# Patient Record
Sex: FEMALE | Race: BLACK OR AFRICAN AMERICAN | NOT HISPANIC OR LATINO | Employment: FULL TIME | URBAN - METROPOLITAN AREA
[De-identification: names, ages, dates, MRNs, and addresses within clinical notes are randomized per-mention and may not be internally consistent; named-entity substitution may affect disease eponyms.]

---

## 2017-01-20 ENCOUNTER — HOSPITAL ENCOUNTER (EMERGENCY)
Facility: OTHER | Age: 37
Discharge: HOME | End: 2017-01-20
Attending: EMERGENCY MEDICINE
Payer: MEDICAID

## 2017-01-20 VITALS
TEMPERATURE: 98 F | WEIGHT: 179 LBS | HEART RATE: 119 BPM | HEIGHT: 64 IN | BODY MASS INDEX: 30.56 KG/M2 | SYSTOLIC BLOOD PRESSURE: 178 MMHG | OXYGEN SATURATION: 98 % | RESPIRATION RATE: 18 BRPM | DIASTOLIC BLOOD PRESSURE: 100 MMHG

## 2017-01-20 DIAGNOSIS — S50.11XA CONTUSION OF RIGHT FOREARM, INITIAL ENCOUNTER: Primary | ICD-10-CM

## 2017-01-20 PROCEDURE — 99283 EMERGENCY DEPT VISIT LOW MDM: CPT
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
## 2017-01-20 NOTE — ED AVS SNAPSHOT
[facility] EMERGENCY DEPARTMENT  [address]  [place] [address]               Shelli Iverson   2017  5:13 PM   ED    Description:  Female : 1980   Department:  [facility] Emergency Department           Your Care was Coordinated By:     Provider Role From To    Xavi Cannon MD Attending Provider 17 [id number] --      Reason for Visit     Arm Injury           Diagnoses this Visit        Comments    Contusion of right forearm, initial encounter    -  Primary       ED Disposition     ED Disposition Condition Comment    Discharge             To Do List           Follow-up Information     Follow up with Cele Isidro MD In 1 week.    Specialty:  Internal Medicine    Contact information:    [address] [place] [address]  [place] [id number] [phone]        [facility]     [facility] On [facility] Nurse Care Line -  Assistance  Registered nurses in the [facility] Call Center provide clinical advisement, health education, appointment booking, and other advisory services.  Call for this free service at [phone].             Medications           Message regarding Medications     Verify the changes and/or additions to your medication regime listed below are the same as discussed with your clinician today.  If any of these changes or additions are incorrect, please notify your healthcare provider.             Verify that the below list of medications is an accurate representation of the medications you are currently taking.  If none reported, the list may be blank. If incorrect, please contact your healthcare provider. Carry this list with you in case of emergency.           Current Medications     amlodipine (NORVASC) 10 MG tablet Take 1 tablet (10 mg total) by mouth once daily.    docusate sodium (COLACE) 100 MG capsule Take 1 capsule (100 mg total) by mouth 2 (two) times daily as needed for Constipation.    ferrous gluconate (FERGON) 324 MG tablet TK 1 T PO  BID WITH MEALS     "hydrocortisone 2.5 % cream     prenatal vit#96-ferrous fum-FA 27 mg iron- 800 mcg Tab tablet TK 1 T PO  QD           Clinical Reference Information           Your Vitals Were     BP Pulse Temp Resp Height Weight    178/100 (BP Location: Left arm, Patient Position: Sitting) 119 98 °F (36.7 °C) (Oral) 18 5' 4" (1.626 m) 81.2 kg (179 lb)    SpO2 BMI             98% 30.73 kg/m2         Allergies as of 1/20/2017        Reactions    Penicillins Rash      Immunizations Administered on Date of Encounter - 1/20/2017     None      ED Micro, Lab, POCT     None      ED Imaging Orders     None        Discharge Instructions         Bruises (Contusions)    A contusion is a bruise. A bruise happens when a blow to your body doesn't break the skin but does break blood vessels beneath the skin. Blood leaking from the broken vessels causes redness and swelling. As it heals, your bruise is likely to turn colors like purple, green, and yellow. This is normal. The bruise should fade in 2 or 3 weeks.  Factors that make you more likely to bruise  Almost everyone bruises now and then. Certain people do bruise more easily than others. You're more prone to bruising as you get older. That's because blood vessels become more fragile with age. You're also more likely to bruise if you have a clotting disorder such as hemophilia or take medications that reduce clotting, including aspirin.  When to go to the emergency room (ER)  Bruises almost always heal on their own without special treatment. But for some people, a bad bruise can be serious. Seek medical care if you:  · Have a clotting disorder such as hemophilia.  · Have cirrhosis or other serious liver disease.  · Take blood-thinning medications such as warfarin (Coumadin).  What to expect in the ER  A doctor will examine your bruise and ask about any health conditions you have. In some cases, you may have a test to check how well your blood clots. Other treatment will depend on your " needs.  Follow-up care  Sometimes a bruise gets worse instead of better. It may become larger and more swollen. This can occur when your body walls off a small pool of blood under the skin (hematoma). In very rare cases, your doctor may need to drain excess blood from the area.  Tip:  Apply an ice pack or bag of frozen peas to a bruise (keep a thin cloth between the cold source and your skin). This can help reduce redness and swelling.   © 5784-7747 [employer]. [address] [place] [address], [place] [address]. All rights reserved. This information is not intended as a substitute for professional medical care. Always follow your healthcare professional's instructions.           [facility] Emergency Department complies with applicable Federal civil rights laws and does not discriminate on the basis of race, color, national origin, age, disability, or sex.        Language Assistance Services     ATTENTION: Language assistance services are available, free of charge. Please call [phone].      ATENCIÓN: Si habla español, tiene a belle disposición servicios gratuitos de asistencia lingüística. Llame al [phone].     PEÑA Ý: N?u b?n nói Ti?ng Vi?t, có các d?ch v? h? tr? ngôn ng? mi?n phí ximenah cho b?n. G?i s? [phone].
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
## 2017-04-11 ENCOUNTER — OFFICE VISIT (OUTPATIENT)
Dept: FAMILY MEDICINE | Facility: CLINIC | Age: 37
End: 2017-04-11
Payer: MEDICAID

## 2017-04-11 VITALS
DIASTOLIC BLOOD PRESSURE: 100 MMHG | OXYGEN SATURATION: 99 % | HEART RATE: 94 BPM | SYSTOLIC BLOOD PRESSURE: 150 MMHG | WEIGHT: 178.38 LBS | BODY MASS INDEX: 30.45 KG/M2 | TEMPERATURE: 98 F | HEIGHT: 64 IN

## 2017-04-11 DIAGNOSIS — I10 HYPERTENSION, ESSENTIAL: ICD-10-CM

## 2017-04-11 PROCEDURE — 99999 PR PBB SHADOW E&M-EST. PATIENT-LVL III: CPT | Mod: PBBFAC,,, | Performed by: NURSE PRACTITIONER

## 2017-04-11 PROCEDURE — 99213 OFFICE O/P EST LOW 20 MIN: CPT | Mod: S$PBB,,, | Performed by: NURSE PRACTITIONER

## 2017-04-11 PROCEDURE — 99213 OFFICE O/P EST LOW 20 MIN: CPT | Mod: PBBFAC,PO | Performed by: NURSE PRACTITIONER

## 2017-04-11 RX ORDER — AMLODIPINE BESYLATE 10 MG/1
10 TABLET ORAL DAILY
Qty: 30 TABLET | Refills: 2 | Status: SHIPPED | OUTPATIENT
Start: 2017-04-11 | End: 2018-01-31 | Stop reason: SDUPTHER

## 2017-04-11 NOTE — PROGRESS NOTES
This dictation has been generated using Dragon Dictation some phonetic errors may occur.     Shelli was seen today for medication refill.    Diagnoses and all orders for this visit:    Hypertension, essential  -     amlodipine (NORVASC) 10 MG tablet; Take 1 tablet (10 mg total) by mouth once daily.      Hypertension.  Patient missed 2 days of medicine.  Discussed avoiding salt.  Resume meds.  Repeat blood pressure in 2 weeks.  Defer labs to 2 weeks.    Return in about 2 weeks (around 2017).      ________________________________________________________________  ________________________________________________________________        Chief Complaint   Patient presents with    Medication Refill     History of present illness  This 36 y.o. presents today for complaint of elevated blood pressure.  Patient has a history of hypertension.  She was recently pregnant and meds were changed.  She indicates that she is no longer breast-feeding.  Her child is 9 months old.  She had a little girl.  She missed her medication yesterday and again today.  She indicates that she did not run out of medicines.  Patient denies any chest pain and headache palpitations.  Review of systems  No fever chills malaise body aches.  Weight is stable.  No dyspnea on exertion.  Orthopnea.      Past Medical History:   Diagnosis Date    GERD (gastroesophageal reflux disease)     Hypertension        Past Surgical History:   Procedure Laterality Date     SECTION      x 1       Family History   Problem Relation Age of Onset    Heart disease Mother      CAD age 38    Hypertension Mother     Diabetes Mother     Hypertension Father        Social History     Social History    Marital status: Single     Spouse name: N/A    Number of children: N/A    Years of education: N/A     Social History Main Topics    Smoking status: Never Smoker    Smokeless tobacco: Never Used    Alcohol use Yes      Comment: RARELY    Drug use: No       Comment: occasional    Sexual activity: Yes     Birth control/ protection: IUD     Other Topics Concern    None     Social History Narrative       Current Outpatient Prescriptions   Medication Sig Dispense Refill    amlodipine (NORVASC) 10 MG tablet Take 1 tablet (10 mg total) by mouth once daily. 30 tablet 2    docusate sodium (COLACE) 100 MG capsule Take 1 capsule (100 mg total) by mouth 2 (two) times daily as needed for Constipation. 60 capsule 1    hydrocortisone 2.5 % cream        No current facility-administered medications for this visit.        Review of patient's allergies indicates:   Allergen Reactions    Penicillins Rash       Physical examination  Vitals Reviewed  Gen. Well-dressed well-nourished no apparent distress  Skin warm dry and intact.  No rashes noted.  Neck is supple without adenopathy  Chest.  Respirations are even unlabored.  Lungs are clear to auscultation.  Cardiac regular rate and rhythm.  No chest wall adenopathy noted.  Neuro. Awake alert oriented x4.  Normal judgment and cognition noted.  Extremities no clubbing cyanosis or edema noted.     Call or return to clinic prn if these symptoms worsen or fail to improve as anticipated.

## 2017-04-11 NOTE — MR AVS SNAPSHOT
[facility]  [address]  [place] [address]  Phone: [phone]  Fax: [phone]                  Shelli Iverson   2017 9:00 AM   Office Visit    Description:  Female : 1980   Provider:  Washington Salvador NP   Department:  [facility] - Family Medicine           Reason for Visit     Medication Refill           Diagnoses this Visit        Comments    Hypertension, essential                To Do List           Goals (5 Years of Data)     None      Follow-Up and Disposition     Return in about 2 weeks (around 2017).       These Medications        Disp Refills Start End    amlodipine (NORVASC) 10 MG tablet 30 tablet 2 2017    Take 1 tablet (10 mg total) by mouth once daily. - Oral    Pharmacy: [facility] - [place]  [address]  #: [phone]         [facility] On [facility]     [facility] On Call Nurse Care Line -  Assistance  Unless otherwise directed by your provider, please contact [facility] On-Call, our nurse care line that is available for  assistance.     Registered nurses in the [facility] On Call Center provide: appointment scheduling, clinical advisement, health education, and other advisory services.  Call: [phone] (toll free)               Medications           Message regarding Medications     Verify the changes and/or additions to your medication regime listed below are the same as discussed with your clinician today.  If any of these changes or additions are incorrect, please notify your healthcare provider.        STOP taking these medications     ferrous gluconate (FERGON) 324 MG tablet TK 1 T PO  BID WITH MEALS    prenatal vit#96-ferrous fum-FA 27 mg iron- 800 mcg Tab tablet TK 1 T PO  QD           Verify that the below list of medications is an accurate representation of the medications you are currently taking.  If none reported, the list may be blank. If incorrect,  "please contact your healthcare provider. Carry this list with you in case of emergency.           Current Medications     amlodipine (NORVASC) 10 MG tablet Take 1 tablet (10 mg total) by mouth once daily.    docusate sodium (COLACE) 100 MG capsule Take 1 capsule (100 mg total) by mouth 2 (two) times daily as needed for Constipation.    hydrocortisone 2.5 % cream            Clinical Reference Information           Your Vitals Were     BP Pulse Temp Height    150/100 (BP Location: Right arm, Patient Position: Sitting, BP Method: Manual) 94 98.1 °F (36.7 °C) (Oral) 5' 4" (1.626 m)    Weight Last Period SpO2 BMI    80.9 kg (178 lb 5.6 oz) 04/01/2017 (Exact Date) 99% 30.61 kg/m2      Blood Pressure          Most Recent Value    BP  (!)  150/100      Allergies as of 4/11/2017     Penicillins      Immunizations Administered on Date of Encounter - 4/11/2017     None      Language Assistance Services     ATTENTION: Language assistance services are available, free of charge. Please call [phone].      ATENCIÓN: Si habla español, tiene a belle disposición servicios gratuitos de asistencia lingüística. Llame al [phone].     PEÑA Ý: N?u b?n nói Ti?ng Vi?t, có các d?ch v? h? tr? ngôn ng? mi?n phí dành cho b?n. G?i s? [phone].         [facility] - Family Medicine complies with applicable Federal civil rights laws and does not discriminate on the basis of race, color, national origin, age, disability, or sex.        "

## 2017-08-17 ENCOUNTER — TELEPHONE (OUTPATIENT)
Dept: FAMILY MEDICINE | Facility: CLINIC | Age: 37
End: 2017-08-17
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
## 2017-08-17 NOTE — TELEPHONE ENCOUNTER
----- Message from Riddhi Duff sent at 8/17/2017  4:19 PM CDT -----  Contact: Self/[phone]  Patient returned staff's call. Thank you.

## 2017-08-19 ENCOUNTER — HOSPITAL ENCOUNTER (OUTPATIENT)
Dept: RADIOLOGY | Facility: HOSPITAL | Age: 37
Discharge: HOME | End: 2017-08-19
Attending: INTERNAL MEDICINE
Payer: MEDICAID

## 2017-08-19 ENCOUNTER — OFFICE VISIT (OUTPATIENT)
Dept: FAMILY MEDICINE | Facility: CLINIC | Age: 37
End: 2017-08-19
Payer: MEDICAID

## 2017-08-19 VITALS
OXYGEN SATURATION: 99 % | SYSTOLIC BLOOD PRESSURE: 150 MMHG | DIASTOLIC BLOOD PRESSURE: 110 MMHG | HEIGHT: 64 IN | WEIGHT: 179.88 LBS | TEMPERATURE: 98 F | HEART RATE: 95 BPM | BODY MASS INDEX: 30.71 KG/M2

## 2017-08-19 DIAGNOSIS — M54.2 NECK PAIN, ACUTE: ICD-10-CM

## 2017-08-19 DIAGNOSIS — M54.2 NECK PAIN, ACUTE: Primary | ICD-10-CM

## 2017-08-19 PROCEDURE — 3008F BODY MASS INDEX DOCD: CPT | Mod: ,,, | Performed by: INTERNAL MEDICINE

## 2017-08-19 PROCEDURE — 99999 PR PBB SHADOW E&M-EST. PATIENT-LVL III: CPT | Mod: PBBFAC,,, | Performed by: INTERNAL MEDICINE

## 2017-08-19 PROCEDURE — 99213 OFFICE O/P EST LOW 20 MIN: CPT | Mod: S$PBB,,, | Performed by: INTERNAL MEDICINE

## 2017-08-19 PROCEDURE — 72040 X-RAY EXAM NECK SPINE 2-3 VW: CPT | Mod: TC,PO

## 2017-08-19 PROCEDURE — 72040 X-RAY EXAM NECK SPINE 2-3 VW: CPT | Mod: 26,,, | Performed by: RADIOLOGY

## 2017-08-19 RX ORDER — ETODOLAC 400 MG/1
400 TABLET, FILM COATED ORAL 2 TIMES DAILY
Qty: 20 TABLET | Refills: 0 | Status: SHIPPED | OUTPATIENT
Start: 2017-08-19 | End: 2019-04-22

## 2017-08-19 NOTE — PROGRESS NOTES
"Subjective:       Shelli Iverson is a 37 y.o. female who presents for evaluation of neck pain. 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 Onset of symptoms was 4 days ago, and have been gradually worsening since that time. Current symptoms are pain in right paraspinous area (aching in character; 7/10 in severity) and stiffness in neck. Patient denies numbness in arms. Patient has had no prior neck problems. Previous treatments: none.    The following portions of the patient's history were reviewed and updated as appropriate: allergies, current medications, past family history, past medical history, past social history, past surgical history and problem list.    Review of Systems  Cardiovascular: blood pressure usually well controlled  Musculoskeletal:positive for neck pain  very stressed by present work assignment      Objective:      BP (!) 150/110 (BP Location: Right arm, Patient Position: Sitting, BP Method: X-Large (Manual))   Pulse 95   Temp 98.4 °F (36.9 °C)   Ht 5' 4" (1.626 m)   Wt 81.6 kg (179 lb 14.3 oz)   SpO2 99%   BMI 30.88 kg/m²   General:   alert, appears stated age and cooperative   External Deformity:  absent   ROM Cervical Spine:  normal range of motion   Midline Tenderness:  moderate on the right   Paraspinous tenderness:  mild on the right   UE Neurologic Exam:  normal strength, normal sensation, normal reflexes     X-ray of the cervical spine: loss of normal lordosis      Assessment:      Cervical strain      Plan:      Educational material distributed.  NSAIDs per medication orders.   "

## 2017-09-04 ENCOUNTER — HOSPITAL ENCOUNTER (EMERGENCY)
Facility: OTHER | Age: 37
Discharge: HOME | End: 2017-09-04
Attending: INTERNAL MEDICINE
Payer: MEDICAID

## 2017-09-04 VITALS
BODY MASS INDEX: 29.19 KG/M2 | RESPIRATION RATE: 17 BRPM | OXYGEN SATURATION: 99 % | DIASTOLIC BLOOD PRESSURE: 110 MMHG | TEMPERATURE: 99 F | WEIGHT: 171 LBS | HEART RATE: 88 BPM | HEIGHT: 64 IN | SYSTOLIC BLOOD PRESSURE: 143 MMHG

## 2017-09-04 DIAGNOSIS — B37.31 VAGINAL CANDIDIASIS: Primary | ICD-10-CM

## 2017-09-04 PROCEDURE — 99283 EMERGENCY DEPT VISIT LOW MDM: CPT

## 2017-09-04 RX ORDER — FLUCONAZOLE 150 MG/1
150 TABLET ORAL ONCE
Qty: 1 TABLET | Refills: 0 | Status: SHIPPED | OUTPATIENT
Start: 2017-09-04 | End: 2017-09-04

## 2017-09-06 ENCOUNTER — OFFICE VISIT (OUTPATIENT)
Dept: FAMILY MEDICINE | Facility: CLINIC | Age: 37
End: 2017-09-06
Payer: MEDICAID

## 2017-09-06 VITALS
WEIGHT: 179.13 LBS | BODY MASS INDEX: 30.58 KG/M2 | DIASTOLIC BLOOD PRESSURE: 80 MMHG | HEART RATE: 94 BPM | SYSTOLIC BLOOD PRESSURE: 130 MMHG | HEIGHT: 64 IN | OXYGEN SATURATION: 99 % | TEMPERATURE: 98 F

## 2017-09-06 DIAGNOSIS — B37.31 VAGINAL CANDIDIASIS: Primary | ICD-10-CM

## 2017-09-06 DIAGNOSIS — N89.8 VAGINAL IRRITATION: ICD-10-CM

## 2017-09-06 LAB
BILIRUB SERPL-MCNC: NORMAL MG/DL
BLOOD URINE, POC: 250
COLOR, POC UA: YELLOW
GLUCOSE UR QL STRIP: NORMAL
KETONES UR QL STRIP: NORMAL
LEUKOCYTE ESTERASE URINE, POC: NORMAL
NITRITE, POC UA: NORMAL
PH, POC UA: 5
PROTEIN, POC: NORMAL
SPECIFIC GRAVITY, POC UA: 1.02
UROBILINOGEN, POC UA: NORMAL

## 2017-09-06 PROCEDURE — 99214 OFFICE O/P EST MOD 30 MIN: CPT | Mod: S$PBB,,, | Performed by: NURSE PRACTITIONER

## 2017-09-06 PROCEDURE — 3008F BODY MASS INDEX DOCD: CPT | Mod: ,,, | Performed by: NURSE PRACTITIONER

## 2017-09-06 PROCEDURE — 99999 PR PBB SHADOW E&M-EST. PATIENT-LVL IV: CPT | Mod: PBBFAC,,, | Performed by: NURSE PRACTITIONER

## 2017-09-06 PROCEDURE — 99214 OFFICE O/P EST MOD 30 MIN: CPT | Mod: PBBFAC,PO | Performed by: NURSE PRACTITIONER

## 2017-09-06 PROCEDURE — 81002 URINALYSIS NONAUTO W/O SCOPE: CPT | Mod: PBBFAC,PO | Performed by: NURSE PRACTITIONER

## 2017-09-06 RX ORDER — FLUCONAZOLE 150 MG/1
TABLET ORAL
Refills: 0 | COMMUNITY
Start: 2017-09-04 | End: 2019-04-22

## 2017-09-06 RX ORDER — FLUCONAZOLE 150 MG/1
150 TABLET ORAL DAILY
Qty: 2 TABLET | Refills: 0 | Status: SHIPPED | OUTPATIENT
Start: 2017-09-06 | End: 2017-09-07

## 2017-09-06 NOTE — PROGRESS NOTES
"History of Present Illness   Shelli Iverson is a 37 y.o. woman with medical history as listed below who presents today for ER follow-up. She was seen in ER on 17 for possible vaginal candidiasis and was treated with one time dose of Diflucan. She states that she took the medication but is still having internal vaginal itching. She denies vaginal irritation or pain. She has no discharge or odor. She has no urinary symptoms. She has recently changed soaps and soaked in the tub. She is sexually active with one partner and does use protection. She has no additional complaints and is otherwise healthy on today's visit.      Past Medical History:   Diagnosis Date    GERD (gastroesophageal reflux disease)     Hypertension        Past Surgical History:   Procedure Laterality Date     SECTION      x 1       Social History     Social History    Marital status: Single     Spouse name: N/A    Number of children: N/A    Years of education: N/A     Social History Main Topics    Smoking status: Never Smoker    Smokeless tobacco: Never Used    Alcohol use Yes      Comment: RARELY    Drug use: No      Comment: occasional    Sexual activity: Yes     Birth control/ protection: IUD     Other Topics Concern    None     Social History Narrative    None       Family History   Problem Relation Age of Onset    Heart disease Mother      CAD age 38    Hypertension Mother     Diabetes Mother     Hypertension Father        Review of Systems  Review of Systems   Constitutional: Negative for chills and fever.   Genitourinary: Negative for dysuria and hematuria.        Positive for vaginal itching.  Negative for vaginal discharge, lesions, pain, irritation.   Skin: Negative for itching and rash.     A complete review of systems was otherwise negative.    Physical Exam  /80 (BP Location: Right arm, Patient Position: Sitting)   Pulse 94   Temp 98.1 °F (36.7 °C) (Oral)   Ht 5' 4" (1.626 m)   Wt 81.3 kg (179 lb " 2 oz)   LMP 08/11/2017   SpO2 99%   BMI 30.75 kg/m²   General appearance: alert, appears stated age, cooperative and no distress  Pelvic: deferred  Neurologic: Grossly normal    Assessment/Plan  Vaginal candidiasis  Urine dipstick negative for signs of UTI and no glucose spilling in urine, history of gestational diabetes.  We discussed taking Diflucan tonight and again in 48 hours if no improvement.  I suggest she go back to using unscented soaps and not use douching products or soaking in tub.   She may also use rePHresh products OTC.  Symptoms not consistent with BV, and she has declined pelvic exam at this time.  She would not like STI screening.  If no improvement in 48 hours, will return for follow-up visit.  -     fluconazole (DIFLUCAN) 150 MG Tab; Take 1 tablet (150 mg total) by mouth once daily.  Dispense: 2 tablet; Refill: 0    Vaginal irritation  As above.  -     POCT urine dipstick without microscope  -     fluconazole (DIFLUCAN) 150 MG Tab; Take 1 tablet (150 mg total) by mouth once daily.  Dispense: 2 tablet; Refill: 0    She has verbalized understanding and is in agreement with plan of care.  Return if symptoms worsen or fail to improve.

## 2017-09-06 NOTE — PATIENT INSTRUCTIONS
Preventing Vaginal Infection  These steps can help you stay comfortable during treatment of a vaginal infection. They also help prevent vaginal infections in the future.  Keeping a healthy balance  Factors that change the normal balance in the vagina can lead to a vaginal infection. To help keep the balance normal, try these tips:  · Change out of wet bathing suits and damp exercise clothes as soon as possible. Yeast thrive in a warm, moist environment.  · Avoid wearing tight pants. Choose cotton underwear and pantyhose that have a cotton crotch. Cotton keeps you cooler and drier than synthetics.  · Don't douche unless directed by your health care provider. Douching can destroy friendly bacteria and change the vagina's normal balance.  · Wipe from front to back after using the toilet. This prevents bacteria from spreading from the anus to the vulva.  · Wash the vulva with mild, unscented soap or with plain water.  · Wash your diaphragm, spermicide applicators, and sex toys with mild soap and water after use. Dry them thoroughly before putting them away.  · Change tampons often (every 2 hours to 4 hours). Leaving a tampon in for too long may disrupt the balance of vaginal bacteria.  · Avoid vaginal sprays, scented toilet paper and soaps, and deodorant tampons or pads, which can cause vaginal irritation  Staying healthy overall  Good overall health can help you resist infection. To be healthier:  · Help protect yourself from STDs by using latex condoms for intercourse. Ask your health care provider for more information about safer sex.  · Eat a variety of healthy foods.  · Exercise regularly.  · Get enough rest and sleep.  · Maintain a healthy weight. If you need to lose weight, ask your health care provider for advice on how to start.  Date Last Reviewed: 5/18/2015  © 8438-5719 [employer]. [address] [place] [address], [place] [address]. All rights reserved. This information is not intended as a substitute  for professional medical care. Always follow your healthcare professional's instructions.        Preventing Vaginitis     Use mild, unscented soap when you bathe or shower to avoid irritating your vagina.    Vaginitis is irritation or infection of the vagina or vulva (the outside opening of the vagina). Vaginitis can be caused by bacteria, viruses, parasites, or yeast. Chemicals (such as in perfumes or soaps or in spermicides) can sometimes be a cause. Vaginitis can be caused by hormone changes in pregnancy or with menopause. You can help prevent vaginitis. Follow the tips below. And see your health care provider if you have any symptoms.  Hygiene  · Avoid chemicals. Do not use vaginal sprays. Do not use scented toilet paper or tampons that are scented. Sprays and scents have chemicals that can irritate your vagina.  · Do not douche unless you are told to by your health care provider. Douching is rarely needed. And it upsets the normal balance in the vagina.  · Wash yourself well. Wash the outer vaginal area (vulva) every day with mild, unscented soap. Keep it as dry as possible.  · Wipe correctly. Make sure to wipe from front to back after a bowel movement. This helps keep from spreading bacteria from your anus to your vagina.  · Change your tampon often. During your period, make sure to change your tampon as often as directed on the package. This allows the normal flow of vaginal discharge and blood.  Lifestyle  · Limit your number of sexual partners. The more partners you have, the greater your risk of infection. Using condoms helps reduce your risk.  · Get enough sleep. Sleep helps keep your bodys immune system healthy. This helps you fight infection.  · Lose weight, if needed. Excess weight can reduce air circulation around your vagina. This can increase your risk of infection.  · Exercise regularly. Regular activity helps keep your body healthy.  · Take antibiotics only as directed. Antibiotics can change the  normal chemical balance in the vagina.    Clothing  · Dont sit in wet clothes. Yeast thrives when its warm and damp.  · Dont wear tight pants. And dont wear tights, leggings, or hose without a cotton crotch. These types of clothing trap warmth and moisture.  · Wear cotton underwear. Cotton lets air circulate around the vagina.  Symptoms of vaginitis  · Irritation, swelling, or itching of the genital area  · Vaginal discharge  · Bad vaginal odor  · Pain or burning during urination   Date Last Reviewed: 5/10/2015  © 6218-9471 [employer]. [address] [place] [address], [place] [address]. All rights reserved. This information is not intended as a substitute for professional medical care. Always follow your healthcare professional's instructions.

## 2017-09-08 NOTE — ED PROVIDER NOTES
Encounter Date: 2017       History     Chief Complaint   Patient presents with    Vaginal Itching     vaginal itching x 2 days, denies any odor or discharge, denies any OTC meds     37-year-old female presents to the emergency department complaining of vaginal itching times one day.  She states she has yeast infection and would like Diflucan.  She also states she does not want to have a pelvic exam performed on her today.      The history is provided by the patient. No  was used.   Female  Problem   Primary symptoms include discharge, genital itching.   This is a new problem. The current episode started today. The problem occurs intermittently. The problem has been unchanged. She is not pregnant. The discharge was white.     Review of patient's allergies indicates:   Allergen Reactions    Penicillins Rash     Past Medical History:   Diagnosis Date    GERD (gastroesophageal reflux disease)     Hypertension      Past Surgical History:   Procedure Laterality Date     SECTION      x 1     Family History   Problem Relation Age of Onset    Heart disease Mother      CAD age 38    Hypertension Mother     Diabetes Mother     Hypertension Father      Social History   Substance Use Topics    Smoking status: Never Smoker    Smokeless tobacco: Never Used    Alcohol use Yes      Comment: RARELY     Review of Systems   Constitutional: Negative.    Genitourinary:        Vaginal itching   All other systems reviewed and are negative.      Physical Exam     Initial Vitals [17 1009]   BP Pulse Resp Temp SpO2   (!) 143/110 88 17 98.5 °F (36.9 °C) 99 %      MAP       121         Physical Exam    Nursing note and vitals reviewed.  Constitutional: She appears well-developed and well-nourished. No distress.   HENT:   Head: Normocephalic.   Eyes: EOM are normal.   Neck: Normal range of motion.   Cardiovascular: Normal rate.   Pulmonary/Chest: Breath sounds normal. No respiratory distress.    Abdominal: Soft.   Genitourinary:   Genitourinary Comments: Refused pelvic exam   Neurological: She is alert.   Skin: Skin is warm. No erythema.   Psychiatric: She has a normal mood and affect.         ED Course   Procedures  Labs Reviewed - No data to display          Medical Decision Making:   Initial Assessment:   37-year-old female presents to the emergency department complaining of vaginal itching times one day.  She states she has yeast infection and would like Diflucan.  She also states she does not want to have a pelvic exam performed on her today.    Differential Diagnosis:   Canna vaginitis  Gonorrhea  Trichomonas  ED Management:  Patient was given a prescription for Diflucan and instructions for candidal vaginitis.  She was advised to follow-up with her primary care physician tomorrow for reevaluation.                   ED Course      Clinical Impression:   The encounter diagnosis was Vaginal candidiasis.    Disposition:   Disposition: Discharged  Condition: Stable                        Carlos Stone MD  09/08/17 0855

## 2017-10-14 ENCOUNTER — HOSPITAL ENCOUNTER (EMERGENCY)
Facility: OTHER | Age: 37
Discharge: HOME | End: 2017-10-14
Attending: EMERGENCY MEDICINE
Payer: MEDICAID

## 2017-10-14 VITALS
HEART RATE: 95 BPM | SYSTOLIC BLOOD PRESSURE: 121 MMHG | RESPIRATION RATE: 18 BRPM | OXYGEN SATURATION: 99 % | TEMPERATURE: 98 F | BODY MASS INDEX: 29.71 KG/M2 | HEIGHT: 64 IN | WEIGHT: 174 LBS | DIASTOLIC BLOOD PRESSURE: 74 MMHG

## 2017-10-14 DIAGNOSIS — N83.201 OVARIAN CYST, RIGHT: ICD-10-CM

## 2017-10-14 DIAGNOSIS — N30.00 ACUTE CYSTITIS WITHOUT HEMATURIA: Primary | ICD-10-CM

## 2017-10-14 DIAGNOSIS — R10.31 ABDOMINAL PAIN, RLQ: ICD-10-CM

## 2017-10-14 LAB
ALBUMIN SERPL-MCNC: 3.8 G/DL (ref 3.3–5.5)
ALP SERPL-CCNC: 84 U/L (ref 42–141)
B-HCG UR QL: NEGATIVE
BILIRUB SERPL-MCNC: 0.9 MG/DL (ref 0.2–1.6)
BILIRUBIN, POC UA: NEGATIVE
BLOOD, POC UA: NEGATIVE
BUN SERPL-MCNC: 9 MG/DL (ref 7–22)
CALCIUM SERPL-MCNC: 8.6 MG/DL (ref 8–10.3)
CHLORIDE SERPL-SCNC: 101 MMOL/L (ref 98–108)
CLARITY, POC UA: CLEAR
COLOR, POC UA: YELLOW
CREAT SERPL-MCNC: 0.6 MG/DL (ref 0.6–1.2)
CTP QC/QA: YES
GLUCOSE SERPL-MCNC: 129 MG/DL (ref 73–118)
GLUCOSE, POC UA: NEGATIVE
HCO3 UR-SCNC: 21.7 MMOL/L (ref 24–28)
KETONES, POC UA: NEGATIVE
LDH SERPL L TO P-CCNC: 3.3 MMOL/L (ref 0.5–2.2)
LEUKOCYTE EST, POC UA: ABNORMAL
NITRITE, POC UA: NEGATIVE
PCO2 BLDA: 38.4 MMHG (ref 35–45)
PH SMN: 7.36 [PH] (ref 7.35–7.45)
PH UR STRIP: 6 [PH]
PO2 BLDA: 31 MMHG (ref 40–60)
POC ALT (SGPT): 20 U/L (ref 10–47)
POC AST (SGOT): 24 U/L (ref 11–38)
POC BE: -4 MMOL/L
POC SATURATED O2: 57 % (ref 95–100)
POC TCO2: 23 MMOL/L (ref 24–29)
POC TCO2: 24 MMOL/L (ref 18–33)
POTASSIUM BLD-SCNC: 3.9 MMOL/L (ref 3.6–5.1)
PROTEIN, POC UA: ABNORMAL
PROTEIN, POC: 7.5 G/DL (ref 6.4–8.1)
SAMPLE: ABNORMAL
SODIUM BLD-SCNC: 137 MMOL/L (ref 128–145)
SPECIFIC GRAVITY, POC UA: 1.02
UROBILINOGEN, POC UA: 0.2 E.U./DL

## 2017-10-14 PROCEDURE — 80053 COMPREHEN METABOLIC PANEL: CPT

## 2017-10-14 PROCEDURE — 25000003 PHARM REV CODE 250: Performed by: EMERGENCY MEDICINE

## 2017-10-14 PROCEDURE — 82803 BLOOD GASES ANY COMBINATION: CPT

## 2017-10-14 PROCEDURE — 99284 EMERGENCY DEPT VISIT MOD MDM: CPT

## 2017-10-14 PROCEDURE — 85025 COMPLETE CBC W/AUTO DIFF WBC: CPT

## 2017-10-14 PROCEDURE — 25500020 PHARM REV CODE 255

## 2017-10-14 PROCEDURE — 87086 URINE CULTURE/COLONY COUNT: CPT

## 2017-10-14 PROCEDURE — 25000003 PHARM REV CODE 250: Performed by: NURSE PRACTITIONER

## 2017-10-14 PROCEDURE — 96375 TX/PRO/DX INJ NEW DRUG ADDON: CPT

## 2017-10-14 PROCEDURE — 81025 URINE PREGNANCY TEST: CPT | Performed by: EMERGENCY MEDICINE

## 2017-10-14 PROCEDURE — 81003 URINALYSIS AUTO W/O SCOPE: CPT

## 2017-10-14 PROCEDURE — 96361 HYDRATE IV INFUSION ADD-ON: CPT

## 2017-10-14 PROCEDURE — 96365 THER/PROPH/DIAG IV INF INIT: CPT | Mod: 59

## 2017-10-14 PROCEDURE — 96372 THER/PROPH/DIAG INJ SC/IM: CPT

## 2017-10-14 PROCEDURE — 63600175 PHARM REV CODE 636 W HCPCS: Performed by: NURSE PRACTITIONER

## 2017-10-14 RX ORDER — ONDANSETRON 2 MG/ML
4 INJECTION INTRAMUSCULAR; INTRAVENOUS
Status: COMPLETED | OUTPATIENT
Start: 2017-10-14 | End: 2017-10-14

## 2017-10-14 RX ORDER — HYDROMORPHONE HYDROCHLORIDE 1 MG/ML
0.5 INJECTION, SOLUTION INTRAMUSCULAR; INTRAVENOUS; SUBCUTANEOUS
Status: COMPLETED | OUTPATIENT
Start: 2017-10-14 | End: 2017-10-14

## 2017-10-14 RX ORDER — HYDROCODONE BITARTRATE AND ACETAMINOPHEN 5; 325 MG/1; MG/1
1 TABLET ORAL EVERY 8 HOURS PRN
Qty: 20 TABLET | Refills: 0 | Status: SHIPPED | OUTPATIENT
Start: 2017-10-14 | End: 2019-04-22

## 2017-10-14 RX ORDER — OXYCODONE AND ACETAMINOPHEN 5; 325 MG/1; MG/1
2 TABLET ORAL
Status: COMPLETED | OUTPATIENT
Start: 2017-10-14 | End: 2017-10-14

## 2017-10-14 RX ORDER — NITROFURANTOIN 25; 75 MG/1; MG/1
100 CAPSULE ORAL EVERY 12 HOURS
Qty: 14 CAPSULE | Refills: 0 | Status: SHIPPED | OUTPATIENT
Start: 2017-10-14 | End: 2017-10-21

## 2017-10-14 RX ORDER — CIPROFLOXACIN 2 MG/ML
400 INJECTION, SOLUTION INTRAVENOUS
Status: DISCONTINUED | OUTPATIENT
Start: 2017-10-14 | End: 2017-10-14

## 2017-10-14 RX ORDER — DICYCLOMINE HYDROCHLORIDE 10 MG/ML
20 INJECTION INTRAMUSCULAR
Status: COMPLETED | OUTPATIENT
Start: 2017-10-14 | End: 2017-10-14

## 2017-10-14 RX ORDER — KETOROLAC TROMETHAMINE 30 MG/ML
15 INJECTION, SOLUTION INTRAMUSCULAR; INTRAVENOUS
Status: COMPLETED | OUTPATIENT
Start: 2017-10-14 | End: 2017-10-14

## 2017-10-14 RX ADMIN — HYDROMORPHONE HYDROCHLORIDE 0.5 MG: 1 INJECTION, SOLUTION INTRAMUSCULAR; INTRAVENOUS; SUBCUTANEOUS at 06:10

## 2017-10-14 RX ADMIN — KETOROLAC TROMETHAMINE 15 MG: 30 INJECTION, SOLUTION INTRAMUSCULAR at 06:10

## 2017-10-14 RX ADMIN — SODIUM CHLORIDE 1000 ML: 0.9 INJECTION, SOLUTION INTRAVENOUS at 07:10

## 2017-10-14 RX ADMIN — ONDANSETRON 4 MG: 2 INJECTION INTRAMUSCULAR; INTRAVENOUS at 05:10

## 2017-10-14 RX ADMIN — CEFTRIAXONE 1 G: 1 INJECTION, SOLUTION INTRAVENOUS at 07:10

## 2017-10-14 RX ADMIN — IOHEXOL 75 ML: 350 INJECTION, SOLUTION INTRAVENOUS at 07:10

## 2017-10-14 RX ADMIN — OXYCODONE AND ACETAMINOPHEN 2 TABLET: 5; 325 TABLET ORAL at 09:10

## 2017-10-14 RX ADMIN — DICYCLOMINE HYDROCHLORIDE 20 MG: 20 INJECTION, SOLUTION INTRAMUSCULAR at 05:10

## 2017-10-14 NOTE — ED NOTES
C/o vomiting and diarrhea x 2 days, has felt hot with chills but has noot measured her temperature at home

## 2017-10-15 NOTE — ED PROVIDER NOTES
Encounter Date: 10/14/2017       History     Chief Complaint   Patient presents with    Abdominal Pain     patient reports having abdominal pain, nausea,vomiting and diarrhea X2 days    Vomiting    Nausea    Diarrhea     The history is provided by the patient. No  was used.   Abdominal Pain   The current episode started two days ago. The onset of the illness was gradual. The problem has been gradually worsening. The abdominal pain is located in the RLQ. The abdominal pain does not radiate. The severity of the abdominal pain is 9/10. The abdominal pain is relieved by nothing. The abdominal pain is exacerbated by movement. The other symptoms of the illness include nausea and vomiting. The other symptoms of the illness do not include fever, fatigue, jaundice, melena, shortness of breath, diarrhea, dysuria, hematemesis, hematochezia, vaginal discharge or vaginal bleeding.   Nausea began 2 days ago.   The vomiting began 2 days ago. Vomiting occurs 2 to 5 times per day. The emesis contains bilious material.   The patient states that she believes she is currently not pregnant. Symptoms associated with the illness do not include constipation or back pain.     Review of patient's allergies indicates:   Allergen Reactions    Penicillins Rash     Past Medical History:   Diagnosis Date    GERD (gastroesophageal reflux disease)     Hypertension      Past Surgical History:   Procedure Laterality Date     SECTION      x 1     Family History   Problem Relation Age of Onset    Heart disease Mother      CAD age 38    Hypertension Mother     Diabetes Mother     Hypertension Father      Social History   Substance Use Topics    Smoking status: Never Smoker    Smokeless tobacco: Never Used    Alcohol use Yes      Comment: RARELY     Review of Systems   Constitutional: Negative.  Negative for fatigue and fever.   HENT: Negative.  Negative for sore throat.    Eyes: Negative.    Respiratory:  Negative.  Negative for shortness of breath.    Cardiovascular: Negative.  Negative for chest pain.   Gastrointestinal: Positive for abdominal pain, nausea and vomiting. Negative for constipation, diarrhea, hematemesis, hematochezia, jaundice and melena.   Genitourinary: Negative.  Negative for dysuria, vaginal bleeding and vaginal discharge.   Musculoskeletal: Negative.  Negative for back pain.   Skin: Negative.  Negative for rash.   Allergic/Immunologic: Negative.    Neurological: Negative.  Negative for weakness.   Hematological: Does not bruise/bleed easily.   Psychiatric/Behavioral: Negative.    All other systems reviewed and are negative.      Physical Exam     Initial Vitals   BP Pulse Resp Temp SpO2   10/14/17 1702 10/14/17 1702 10/14/17 1834 10/14/17 1702 10/14/17 1702   (!) 145/103 (!) 113 18 99.3 °F (37.4 °C) 100 %      MAP       10/14/17 1702       117         Physical Exam    Nursing note and vitals reviewed.  Constitutional: She appears well-developed and well-nourished. She is not diaphoretic.  Non-toxic appearance. She does not appear ill. No distress.   HENT:   Head: Normocephalic and atraumatic.   Eyes: Conjunctivae are normal. Right eye exhibits no discharge. Left eye exhibits no discharge.   Neck: Normal range of motion.   Cardiovascular: Regular rhythm, normal heart sounds and intact distal pulses. Tachycardia present.  Exam reveals no gallop and no friction rub.    No murmur heard.  Pulmonary/Chest: Breath sounds normal. No respiratory distress. She has no wheezes. She has no rhonchi. She has no rales. She exhibits no tenderness.   Abdominal: Soft. Normal appearance and bowel sounds are normal. She exhibits no shifting dullness, no distension, no pulsatile liver, no fluid wave, no abdominal bruit, no ascites, no pulsatile midline mass and no mass. There is no hepatosplenomegaly. There is tenderness in the right lower quadrant. There is tenderness at McBurney's point. There is no CVA tenderness  and negative Lai's sign. No hernia.   Musculoskeletal: Normal range of motion.   Neurological: She is alert and oriented to person, place, and time.   Skin: Skin is warm and dry. Capillary refill takes less than 2 seconds. No rash noted.   Psychiatric: She has a normal mood and affect. Her behavior is normal. Judgment and thought content normal.         ED Course   Procedures  Labs Reviewed   POCT URINALYSIS W/O SCOPE - Abnormal; Notable for the following:        Result Value    Glucose, UA Negative (*)     Bilirubin, UA Negative (*)     Ketones, UA Negative (*)     Blood, UA Negative (*)     Protein, UA 1+ (*)     Nitrite, UA Negative (*)     Leukocytes, UA Trace (*)     All other components within normal limits   POCT CMP - Abnormal; Notable for the following:     POC Creatinine 0.6 (*)     POC Glucose 129 (*)     All other components within normal limits   ISTAT PROCEDURE - Abnormal; Notable for the following:     POC PO2 31 (*)     POC HCO3 21.7 (*)     POC SATURATED O2 57 (*)     POC Lactate 3.30 (*)     POC TCO2 23 (*)     All other components within normal limits   CULTURE, URINE   POCT URINE PREGNANCY   POCT URINALYSIS W/O SCOPE   POCT CBC   POCT CMP             Medical Decision Making:   Differential Diagnosis:   Appendicitis, sepsis, gastritis, tachycardia, dehydration, UTI  Clinical Tests:   Lab Tests: Ordered and Reviewed  Radiological Study: Ordered and Reviewed  ED Management:  Zofran IV, Toradol IV, Dilaudid IV, Bentyl IM, Ciprofloxin IV, Flagyl IV, normal saline boluses IV ×2 given in the ER.              Attending Attestation:     Physician Attestation Statement for NP/PA:   I have conducted a face to face encounter with this patient in addition to the NP/PA, due to Medical Complexity    Other NP/PA Attestation Additions:    History of Present Illness: 37-year-old who presents with abdominal pain    Medical Decision Making: Patient presented with pain to her right lower quadrant.  CT showed no  evidence of appendicitis.  However there was evidence of a right ovarian cyst.  Patient also had a UTI.  She was given Rocephin in the ED and her pain was treated with hydromorphone and Percocet.  Patient will be discharged on Macrobid and hydrocodone.  Urine culture was sent                 ED Course      Clinical Impression:   The primary encounter diagnosis was Acute cystitis without hematuria. Diagnoses of Ovarian cyst, right and Abdominal pain, RLQ were also pertinent to this visit.                           Radha Ross MD  10/15/17 0116

## 2017-10-16 LAB — BACTERIA UR CULT: NORMAL

## 2017-10-26 ENCOUNTER — HOSPITAL ENCOUNTER (EMERGENCY)
Facility: OTHER | Age: 37
Discharge: HOME | End: 2017-10-26
Attending: EMERGENCY MEDICINE
Payer: MEDICAID

## 2017-10-26 VITALS
BODY MASS INDEX: 31.07 KG/M2 | WEIGHT: 182 LBS | HEIGHT: 64 IN | SYSTOLIC BLOOD PRESSURE: 135 MMHG | DIASTOLIC BLOOD PRESSURE: 87 MMHG | TEMPERATURE: 100 F | HEART RATE: 106 BPM | OXYGEN SATURATION: 100 % | RESPIRATION RATE: 18 BRPM

## 2017-10-26 DIAGNOSIS — R52 BODY ACHES: ICD-10-CM

## 2017-10-26 DIAGNOSIS — R50.9 FEVER, UNSPECIFIED FEVER CAUSE: ICD-10-CM

## 2017-10-26 DIAGNOSIS — B34.9 VIRAL ILLNESS: Primary | ICD-10-CM

## 2017-10-26 LAB
B-HCG UR QL: NEGATIVE
BILIRUBIN, POC UA: NEGATIVE
BLOOD, POC UA: ABNORMAL
CLARITY, POC UA: ABNORMAL
COLOR, POC UA: YELLOW
CTP QC/QA: YES
FLUAV AG NPH QL: NEGATIVE
FLUBV AG NPH QL: NEGATIVE
GLUCOSE, POC UA: NEGATIVE
KETONES, POC UA: NEGATIVE
LEUKOCYTE EST, POC UA: ABNORMAL
NITRITE, POC UA: NEGATIVE
PH UR STRIP: 6 [PH]
PROTEIN, POC UA: ABNORMAL
S PYO RRNA THROAT QL PROBE: NEGATIVE
SPECIFIC GRAVITY, POC UA: 1.02
UROBILINOGEN, POC UA: 0.2 E.U./DL

## 2017-10-26 PROCEDURE — 63600175 PHARM REV CODE 636 W HCPCS: Performed by: EMERGENCY MEDICINE

## 2017-10-26 PROCEDURE — 87086 URINE CULTURE/COLONY COUNT: CPT

## 2017-10-26 PROCEDURE — 99283 EMERGENCY DEPT VISIT LOW MDM: CPT | Mod: 25

## 2017-10-26 PROCEDURE — 87804 INFLUENZA ASSAY W/OPTIC: CPT

## 2017-10-26 PROCEDURE — 96372 THER/PROPH/DIAG INJ SC/IM: CPT

## 2017-10-26 PROCEDURE — 25000003 PHARM REV CODE 250: Performed by: EMERGENCY MEDICINE

## 2017-10-26 PROCEDURE — 81025 URINE PREGNANCY TEST: CPT | Performed by: EMERGENCY MEDICINE

## 2017-10-26 PROCEDURE — 87880 STREP A ASSAY W/OPTIC: CPT

## 2017-10-26 PROCEDURE — 81003 URINALYSIS AUTO W/O SCOPE: CPT

## 2017-10-26 RX ORDER — KETOROLAC TROMETHAMINE 30 MG/ML
15 INJECTION, SOLUTION INTRAMUSCULAR; INTRAVENOUS
Status: DISCONTINUED | OUTPATIENT
Start: 2017-10-26 | End: 2017-10-26

## 2017-10-26 RX ORDER — SODIUM CHLORIDE 9 MG/ML
1000 INJECTION, SOLUTION INTRAVENOUS
Status: DISCONTINUED | OUTPATIENT
Start: 2017-10-26 | End: 2017-10-26

## 2017-10-26 RX ORDER — OSELTAMIVIR PHOSPHATE 75 MG/1
75 CAPSULE ORAL 2 TIMES DAILY
Qty: 10 CAPSULE | Refills: 0 | Status: SHIPPED | OUTPATIENT
Start: 2017-10-26 | End: 2017-10-31

## 2017-10-26 RX ORDER — KETOROLAC TROMETHAMINE 30 MG/ML
30 INJECTION, SOLUTION INTRAMUSCULAR; INTRAVENOUS
Status: COMPLETED | OUTPATIENT
Start: 2017-10-26 | End: 2017-10-26

## 2017-10-26 RX ORDER — KETOROLAC TROMETHAMINE 30 MG/ML
30 INJECTION, SOLUTION INTRAMUSCULAR; INTRAVENOUS
Status: DISCONTINUED | OUTPATIENT
Start: 2017-10-26 | End: 2017-10-26

## 2017-10-26 RX ORDER — ACETAMINOPHEN 500 MG
1000 TABLET ORAL
Status: COMPLETED | OUTPATIENT
Start: 2017-10-26 | End: 2017-10-26

## 2017-10-26 RX ADMIN — KETOROLAC TROMETHAMINE 30 MG: 30 INJECTION, SOLUTION INTRAMUSCULAR at 10:10

## 2017-10-26 RX ADMIN — ACETAMINOPHEN 1000 MG: 500 TABLET ORAL at 10:10

## 2017-10-27 NOTE — ED PROVIDER NOTES
Encounter Date: 10/26/2017       History     Chief Complaint   Patient presents with    Sore Throat     pt c/o sore throat, fever, weakness and URI symptoms since this AM     Ms Iverson reports onset of body aches, chills, sore throat, headache, all onset today while at work. 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 She denies abd pain, vomiting, diarrhea, focal weakness or difficulty swallowing. Reports all sxs occurred at same time today.       The history is provided by the patient.     Review of patient's allergies indicates:   Allergen Reactions    Penicillins Rash     Past Medical History:   Diagnosis Date    GERD (gastroesophageal reflux disease)     Hypertension      Past Surgical History:   Procedure Laterality Date     SECTION      x 1     Family History   Problem Relation Age of Onset    Heart disease Mother      CAD age 38    Hypertension Mother     Diabetes Mother     Hypertension Father      Social History   Substance Use Topics    Smoking status: Never Smoker    Smokeless tobacco: Never Used    Alcohol use Yes      Comment: RARELY     Review of Systems   Constitutional: Positive for appetite change and chills.        Body aches.    HENT: Positive for congestion, postnasal drip and rhinorrhea.    Respiratory: Negative.    Cardiovascular: Negative.    Gastrointestinal: Negative.    Musculoskeletal:        Body aches   All other systems reviewed and are negative.      Physical Exam     Initial Vitals   BP Pulse Resp Temp SpO2   10/26/17 2042 10/26/17 2042 -- 10/26/17 2042 10/26/17 2043   (!) 149/102 (!) 120  100.3 °F (37.9 °C) 99 %      MAP       10/26/17 2042       117.67         Physical Exam    Nursing note and vitals reviewed.  Constitutional: She appears well-developed. She is not diaphoretic.   Appears to not feel well.    HENT:   Head: Normocephalic and atraumatic.   Right Ear: External ear normal.   Left Ear: External ear normal.   Mouth/Throat: Oropharynx is clear and moist.    Eyes: EOM are normal.   Neck: Normal range of motion.   Cardiovascular: Regular rhythm and normal heart sounds.   No murmur heard.  tachycardic   Pulmonary/Chest: Breath sounds normal. No respiratory distress.   Abdominal: Soft. She exhibits no distension. There is no tenderness. There is no rebound.   Musculoskeletal: Normal range of motion. She exhibits no edema or tenderness.   Neurological: She is alert.   Skin: Skin is warm and dry. Capillary refill takes less than 2 seconds. No erythema.   Psychiatric: She has a normal mood and affect. Her behavior is normal. Thought content normal.         ED Course   Procedures  Labs Reviewed   POCT URINALYSIS W/O SCOPE - Abnormal; Notable for the following:        Result Value    Glucose, UA Negative (*)     Bilirubin, UA Negative (*)     Ketones, UA Negative (*)     Blood, UA 3+ (*)     Protein, UA 1+ (*)     Nitrite, UA Negative (*)     Leukocytes, UA 1+ (*)     All other components within normal limits   CULTURE, URINE   POCT RAPID STREP A   POCT INFLUENZA A/B   POCT URINE PREGNANCY   POCT URINALYSIS W/O SCOPE             Medical Decision Making:   ED Management:  Rapid strep and flu negative.   Pos leuk and blood noted on UA. Pt has no urinary sxs. Is on menstrual cycle. Will send urine culture.   Onset of viral sxs today, clinically most c/w flu like illness. We discussed home care and f/u and need for rest.   We discussed worrisome signs that should prompt need to return to the er should they occur.   Pt signed out at change of shift to Dr. Gayle to follow response to toradol and resolution of tachycardia. Pt drinking poweraid at my departure at change of shift.                      ED Course      Clinical Impression:   The primary encounter diagnosis was Viral illness. Diagnoses of Fever, unspecified fever cause and Body aches were also pertinent to this visit.                           Soraya Alfaro MD  10/31/17 1400

## 2017-10-28 LAB — BACTERIA UR CULT: NORMAL

## 2018-01-31 DIAGNOSIS — I10 HYPERTENSION, ESSENTIAL: ICD-10-CM

## 2018-01-31 RX ORDER — AMLODIPINE BESYLATE 10 MG/1
TABLET ORAL
Qty: 30 TABLET | Refills: 0 | Status: SHIPPED | OUTPATIENT
Start: 2018-01-31 | End: 2018-03-28 | Stop reason: SDUPTHER

## 2018-03-28 DIAGNOSIS — I10 HYPERTENSION, ESSENTIAL: ICD-10-CM

## 2018-03-28 RX ORDER — AMLODIPINE BESYLATE 10 MG/1
TABLET ORAL
Qty: 30 TABLET | Refills: 0 | Status: SHIPPED | OUTPATIENT
Start: 2018-03-28 | End: 2018-05-22 | Stop reason: SDUPTHER

## 2018-04-23 ENCOUNTER — HOSPITAL ENCOUNTER (EMERGENCY)
Facility: HOSPITAL | Age: 38
Discharge: HOME | End: 2018-04-23
Attending: EMERGENCY MEDICINE
Payer: MEDICAID

## 2018-04-23 VITALS
DIASTOLIC BLOOD PRESSURE: 106 MMHG | HEART RATE: 93 BPM | TEMPERATURE: 98 F | SYSTOLIC BLOOD PRESSURE: 147 MMHG | RESPIRATION RATE: 19 BRPM | BODY MASS INDEX: 30.22 KG/M2 | WEIGHT: 177 LBS | OXYGEN SATURATION: 99 % | HEIGHT: 64 IN

## 2018-04-23 DIAGNOSIS — L25.9 CONTACT DERMATITIS AND ECZEMA: ICD-10-CM

## 2018-04-23 DIAGNOSIS — L29.9 PRURITUS: Primary | ICD-10-CM

## 2018-04-23 LAB
B-HCG UR QL: NEGATIVE
CTP QC/QA: YES

## 2018-04-23 PROCEDURE — 99283 EMERGENCY DEPT VISIT LOW MDM: CPT | Mod: 25

## 2018-04-23 PROCEDURE — 81025 URINE PREGNANCY TEST: CPT | Performed by: EMERGENCY MEDICINE

## 2018-04-23 RX ORDER — TRIAMCINOLONE ACETONIDE 0.25 MG/G
OINTMENT TOPICAL 2 TIMES DAILY
Qty: 15 G | Refills: 0 | Status: SHIPPED | OUTPATIENT
Start: 2018-04-23 | End: 2018-12-20

## 2018-04-23 RX ORDER — LORATADINE 10 MG/1
10 TABLET ORAL DAILY
Qty: 30 TABLET | Refills: 0 | Status: SHIPPED | OUTPATIENT
Start: 2018-04-23 | End: 2018-12-20

## 2018-04-23 NOTE — ED PROVIDER NOTES
Encounter Date: 2018    SCRIBE #1 NOTE: I, Aubrey Garcia, am scribing for, and in the presence of,  Dr. Gayle. I have scribed the entire note.       History     Chief Complaint   Patient presents with    Itching     hands and neck itching w circles     This is a 38 y.o. Female who presents with pruritis to her bilateral wrist and neck for 1 month. She denies any fever, rashes, or vomitnig. She denies any new lotion, detergents, or soaps. She denies any alleviation with OTC creams.       The history is provided by the patient.     Review of patient's allergies indicates:   Allergen Reactions    Penicillins Rash     Past Medical History:   Diagnosis Date    GERD (gastroesophageal reflux disease)     Hypertension      Past Surgical History:   Procedure Laterality Date     SECTION      x 1     Family History   Problem Relation Age of Onset    Heart disease Mother      CAD age 38    Hypertension Mother     Diabetes Mother     Hypertension Father      Social History   Substance Use Topics    Smoking status: Never Smoker    Smokeless tobacco: Never Used    Alcohol use Yes      Comment: RARELY     Review of Systems   Constitutional: Negative for fever.   HENT: Negative for congestion, sore throat and trouble swallowing.    Respiratory: Negative for cough, shortness of breath and wheezing.    Cardiovascular: Negative for chest pain.   Gastrointestinal: Negative for nausea and vomiting.   Musculoskeletal: Negative for myalgias.   Skin: Negative for rash.        pruritis   Neurological: Negative for headaches.   All other systems reviewed and are negative.      Physical Exam     Initial Vitals [18 1640]   BP Pulse Resp Temp SpO2   (!) 147/106 93 19 98.2 °F (36.8 °C) 99 %      MAP       119.67         Physical Exam    Nursing note and vitals reviewed.  Constitutional: She appears well-developed and well-nourished. She is not diaphoretic. No distress.   HENT:   Head: Normocephalic and  atraumatic.   Mouth/Throat: Oropharynx is clear and moist.   Eyes: Conjunctivae and EOM are normal. Pupils are equal, round, and reactive to light.   Neck: Normal range of motion. Neck supple.   Excoriation to posterior neck. No blistering or erythema.    Cardiovascular: Normal rate and intact distal pulses.   Pulmonary/Chest: No accessory muscle usage or stridor. No tachypnea. No respiratory distress.   Abdominal: She exhibits no distension. There is no tenderness.   Musculoskeletal: Normal range of motion. She exhibits no tenderness.   Neurological: She is alert and oriented to person, place, and time.   Skin: Skin is warm and dry. Capillary refill takes less than 2 seconds.   Psychiatric: She has a normal mood and affect.         ED Course   Procedures  Labs Reviewed   POCT URINE PREGNANCY                        Scribe Attestation:   Scribe #1: I performed the above scribed service and the documentation accurately describes the services I performed. I attest to the accuracy of the note.    Attending Attestation:           Physician Attestation for Scribe:  Physician Attestation Statement for Scribe #1: I, Dr. Gayle, reviewed documentation, as scribed by Aubrey Garcia in my presence, and it is both accurate and complete.               Discharge Medications     Medication List with Changes/Refills   New Medications    LORATADINE (CLARITIN) 10 MG TABLET    Take 1 tablet (10 mg total) by mouth once daily.    TRIAMCINOLONE ACETONIDE 0.025% (KENALOG) 0.025 % OINT    Apply topically 2 (two) times daily. Do not use on face   Current Medications    AMLODIPINE (NORVASC) 10 MG TABLET    TAKE 1 TABLET(10 MG) BY MOUTH EVERY DAY    DOCUSATE SODIUM (COLACE) 100 MG CAPSULE    Take 1 capsule (100 mg total) by mouth 2 (two) times daily as needed for Constipation.    ETODOLAC (LODINE) 400 MG TABLET    Take 1 tablet (400 mg total) by mouth 2 (two) times daily.    FLUCONAZOLE (DIFLUCAN) 150 MG TAB    TK 1 T PO ONCE     HYDROCODONE-ACETAMINOPHEN 5-325MG (NORCO) 5-325 MG PER TABLET    Take 1 tablet by mouth every 8 (eight) hours as needed for Pain.    HYDROCORTISONE 2.5 % CREAM                 Patient discharged to home in stable condition with instructions to:   1. Please take all meds as prescribed.  2. Follow-up with your primary care doctor   3. Return precautions discussed and patient and/or family/caretaker understands to return to the emergency room for any concerns including worsening of your current symptoms, fever, chills, night sweats, worsening pain, chest pain, shortness of breath, nausea, vomiting, diarrhea, bleeding, headache, difficulty talking, visual disturbances, weakness, numbness or any other acute concerns          Clinical Impression:     1. Pruritus    2. Contact dermatitis and eczema                                Donaldo Gayle MD  05/23/18 0250

## 2018-05-22 DIAGNOSIS — I10 HYPERTENSION, ESSENTIAL: ICD-10-CM

## 2018-05-23 ENCOUNTER — TELEPHONE (OUTPATIENT)
Dept: FAMILY MEDICINE | Facility: CLINIC | Age: 38
End: 2018-05-23

## 2018-05-23 RX ORDER — AMLODIPINE BESYLATE 10 MG/1
TABLET ORAL
Qty: 30 TABLET | Refills: 0 | Status: SHIPPED | OUTPATIENT
Start: 2018-05-23 | End: 2018-07-11 | Stop reason: SDUPTHER

## 2018-05-23 NOTE — TELEPHONE ENCOUNTER
Patient states she is out of amlodipine.  Informed that a prescription for this medication was approved this morning and has been sent to the pharmacy.  Verbalized understanding.

## 2018-05-23 NOTE — TELEPHONE ENCOUNTER
----- Message from Lucero Hope sent at 5/23/2018 12:12 PM CDT -----  Contact: Self   Patient is scheduled for 6/22 but says she need a refill on her medication before then because she is out of medication . Please call at [phone]

## 2018-06-22 ENCOUNTER — OFFICE VISIT (OUTPATIENT)
Dept: FAMILY MEDICINE | Facility: CLINIC | Age: 38
End: 2018-06-22
Payer: MEDICAID

## 2018-06-22 ENCOUNTER — LAB VISIT (OUTPATIENT)
Dept: LAB | Facility: HOSPITAL | Age: 38
End: 2018-06-22
Payer: MEDICAID

## 2018-06-22 VITALS
TEMPERATURE: 99 F | HEIGHT: 64 IN | DIASTOLIC BLOOD PRESSURE: 72 MMHG | SYSTOLIC BLOOD PRESSURE: 126 MMHG | HEART RATE: 93 BPM | WEIGHT: 181 LBS | BODY MASS INDEX: 30.9 KG/M2 | OXYGEN SATURATION: 97 %

## 2018-06-22 DIAGNOSIS — Z00.00 WELL WOMAN EXAM WITHOUT GYNECOLOGICAL EXAM: Primary | ICD-10-CM

## 2018-06-22 DIAGNOSIS — R73.9 HYPERGLYCEMIA: ICD-10-CM

## 2018-06-22 DIAGNOSIS — Z00.00 WELL WOMAN EXAM WITHOUT GYNECOLOGICAL EXAM: ICD-10-CM

## 2018-06-22 LAB
ALBUMIN SERPL BCP-MCNC: 3.8 G/DL
ALP SERPL-CCNC: 91 U/L
ALT SERPL W/O P-5'-P-CCNC: 15 U/L
ANION GAP SERPL CALC-SCNC: 8 MMOL/L
AST SERPL-CCNC: 17 U/L
BASOPHILS # BLD AUTO: 0.05 K/UL
BASOPHILS NFR BLD: 0.9 %
BILIRUB SERPL-MCNC: 0.9 MG/DL
BUN SERPL-MCNC: 11 MG/DL
CALCIUM SERPL-MCNC: 8.7 MG/DL
CHLORIDE SERPL-SCNC: 106 MMOL/L
CHOLEST SERPL-MCNC: 194 MG/DL
CHOLEST/HDLC SERPL: 3.5 {RATIO}
CO2 SERPL-SCNC: 23 MMOL/L
CREAT SERPL-MCNC: 0.8 MG/DL
DIFFERENTIAL METHOD: ABNORMAL
EOSINOPHIL # BLD AUTO: 0.1 K/UL
EOSINOPHIL NFR BLD: 1.7 %
ERYTHROCYTE [DISTWIDTH] IN BLOOD BY AUTOMATED COUNT: 17.2 %
EST. GFR  (AFRICAN AMERICAN): >60 ML/MIN/1.73 M^2
EST. GFR  (NON AFRICAN AMERICAN): >60 ML/MIN/1.73 M^2
ESTIMATED AVG GLUCOSE: 143 MG/DL
GLUCOSE SERPL-MCNC: 131 MG/DL
HBA1C MFR BLD HPLC: 6.6 %
HCT VFR BLD AUTO: 36.7 %
HDLC SERPL-MCNC: 56 MG/DL
HDLC SERPL: 28.9 %
HGB BLD-MCNC: 11 G/DL
IMM GRANULOCYTES # BLD AUTO: 0.01 K/UL
IMM GRANULOCYTES NFR BLD AUTO: 0.2 %
LDLC SERPL CALC-MCNC: 123.2 MG/DL
LYMPHOCYTES # BLD AUTO: 1.9 K/UL
LYMPHOCYTES NFR BLD: 32.9 %
MCH RBC QN AUTO: 23.7 PG
MCHC RBC AUTO-ENTMCNC: 30 G/DL
MCV RBC AUTO: 79 FL
MONOCYTES # BLD AUTO: 0.6 K/UL
MONOCYTES NFR BLD: 9.4 %
NEUTROPHILS # BLD AUTO: 3.2 K/UL
NEUTROPHILS NFR BLD: 54.9 %
NONHDLC SERPL-MCNC: 138 MG/DL
NRBC BLD-RTO: 0 /100 WBC
PLATELET # BLD AUTO: 385 K/UL
PMV BLD AUTO: 10 FL
POTASSIUM SERPL-SCNC: 3.9 MMOL/L
PROT SERPL-MCNC: 7.4 G/DL
RBC # BLD AUTO: 4.65 M/UL
SODIUM SERPL-SCNC: 137 MMOL/L
TRIGL SERPL-MCNC: 74 MG/DL
TSH SERPL DL<=0.005 MIU/L-ACNC: 1.75 UIU/ML
WBC # BLD AUTO: 5.84 K/UL

## 2018-06-22 PROCEDURE — 36415 COLL VENOUS BLD VENIPUNCTURE: CPT | Mod: PO

## 2018-06-22 PROCEDURE — 85025 COMPLETE CBC W/AUTO DIFF WBC: CPT

## 2018-06-22 PROCEDURE — 99999 PR PBB SHADOW E&M-EST. PATIENT-LVL IV: CPT | Mod: PBBFAC,,, | Performed by: NURSE PRACTITIONER

## 2018-06-22 PROCEDURE — 99215 OFFICE O/P EST HI 40 MIN: CPT | Mod: S$PBB,,, | Performed by: NURSE PRACTITIONER

## 2018-06-22 PROCEDURE — 80061 LIPID PANEL: CPT

## 2018-06-22 PROCEDURE — 80053 COMPREHEN METABOLIC PANEL: CPT

## 2018-06-22 PROCEDURE — 84443 ASSAY THYROID STIM HORMONE: CPT

## 2018-06-22 PROCEDURE — 99214 OFFICE O/P EST MOD 30 MIN: CPT | Mod: PBBFAC,PO | Performed by: NURSE PRACTITIONER

## 2018-06-22 PROCEDURE — 83036 HEMOGLOBIN GLYCOSYLATED A1C: CPT

## 2018-06-22 RX ORDER — METFORMIN HYDROCHLORIDE 500 MG/1
500 TABLET, EXTENDED RELEASE ORAL
Qty: 90 TABLET | Refills: 3 | Status: SHIPPED | OUTPATIENT
Start: 2018-06-22 | End: 2019-01-22

## 2018-06-22 NOTE — PROGRESS NOTES
This dictation has been generated using Dragon Dictation some phonetic errors may occur.     Problem List Items Addressed This Visit     None      Visit Diagnoses     Well woman exam without gynecological exam    -  Primary    Relevant Orders    CBC auto differential (Completed)    Comprehensive metabolic panel    Lipid panel    Urinalysis    TSH    Hyperglycemia        Relevant Orders    Hemoglobin A1c        Orders Placed This Encounter    CBC auto differential    Comprehensive metabolic panel    Lipid panel    Urinalysis    TSH    Hemoglobin A1c    metFORMIN (GLUCOPHAGE-XR) 500 MG 24 hr tablet     Well woman exam without GYN exam.  Needs update of labs.  She has had some hyperglycemia in the past we'll check an A1c also.  Attempting to lose weight treatment with metformin.  In excessive of 45 minutes spent with patient with greater than 50% of time dedicated to education on symptoms, diagnosis, treatments, and coordination of care.  Discussed dietary modifications.  Discussed benefits of exercise.  Discussed med action and its indication that it can help with weight loss.     Follow-up in about 1 month (around 7/22/2018).    ________________________________________________________________  ________________________________________________________________      Chief Complaint   Patient presents with    Annual Exam     History of present illness  This 38 y.o. presents today for complaint of annual exam.  Patient was diagnosed recently with eczema by dermatology and prescribed an ointment and recommended that she take Benadryl.  Her symptoms are improved.  She is here to update her labs.  She has an appointment with gynecology for this afternoon.  She does note some irregular menses and cramping.  It's possible that she could have a PCO S.  She has had difficulty losing weight.  She is interested in taking something to help her lose weight.  She's had some hyperglycemia in the past.  We discussed the use of  metformin.  She remains active.  She has been trying to watch the diet.  She does do portion control.  Complete review of systems otherwise negative.    Past Medical History:   Diagnosis Date    GERD (gastroesophageal reflux disease)     Hypertension        Past Surgical History:   Procedure Laterality Date     SECTION      x 1       Family History   Problem Relation Age of Onset    Heart disease Mother         CAD age 38    Hypertension Mother     Diabetes Mother     Hypertension Father        Social History     Social History    Marital status: Single     Spouse name: N/A    Number of children: N/A    Years of education: N/A     Social History Main Topics    Smoking status: Never Smoker    Smokeless tobacco: Never Used    Alcohol use Yes      Comment: RARELY    Drug use: No      Comment: occasional    Sexual activity: Yes     Birth control/ protection: IUD     Other Topics Concern    None     Social History Narrative    None       Current Outpatient Prescriptions   Medication Sig Dispense Refill    amLODIPine (NORVASC) 10 MG tablet TAKE 1 TABLET(10 MG) BY MOUTH EVERY DAY 30 tablet 0    docusate sodium (COLACE) 100 MG capsule Take 1 capsule (100 mg total) by mouth 2 (two) times daily as needed for Constipation. 60 capsule 1    etodolac (LODINE) 400 MG tablet Take 1 tablet (400 mg total) by mouth 2 (two) times daily. 20 tablet 0    fluconazole (DIFLUCAN) 150 MG Tab TK 1 T PO ONCE  0    hydrocodone-acetaminophen 5-325mg (NORCO) 5-325 mg per tablet Take 1 tablet by mouth every 8 (eight) hours as needed for Pain. 20 tablet 0    hydrocortisone 2.5 % cream       loratadine (CLARITIN) 10 mg tablet Take 1 tablet (10 mg total) by mouth once daily. 30 tablet 0    metFORMIN (GLUCOPHAGE-XR) 500 MG 24 hr tablet Take 1 tablet (500 mg total) by mouth daily with breakfast. 90 tablet 3    triamcinolone acetonide 0.025% (KENALOG) 0.025 % Oint Apply topically 2 (two) times daily. Do not use on face  15 g 0     No current facility-administered medications for this visit.        Review of patient's allergies indicates:   Allergen Reactions    Penicillins Rash       Physical examination  Vitals Reviewed  Gen. Well-dressed well-nourished no apparent distress  Skin warm dry and intact.  No rashes noted.  Neck is supple without adenopathy  Chest.  Respirations are even unlabored.  Lungs are clear to auscultation.  Cardiac regular rate and rhythm.  No chest wall adenopathy noted.  Abdomen is soft and not distended.  Bowel sounds are present.  No tenderness during palpation of the abdomen.  No Hepatosplenomegaly noted.  No hernia noted.  No CVA tenderness to percussion.    Neuro. Awake alert oriented x4.  Normal judgment and cognition noted.  Extremities no clubbing cyanosis or edema noted.     Call or return to clinic prn if these symptoms worsen or fail to improve as anticipated.

## 2018-07-11 DIAGNOSIS — I10 HYPERTENSION, ESSENTIAL: ICD-10-CM

## 2018-07-11 RX ORDER — AMLODIPINE BESYLATE 10 MG/1
TABLET ORAL
Qty: 30 TABLET | Refills: 0 | Status: SHIPPED | OUTPATIENT
Start: 2018-07-11 | End: 2018-08-29 | Stop reason: SDUPTHER

## 2018-08-29 DIAGNOSIS — I10 HYPERTENSION, ESSENTIAL: ICD-10-CM

## 2018-08-30 RX ORDER — AMLODIPINE BESYLATE 10 MG/1
TABLET ORAL
Qty: 30 TABLET | Refills: 0 | Status: SHIPPED | OUTPATIENT
Start: 2018-08-30 | End: 2018-10-05 | Stop reason: SDUPTHER

## 2018-10-05 DIAGNOSIS — I10 HYPERTENSION, ESSENTIAL: ICD-10-CM

## 2018-10-05 RX ORDER — AMLODIPINE BESYLATE 10 MG/1
TABLET ORAL
Qty: 30 TABLET | Refills: 3 | Status: SHIPPED | OUTPATIENT
Start: 2018-10-05 | End: 2018-12-28 | Stop reason: SDUPTHER

## 2018-11-25 ENCOUNTER — HOSPITAL ENCOUNTER (EMERGENCY)
Facility: HOSPITAL | Age: 38
Discharge: HOME | End: 2018-11-25
Attending: EMERGENCY MEDICINE
Payer: MEDICAID

## 2018-11-25 VITALS
BODY MASS INDEX: 30.73 KG/M2 | WEIGHT: 180 LBS | HEART RATE: 77 BPM | OXYGEN SATURATION: 100 % | TEMPERATURE: 98 F | RESPIRATION RATE: 16 BRPM | DIASTOLIC BLOOD PRESSURE: 127 MMHG | SYSTOLIC BLOOD PRESSURE: 161 MMHG | HEIGHT: 64 IN

## 2018-11-25 DIAGNOSIS — J06.9 UPPER RESPIRATORY TRACT INFECTION, UNSPECIFIED TYPE: Primary | ICD-10-CM

## 2018-11-25 LAB
B-HCG UR QL: NEGATIVE
CTP QC/QA: YES

## 2018-11-25 PROCEDURE — 99284 EMERGENCY DEPT VISIT MOD MDM: CPT | Mod: 25

## 2018-11-25 PROCEDURE — 25000003 PHARM REV CODE 250: Performed by: EMERGENCY MEDICINE

## 2018-11-25 PROCEDURE — 81025 URINE PREGNANCY TEST: CPT | Performed by: EMERGENCY MEDICINE

## 2018-11-25 RX ORDER — PROMETHAZINE HYDROCHLORIDE AND DEXTROMETHORPHAN HYDROBROMIDE 6.25; 15 MG/5ML; MG/5ML
5 SYRUP ORAL 3 TIMES DAILY PRN
Qty: 200 ML | Refills: 0 | Status: SHIPPED | OUTPATIENT
Start: 2018-11-25 | End: 2018-12-05

## 2018-11-25 RX ORDER — FLUTICASONE PROPIONATE 50 MCG
2 SPRAY, SUSPENSION (ML) NASAL DAILY
Qty: 1 BOTTLE | Refills: 0 | Status: SHIPPED | OUTPATIENT
Start: 2018-11-25 | End: 2019-04-22

## 2018-11-25 RX ORDER — ACETAMINOPHEN 500 MG
1000 TABLET ORAL
Status: COMPLETED | OUTPATIENT
Start: 2018-11-25 | End: 2018-11-25

## 2018-11-25 RX ORDER — ALBUTEROL SULFATE 90 UG/1
1-2 AEROSOL, METERED RESPIRATORY (INHALATION) EVERY 6 HOURS PRN
Qty: 1 INHALER | Refills: 0 | Status: SHIPPED | OUTPATIENT
Start: 2018-11-25 | End: 2022-11-22

## 2018-11-25 RX ORDER — AZELASTINE 1 MG/ML
1 SPRAY, METERED NASAL NIGHTLY
Qty: 30 ML | Refills: 0 | Status: SHIPPED | OUTPATIENT
Start: 2018-11-25 | End: 2019-04-22

## 2018-11-25 RX ADMIN — ACETAMINOPHEN 1000 MG: 500 TABLET, FILM COATED ORAL at 08:11

## 2018-11-25 NOTE — ED PROVIDER NOTES
Encounter Date: 2018       History     Chief Complaint   Patient presents with    URI     sinus, watering eyes, runny nose, onset over a week, states using OTC meds and getting no relief     38-year-old female chief complaint itchy watery eyes, congestion, and cough.  Patient states she has been having her symptoms for at least a week.  Patient has been taking over-the-counter cold medicines which she knows is made her pressure go up a little bit.  Patient states her a problem as she keeps coughing all night long and just can't sleep.  Patient states she has a history of GERD had high blood pressure.  Patient has not taken her hypertension meds today      The history is provided by the patient.     Review of patient's allergies indicates:   Allergen Reactions    Penicillins Rash     Past Medical History:   Diagnosis Date    GERD (gastroesophageal reflux disease)     Hypertension      Past Surgical History:   Procedure Laterality Date     SECTION      x 1    DELIVERY-CEASAREAN SECTION N/A 2016    Performed by Galdino Bernstein [facility] at [facility] L[facility]D [facility]    ENCERCLAGE N/A 3/3/2016    [facility] at [facility]    EXCISION-MASS N/A 2013    Performed by Xavi Xie MD at [facility] OR     Family History   Problem Relation Age of Onset    Heart disease Mother         CAD age 38    Hypertension Mother     Diabetes Mother     Hypertension Father      Social History     Tobacco Use    Smoking status: Never Smoker    Smokeless tobacco: Never Used   Substance Use Topics    Alcohol use: Yes     Comment: RARELY    Drug use: No     Comment: occasional     Review of Systems   Constitutional: Negative for fever.   HENT: Positive for congestion, postnasal drip and sinus pressure. Negative for sore throat.    Respiratory: Positive for cough. Negative for shortness of breath.    Cardiovascular: Negative for chest pain.   Gastrointestinal: Negative for nausea.   Genitourinary: Negative for  dysuria.   Musculoskeletal: Negative for back pain.   Skin: Negative for rash.   Neurological: Negative for weakness.   Hematological: Does not bruise/bleed easily.   All other systems reviewed and are negative.      Physical Exam     Initial Vitals [11/25/18 0743]   BP Pulse Resp Temp SpO2   (!) 157/109 82 18 98.4 °F (36.9 °C) --      MAP       --         Physical Exam    Nursing note and vitals reviewed.  Constitutional: She appears well-developed and well-nourished.   HENT:   Head: Normocephalic and atraumatic.   Right Ear: External ear normal.   Left Ear: External ear normal.   Eyes: Conjunctivae and EOM are normal. Pupils are equal, round, and reactive to light. Right eye exhibits no discharge. Left eye exhibits no discharge.   Neck: Normal range of motion. Neck supple.   Cardiovascular: Normal rate, regular rhythm, normal heart sounds and intact distal pulses. Exam reveals no gallop and no friction rub.    No murmur heard.  Pulmonary/Chest: Breath sounds normal. No respiratory distress. She has no wheezes. She has no rhonchi. She has no rales. She exhibits no tenderness.   Abdominal: Soft. Bowel sounds are normal. She exhibits no distension and no mass. There is no tenderness. There is no rebound and no guarding.   Musculoskeletal: Normal range of motion. She exhibits no edema or tenderness.   Neurological: She is alert and oriented to person, place, and time. She has normal strength and normal reflexes. She displays normal reflexes. No cranial nerve deficit or sensory deficit.   Skin: Skin is warm and dry. No rash noted. No pallor.   Psychiatric: She has a normal mood and affect.         ED Course   Procedures  Labs Reviewed   POCT URINE PREGNANCY    Urine pregnancy negative       Imaging Results          X-Ray Chest PA And Lateral (Final result)  Result time 11/25/18 09:10:07    Final result by Aubrey Benitez MD (11/25/18 09:10:07)                 Impression:      No acute cardiopulmonary  process.      Electronically signed by: Aubrey Benitez MD  Date:    11/25/2018  Time:    09:10             Narrative:    EXAMINATION:  XR CHEST PA AND LATERAL    CLINICAL HISTORY:  cough;    TECHNIQUE:  PA and lateral views of the chest were performed.    COMPARISON:  None    FINDINGS:  Cardiac silhouette and mediastinal contours are normal.  Lungs are clear.  Osseous structures are intact.                                                 Medical decision making   Fill and take prescriptions as directed.  Return to the ED if symptoms worsen or do not resolve.   Answered questions and discussed discharge plan.    Patient feels much better and is ready for discharge.  Follow up with PCP in 1 days.       Clinical Impression:   The encounter diagnosis was Upper respiratory tract infection, unspecified type.                             Deysi Ta DO  11/29/18 2647

## 2018-12-03 ENCOUNTER — HOSPITAL ENCOUNTER (EMERGENCY)
Facility: HOSPITAL | Age: 38
Discharge: HOME | End: 2018-12-03
Attending: EMERGENCY MEDICINE
Payer: MEDICAID

## 2018-12-03 VITALS
WEIGHT: 180 LBS | SYSTOLIC BLOOD PRESSURE: 167 MMHG | BODY MASS INDEX: 30.73 KG/M2 | TEMPERATURE: 98 F | OXYGEN SATURATION: 100 % | DIASTOLIC BLOOD PRESSURE: 100 MMHG | RESPIRATION RATE: 18 BRPM | HEIGHT: 64 IN | HEART RATE: 79 BPM

## 2018-12-03 DIAGNOSIS — T78.40XA ALLERGIC REACTION, INITIAL ENCOUNTER: Primary | ICD-10-CM

## 2018-12-03 LAB
B-HCG UR QL: NEGATIVE
CTP QC/QA: YES

## 2018-12-03 PROCEDURE — 96372 THER/PROPH/DIAG INJ SC/IM: CPT

## 2018-12-03 PROCEDURE — 99284 EMERGENCY DEPT VISIT MOD MDM: CPT | Mod: 25

## 2018-12-03 PROCEDURE — 63600175 PHARM REV CODE 636 W HCPCS: Performed by: NURSE PRACTITIONER

## 2018-12-03 PROCEDURE — 81025 URINE PREGNANCY TEST: CPT | Performed by: EMERGENCY MEDICINE

## 2018-12-03 RX ORDER — DEXAMETHASONE SODIUM PHOSPHATE 4 MG/ML
12 INJECTION, SOLUTION INTRA-ARTICULAR; INTRALESIONAL; INTRAMUSCULAR; INTRAVENOUS; SOFT TISSUE
Status: COMPLETED | OUTPATIENT
Start: 2018-12-03 | End: 2018-12-03

## 2018-12-03 RX ORDER — FAMOTIDINE 20 MG/1
20 TABLET, FILM COATED ORAL 2 TIMES DAILY
Qty: 14 TABLET | Refills: 0 | OUTPATIENT
Start: 2018-12-03 | End: 2018-12-20

## 2018-12-03 RX ORDER — HYDROXYZINE HYDROCHLORIDE 25 MG/1
25 TABLET, FILM COATED ORAL EVERY 6 HOURS
Qty: 16 TABLET | Refills: 0 | Status: SHIPPED | OUTPATIENT
Start: 2018-12-03 | End: 2018-12-20 | Stop reason: ALTCHOICE

## 2018-12-03 RX ADMIN — DEXAMETHASONE SODIUM PHOSPHATE 12 MG: 4 INJECTION, SOLUTION INTRAMUSCULAR; INTRAVENOUS at 05:12

## 2018-12-20 ENCOUNTER — HOSPITAL ENCOUNTER (EMERGENCY)
Facility: HOSPITAL | Age: 38
Discharge: HOME | End: 2018-12-20
Attending: EMERGENCY MEDICINE
Payer: MEDICAID

## 2018-12-20 VITALS
HEIGHT: 64 IN | HEART RATE: 83 BPM | WEIGHT: 175 LBS | DIASTOLIC BLOOD PRESSURE: 102 MMHG | TEMPERATURE: 98 F | BODY MASS INDEX: 29.88 KG/M2 | RESPIRATION RATE: 18 BRPM | SYSTOLIC BLOOD PRESSURE: 142 MMHG | OXYGEN SATURATION: 100 %

## 2018-12-20 DIAGNOSIS — R21 RASH AND NONSPECIFIC SKIN ERUPTION: Primary | ICD-10-CM

## 2018-12-20 PROCEDURE — 99283 EMERGENCY DEPT VISIT LOW MDM: CPT

## 2018-12-20 RX ORDER — FAMOTIDINE 20 MG/1
20 TABLET, FILM COATED ORAL 2 TIMES DAILY
Qty: 20 TABLET | Refills: 0 | Status: SHIPPED | OUTPATIENT
Start: 2018-12-20 | End: 2019-04-22

## 2018-12-20 RX ORDER — TRIAMCINOLONE ACETONIDE 1 MG/G
OINTMENT TOPICAL 2 TIMES DAILY
Qty: 80 G | Refills: 0 | Status: SHIPPED | OUTPATIENT
Start: 2018-12-20 | End: 2019-04-22

## 2018-12-20 RX ORDER — METHYLPREDNISOLONE 4 MG/1
TABLET ORAL
Qty: 1 PACKAGE | Refills: 0 | Status: SHIPPED | OUTPATIENT
Start: 2018-12-20 | End: 2019-01-10

## 2018-12-20 RX ORDER — LORATADINE 10 MG/1
10 TABLET ORAL DAILY
Qty: 60 TABLET | Refills: 0 | COMMUNITY
Start: 2018-12-20 | End: 2019-04-22

## 2018-12-21 NOTE — ED PROVIDER NOTES
Encounter Date: 2018    SCRIBE #1 NOTE: I, Rodney Hunter, am scribing for, and in the presence of,  Dr. Gayle . I have scribed the following portions of the note - Other sections scribed: HPI, ROS, PE.       History     Chief Complaint   Patient presents with    rash with itching     pt c/o rash to body with itching x1 week, , possibly new soap at home, unsure, denies other complaints, dose of benadryl approx 2pm 50 mg, denies relieve, nad     This is a 38 y.o. female who presents to the ED with a complaint of rashes with itchiness to the left and right arm, and upper chest and back that began two weeks ago after using a hair dye. Pt reports a history of similar rashes after using hair dye. Reports changing soaps one week ago. She last washed her hair three days ago. She has taken benadryl without relief. Denies fever. She was recently dx with eczema by her dermatologist.         The history is provided by the patient.     Review of patient's allergies indicates:   Allergen Reactions    Penicillins Rash     Past Medical History:   Diagnosis Date    GERD (gastroesophageal reflux disease)     Hypertension      Past Surgical History:   Procedure Laterality Date     SECTION      x 1    DELIVERY-CEASAREAN SECTION N/A 2016    Performed by [facility] at [facility] L[facility]D OR    ENCERCLAGE N/A 3/3/2016    [facility]formed [facility]&D    EXCISION-MASS N/A 2013    Performed by [facility]avi Xie [facility] at [facility] OR     Family History   Problem Relation Age of Onset    Heart disease Mother         CAD age 38    Hypertension Mother     Diabetes Mother     Hypertension Father      Social History     Tobacco Use    Smoking status: Never Smoker    Smokeless tobacco: Never Used   Substance Use Topics    Alcohol use: Yes     Comment: RARELY    Drug use: No     Comment: occasional     Review of Systems   Constitutional: Negative for fever.   HENT: Negative for trouble swallowing and voice  change.    Respiratory: Negative for shortness of breath.    Gastrointestinal: Negative for nausea and vomiting.   Skin: Positive for rash.   All other systems reviewed and are negative.      Physical Exam     Initial Vitals [12/20/18 2058]   BP Pulse Resp Temp SpO2   (!) 133/105 85 18 97.9 °F (36.6 °C) 98 %      MAP       --         Physical Exam    Nursing note and vitals reviewed.  Constitutional: She appears well-developed and well-nourished.   HENT:   Head: Normocephalic and atraumatic.   Mouth/Throat: Uvula is midline, oropharynx is clear and moist and mucous membranes are normal. No oropharyngeal exudate, posterior oropharyngeal edema or posterior oropharyngeal erythema.   Eyes: Conjunctivae are normal.   Neck: Normal range of motion and phonation normal. Neck supple.   Cardiovascular: Normal rate, regular rhythm, normal heart sounds and intact distal pulses. Exam reveals no gallop and no friction rub.    No murmur heard.  Pulmonary/Chest: Breath sounds normal. No stridor. No respiratory distress. She has no wheezes. She has no rhonchi. She has no rales. She exhibits no tenderness.   Musculoskeletal: Normal range of motion.   Neurological: She is alert and oriented to person, place, and time.   Skin: Skin is warm and dry. Capillary refill takes less than 2 seconds. Rash noted. No abscess noted. Rash is maculopapular. No erythema.   Rash to the left and right arm, and upper back and chest. There is no palm/sole or oral involvement.    Psychiatric: She has a normal mood and affect. Her behavior is normal.         ED Course   Procedures  Labs Reviewed - No data to display       Imaging Results    None                     Scribe Attestation:   Scribe #1: I performed the above scribed service and the documentation accurately describes the services I performed. I attest to the accuracy of the note.              Labs Reviewed       Imaging Reviewed    Imaging Results    None         Medications given in  ED    Medications - No data to display    This document was produced by a scribe under my direction and in my presence. I agree with the content of the note and have made any necessary edits.     Donaldo Gayle MD         Note was created using voice recognition software. Note may have occasional typographical errors that may not have been identified and edited despite good axel initial review prior to signing.        Discharge Medications        Medication List      START taking these medications    methylPREDNISolone 4 mg tablet  Commonly known as:  MEDROL DOSEPACK  Take as directed     triamcinolone acetonide 0.1% 0.1 % ointment  Commonly known as:  KENALOG  Apply topically 2 (two) times daily. Apply to rash. Do not use on face.  Replaces:  triamcinolone acetonide 0.025% 0.025 % Oint        CONTINUE taking these medications    famotidine 20 MG tablet  Commonly known as:  PEPCID  Take 1 tablet (20 mg total) by mouth 2 (two) times daily.     loratadine 10 mg tablet  Commonly known as:  CLARITIN  Take 1 tablet (10 mg total) by mouth once daily. for 10 days        STOP taking these medications    triamcinolone acetonide 0.025% 0.025 % Oint  Commonly known as:  KENALOG  Replaced by:  triamcinolone acetonide 0.1% 0.1 % ointment        ASK your doctor about these medications    albuterol 90 mcg/actuation inhaler  Commonly known as:  PROVENTIL/VENTOLIN HFA  Inhale 1-2 puffs into the lungs every 6 (six) hours as needed for Wheezing (And cough). Rescue     amLODIPine 10 MG tablet  Commonly known as:  NORVASC  TAKE 1 TABLET(10 MG) BY MOUTH EVERY DAY     azelastine 137 mcg (0.1 %) nasal spray  Commonly known as:  ASTELIN  1 spray (137 mcg total) by Nasal route every evening. for 21 days     docusate sodium 100 MG capsule  Commonly known as:  COLACE  Take 1 capsule (100 mg total) by mouth 2 (two) times daily as needed for Constipation.     etodolac 400 MG tablet  Commonly known as:  LODINE  Take 1 tablet (400 mg total) by  mouth 2 (two) times daily.     fluconazole 150 MG Tab  Commonly known as:  DIFLUCAN     fluticasone 50 mcg/actuation nasal spray  Commonly known as:  FLONASE  2 sprays (100 mcg total) by Each Nare route once daily.     HYDROcodone-acetaminophen 5-325 mg per tablet  Commonly known as:  NORCO  Take 1 tablet by mouth every 8 (eight) hours as needed for Pain.     hydrocortisone 2.5 % cream     metFORMIN 500 MG 24 hr tablet  Commonly known as:  GLUCOPHAGE-XR  Take 1 tablet (500 mg total) by mouth daily with breakfast.           Where to Get Your Medications      These medications were sent to [facility] - [place] - [address] AT [facility]  [address] [place] [facility] [place] [address]    Hours:  24-hours Phone:  [phone]   · famotidine 20 MG tablet  · methylPREDNISolone 4 mg tablet  · triamcinolone acetonide 0.1% 0.1 % ointment     You can get these medications from any pharmacy    You don't need a prescription for these medications  · loratadine 10 mg tablet               Patient discharged to home in stable condition with instructions to:   1. Please take all meds as prescribed.  2. Follow-up with your primary care doctor   3. Return precautions discussed and patient and/or family/caretaker understands to return to the emergency room for any concerns including worsening of your current symptoms, fever, chills, night sweats, worsening pain, chest pain, shortness of breath, nausea, vomiting, diarrhea, bleeding, headache, difficulty talking, visual disturbances, weakness, numbness or any other acute concerns    Clinical Impression:     1. Rash and nonspecific skin eruption                                   Donaldo Gayle MD  12/21/18 8055

## 2018-12-21 NOTE — ED NOTES
"Pt states that she was seen a month ago for a similar rash and thought it was related to hair dye but reports it cleared up. She has a rash on her neck that looks different than the rash on her daquan arms, stomach, back and reports no relief with Benadryl. She reports the itching is so bad it is driving her "crazy.". Pt also reports that her spouse is itching as well.   "

## 2018-12-28 DIAGNOSIS — I10 HYPERTENSION, ESSENTIAL: ICD-10-CM

## 2018-12-28 RX ORDER — AMLODIPINE BESYLATE 10 MG/1
TABLET ORAL
Qty: 30 TABLET | Refills: 0 | Status: SHIPPED | OUTPATIENT
Start: 2018-12-28 | End: 2019-02-08 | Stop reason: SDUPTHER

## 2018-12-28 NOTE — TELEPHONE ENCOUNTER
----- Message from Mari Epperson sent at 12/28/2018 12:42 PM CST -----  Contact: self  Pt calling to request a refill of amLODIPine (NORVASC) 10 MG tablet to be sent to [facility]/ please call [phone]. Pt is out of medication and was not aware Mr Salvador transferred. Please call.

## 2019-01-03 ENCOUNTER — HOSPITAL ENCOUNTER (EMERGENCY)
Facility: HOSPITAL | Age: 39
Discharge: HOME | End: 2019-01-03
Attending: EMERGENCY MEDICINE
Payer: MEDICAID

## 2019-01-03 VITALS
WEIGHT: 177 LBS | OXYGEN SATURATION: 99 % | DIASTOLIC BLOOD PRESSURE: 87 MMHG | TEMPERATURE: 98 F | RESPIRATION RATE: 16 BRPM | BODY MASS INDEX: 30.22 KG/M2 | HEIGHT: 64 IN | SYSTOLIC BLOOD PRESSURE: 150 MMHG | HEART RATE: 82 BPM

## 2019-01-03 DIAGNOSIS — K02.9 DENTAL CARIES: ICD-10-CM

## 2019-01-03 DIAGNOSIS — K08.89 DENTALGIA: ICD-10-CM

## 2019-01-03 DIAGNOSIS — G43.909 MIGRAINE WITHOUT STATUS MIGRAINOSUS, NOT INTRACTABLE, UNSPECIFIED MIGRAINE TYPE: ICD-10-CM

## 2019-01-03 DIAGNOSIS — I10 HTN (HYPERTENSION): Primary | ICD-10-CM

## 2019-01-03 LAB
ALBUMIN SERPL-MCNC: 3.5 G/DL (ref 3.3–5.5)
ALP SERPL-CCNC: 72 U/L (ref 42–141)
B-HCG UR QL: NEGATIVE
BILIRUB SERPL-MCNC: 0.5 MG/DL (ref 0.2–1.6)
BILIRUBIN, POC UA: NEGATIVE
BLOOD, POC UA: NEGATIVE
BUN SERPL-MCNC: 9 MG/DL (ref 7–22)
CALCIUM SERPL-MCNC: 9.5 MG/DL (ref 8–10.3)
CHLORIDE SERPL-SCNC: 103 MMOL/L (ref 98–108)
CLARITY, POC UA: CLEAR
COLOR, POC UA: YELLOW
CREAT SERPL-MCNC: 0.8 MG/DL (ref 0.6–1.2)
CTP QC/QA: YES
GLUCOSE SERPL-MCNC: 112 MG/DL (ref 73–118)
GLUCOSE SERPL-MCNC: 133 MG/DL (ref 70–110)
GLUCOSE, POC UA: ABNORMAL
KETONES, POC UA: NEGATIVE
LEUKOCYTE EST, POC UA: NEGATIVE
NITRITE, POC UA: NEGATIVE
PH UR STRIP: 7 [PH]
POC ALT (SGPT): 17 U/L (ref 10–47)
POC AST (SGOT): 19 U/L (ref 11–38)
POC CARDIAC TROPONIN I: 0 NG/ML
POC PTINR: 0.9 (ref 0.9–1.2)
POC PTWBT: 10.9 SEC (ref 9.7–14.3)
POC TCO2: 27 MMOL/L (ref 18–33)
POCT GLUCOSE: 133 MG/DL (ref 70–110)
POTASSIUM BLD-SCNC: 3.9 MMOL/L (ref 3.6–5.1)
PROTEIN, POC UA: NEGATIVE
PROTEIN, POC: 6.6 G/DL (ref 6.4–8.1)
SAMPLE: NORMAL
SAMPLE: NORMAL
SODIUM BLD-SCNC: 144 MMOL/L (ref 128–145)
SPECIFIC GRAVITY, POC UA: 1.02
UROBILINOGEN, POC UA: 0.2 E.U./DL

## 2019-01-03 PROCEDURE — 85610 PROTHROMBIN TIME: CPT | Mod: ER

## 2019-01-03 PROCEDURE — 99284 EMERGENCY DEPT VISIT MOD MDM: CPT | Mod: 25,ER

## 2019-01-03 PROCEDURE — 93010 ELECTROCARDIOGRAM REPORT: CPT | Mod: ,,, | Performed by: INTERNAL MEDICINE

## 2019-01-03 PROCEDURE — 96372 THER/PROPH/DIAG INJ SC/IM: CPT | Mod: ER,59

## 2019-01-03 PROCEDURE — 81003 URINALYSIS AUTO W/O SCOPE: CPT | Mod: ER

## 2019-01-03 PROCEDURE — 85025 COMPLETE CBC W/AUTO DIFF WBC: CPT | Mod: ER

## 2019-01-03 PROCEDURE — 84484 ASSAY OF TROPONIN QUANT: CPT | Mod: ER

## 2019-01-03 PROCEDURE — 80053 COMPREHEN METABOLIC PANEL: CPT | Mod: ER

## 2019-01-03 PROCEDURE — 93005 ELECTROCARDIOGRAM TRACING: CPT | Mod: ER

## 2019-01-03 PROCEDURE — 93010 EKG 12-LEAD: ICD-10-PCS | Mod: ,,, | Performed by: INTERNAL MEDICINE

## 2019-01-03 PROCEDURE — 96374 THER/PROPH/DIAG INJ IV PUSH: CPT | Mod: ER

## 2019-01-03 PROCEDURE — 25000003 PHARM REV CODE 250: Mod: ER | Performed by: EMERGENCY MEDICINE

## 2019-01-03 PROCEDURE — 81025 URINE PREGNANCY TEST: CPT | Mod: ER | Performed by: EMERGENCY MEDICINE

## 2019-01-03 PROCEDURE — 63600175 PHARM REV CODE 636 W HCPCS: Mod: ER | Performed by: EMERGENCY MEDICINE

## 2019-01-03 RX ORDER — DIPHENHYDRAMINE HCL 25 MG
25 CAPSULE ORAL
Status: COMPLETED | OUTPATIENT
Start: 2019-01-03 | End: 2019-01-03

## 2019-01-03 RX ORDER — KETOROLAC TROMETHAMINE 30 MG/ML
15 INJECTION, SOLUTION INTRAMUSCULAR; INTRAVENOUS
Status: COMPLETED | OUTPATIENT
Start: 2019-01-03 | End: 2019-01-03

## 2019-01-03 RX ORDER — ACETAMINOPHEN 500 MG
1000 TABLET ORAL EVERY 6 HOURS PRN
Qty: 30 TABLET | Refills: 0 | Status: SHIPPED | OUTPATIENT
Start: 2019-01-03 | End: 2019-04-22

## 2019-01-03 RX ORDER — PROMETHAZINE HYDROCHLORIDE 25 MG/1
25 TABLET ORAL EVERY 6 HOURS PRN
Qty: 15 TABLET | Refills: 0 | Status: SHIPPED | OUTPATIENT
Start: 2019-01-03 | End: 2019-04-22

## 2019-01-03 RX ORDER — DIPHENHYDRAMINE HCL 25 MG
25 CAPSULE ORAL EVERY 6 HOURS PRN
Qty: 20 CAPSULE | Refills: 0 | Status: SHIPPED | OUTPATIENT
Start: 2019-01-03 | End: 2019-04-22

## 2019-01-03 RX ORDER — IBUPROFEN 600 MG/1
600 TABLET ORAL EVERY 6 HOURS PRN
Qty: 30 TABLET | Refills: 0 | Status: SHIPPED | OUTPATIENT
Start: 2019-01-03 | End: 2019-04-22

## 2019-01-03 RX ORDER — LIDOCAINE HYDROCHLORIDE 20 MG/ML
SOLUTION OROPHARYNGEAL
Qty: 30 ML | Refills: 0 | Status: SHIPPED | OUTPATIENT
Start: 2019-01-03 | End: 2019-04-22

## 2019-01-03 RX ORDER — PROCHLORPERAZINE EDISYLATE 5 MG/ML
10 INJECTION INTRAMUSCULAR; INTRAVENOUS ONCE
Status: COMPLETED | OUTPATIENT
Start: 2019-01-03 | End: 2019-01-03

## 2019-01-03 RX ADMIN — KETOROLAC TROMETHAMINE 15 MG: 30 INJECTION, SOLUTION INTRAMUSCULAR at 03:01

## 2019-01-03 RX ADMIN — DIPHENHYDRAMINE HYDROCHLORIDE 25 MG: 25 CAPSULE ORAL at 03:01

## 2019-01-03 RX ADMIN — PROCHLORPERAZINE EDISYLATE 10 MG: 5 INJECTION INTRAMUSCULAR; INTRAVENOUS at 03:01

## 2019-01-03 NOTE — ED PROVIDER NOTES
Encounter Date: 1/3/2019    SCRIBE #1 NOTE: I, Patricia Meredith, am scribing for, and in the presence of,  Dr. Ta . I have scribed the following portions of the note - Other sections scribed: HPI,ROS,PE .       History     Chief Complaint   Patient presents with    Hypertension     Pt report 225/150 from home machine.  Pt report taking home BP medication of Norvasc 10 mg. Pt report having a toothache    Headache     rate 10/10 describe as aching    Blurred Vision     onset today with the onset of the elevated blood pressure     37 y/o/f with a hx of migraines presents with dental pain for one month which is causing a left sided temple headache, that she thinks is causing her /150 from home machine today.  Having visual changes which is normal for her migraines. Rates the headache a 8/10 and tooth pain is a 6/10. She saw a dentist today was told that she possibly needs to see a endocrinologist or oral surgeon. Rx: clindamycin. She states she only takes amlodipine for her HTN. She is taking tylenol 500mg, 2-3 pills every 30 minutes to one hour for the pain. She has taken 23 tablets in 4 days and 14 tablets in 2 days. Denies CP, SOB, neck pain, N/V/D.      The history is provided by the patient. No  was used.     Review of patient's allergies indicates:   Allergen Reactions    Penicillins Rash     Past Medical History:   Diagnosis Date    GERD (gastroesophageal reflux disease)     Hypertension      Past Surgical History:   Procedure Laterality Date     SECTION      x 1    DELIVERY-CEASAREAN SECTION N/A 2016    Performed by [facility]ino Bernstein [facility] at [facility] L[facility]D [facility]    ENCERCLAGE N/A 3/3/2016    [facility]    EXCISION-MASS N/A 2013    Performed by Xavi Xie MD at [facility] OR     Family History   Problem Relation Age of Onset    Heart disease Mother         CAD age 38    Hypertension Mother     Diabetes Mother     Hypertension Father       Social History     Tobacco Use    Smoking status: Never Smoker    Smokeless tobacco: Never Used   Substance Use Topics    Alcohol use: Yes     Comment: RARELY    Drug use: No     Comment: occasional     Review of Systems   Constitutional: Negative for chills and fever.   Eyes: Positive for visual disturbance. Negative for photophobia.   Respiratory: Negative for shortness of breath.    Cardiovascular: Negative for chest pain and leg swelling.   Gastrointestinal: Negative for diarrhea, nausea and vomiting.   Musculoskeletal: Negative for neck pain and neck stiffness.   Skin: Negative for rash.   Neurological: Positive for headaches. Negative for dizziness, weakness and numbness.   All other systems reviewed and are negative.      Physical Exam     Initial Vitals [01/03/19 1349]   BP Pulse Resp Temp SpO2   (!) 144/111 97 17 98.1 °F (36.7 °C) 98 %      MAP       --         Physical Exam    Nursing note and vitals reviewed.  Constitutional: She appears well-developed and well-nourished.   HENT:   Head: Normocephalic and atraumatic.   Right Ear: External ear normal.   Left Ear: External ear normal.   Nose: Nose normal.   Mouth/Throat: Oropharynx is clear and moist.       Eyes: Conjunctivae are normal.   Neck: Normal range of motion. Neck supple.   Cardiovascular: Normal rate, regular rhythm, normal heart sounds and intact distal pulses. Exam reveals no gallop and no friction rub.    No murmur heard.  Pulmonary/Chest: Effort normal. No respiratory distress.   Musculoskeletal: Normal range of motion.   Neurological: She is alert and oriented to person, place, and time. She has normal strength and normal reflexes. No cranial nerve deficit or sensory deficit. GCS score is 15. GCS eye subscore is 4. GCS verbal subscore is 5. GCS motor subscore is 6.   Skin: Skin is warm and dry. Capillary refill takes less than 2 seconds. No rash noted.   Psychiatric: She has a normal mood and affect. Her behavior is normal.          Patient gave consent to have physical exam performed.    ED Course   Procedures  Labs Reviewed   POCT GLUCOSE, HAND-HELD DEVICE - Abnormal; Notable for the following components:       Result Value    POC Glucose 133 (*)     All other components within normal limits   POCT URINALYSIS W/O SCOPE - Abnormal; Notable for the following components:    Glucose, UA Trace (*)     Bilirubin, UA Negative (*)     Ketones, UA Negative (*)     Blood, UA Negative (*)     Protein, UA Negative (*)     Nitrite, UA Negative (*)     Leukocytes, UA Negative (*)     All other components within normal limits   POCT GLUCOSE - Abnormal; Notable for the following components:    POCT Glucose 133 (*)     All other components within normal limits   TROPONIN ISTAT   POCT URINE PREGNANCY   POCT URINALYSIS W/O SCOPE   POCT CBC   POCT CMP   POCT PROTIME-INR   POCT TROPONIN   POCT CMP   ISTAT PROCEDURE     EKG Readings: (Independently Interpreted)   Initial Reading: No STEMI. Rhythm: Normal Sinus Rhythm. Heart Rate: ventricular rate . Axis: Normal. Other Impression: abnormal EKG,LVH viort critera,QTC normal 482. Compared to prior EKG on April 23,2014 rate has increased by 11 bpm.       Imaging Results          CT Head Without Contrast (Final result)  Result time 01/03/19 15:14:44    Final result by Luis Miguel Nguyễn MD (01/03/19 15:14:44)                 Impression:      No acute intracranial abnormality identified.  Specifically, no intracranial hemorrhage.      Electronically signed by: Luis Miguel Nguyễn MD  Date:    01/03/2019  Time:    15:14             Narrative:    EXAMINATION:  CT HEAD WITHOUT CONTRAST    CLINICAL HISTORY:  Headache, acute, norm neuro exam;    TECHNIQUE:  Low dose axial CT images obtained throughout the head without intravenous contrast. Sagittal and coronal reconstructions were performed.    COMPARISON:  Head CT 08/20/2012    FINDINGS:  Intracranial compartment:    Ventricles and sulci are normal in size for age without  evidence of hydrocephalus. No extra-axial blood or fluid collections.    The brain parenchyma appears normal. No parenchymal mass, hemorrhage, edema or major vascular distribution infarct.    Skull/extracranial contents (limited evaluation): No fracture. Mastoid air cells and paranasal sinuses are essentially clear.  Visualized portions of the orbits are within normal limits.                               X-Ray Chest PA And Lateral (Final result)  Result time 01/03/19 15:15:24    Final result by Luis Miguel Nguyễn MD (01/03/19 15:15:24)                 Impression:      Normal radiographic appearance of the chest.      Electronically signed by: Luis Miguel Nguyễn MD  Date:    01/03/2019  Time:    15:15             Narrative:    EXAMINATION:  XR CHEST PA AND LATERAL    CLINICAL HISTORY:  htn;    TECHNIQUE:  PA and lateral views of the chest were performed.    COMPARISON:  Chest radiograph 11/25/2018    FINDINGS:  No detrimental change.The lungs are clear, with normal appearance of pulmonary vasculature and no pleural effusion or pneumothorax.    The cardiac silhouette is normal in size. The hilar and mediastinal contours are unremarkable.    Bones are intact.                                 Medical Decision Making:   Clinical Tests:   Lab Tests: Ordered and Reviewed  Radiological Study: Ordered and Reviewed  ED Management:  UPT, CT head, CXR, prochlorperazine injection, toradol IM, benadryl capsule ordered.  Treatment in the ED Physical Exam, lidocaine pop for dental pain, Toradol, Benadryl Compazine for headache.  Patient reports total resolution of symptoms after medication.    Discussed labs, and imaging results.    CBC white blood cell count 8.4, hemoglobin 10.2, hematocrit 32.1 and platelets 335.  Troponin is 0.00.  INR 0.9.  UA trace glucose negative ketones negative nitrites and negative leukocytes.  CMP normal sodium 144, potassium 3.9, bicarb 27, chloride 103, glucose 112, BUN 9 and creatinine 0 point   Fill and take  prescriptions as directed.  Return to the ED if symptoms worsen or do not resolve.   Answered questions and discussed discharge plan.    Patient feels much better and is ready for discharge.  Follow up with PCP in 1 days.            Scribe Attestation:   Scribe #1: I performed the above scribed service and the documentation accurately describes the services I performed. I attest to the accuracy of the note.     I, Dr. Deysi Ta, personally performed the services described in this documentation. This document was produced by a scribe under my direction and in my presence. All medical record entries made by the scribe were at my direction and in my presence.  I have reviewed the chart and agree that the record reflects my personal performance and is accurate and complete. Deysi Ta DO.     01/03/2019 4:30 PM          ED Course as of Jan 03 1409   Thu Jan 03, 2019   1355 A nontoxic 38-year-old female who presents to the ED with complaints of elevated blood pressure.  Patient reports a headache and toothache.  Patient takes amlodipine 10 mg daily.  [TA]      ED Course User Index  [TA] MARTINA Díaz     Clinical Impression:     1. HTN (hypertension)    2. Dentalgia    3. Dental caries    4. Migraine without status migrainosus, not intractable, unspecified migraine type                                   Deysi Ta DO  01/03/19 1631

## 2019-01-22 ENCOUNTER — OFFICE VISIT (OUTPATIENT)
Dept: OBSTETRICS AND GYNECOLOGY | Facility: CLINIC | Age: 39
End: 2019-01-22
Payer: MEDICAID

## 2019-01-22 VITALS
BODY MASS INDEX: 31.82 KG/M2 | DIASTOLIC BLOOD PRESSURE: 84 MMHG | SYSTOLIC BLOOD PRESSURE: 120 MMHG | HEIGHT: 64 IN | WEIGHT: 186.38 LBS

## 2019-01-22 DIAGNOSIS — Z01.419 WELL WOMAN EXAM WITH ROUTINE GYNECOLOGICAL EXAM: Primary | ICD-10-CM

## 2019-01-22 DIAGNOSIS — Z12.4 CERVICAL CANCER SCREENING: ICD-10-CM

## 2019-01-22 PROBLEM — B37.31 VAGINAL CANDIDIASIS: Status: RESOLVED | Noted: 2017-09-04 | Resolved: 2019-01-22

## 2019-01-22 PROCEDURE — 99999 PR PBB SHADOW E&M-EST. PATIENT-LVL III: CPT | Mod: PBBFAC,,, | Performed by: OBSTETRICS & GYNECOLOGY

## 2019-01-22 PROCEDURE — 99395 PREV VISIT EST AGE 18-39: CPT | Mod: S$PBB,,, | Performed by: OBSTETRICS & GYNECOLOGY

## 2019-01-22 PROCEDURE — 99999 PR PBB SHADOW E&M-EST. PATIENT-LVL III: ICD-10-PCS | Mod: PBBFAC,,, | Performed by: OBSTETRICS & GYNECOLOGY

## 2019-01-22 PROCEDURE — 99395 PR PREVENTIVE VISIT,EST,18-39: ICD-10-PCS | Mod: S$PBB,,, | Performed by: OBSTETRICS & GYNECOLOGY

## 2019-01-22 PROCEDURE — 99213 OFFICE O/P EST LOW 20 MIN: CPT | Mod: PBBFAC | Performed by: OBSTETRICS & GYNECOLOGY

## 2019-01-22 PROCEDURE — 87624 HPV HI-RISK TYP POOLED RSLT: CPT

## 2019-01-22 PROCEDURE — 88175 CYTOPATH C/V AUTO FLUID REDO: CPT

## 2019-01-22 RX ORDER — HYDROXYZINE HYDROCHLORIDE 10 MG/1
TABLET, FILM COATED ORAL
Refills: 1 | COMMUNITY
Start: 2018-12-26 | End: 2019-04-22

## 2019-01-22 RX ORDER — CLINDAMYCIN HYDROCHLORIDE 300 MG/1
CAPSULE ORAL
Refills: 0 | COMMUNITY
Start: 2019-01-03 | End: 2019-04-22

## 2019-01-23 NOTE — PROGRESS NOTES
"[facility]  Ambulatory Clinic  Obstetrics & Gynecology    Visit Date:  2019    Chief Complaint:  Annual GYN exam    History of Present Illness:      Shelli Iverson is a 38 y.o.  here for a gynecologic exam.    Pt has no major complaints today.      Menses are regular, not heavy or painful.    Pt current method of family planning is BTL.    Pt denies h/o abnormal pap.    Pt denies active sexually transmitted infections.    Pt performs monthly self breast examination, non-smoker, uses seat belts, and denies abuse.     Pt denies abnormal vaginal bleeding, vaginal discharge, dysmenorrhea, dyspareunia, pelvic pain, bloating, early satiety, unintentional weight loss, breast mass/skin changes, incontinence, GI or urinary complaints.      Otherwise, the pt is in her usual state of health.    Past History:  Gynecologic history as noted above.    Review of Systems:      GENERAL:  No fever, fatigue, excessive weight gain or loss  HEENT:  No headaches, hearing changes, visual disturbance  RESPIRATORY:  No cough, shortness of breath  CARDIOVASCULAR:  No chest pain, heart palpitations, leg swelling  BREAST:  No lump, pain, nipple discharge, skin changes  GASTROINTESTINAL:  No nausea, vomiting, constipation, diarrhea, abd pain, rectal bleeding   GENITOURINARY:  See HPI  ENDOCRINE:  No heat or cold intolerance  HEMATOLOGIC:  No easy bruisability or bleeding   LYMPHATICS:  No enlarged nodes  MUSCULOSKELETAL:  No joint pain or swelling  SKIN:  No rash, lesions, jaundice  NEUROLOGIC:  No dizziness, weakness, syncope  PSYCHIATRIC:  No homicidal/suicidal ideations or significant mood swings    Physical Exam:     /84 (BP Location: Right arm, Patient Position: Sitting, BP Method: Medium (Manual))   Ht 5' 4" (1.626 m)   Wt 84.5 kg (186 lb 6.4 oz)   LMP 2019 (Approximate)   BMI 32.00 kg/m²      GENERAL:  No acute distress, well-nourished  HEENT:  Atraumatic, anicteric, moist mucus " membranes. Neck supple w/o masses.  BREAST:  Symmetric, nontender, no obvious masses, adenopathy, skin changes or nipple discharge.  LUNGS:  Clear to auscultation  HEART:  Regular rate and rhythm, no murmurs, gallops, or rubs  ABDOMEN:  Soft, non-tender, non-distended, normoactive bowel sounds, no obvious organomegaly  EXT:  Symmetric w/o cramping, claudication, or edema. +2 distal pulses.  SKIN:  No rashes or bruising  PSYCH:  Mood and affect appropriate  NEURO:  Grossly intact bilaterally, FROM,  no sensory or motor deficits     GENITOURINARY:    VULVAR:  Female external genitalia w/o obvious lesions. Female hair distribution. Normal urethral meatus. No gross lymphadenopathy.    VAGINA:  Pink, moist, well-rugated. Good support. No obvious lesion. No discharge.  CERVIX:  No cervical motion tenderness, discharge, or obvious lesions.   UTERUS:  Small, non-tender, normal contour  ADNEXA:  No masses, non-tender    RECTAL:  Declined. No obvious external lesions    Chaperone present for exam.    Assessment:     38 y.o.  with h/o BTL:    1. Well woman gynecologic exam    Plan:    A gynecologic health assessment was performed with age appropriate counseling.    Cervical cancer screening - pap obtained.    STI screening - pt declined.  Safe sex discussed.      Encourage healthy lifestyle modifications, monthly self breast exams, Ca/Vit D.    F/u with PCP for health maintenance.    Return 1 year for gynecologic exam, or sooner as needed.  All questions answered, pt voiced understanding.        Galdino Bernstein MD

## 2019-01-24 LAB
HPV HR 12 DNA CVX QL NAA+PROBE: NEGATIVE
HPV16 AG SPEC QL: NEGATIVE
HPV18 DNA SPEC QL NAA+PROBE: NEGATIVE

## 2019-01-25 ENCOUNTER — PATIENT MESSAGE (OUTPATIENT)
Dept: OBSTETRICS AND GYNECOLOGY | Facility: CLINIC | Age: 39
End: 2019-01-25

## 2019-02-08 DIAGNOSIS — I10 HYPERTENSION, ESSENTIAL: ICD-10-CM

## 2019-02-08 RX ORDER — AMLODIPINE BESYLATE 10 MG/1
TABLET ORAL
Qty: 30 TABLET | Refills: 1 | Status: SHIPPED | OUTPATIENT
Start: 2019-02-08 | End: 2019-04-22 | Stop reason: SDUPTHER

## 2019-02-08 NOTE — TELEPHONE ENCOUNTER
Patient requesting refill on BP medication. Advised patient that she needs to establish care with a new PCP in order to continue getting refills. Nothing is available  with you til April due to insurance so she is scheduled with Ghada NP for 03/06. Can you give pt refill on medication until patient can come in for an appointment ?

## 2019-02-08 NOTE — TELEPHONE ENCOUNTER
----- Message from Ousmane Burrell sent at 2/8/2019  3:44 PM CST -----  Contact: Self/[phone]  Refill:amLODIPine (NORVASC) 10 MG tablet        [facility] [address] [place] [address] [phone]      Thank you

## 2019-04-22 ENCOUNTER — OFFICE VISIT (OUTPATIENT)
Dept: FAMILY MEDICINE | Facility: CLINIC | Age: 39
End: 2019-04-22
Payer: MEDICAID

## 2019-04-22 VITALS
BODY MASS INDEX: 31.07 KG/M2 | DIASTOLIC BLOOD PRESSURE: 105 MMHG | HEIGHT: 64 IN | SYSTOLIC BLOOD PRESSURE: 150 MMHG | HEART RATE: 85 BPM | TEMPERATURE: 98 F | OXYGEN SATURATION: 99 % | WEIGHT: 182 LBS

## 2019-04-22 DIAGNOSIS — I10 ESSENTIAL (PRIMARY) HYPERTENSION: ICD-10-CM

## 2019-04-22 DIAGNOSIS — E78.49 OTHER HYPERLIPIDEMIA: ICD-10-CM

## 2019-04-22 DIAGNOSIS — E11.9 CONTROLLED TYPE 2 DIABETES MELLITUS WITHOUT COMPLICATION, WITHOUT LONG-TERM CURRENT USE OF INSULIN: Primary | ICD-10-CM

## 2019-04-22 DIAGNOSIS — E66.9 OBESITY (BMI 30-39.9): ICD-10-CM

## 2019-04-22 DIAGNOSIS — Z12.39 BREAST CANCER SCREENING: ICD-10-CM

## 2019-04-22 PROCEDURE — 99213 OFFICE O/P EST LOW 20 MIN: CPT | Mod: PBBFAC,PO | Performed by: FAMILY MEDICINE

## 2019-04-22 PROCEDURE — 99999 PR PBB SHADOW E&M-EST. PATIENT-LVL III: ICD-10-PCS | Mod: PBBFAC,,, | Performed by: FAMILY MEDICINE

## 2019-04-22 PROCEDURE — 99999 PR PBB SHADOW E&M-EST. PATIENT-LVL III: CPT | Mod: PBBFAC,,, | Performed by: FAMILY MEDICINE

## 2019-04-22 PROCEDURE — 99214 OFFICE O/P EST MOD 30 MIN: CPT | Mod: S$PBB,,, | Performed by: FAMILY MEDICINE

## 2019-04-22 PROCEDURE — 99214 PR OFFICE/OUTPT VISIT, EST, LEVL IV, 30-39 MIN: ICD-10-PCS | Mod: S$PBB,,, | Performed by: FAMILY MEDICINE

## 2019-04-22 RX ORDER — LOSARTAN POTASSIUM 50 MG/1
50 TABLET ORAL DAILY
Qty: 90 TABLET | Refills: 0 | Status: SHIPPED | OUTPATIENT
Start: 2019-04-22 | End: 2019-08-26 | Stop reason: SDUPTHER

## 2019-04-22 RX ORDER — AMLODIPINE BESYLATE 10 MG/1
TABLET ORAL
Qty: 90 TABLET | Refills: 0 | Status: SHIPPED | OUTPATIENT
Start: 2019-04-22 | End: 2019-08-26 | Stop reason: SDUPTHER

## 2019-04-22 RX ORDER — ATORVASTATIN CALCIUM 20 MG/1
20 TABLET, FILM COATED ORAL DAILY
Qty: 90 TABLET | Refills: 3 | Status: SHIPPED | OUTPATIENT
Start: 2019-04-22 | End: 2020-01-07 | Stop reason: SDUPTHER

## 2019-04-22 NOTE — PROGRESS NOTES
Office Visit    Patient Name: Shelli Iverson    : 1980  MRN: [id number]      Assessment/Plan:  Shelli Iverson is a 39 y.o. female who presents today for :    Controlled type 2 diabetes mellitus without complication, without long-term current use of insulin  -     Hemoglobin A1c; Future; Expected date: 2019  -     CBC Without Differential; Future; Expected date: 2019  -     Comprehensive metabolic panel; Future; Expected date: 2019  -     Microalbumin/creatinine urine ratio; Future; Expected date: 2019  Obesity (BMI 30-39.9)  -previous A1c reviewed:   Lab Results   Component Value Date    HGBA1C 6.6 (H) 2018     -diet-controlled, recheck A1c   -d/w patient about the need for regular eye care, skin care, daily foot exam, proper nutrition, daily BG checks at home (fasting and 2 hrs pp) and medication compliance in a diabetic.  Target morning BS  and after meal -180  -discussed weight loss and regular physical excercise    Essential (primary) hypertension  -  ADD   losartan (COZAAR) 50 MG tablet; Take 1 tablet (50 mg total) by mouth once daily.  Dispense: 90 tablet; Refill: 0  -     amLODIPine (NORVASC) 10 MG tablet; TAKE 1 TABLET(10 MG) BY MOUTH EVERY DAY  Dispense: 90 tablet; Refill: 0  Other hyperlipidemia  -     atorvastatin (LIPITOR) 20 MG tablet; Take 1 tablet (20 mg total) by mouth once daily.  Dispense: 90 tablet; Refill: 3  -     Lipid panel; Future; Expected date: 2019  - continue current medications - add ARB for additional BP control   - ?advised DASH diet, portion control, regular cardiovascular exercises  - ?counseled on weight loss    Breast cancer screening  -     Mammo Digital Screening Bilat; Future; Expected date: 2019      Follow up in about 2 weeks (around 2019) for BP Check.     This note was created by combination of typed  and Dragon dictation.  Transcription errors may be present.  If there are any questions, please  contact me.      ----------------------------------------------------------------------------------------------------------------------      HPI:  Patient Care Team:  Octavio Bernstein MD as PCP - General (Family Medicine)    Shelli is a 39 y.o. female with      Patient Active Problem List   Diagnosis   (none) - all problems resolved or deleted     This patient is new to me     Patient presents today for f/u DM/HTN:  Medication Refill    DM - currently not on any medications as she states she was borderline diabetic. No regular FBG checks at home.  No polyuria/polydipsia. No foot numbness/tingling/vision changes/hypoglycemia      Hemoglobin A1C   Date Value Ref Range Status   06/22/2018 6.6 (H) 4.0 - 5.6 % Final     Comment:     ADA Screening Guidelines:  5.7-6.4%  Consistent with prediabetes  >or=6.5%  Consistent with diabetes  High levels of fetal hemoglobin interfere with the HbA1C  assay. Heterozygous hemoglobin variants (HbS, HgC, etc)do  not significantly interfere with this assay.   However, presence of multiple variants may affect accuracy.     12/11/2015 6.3 (H) 4.5 - 6.2 % Final   01/14/2015 6.0 4.5 - 6.2 % Final       HTN - currently on Amlodipine, compliant with med as prescribed, denies any side effects but her BP has been in the 150s/120s range at home, with elevated Bp today in clinic with repeat measurement. She denies any vision changes/urinary changes/leg swelling.       Additional ROS    No F/C/wt changes/fatigue  No dysphagia/sore throat  No CP/MORGAN/palpitations/swelling  No cough/wheezing/SOB  No nausea/vomiting/abd pain/no diarrhea, no constipation, no blood in stool  No MSK weakness/HA/tingling/numbness  No rashes  No anxiety/depression  No dysuria/hematuria      Patient Active Problem List   Diagnosis   (none) - all problems resolved or deleted       Current Medications  Medications reviewed/updated.     Current Outpatient Medications on File Prior to Visit   Medication Sig Dispense Refill     hydrocortisone 2.5 % cream       [DISCONTINUED] amLODIPine (NORVASC) 10 MG tablet TAKE 1 TABLET(10 MG) BY MOUTH EVERY DAY 30 tablet 1    albuterol (PROVENTIL/VENTOLIN HFA) 90 mcg/actuation inhaler Inhale 1-2 puffs into the lungs every 6 (six) hours as needed for Wheezing (And cough). Rescue 1 Inhaler 0    [DISCONTINUED] acetaminophen (TYLENOL) 500 MG tablet Take 2 tablets (1,000 mg total) by mouth every 6 (six) hours as needed for Pain. 30 tablet 0    [DISCONTINUED] azelastine (ASTELIN) 137 mcg (0.1 %) nasal spray 1 spray (137 mcg total) by Nasal route every evening. for 21 days 30 mL 0    [DISCONTINUED] clindamycin (CLEOCIN) 300 MG capsule TK ONE C PO  Q 6 H  0    [DISCONTINUED] diphenhydrAMINE (BENADRYL) 25 mg capsule Take 1 each (25 mg total) by mouth every 6 (six) hours as needed (Take for headache). 20 capsule 0    [DISCONTINUED] docusate sodium (COLACE) 100 MG capsule Take 1 capsule (100 mg total) by mouth 2 (two) times daily as needed for Constipation. 60 capsule 1    [DISCONTINUED] etodolac (LODINE) 400 MG tablet Take 1 tablet (400 mg total) by mouth 2 (two) times daily. 20 tablet 0    [DISCONTINUED] famotidine (PEPCID) 20 MG tablet Take 1 tablet (20 mg total) by mouth 2 (two) times daily. 20 tablet 0    [DISCONTINUED] fluconazole (DIFLUCAN) 150 MG Tab TK 1 T PO ONCE  0    [DISCONTINUED] fluticasone (FLONASE) 50 mcg/actuation nasal spray 2 sprays (100 mcg total) by Each Nare route once daily. 1 Bottle 0    [DISCONTINUED] hydrocodone-acetaminophen 5-325mg (NORCO) 5-325 mg per tablet Take 1 tablet by mouth every 8 (eight) hours as needed for Pain. 20 tablet 0    [DISCONTINUED] hydrOXYzine HCl (ATARAX) 10 MG Tab TK 1 T PO Q 6 H PRF ITCHING  1    [DISCONTINUED] ibuprofen (ADVIL,MOTRIN) 600 MG tablet Take 1 tablet (600 mg total) by mouth every 6 (six) hours as needed for Pain (Take with food as needed for mild-to-moderate pain). 30 tablet 0    [DISCONTINUED] lidocaine HCl 2% (XYLOCAINE) 2 % Soln by  Mucous Membrane route every 3 (three) hours. Apply with Q-tip to painful area 30 mL 0    [DISCONTINUED] loratadine (CLARITIN) 10 mg tablet Take 1 tablet (10 mg total) by mouth once daily. for 10 days 60 tablet 0    [DISCONTINUED] promethazine (PHENERGAN) 25 MG tablet Take 1 tablet (25 mg total) by mouth every 6 (six) hours as needed for Nausea (Take for headache). 15 tablet 0    [DISCONTINUED] triamcinolone acetonide 0.1% (KENALOG) 0.1 % ointment Apply topically 2 (two) times daily. Apply to rash. Do not use on face. 80 g 0     No current facility-administered medications on file prior to visit.            Past Surgical History:   Procedure Laterality Date     SECTION      x 1    DELIVERY-CEASAREAN SECTION N/A 2016    [facility]    ENCERCLAGE N/A 3/3/2016    [facility] L[facility]D    EXCISION-MASS N/A 2013    Performed by [facility] Xie [facility] at [facility] OR       Family History   Problem Relation Age of Onset    Heart disease Mother         CAD age 38    Hypertension Mother 38        MI    Diabetes Mother     Hypertension Father        Social History     Socioeconomic History    Marital status: Single     Spouse name: Not on file    Number of children: Not on file    Years of education: Not on file    Highest education level: Not on file   Occupational History    Not on file   Social Needs    Financial resource strain: Not on file    Food insecurity:     Worry: Not on file     Inability: Not on file    Transportation needs:     Medical: Not on file     Non-medical: Not on file   Tobacco Use    Smoking status: Never Smoker    Smokeless tobacco: Never Used   Substance and Sexual Activity    Alcohol use: Yes     Comment: RARELY    Drug use: No     Comment: occasional    Sexual activity: Yes     Birth control/protection: IUD   Lifestyle    Physical activity:     Days per week: Not on file     Minutes per session: Not on file  "   Stress: Not on file   Relationships    Social connections:     Talks on phone: Not on file     Gets together: Not on file     Attends Yazidi service: Not on file     Active member of club or organization: Not on file     Attends meetings of clubs or organizations: Not on file     Relationship status: Not on file   Other Topics Concern    Not on file   Social History Narrative    Not on file             Allergies   Review of patient's allergies indicates:   Allergen Reactions    Penicillins Rash             Review of Systems  See HPI      Physical Exam  BP (!) 150/105   Pulse 85   Temp 97.8 °F (36.6 °C) (Oral)   Ht 5' 4" (1.626 m)   Wt 82.6 kg (181 lb 15.8 oz)   SpO2 99%   BMI 31.24 kg/m²     GEN: NAD, well developed, pleasant, well nourished  HEENT: NCAT, PERRLA, EOMI, sclera clear, anicteric, O/P clear, MMM with no lesions  NECK: normal, supple with midline trachea, no LAD, no thyromegaly  LUNGS: CTAB, no w/r/r, no increased work of breathing   HEART: RRR, normal S1 and S2, no m/r/g, no edema  ABD: s/nt/nd, NABS  SKIN: normal turgor, no rashes  PSYCH: AOx3, appropriate mood and affect  MSK: warm/well perfused, normal ROM in all extremities, no c/c/e.  FOOT:  Protective Sensation (w/ 10 gram monofilament):  Right: Intact  Left: Intact    Visual Inspection:  Normal -  Bilateral    Pedal Pulses:   Right: Present  Left: Present    Posterior tibialis:   Right:Present  Left: Present            Labs  Lab Results   Component Value Date    HGBA1C 6.6 (H) 06/22/2018     Lab Results   Component Value Date     06/22/2018    K 3.9 06/22/2018     06/22/2018    CO2 23 06/22/2018    BUN 11 06/22/2018    CREATININE 0.8 06/22/2018    CALCIUM 8.7 06/22/2018    ANIONGAP 8 06/22/2018    ESTGFRAFRICA >60.0 06/22/2018    EGFRNONAA >60.0 06/22/2018     Lab Results   Component Value Date    CHOL 194 06/22/2018    CHOL 159 01/14/2015    CHOL 164 02/08/2014     Lab Results   Component Value Date    HDL 56 " 06/22/2018    HDL 56 01/14/2015    HDL 50 02/08/2014     Lab Results   Component Value Date    LDLCALC 123.2 06/22/2018    LDLCALC 92.4 01/14/2015    LDLCALC 102.4 02/08/2014     Lab Results   Component Value Date    TRIG 74 06/22/2018    TRIG 53 01/14/2015    TRIG 58 02/08/2014     Lab Results   Component Value Date    CHOLHDL 28.9 06/22/2018    CHOLHDL 35.2 01/14/2015    CHOLHDL 30.5 02/08/2014     Last set of blood work has been reviewed as noted above.

## 2019-04-23 ENCOUNTER — LAB VISIT (OUTPATIENT)
Dept: LAB | Facility: HOSPITAL | Age: 39
End: 2019-04-23
Attending: FAMILY MEDICINE
Payer: MEDICAID

## 2019-04-23 DIAGNOSIS — E11.9 CONTROLLED TYPE 2 DIABETES MELLITUS WITHOUT COMPLICATION, WITHOUT LONG-TERM CURRENT USE OF INSULIN: ICD-10-CM

## 2019-04-23 DIAGNOSIS — E78.49 OTHER HYPERLIPIDEMIA: ICD-10-CM

## 2019-04-23 LAB
ALBUMIN SERPL BCP-MCNC: 3.6 G/DL (ref 3.5–5.2)
ALP SERPL-CCNC: 93 U/L (ref 55–135)
ALT SERPL W/O P-5'-P-CCNC: 16 U/L (ref 10–44)
ANION GAP SERPL CALC-SCNC: 9 MMOL/L (ref 8–16)
AST SERPL-CCNC: 16 U/L (ref 10–40)
BILIRUB SERPL-MCNC: 0.3 MG/DL (ref 0.1–1)
BUN SERPL-MCNC: 9 MG/DL (ref 6–20)
CALCIUM SERPL-MCNC: 9 MG/DL (ref 8.7–10.5)
CHLORIDE SERPL-SCNC: 104 MMOL/L (ref 95–110)
CHOLEST SERPL-MCNC: 174 MG/DL (ref 120–199)
CHOLEST/HDLC SERPL: 3 {RATIO} (ref 2–5)
CO2 SERPL-SCNC: 24 MMOL/L (ref 23–29)
CREAT SERPL-MCNC: 0.7 MG/DL (ref 0.5–1.4)
ERYTHROCYTE [DISTWIDTH] IN BLOOD BY AUTOMATED COUNT: 15.9 % (ref 11.5–14.5)
EST. GFR  (AFRICAN AMERICAN): >60 ML/MIN/1.73 M^2
EST. GFR  (NON AFRICAN AMERICAN): >60 ML/MIN/1.73 M^2
ESTIMATED AVG GLUCOSE: 140 MG/DL (ref 68–131)
GLUCOSE SERPL-MCNC: 130 MG/DL (ref 70–110)
HBA1C MFR BLD HPLC: 6.5 % (ref 4–5.6)
HCT VFR BLD AUTO: 35 % (ref 37–48.5)
HDLC SERPL-MCNC: 58 MG/DL (ref 40–75)
HDLC SERPL: 33.3 % (ref 20–50)
HGB BLD-MCNC: 10.5 G/DL (ref 12–16)
LDLC SERPL CALC-MCNC: 97.6 MG/DL (ref 63–159)
MCH RBC QN AUTO: 24 PG (ref 27–31)
MCHC RBC AUTO-ENTMCNC: 30 G/DL (ref 32–36)
MCV RBC AUTO: 80 FL (ref 82–98)
NONHDLC SERPL-MCNC: 116 MG/DL
PLATELET # BLD AUTO: 417 K/UL (ref 150–350)
PMV BLD AUTO: 10.3 FL (ref 9.2–12.9)
POTASSIUM SERPL-SCNC: 4 MMOL/L (ref 3.5–5.1)
PROT SERPL-MCNC: 7 G/DL (ref 6–8.4)
RBC # BLD AUTO: 4.37 M/UL (ref 4–5.4)
SODIUM SERPL-SCNC: 137 MMOL/L (ref 136–145)
TRIGL SERPL-MCNC: 92 MG/DL (ref 30–150)
WBC # BLD AUTO: 6.22 K/UL (ref 3.9–12.7)

## 2019-04-23 PROCEDURE — 80053 COMPREHEN METABOLIC PANEL: CPT

## 2019-04-23 PROCEDURE — 85027 COMPLETE CBC AUTOMATED: CPT

## 2019-04-23 PROCEDURE — 80061 LIPID PANEL: CPT

## 2019-04-23 PROCEDURE — 83036 HEMOGLOBIN GLYCOSYLATED A1C: CPT

## 2019-04-23 PROCEDURE — 36415 COLL VENOUS BLD VENIPUNCTURE: CPT | Mod: PO

## 2019-05-17 ENCOUNTER — PATIENT OUTREACH (OUTPATIENT)
Dept: ADMINISTRATIVE | Facility: HOSPITAL | Age: 39
End: 2019-05-17

## 2019-05-31 ENCOUNTER — CLINICAL SUPPORT (OUTPATIENT)
Dept: OPHTHALMOLOGY | Facility: CLINIC | Age: 39
End: 2019-05-31
Attending: FAMILY MEDICINE
Payer: MEDICAID

## 2019-05-31 ENCOUNTER — HOSPITAL ENCOUNTER (OUTPATIENT)
Dept: RADIOLOGY | Facility: HOSPITAL | Age: 39
Discharge: HOME | End: 2019-05-31
Attending: FAMILY MEDICINE
Payer: MEDICAID

## 2019-05-31 DIAGNOSIS — Z12.39 BREAST CANCER SCREENING: ICD-10-CM

## 2019-05-31 PROCEDURE — 77067 SCR MAMMO BI INCL CAD: CPT | Mod: TC,PO

## 2019-05-31 PROCEDURE — 92250 FUNDUS PHOTOGRAPHY W/I&R: CPT | Mod: 26,S$PBB,, | Performed by: OPHTHALMOLOGY

## 2019-05-31 PROCEDURE — 77067 MAMMO DIGITAL SCREENING BILAT WITH TOMOSYNTHESIS_CAD: ICD-10-PCS | Mod: 26,,, | Performed by: RADIOLOGY

## 2019-05-31 PROCEDURE — 92250 FUNDUS PHOTOGRAPHY W/I&R: CPT | Mod: PBBFAC,PO

## 2019-05-31 PROCEDURE — 77063 MAMMO DIGITAL SCREENING BILAT WITH TOMOSYNTHESIS_CAD: ICD-10-PCS | Mod: 26,,, | Performed by: RADIOLOGY

## 2019-05-31 PROCEDURE — 77063 BREAST TOMOSYNTHESIS BI: CPT | Mod: 26,,, | Performed by: RADIOLOGY

## 2019-05-31 PROCEDURE — 92250 DIABETIC EYE SCREENING PHOTO: ICD-10-PCS | Mod: 26,S$PBB,, | Performed by: OPHTHALMOLOGY

## 2019-05-31 PROCEDURE — 77067 SCR MAMMO BI INCL CAD: CPT | Mod: 26,,, | Performed by: RADIOLOGY

## 2019-05-31 NOTE — PROGRESS NOTES
HPI     38 Y/o here for screening for diabetic retinopathy with non-dilated   fundus photos per Dr. Bernstein     Last edited by Ramon Parsons MA on 5/31/2019  2:44 PM. (History)            Assessment /Plan     For exam results, see Encounter Report.    Type II diabetes mellitus with complication, uncontrolled  -     Diabetic Eye Screening Photo      Please see Dr. Case progress note for interpretation

## 2019-06-05 ENCOUNTER — TELEPHONE (OUTPATIENT)
Dept: OPHTHALMOLOGY | Facility: CLINIC | Age: 39
End: 2019-06-05

## 2019-06-05 NOTE — TELEPHONE ENCOUNTER
Called patient regarding diabetic eye screening results ; gave patient her results     ----- Message from Jordyn Almaguer sent at 6/5/2019  8:28 AM CDT -----      ----- Message -----  From: Yusef Case MD  Sent: 6/4/2019   6:41 PM  To: Heidi Salinas

## 2019-08-26 DIAGNOSIS — I10 ESSENTIAL (PRIMARY) HYPERTENSION: ICD-10-CM

## 2019-08-26 RX ORDER — AMLODIPINE BESYLATE 10 MG/1
TABLET ORAL
Qty: 30 TABLET | Refills: 0 | Status: SHIPPED | OUTPATIENT
Start: 2019-08-26 | End: 2019-09-25 | Stop reason: SDUPTHER

## 2019-08-26 RX ORDER — LOSARTAN POTASSIUM 50 MG/1
50 TABLET ORAL DAILY
Qty: 30 TABLET | Refills: 0 | Status: SHIPPED | OUTPATIENT
Start: 2019-08-26 | End: 2019-09-25 | Stop reason: SDUPTHER

## 2019-09-25 DIAGNOSIS — I10 ESSENTIAL (PRIMARY) HYPERTENSION: ICD-10-CM

## 2019-09-25 RX ORDER — AMLODIPINE BESYLATE 10 MG/1
TABLET ORAL
Qty: 14 TABLET | Refills: 0 | Status: SHIPPED | OUTPATIENT
Start: 2019-09-25 | End: 2019-12-16 | Stop reason: SDUPTHER

## 2019-09-25 RX ORDER — LOSARTAN POTASSIUM 50 MG/1
50 TABLET ORAL DAILY
Qty: 14 TABLET | Refills: 0 | Status: SHIPPED | OUTPATIENT
Start: 2019-09-25 | End: 2019-12-16 | Stop reason: SDUPTHER

## 2019-10-10 ENCOUNTER — HOSPITAL ENCOUNTER (EMERGENCY)
Facility: HOSPITAL | Age: 39
Discharge: HOME | End: 2019-10-10
Attending: EMERGENCY MEDICINE
Payer: MEDICAID

## 2019-10-10 VITALS
TEMPERATURE: 98 F | HEART RATE: 79 BPM | HEIGHT: 64 IN | BODY MASS INDEX: 29.71 KG/M2 | OXYGEN SATURATION: 100 % | SYSTOLIC BLOOD PRESSURE: 144 MMHG | DIASTOLIC BLOOD PRESSURE: 94 MMHG | WEIGHT: 174 LBS | RESPIRATION RATE: 18 BRPM

## 2019-10-10 DIAGNOSIS — J06.9 VIRAL URI WITH COUGH: ICD-10-CM

## 2019-10-10 DIAGNOSIS — M54.32 SCIATICA OF LEFT SIDE: Primary | ICD-10-CM

## 2019-10-10 LAB
B-HCG UR QL: NEGATIVE
BILIRUBIN, POC UA: NEGATIVE
BLOOD, POC UA: NEGATIVE
CLARITY, POC UA: ABNORMAL
COLOR, POC UA: YELLOW
CTP QC/QA: YES
GLUCOSE, POC UA: NEGATIVE
KETONES, POC UA: NEGATIVE
LEUKOCYTE EST, POC UA: NEGATIVE
NITRITE, POC UA: NEGATIVE
PH UR STRIP: 5.5 [PH]
PROTEIN, POC UA: NEGATIVE
SPECIFIC GRAVITY, POC UA: >=1.03
UROBILINOGEN, POC UA: 0.2 E.U./DL

## 2019-10-10 PROCEDURE — 81025 URINE PREGNANCY TEST: CPT | Mod: ER | Performed by: EMERGENCY MEDICINE

## 2019-10-10 PROCEDURE — 96372 THER/PROPH/DIAG INJ SC/IM: CPT | Mod: ER

## 2019-10-10 PROCEDURE — 99284 EMERGENCY DEPT VISIT MOD MDM: CPT | Mod: 25,ER

## 2019-10-10 PROCEDURE — 81003 URINALYSIS AUTO W/O SCOPE: CPT | Mod: ER

## 2019-10-10 PROCEDURE — 63600175 PHARM REV CODE 636 W HCPCS: Mod: ER | Performed by: EMERGENCY MEDICINE

## 2019-10-10 RX ORDER — NAPROXEN 375 MG/1
375 TABLET ORAL 2 TIMES DAILY PRN
Qty: 20 TABLET | Refills: 0 | Status: SHIPPED | OUTPATIENT
Start: 2019-10-10 | End: 2019-10-15

## 2019-10-10 RX ORDER — DICLOFENAC SODIUM 10 MG/G
2 GEL TOPICAL 4 TIMES DAILY
Qty: 1 TUBE | Refills: 0 | Status: SHIPPED | OUTPATIENT
Start: 2019-10-10 | End: 2020-01-07 | Stop reason: SDUPTHER

## 2019-10-10 RX ORDER — KETOROLAC TROMETHAMINE 30 MG/ML
30 INJECTION, SOLUTION INTRAMUSCULAR; INTRAVENOUS
Status: COMPLETED | OUTPATIENT
Start: 2019-10-10 | End: 2019-10-10

## 2019-10-10 RX ORDER — METHOCARBAMOL 750 MG/1
1500 TABLET, FILM COATED ORAL 3 TIMES DAILY PRN
Qty: 30 TABLET | Refills: 0 | Status: SHIPPED | OUTPATIENT
Start: 2019-10-10 | End: 2019-10-15

## 2019-10-10 RX ORDER — FLUTICASONE PROPIONATE 50 MCG
1 SPRAY, SUSPENSION (ML) NASAL 2 TIMES DAILY
Qty: 1 BOTTLE | Refills: 0 | Status: SHIPPED | OUTPATIENT
Start: 2019-10-10 | End: 2022-11-22

## 2019-10-10 RX ORDER — LORATADINE 10 MG/1
10 TABLET ORAL DAILY
Qty: 60 TABLET | Refills: 0 | Status: SHIPPED | OUTPATIENT
Start: 2019-10-10 | End: 2020-01-07

## 2019-10-10 RX ADMIN — KETOROLAC TROMETHAMINE 30 MG: 30 INJECTION, SOLUTION INTRAMUSCULAR at 08:10

## 2019-10-10 NOTE — ED NOTES
Pt. Read all information on all prescriptions given .  She verb understanding of all information given.   Pt. Dressed and them  Escorted to the d/c desk  For her d/c to home.

## 2019-10-10 NOTE — ED PROVIDER NOTES
"Encounter Date: 10/10/2019    SCRIBE #1 NOTE: I, Marylin Nunez, am scribing for, and in the presence of,  Dr. Deysi Ta. I have scribed the following portions of the note - Other sections scribed: HPI, ROS, PE.       History     Chief Complaint   Patient presents with    Sciatica     Pt states," My sciatica has been bothering me for one week and I think I have a cold or something."    CHIOMA Iverson is a 39 y.o. female who presents to the ED complaining of worsening lower left back pain radiating to left leg x 1 week ago. Reports dry cough and sore throat, but denies congestion, rhinorrhea, fever, chills, SOB, CP, and N/V/D. Denies numbness and tingling. Patient says pain worsens with movement and sitting. Notes symptoms reoccur monthly around the time of MP. Admits informing OBGYN, Dr. Octavio Bernstein for symptoms. She states Dr. Octavio Bernstein dx possible sciatica. Medication allergy to Penicillin (Hives).     The history is provided by the patient. No  was used.     Review of patient's allergies indicates:   Allergen Reactions    Penicillins Rash     Past Medical History:   Diagnosis Date    GERD (gastroesophageal reflux disease)     Hypertension     Sciatica      Past Surgical History:   Procedure Laterality Date     SECTION      x 1     Family History   Problem Relation Age of Onset    Heart disease Mother         CAD age 38    Hypertension Mother 38        MI    Diabetes Mother     Hypertension Father      Social History     Tobacco Use    Smoking status: Never Smoker    Smokeless tobacco: Never Used   Substance Use Topics    Alcohol use: Yes     Comment: RARELY    Drug use: No     Comment: occasional     Review of Systems   Constitutional: Negative for chills and fever.   HENT: Positive for sore throat. Negative for congestion and rhinorrhea.    Eyes: Negative for redness.   Respiratory: Positive for cough (+ DRY ). Negative for shortness of breath.  "   Cardiovascular: Negative for chest pain.   Gastrointestinal: Negative for diarrhea, nausea and vomiting.   Musculoskeletal: Positive for back pain (+ LEFT side radiating down to LEFT leg).   Skin: Negative for rash.   Allergic/Immunologic: Negative for environmental allergies.   Neurological: Negative for dizziness, numbness and headaches.   All other systems reviewed and are negative.      Patient gave consent to have physical exam performed.    Physical Exam     Initial Vitals [10/10/19 0739]   BP Pulse Resp Temp SpO2   (!) 141/95 92 16 98 °F (36.7 °C) 100 %      MAP       --         Physical Exam    Nursing note and vitals reviewed.  Constitutional: She appears well-developed and well-nourished.   HENT:   Head: Normocephalic and atraumatic.   Right Ear: External ear normal.   Left Ear: External ear normal.   Nose: Mucosal edema and rhinorrhea present.   Mouth/Throat: Uvula is midline and mucous membranes are normal. Posterior oropharyngeal erythema present. No oropharyngeal exudate or posterior oropharyngeal edema.   Eyes: Conjunctivae and EOM are normal. Pupils are equal, round, and reactive to light.   Neck: Normal range of motion and phonation normal. Neck supple.   Cardiovascular: Normal rate, regular rhythm, normal heart sounds and intact distal pulses. Exam reveals no gallop and no friction rub.    No murmur heard.  Pulmonary/Chest: Effort normal and breath sounds normal. No stridor. No respiratory distress. She has no wheezes. She has no rhonchi. She has no rales. She exhibits no tenderness.   Abdominal: Soft. Bowel sounds are normal. She exhibits no distension. There is no tenderness. There is no rigidity, no rebound and no guarding.   Musculoskeletal: Normal range of motion. She exhibits no edema or tenderness.        Legs:  Neurological: She is alert and oriented to person, place, and time. She has normal strength. No cranial nerve deficit or sensory deficit. GCS score is 15. GCS eye subscore is 4.  GCS verbal subscore is 5. GCS motor subscore is 6.   Skin: Skin is warm and dry. Capillary refill takes less than 2 seconds. No rash noted.   Psychiatric: She has a normal mood and affect. Her behavior is normal.         ED Course   Procedures  Labs Reviewed   POCT URINALYSIS W/O SCOPE - Abnormal; Notable for the following components:       Result Value    Spec Grav UA >=1.030 (*)     All other components within normal limits   POCT URINE PREGNANCY   POCT URINALYSIS W/O SCOPE          Imaging Results          X-Ray Lumbar Spine 2 Or 3 Views (Final result)  Result time 10/10/19 08:58:03    Final result by Ki Momin MD (10/10/19 08:58:03)                 Impression:      No evidence of fracture or malalignment.    Facet arthropathy in the mid to lower lumbar spine.  No apparent degenerative disc disease.      Electronically signed by: Ki Momin MD  Date:    10/10/2019  Time:    08:58             Narrative:    EXAMINATION:  XR LUMBAR SPINE 2 OR 3 VIEWS    CLINICAL HISTORY:  Low back pain, minor trauma;    TECHNIQUE:  AP, lateral and spot images were performed of the lumbar spine.    COMPARISON:  Correlation made with a CT abdomen pelvis 10/14/2017    FINDINGS:  Vertebral bodies are normal in height without evidence of fracture.    Normal sagittal alignment is preserved.  No spondylolisthesis.    Intervertebral disc heights are well maintained.Mild hypertrophic facet arthropathy in the mid to lower lumbar spine.                                 Medical Decision Making:   History:   Old Medical Records: I decided to obtain old medical records.  Differential Diagnosis:   UTI, sciatica, strain, sprain, fracture and pregnancy.  Independently Interpreted Test(s):   I have ordered and independently interpreted X-rays - see prior notes.  Clinical Tests:   Lab Tests: Ordered and Reviewed  The following lab test(s) were unremarkable: UPT  Radiological Study: Ordered and Reviewed    Chief complaint: Lower left back  pain radiating down to left leg.  Differential diagnosis: UTI, sciatica, strain, sprain, fracture and pregnancy.    Treatment in the ED; PE, ketorolac injection 30 mg.  Patient reports feeling better after treatment in the ER.      Ordered UPT and UA. Ordered X-Ray Lumbar Spine 2 Or 3 Views.      Discussed treatment, prescriptions, labs, and imaging results.    Discharge home with fluticasone propionate (FLONASE) 50 mcg/actuation nasal spray, loratadine (CLARITIN) 10 mg tablet, naproxen (EC-NAPROSYN) 375 MG TbEC EC tablet and methocarbamol (ROBAXIN) 750 MG Tab.    Fill and take prescriptions as directed.  Return to the ED if symptoms worsen or do not resolve.   Answered questions and discussed discharge plan.    Patient feels better and is ready for discharge.  Follow up with PCP/specialist in 1 day.            Scribe Attestation:   Scribe #1: I performed the above scribed service and the documentation accurately describes the services I performed. I attest to the accuracy of the note.     I, Dr. Deysi Ta, personally performed the services described in this documentation. This document was produced by a scribe under my direction and in my presence. All medical record entries made by the scribe were at my direction and in my presence.  I have reviewed the chart and agree that the record reflects my personal performance and is accurate and complete. Deysi Ta DO.              Clinical Impression:     1. Sciatica of left side    2. Viral URI with cough                                   Deysi Ta DO  10/12/19 0750

## 2019-10-22 ENCOUNTER — HOSPITAL ENCOUNTER (EMERGENCY)
Facility: HOSPITAL | Age: 39
Discharge: HOME | End: 2019-10-22
Attending: EMERGENCY MEDICINE
Payer: MEDICAID

## 2019-10-22 VITALS
BODY MASS INDEX: 29.87 KG/M2 | OXYGEN SATURATION: 100 % | SYSTOLIC BLOOD PRESSURE: 142 MMHG | DIASTOLIC BLOOD PRESSURE: 92 MMHG | RESPIRATION RATE: 16 BRPM | WEIGHT: 174 LBS | TEMPERATURE: 99 F | HEART RATE: 112 BPM

## 2019-10-22 DIAGNOSIS — K04.7 INFECTED DENTAL CARIES: Primary | ICD-10-CM

## 2019-10-22 DIAGNOSIS — K02.9 INFECTED DENTAL CARIES: Primary | ICD-10-CM

## 2019-10-22 LAB
B-HCG UR QL: NEGATIVE
CTP QC/QA: YES

## 2019-10-22 PROCEDURE — 99284 EMERGENCY DEPT VISIT MOD MDM: CPT | Mod: ER

## 2019-10-22 PROCEDURE — 81025 URINE PREGNANCY TEST: CPT | Mod: ER | Performed by: EMERGENCY MEDICINE

## 2019-10-22 RX ORDER — ACETAMINOPHEN AND CODEINE PHOSPHATE 300; 30 MG/1; MG/1
1 TABLET ORAL EVERY 6 HOURS PRN
Qty: 6 TABLET | Refills: 0 | Status: SHIPPED | OUTPATIENT
Start: 2019-10-22 | End: 2019-11-01

## 2019-10-22 RX ORDER — CLINDAMYCIN HYDROCHLORIDE 150 MG/1
300 CAPSULE ORAL 4 TIMES DAILY
Qty: 56 CAPSULE | Refills: 0 | Status: SHIPPED | OUTPATIENT
Start: 2019-10-22 | End: 2019-10-29

## 2019-10-22 NOTE — ED PROVIDER NOTES
"Encounter Date: 10/22/2019    SCRIBE #1 NOTE: I, Chandni Muñoz , am scribing for, and in the presence of,  MARTINA Byrd. I have scribed the following portions of the note - Other sections scribed: HPI, ROS, PE.       History     Chief Complaint   Patient presents with    Oral Swelling     Pt states," I have a bad tooth, i have a sore in the top of my mouth. I now have a sore throat."     39 year old female complaining of dental pain that started today.  Patient states she has a bad tooth and it broke now she thinks that the nerve is exposed and the gums around it is sore.  She denies fever, rash, CP, SOB, abdominal pain, nausea, vomiting, diarrhea, numbness, weakness, tingling, or any other complaints.  Patient states she has a dental appointment in the AM but was told to come to the ER for pain relief and possible ABX.  She rates her pain as 10/10 and has not tried anything for her symptoms.  No alleviating factors, palpation makes the pain worse.      The history is provided by the patient. No  was used.     Review of patient's allergies indicates:   Allergen Reactions    Penicillins Rash     Past Medical History:   Diagnosis Date    GERD (gastroesophageal reflux disease)     Hypertension     Sciatica      Past Surgical History:   Procedure Laterality Date     SECTION      x 1     Family History   Problem Relation Age of Onset    Heart disease Mother         CAD age 38    Hypertension Mother 38        MI    Diabetes Mother     Hypertension Father      Social History     Tobacco Use    Smoking status: Never Smoker    Smokeless tobacco: Never Used   Substance Use Topics    Alcohol use: Yes     Comment: RARELY    Drug use: No     Comment: occasional     Review of Systems   Constitutional: Negative for chills and fever.   HENT: Positive for dental problem. Negative for congestion, ear pain, rhinorrhea, sore throat and trouble swallowing.    Eyes: Negative for pain, " discharge and redness.   Respiratory: Negative for cough and shortness of breath.    Cardiovascular: Negative for chest pain.   Gastrointestinal: Negative for abdominal pain, diarrhea, nausea and vomiting.   Genitourinary: Negative for decreased urine volume and dysuria.   Musculoskeletal: Negative for back pain, neck pain and neck stiffness.   Skin: Negative for rash.   Neurological: Negative for dizziness, weakness, light-headedness, numbness and headaches.   Psychiatric/Behavioral: Negative for confusion.   All other systems reviewed and are negative.      Physical Exam     Initial Vitals [10/22/19 1811]   BP Pulse Resp Temp SpO2   (!) 148/92 100 16 98.4 °F (36.9 °C) 100 %      MAP       --         Physical Exam    Nursing note and vitals reviewed.  Constitutional: Vital signs are normal. She appears well-developed.  Non-toxic appearance. She does not appear ill.   HENT:   Head: Normocephalic and atraumatic.   Right Ear: Tympanic membrane normal.   Left Ear: Tympanic membrane normal.   Nose: Nose normal.   Mouth/Throat: Oropharynx is clear and moist and mucous membranes are normal. Dental caries present. No dental abscesses.   Infected dental caries to upper left molar (filled with paste), no dental abscess, tenderness to surrounding gums; no PTA, no Kaushik's angina    Eyes: Conjunctivae are normal.   Neck: Normal range of motion.   Cardiovascular: Normal rate and regular rhythm.   Pulmonary/Chest: Effort normal and breath sounds normal. She exhibits no tenderness.   Abdominal: Soft. There is no tenderness.   Neurological: She is alert and oriented to person, place, and time. Gait normal. GCS eye subscore is 4. GCS verbal subscore is 5. GCS motor subscore is 6.   Skin: Skin is warm, dry and intact. No rash noted.   Psychiatric: She has a normal mood and affect. Her speech is normal and behavior is normal. Judgment and thought content normal.         ED Course   Procedures  Labs Reviewed   POCT URINE PREGNANCY           Imaging Results    None          Medical Decision Making:   History:   Old Medical Records: I decided to obtain old medical records.  Clinical Tests:   Lab Tests: Ordered and Reviewed  The following lab test(s) were unremarkable: UPT       APC / Resident Notes:   This is an evaluation of a 39 y.o. female that presents to the Emergency Department for dental pain. The patient reports no fever, chills, nausea, vomiting, or inability to open mouth. Physical Exam shows a non-toxic, afebrile, and well appearing female with Infected dental caries to upper left molar (filled with paste), no dental abscess, tenderness to surrounding gums; no PTA, no Kaushik's angina . There is no facial swelling. There is no visible oral drainable abscess at this time. She has no facial cellulitis, oral swelling, airway compromise, sublingual swelling/elevation, or trismus. Neck spaces are soft. There is no gingival erythema on the facial and lingual surfaces surrounding the tooth.    Vital signs are reassuring. RESULTS: UPT negative    My overall impression is infected dental caries. I considered, but at this time, do not suspect Kaushik's angina, deep space infection, peritonsillar infection, pharyngitis, mastoiditis, or a facial cellulitis.    ED Course: PE, UPT. D/C Meds: Tylenol #3, Clindamycin, magic mouthwash. Additional D/C Information: f/u, medications. The diagnosis, treatment plan, instructions for follow-up and reevaluation with a dentist as well as ED return precautions were discussed and understanding was verbalized. All questions or concerns have been addressed.              Scribe Attestation:   Scribe #1: I performed the above scribed service and the documentation accurately describes the services I performed. I attest to the accuracy of the note.    Physician Attestation for Scribe:  Physician Attestation Statement for Scribe: I, MARTINA Byrd, reviewed documentation, as scribed by Brittany Muñoz in my  presence, and it is both accurate and complete.                    Clinical Impression:     1. Infected dental caries            Disposition:   Disposition: Discharged  Condition: Stable                        MARTINA Medina  10/22/19 8274

## 2019-10-24 NOTE — ADDENDUM NOTE
Encounter addended by: Bryanna Saravia on: 10/24/2019 11:08 AM   Actions taken: Charge Capture section accepted, Visit Navigator Flowsheet section accepted

## 2019-11-27 ENCOUNTER — TELEPHONE (OUTPATIENT)
Dept: FAMILY MEDICINE | Facility: CLINIC | Age: 39
End: 2019-11-27

## 2019-11-27 NOTE — TELEPHONE ENCOUNTER
----- Message from Hilda Mac sent at 11/27/2019 12:30 PM CST -----  Type:  Sooner Appointment Request    Patient is requesting a sooner appointment.  Patient declined first available appointment listed as well as another facility and provider .  Patient will not accept being placed on the waitlist and is requesting a message be sent to doctor.    Name of Caller: pt     When is the first available appointment? 12/05 with Mrs. Aguilar    Symptoms: headahce/ Migraines meds refill     Would the patient rather a call back or a response via [facility]? Call back     Best Call Back Number: [phone]    Additional Information: Pt states she needs appt by Monday - 12/02

## 2019-11-27 NOTE — TELEPHONE ENCOUNTER
Patient states that she would like to be seen for her headaches.     Dr. Bernstein first opening is 12/17/2019 and MARTY Wiggins first opening is 12/12/2019.    Patient states that she can not wait that late for an appointment.    Please advise..    Thanks,  Jon

## 2019-11-28 NOTE — TELEPHONE ENCOUNTER
I won't be back in the office until next week.  I don't recommend she wait that long to be seen by a provider. It appears that she had declined several other first available appointments and I'm not sure why she had declined those. If she wants to be seen by someone even sooner,  I suggest that she either go to an Urgent care, or [facility] to get evaluated by a provider if her Sx bothers her that she doesn't think she can wait.

## 2019-11-29 NOTE — TELEPHONE ENCOUNTER
Called pt per Dr. Cunningham, pt stating that she only has her headaches when she runs out of BP meds. Pt pharmacy gave her 2 weeks of meds until she can come in to see someone. Pt schedule is really bad so she will call to schedule with any provider she can get into to since she knows her ins only has limited slots. Pt will call back when ready for an appt.

## 2019-12-16 DIAGNOSIS — I10 ESSENTIAL (PRIMARY) HYPERTENSION: ICD-10-CM

## 2019-12-16 RX ORDER — LOSARTAN POTASSIUM 50 MG/1
50 TABLET ORAL DAILY
Qty: 14 TABLET | Refills: 0 | Status: SHIPPED | OUTPATIENT
Start: 2019-12-16 | End: 2020-01-07 | Stop reason: SDUPTHER

## 2019-12-16 RX ORDER — AMLODIPINE BESYLATE 10 MG/1
TABLET ORAL
Qty: 14 TABLET | Refills: 0 | Status: SHIPPED | OUTPATIENT
Start: 2019-12-16 | End: 2020-01-07

## 2019-12-16 NOTE — TELEPHONE ENCOUNTER
----- Message from Amanda Henriquez sent at 12/16/2019  7:38 AM CST -----  Contact: EDDIE ACOSTA [[id number]]      MEDICATION  REFILL  REQUESTING      Please refill the medication(s) listed below. Please call the patient when the prescription(s) is ready for  at this phone number   [phone]       Medication #1  amLODIPine (NORVASC) 10 MG tablet    Medication #2  losartan (COZAAR) 50 MG tablet      Preferred Pharmacy:  [id number]  [place] [address] AT [facility] [address]     [phone] (Phone)  [phone] (Fax)

## 2020-01-07 ENCOUNTER — OFFICE VISIT (OUTPATIENT)
Dept: FAMILY MEDICINE | Facility: CLINIC | Age: 40
End: 2020-01-07
Payer: MEDICAID

## 2020-01-07 VITALS
DIASTOLIC BLOOD PRESSURE: 63 MMHG | OXYGEN SATURATION: 99 % | TEMPERATURE: 98 F | HEIGHT: 64 IN | BODY MASS INDEX: 30.83 KG/M2 | HEART RATE: 76 BPM | WEIGHT: 180.56 LBS | SYSTOLIC BLOOD PRESSURE: 137 MMHG

## 2020-01-07 DIAGNOSIS — E11.9 CONTROLLED TYPE 2 DIABETES MELLITUS WITHOUT COMPLICATION, WITHOUT LONG-TERM CURRENT USE OF INSULIN: ICD-10-CM

## 2020-01-07 DIAGNOSIS — Z23 ENCOUNTER FOR ADMINISTRATION OF VACCINE: ICD-10-CM

## 2020-01-07 DIAGNOSIS — I10 ESSENTIAL (PRIMARY) HYPERTENSION: ICD-10-CM

## 2020-01-07 DIAGNOSIS — E78.49 OTHER HYPERLIPIDEMIA: ICD-10-CM

## 2020-01-07 DIAGNOSIS — L30.8 OTHER ECZEMA: ICD-10-CM

## 2020-01-07 DIAGNOSIS — G89.29 CHRONIC KNEE PAIN, UNSPECIFIED LATERALITY: ICD-10-CM

## 2020-01-07 DIAGNOSIS — E66.9 OBESITY (BMI 30-39.9): ICD-10-CM

## 2020-01-07 DIAGNOSIS — M25.569 CHRONIC KNEE PAIN, UNSPECIFIED LATERALITY: ICD-10-CM

## 2020-01-07 DIAGNOSIS — Z00.00 ANNUAL PHYSICAL EXAM: Primary | ICD-10-CM

## 2020-01-07 PROBLEM — L30.9 ECZEMA: Status: ACTIVE | Noted: 2020-01-07

## 2020-01-07 PROCEDURE — 99396 PREV VISIT EST AGE 40-64: CPT | Mod: 25,S$PBB,, | Performed by: FAMILY MEDICINE

## 2020-01-07 PROCEDURE — 99999 PR PBB SHADOW E&M-EST. PATIENT-LVL III: ICD-10-PCS | Mod: PBBFAC,,, | Performed by: FAMILY MEDICINE

## 2020-01-07 PROCEDURE — 99396 PR PREVENTIVE VISIT,EST,40-64: ICD-10-PCS | Mod: 25,S$PBB,, | Performed by: FAMILY MEDICINE

## 2020-01-07 PROCEDURE — 99214 OFFICE O/P EST MOD 30 MIN: CPT | Mod: S$PBB,25,, | Performed by: FAMILY MEDICINE

## 2020-01-07 PROCEDURE — 99214 PR OFFICE/OUTPT VISIT, EST, LEVL IV, 30-39 MIN: ICD-10-PCS | Mod: S$PBB,25,, | Performed by: FAMILY MEDICINE

## 2020-01-07 PROCEDURE — 90471 IMMUNIZATION ADMIN: CPT | Mod: PBBFAC,PO

## 2020-01-07 PROCEDURE — 99999 PR PBB SHADOW E&M-EST. PATIENT-LVL III: CPT | Mod: PBBFAC,,, | Performed by: FAMILY MEDICINE

## 2020-01-07 PROCEDURE — 99213 OFFICE O/P EST LOW 20 MIN: CPT | Mod: PBBFAC,PO,25 | Performed by: FAMILY MEDICINE

## 2020-01-07 RX ORDER — DICLOFENAC SODIUM 10 MG/G
2 GEL TOPICAL 4 TIMES DAILY
Qty: 1 TUBE | Refills: 0 | Status: SHIPPED | OUTPATIENT
Start: 2020-01-07 | End: 2020-01-07 | Stop reason: SDUPTHER

## 2020-01-07 RX ORDER — AMLODIPINE BESYLATE 10 MG/1
TABLET ORAL
Qty: 14 TABLET | Refills: 0 | Status: CANCELLED | OUTPATIENT
Start: 2020-01-07

## 2020-01-07 RX ORDER — LOSARTAN POTASSIUM 50 MG/1
50 TABLET ORAL DAILY
Qty: 14 TABLET | Refills: 0 | Status: SHIPPED | OUTPATIENT
Start: 2020-01-07 | End: 2020-01-07 | Stop reason: SDUPTHER

## 2020-01-07 RX ORDER — ATORVASTATIN CALCIUM 20 MG/1
20 TABLET, FILM COATED ORAL DAILY
Qty: 90 TABLET | Refills: 3 | Status: SHIPPED | OUTPATIENT
Start: 2020-01-07 | End: 2021-02-23

## 2020-01-07 RX ORDER — DICLOFENAC SODIUM 10 MG/G
2 GEL TOPICAL 4 TIMES DAILY
Qty: 1 TUBE | Refills: 6 | Status: SHIPPED | OUTPATIENT
Start: 2020-01-07 | End: 2023-04-27

## 2020-01-07 RX ORDER — HYDROCORTISONE 25 MG/G
CREAM TOPICAL 2 TIMES DAILY
Qty: 28 G | Refills: 6 | Status: SHIPPED | OUTPATIENT
Start: 2020-01-07

## 2020-01-07 RX ORDER — LOSARTAN POTASSIUM 50 MG/1
50 TABLET ORAL DAILY
Qty: 90 TABLET | Refills: 1 | Status: SHIPPED | OUTPATIENT
Start: 2020-01-07 | End: 2020-06-08

## 2020-01-07 NOTE — PROGRESS NOTES
Office Visit    Patient Name: Shelli Iverson    : 1980  MRN: [id number]      Assessment/Plan:  Shelli Iverson is a 39 y.o. female who presents today for :    Annual physical exam  -     Hemoglobin A1c; Future; Expected date: 2020  -     CBC Without Differential; Future; Expected date: 2020  -     Comprehensive metabolic panel; Future; Expected date: 2020  -     Lipid panel; Future; Expected date: 2020  -     Microalbumin/creatinine urine ratio; Future; Expected date: 2020  Encounter for administration of vaccine  -     Pneumococcal Polysaccharide Vaccine (23 Valent) (SQ/IM)  Obesity (BMI 30-39.9)  -anticipatory guidance provided with age appropriate preventative services discussed, healthy diet and regular physical exercise also discussed with patient  -any additional health maintenance will be readdressed at the next physical if declined or deferred by the patient today   -d/w pt about the importance of portion control  -Recommend 15-30 minutes of moderate intensity exercise 5 days/week.                  Follow up for any evaluation as needed.          Additional Evaluation & Management issues:     In addition to today's Annual Physical, patient has other medical issues that need to be addressed, as well as their associated prescription management that is separate from today's Physical  - as documented separately below the Annual Physical portion of this encounter.        This note was created by combination of typed  and MModal dictation.  Transcription errors may be present.  If there are any questions, please contact me.        ----------------------------------------------------------------------------------------------------------------------      HPI:  Patient Care Team:  Octavio Bernstein MD as PCP - General (Family Medicine)  TEVIN Germain MA as Care Coordinator    Shelli is a 39 y.o. female with      Patient Active Problem List    Diagnosis    Essential (primary) hypertension    Controlled type 2 diabetes mellitus without complication, without long-term current use of insulin    Obesity (BMI 30-39.9)    Other hyperlipidemia    Eczema    Chronic knee pain         Patient presents today for:  Follow-up      In addition to addressing the reason for this office visit as above, which is further discussed and addressed in the separate E&M section of this note, patient also due for annual bloodwork today.  Health maintenance-wise, she is up to date on MMG/Pap, need Pneunomvax today.  Otherwise, no major new changes in health since last checkup.  Her eczema and chronic b/l knee pain is well controlled without any recent flare ups - no side effects on PRN medical therapy.  Patient reports that BP/FBG well controlled at home, compliant with medications daily without any adverse side effects.  She denies any cardiovascular or neurologic complaints today        Additional ROS  No F/C/wt changes/fatigue  No dysphagia/sore throat/rhinorrhea  No CP/MORGAN/palpitations/swelling  No cough/wheezing/SOB  No nausea/vomiting/abd pain/no diarrhea, no constipation, no blood in stool  No muscle aches, +chronic b/l knee pain controlled on PRN Voltaren gel  No rashes  No MSK weakness/HA/tingling/numbness  No anxiety/depression  No dysuria/hematuria  No polyuria/polydipsia/cold or hot intolerance          Current Medications  Medications reviewed and updated.       Current Outpatient Medications:     diphenhydrAMINE-aluminum-magnesium hydroxide-simethicone-lidocaine HCl 2%, Swish and spit 5 mLs every 4 (four) hours as needed., Disp: 118 mL, Rfl: 0    fluticasone propionate (FLONASE) 50 mcg/actuation nasal spray, 1 spray (50 mcg total) by Each Nostril route 2 (two) times daily., Disp: 1 Bottle, Rfl: 0    albuterol (PROVENTIL/VENTOLIN HFA) 90 mcg/actuation inhaler, Inhale 1-2 puffs into the lungs every 6 (six) hours as needed for Wheezing (And cough). Rescue,  Disp: 1 Inhaler, Rfl: 0    atorvastatin (LIPITOR) 20 MG tablet, Take 1 tablet (20 mg total) by mouth once daily., Disp: 90 tablet, Rfl: 3    diclofenac sodium (VOLTAREN) 1 % Gel, Apply 2 g topically 4 (four) times daily. Apply to painful area for 10 days, Disp: 1 Tube, Rfl: 6    hydrocortisone 2.5 % cream, Apply topically 2 (two) times daily., Disp: 28 g, Rfl: 6    losartan (COZAAR) 50 MG tablet, Take 1 tablet (50 mg total) by mouth once daily. Followup with doctor 2020, Disp: 90 tablet, Rfl: 1    Past Surgical History:   Procedure Laterality Date     SECTION      x 1       Family History   Problem Relation Age of Onset    Heart disease Mother         CAD age 38    Hypertension Mother 38        MI    Diabetes Mother     Hypertension Father        Social History     Socioeconomic History    Marital status: Single     Spouse name: Not on file    Number of children: Not on file    Years of education: Not on file    Highest education level: Not on file   Occupational History    Not on file   Social Needs    Financial resource strain: Not on file    Food insecurity:     Worry: Not on file     Inability: Not on file    Transportation needs:     Medical: Not on file     Non-medical: Not on file   Tobacco Use    Smoking status: Never Smoker    Smokeless tobacco: Never Used   Substance and Sexual Activity    Alcohol use: Yes     Comment: RARELY    Drug use: No     Comment: occasional    Sexual activity: Yes     Birth control/protection: IUD   Lifestyle    Physical activity:     Days per week: Not on file     Minutes per session: Not on file    Stress: Not at all   Relationships    Social connections:     Talks on phone: Not on file     Gets together: Not on file     Attends Jew service: Not on file     Active member of club or organization: Not on file     Attends meetings of clubs or organizations: Not on file     Relationship status: Not on file   Other Topics Concern    Not on  "file   Social History Narrative    Not on file           Allergies   Review of patient's allergies indicates:   Allergen Reactions    Penicillins Rash             Review of Systems  See HPI      Physical Exam  BP (!) 140/60   Pulse 76   Temp 97.7 °F (36.5 °C) (Oral)   Ht 5' 4" (1.626 m)   Wt 81.9 kg (180 lb 8.9 oz)   LMP 01/07/2020   SpO2 99%   BMI 30.99 kg/m²       GEN: NAD, well developed, pleasant, well nourished  HEENT: NCAT, PERRLA, EOMI, sclera clear, anicteric, bilateral ear exam wnl, O/P clear, MMM with no lesions  NECK: normal, supple with midline trachea, no LAD, no thyromegaly  LUNGS: CTAB, no w/r/r, no increased work of breathing   HEART: RRR, normal S1 and S2, no m/r/g, no edema  ABD: s/nt/nd, NABS  SKIN: normal turgor, no rashes  PSYCH: AOx3, appropriate mood and affect  MSK: warm/well perfused, normal ROM in all extremities, no c/c/e.  NEURO: normal without focal findings, CN II-XII are grossly intact.  Sensation/strength grossly normal, gait and station normal.         Labs  Lab Results   Component Value Date    HGBA1C 6.5 (H) 04/23/2019     Lab Results   Component Value Date     04/23/2019    K 4.0 04/23/2019     04/23/2019    CO2 24 04/23/2019    BUN 9 04/23/2019    CREATININE 0.7 04/23/2019    CALCIUM 9.0 04/23/2019    ANIONGAP 9 04/23/2019    ESTGFRAFRICA >60.0 04/23/2019    EGFRNONAA >60.0 04/23/2019     Lab Results   Component Value Date    CHOL 174 04/23/2019    CHOL 194 06/22/2018    CHOL 159 01/14/2015     Lab Results   Component Value Date    HDL 58 04/23/2019    HDL 56 06/22/2018    HDL 56 01/14/2015     Lab Results   Component Value Date    LDLCALC 97.6 04/23/2019    LDLCALC 123.2 06/22/2018    LDLCALC 92.4 01/14/2015     Lab Results   Component Value Date    TRIG 92 04/23/2019    TRIG 74 06/22/2018    TRIG 53 01/14/2015     Lab Results   Component Value Date    CHOLHDL 33.3 04/23/2019    CHOLHDL 28.9 06/22/2018    CHOLHDL 35.2 01/14/2015     Last set of blood work " "has been reviewed as noted above.          __________________________________________________________________________________________________________________________________      Additional Evaluation & Management issues:     In addition to today's Annual Physical, patient has other medical issues that need to be addressed, as well as their associated prescription management that is separate from today's Physical  - as documented separately below      HPI:    Patient presents today for:  Follow-up      HTN - patient is compliant with current medication regimen with good BP control at home, has been out of Amlodipine the past 2 weeks, BP appears borderline elevated today with monotherapy with Losartan - no side effects. Given good BP in clinic today on monotherapy, we discussed dc amlodipine at this time to decrease her medication burden, which pt agrees to. She denies CP/SOB/leg swelling.   DM - diet controlled. No FBG logs. Last A1c at 6.5%.  Denies polyuria/polydipsia/foot numbness/tingling/vision changes/hypoglycemia  Eczema - controlled on PRN steroid cream.        Additional ROS  No F/C/wt changes/fatigue  No dysphagia/sore throat/rhinorrhea  No CP/MORGAN/palpitations/swelling  No cough/wheezing/SOB  No nausea/vomiting/abd pain  No muscle aches, +chronic b/l knee pain controlled on PRN Voltaren gel  No rashes  No MSK weakness/HA/tingling/numbness  No anxiety/depression  No dysuria/hematuria            Review of Systems  See HPI        Physical Exam  BP (!) 140/60   Pulse 76   Temp 97.7 °F (36.5 °C) (Oral)   Ht 5' 4" (1.626 m)   Wt 81.9 kg (180 lb 8.9 oz)   LMP 01/07/2020   SpO2 99%   BMI 30.99 kg/m²       GEN: NAD, well developed, pleasant, well nourished  HEENT: NCAT, PERRLA, EOMI, sclera clear, anicteric  NECK: normal, supple with midline trachea, no LAD, no thyromegaly  LUNGS: CTAB, no w/r/r, no increased work of breathing   HEART: RRR, normal S1 and S2, no m/r/g, no edema  ABD: s/nt/nd, NABS  SKIN: " normal turgor, no rashes  PSYCH: AOx3, appropriate mood and affect  MSK: warm/well perfused, normal ROM in all extremities, no c/c/e.                      Assessment/Plan:  Shelli Iverson is a 39 y.o. female who presents today for :    Essential (primary) hypertension  -     Discontinue: losartan (COZAAR) 50 MG tablet; Take 1 tablet (50 mg total) by mouth once daily. Please make follow up appt with Dr. KARINE Bernstein 12/27/2019 for more refills.  Dispense: 14 tablet; Refill: 0  -     losartan (COZAAR) 50 MG tablet; Take 1 tablet (50 mg total) by mouth once daily. Followup with doctor JULY 2020  Dispense: 90 tablet; Refill: 1  -     CBC Without Differential; Future; Expected date: 01/07/2020  -     Comprehensive metabolic panel; Future; Expected date: 01/07/2020  Obesity (BMI 30-39.9)    -continue monotherapy with Losartan and monitor BP  -DASH diet, regular cardiovascular exercises  -counseled on weight loss    Other hyperlipidemia  -     atorvastatin (LIPITOR) 20 MG tablet; Take 1 tablet (20 mg total) by mouth once daily.  Dispense: 90 tablet; Refill: 3  -     Lipid panel; Future; Expected date: 01/07/2020  Diet-controlled type 2 DM  -     Hemoglobin A1c; Future; Expected date: 01/07/2020  -     Microalbumin/creatinine urine ratio; Future; Expected date: 01/07/2020      Other eczema  -     hydrocortisone 2.5 % cream; Apply topically 2 (two) times daily.  Dispense: 28 g; Refill: 6    Chronic knee pain - controlled on PRN Voltaren gel  -     diclofenac sodium (VOLTAREN) 1 % Gel; Apply 2 g topically 4 (four) times daily. Apply to painful area for 10 days  Dispense: 1 Tube; Refill: 6                  Follow up 6mo

## 2020-04-08 ENCOUNTER — TELEPHONE (OUTPATIENT)
Dept: FAMILY MEDICINE | Facility: CLINIC | Age: 40
End: 2020-04-08

## 2020-04-08 DIAGNOSIS — B37.31 YEAST VAGINITIS: Primary | ICD-10-CM

## 2020-04-08 NOTE — TELEPHONE ENCOUNTER
----- Message from Lamonte Hope sent at 4/8/2020 11:28 AM CDT -----  Contact: Patient  Type: Patient Call Back    Who called: Patient     What is the request in detail: Patient is requesting medication for yeast infection. Please advise.    Can the clinic reply by [facility]? No    Would the patient rather a call back or a response via [facility]? Call    Best call back number: [phone] (home)     Additional Information: N/A

## 2020-04-09 ENCOUNTER — PATIENT MESSAGE (OUTPATIENT)
Dept: ADMINISTRATIVE | Facility: HOSPITAL | Age: 40
End: 2020-04-09

## 2020-04-09 ENCOUNTER — TELEPHONE (OUTPATIENT)
Dept: FAMILY MEDICINE | Facility: CLINIC | Age: 40
End: 2020-04-09

## 2020-04-09 RX ORDER — FLUCONAZOLE 150 MG/1
150 TABLET ORAL ONCE AS NEEDED
Qty: 1 TABLET | Refills: 1 | Status: SHIPPED | OUTPATIENT
Start: 2020-04-09 | End: 2020-04-09

## 2020-04-09 NOTE — TELEPHONE ENCOUNTER
Spoke with pt. Regarding her message. Pt. Thinks something was already called in, due to message from her pharmacy. Confirmed for pt. , yes something was called in. Pt. Will still keep appt. Scheduled on 4/13/2020. Incase it does not get better.

## 2020-04-09 NOTE — TELEPHONE ENCOUNTER
Order entered. Thanks!    Yeast vaginitis  -     fluconazole (DIFLUCAN) 150 MG Tab; Take 1 tablet (150 mg total) by mouth once as needed.  Dispense: 1 tablet; Refill: 1

## 2020-04-09 NOTE — TELEPHONE ENCOUNTER
----- Message from Marivelgeoffshey Dave sent at 4/9/2020  8:12 AM CDT -----  Contact: Shelli [phone]  Name of Caller Shelli     Reason for Visit/Symptoms: yeast infection for 3-4 days    Best Contact Number or Confirm if Mychart Preferred: [phone]    Preferred Date/Time of Appointment: today if possible      Interested in Virtual Visit (yes/no): yes, but wants something today, Dr. Bernstein     Additional Information: If possible would like something for a yeast infection sent to the pharmacy. She stated that if she has to come in, she would be willing to. The patient would also be willing to do a virtual visit, but Dr. Bernstein does not have anything until next week.

## 2020-05-04 DIAGNOSIS — E78.49 OTHER HYPERLIPIDEMIA: ICD-10-CM

## 2020-05-04 RX ORDER — ATORVASTATIN CALCIUM 20 MG/1
TABLET, FILM COATED ORAL
Qty: 90 TABLET | Refills: 3 | OUTPATIENT
Start: 2020-05-04

## 2020-05-19 DIAGNOSIS — E78.49 OTHER HYPERLIPIDEMIA: ICD-10-CM

## 2020-05-19 RX ORDER — ATORVASTATIN CALCIUM 20 MG/1
20 TABLET, FILM COATED ORAL NIGHTLY
Qty: 60 TABLET | Refills: 3 | OUTPATIENT
Start: 2020-05-19

## 2020-05-19 NOTE — TELEPHONE ENCOUNTER
Care Due:                  Date            Visit Type   Department     Provider  --------------------------------------------------------------------------------    Last Visit: None Found      None         None Found  Next Visit: None Scheduled  None         None Found                                                            Last  Test          Frequency    Reason                     Performed    Due Date  --------------------------------------------------------------------------------    Office Visit  12 months..  atorvastatin, losartan...  Not Found    Overdue    Cr..........  12 months..  losartan.................  04- 04-    eGFR........  12 months..  losartan.................  Not Found    Overdue    K...........  12 months..  losartan.................  04- 04-    LFT.........  12 months..  atorvastatin.............  Not Found    Overdue    Lipid Panel.  12 months..  atorvastatin.............  Not Found    Overdue    Powered by [employer]. Reference number: [id number]. 5/19/2020 11:02:14 AM   CDT

## 2020-06-01 ENCOUNTER — TELEPHONE (OUTPATIENT)
Dept: FAMILY MEDICINE | Facility: CLINIC | Age: 40
End: 2020-06-01

## 2020-06-01 NOTE — TELEPHONE ENCOUNTER
----- Message from Hilda Mac sent at 6/1/2020  2:02 PM CDT -----  Type: Patient Call Back    Who called: pt     What is the request in detail: pt asking for appt for clearance for surgery for Tummy Tuck     Can the clinic reply by [facility]? No     Would the patient rather a call back or a response via [facility]? Call back     Best call back number: [phone]    Additional Information:

## 2020-06-01 NOTE — TELEPHONE ENCOUNTER
No appointments for pre-op for this patient. Earliest appointment July 18th. Surgery date July 8th

## 2020-06-08 ENCOUNTER — OFFICE VISIT (OUTPATIENT)
Dept: FAMILY MEDICINE | Facility: CLINIC | Age: 40
End: 2020-06-08
Payer: MEDICAID

## 2020-06-08 ENCOUNTER — PATIENT MESSAGE (OUTPATIENT)
Dept: FAMILY MEDICINE | Facility: CLINIC | Age: 40
End: 2020-06-08

## 2020-06-08 ENCOUNTER — PATIENT OUTREACH (OUTPATIENT)
Dept: ADMINISTRATIVE | Facility: HOSPITAL | Age: 40
End: 2020-06-08

## 2020-06-08 VITALS
DIASTOLIC BLOOD PRESSURE: 126 MMHG | TEMPERATURE: 98 F | HEART RATE: 112 BPM | OXYGEN SATURATION: 99 % | BODY MASS INDEX: 32.11 KG/M2 | SYSTOLIC BLOOD PRESSURE: 182 MMHG | WEIGHT: 188.06 LBS | HEIGHT: 64 IN

## 2020-06-08 DIAGNOSIS — I10 ESSENTIAL (PRIMARY) HYPERTENSION: ICD-10-CM

## 2020-06-08 PROCEDURE — 99213 OFFICE O/P EST LOW 20 MIN: CPT | Mod: PBBFAC,PO | Performed by: NURSE PRACTITIONER

## 2020-06-08 PROCEDURE — 99214 OFFICE O/P EST MOD 30 MIN: CPT | Mod: S$PBB,,, | Performed by: NURSE PRACTITIONER

## 2020-06-08 PROCEDURE — 99214 PR OFFICE/OUTPT VISIT, EST, LEVL IV, 30-39 MIN: ICD-10-PCS | Mod: S$PBB,,, | Performed by: NURSE PRACTITIONER

## 2020-06-08 PROCEDURE — 99999 PR PBB SHADOW E&M-EST. PATIENT-LVL III: CPT | Mod: PBBFAC,,, | Performed by: NURSE PRACTITIONER

## 2020-06-08 PROCEDURE — 99999 PR PBB SHADOW E&M-EST. PATIENT-LVL III: ICD-10-PCS | Mod: PBBFAC,,, | Performed by: NURSE PRACTITIONER

## 2020-06-08 RX ORDER — HYDROCHLOROTHIAZIDE 12.5 MG/1
12.5 TABLET ORAL DAILY
Qty: 90 TABLET | Refills: 11 | Status: SHIPPED | OUTPATIENT
Start: 2020-06-08 | End: 2021-08-23

## 2020-06-08 RX ORDER — LOSARTAN POTASSIUM 50 MG/1
100 TABLET ORAL DAILY
Qty: 180 TABLET | Refills: 1 | Status: SHIPPED | OUTPATIENT
Start: 2020-06-08 | End: 2021-05-25

## 2020-06-08 RX ORDER — AMLODIPINE BESYLATE 10 MG/1
10 TABLET ORAL DAILY
Qty: 90 TABLET | Refills: 11 | Status: SHIPPED | OUTPATIENT
Start: 2020-06-08 | End: 2021-09-26

## 2020-06-08 RX ORDER — FLUCONAZOLE 150 MG/1
TABLET ORAL
COMMUNITY
Start: 2020-04-09 | End: 2020-07-24 | Stop reason: SDUPTHER

## 2020-06-08 NOTE — PROGRESS NOTES
This dictation has been generated using Modal Fluency Dictation some phonetic errors may occur. Please contact author for clarification if needed.     Problem List Items Addressed This Visit     Essential (primary) hypertension    Overview     Aldomet 250 tid         Relevant Medications    losartan (COZAAR) 50 MG tablet          Orders Placed This Encounter    losartan (COZAAR) 50 MG tablet    amLODIPine (NORVASC) 10 MG tablet    hydroCHLOROthiazide (HYDRODIURIL) 12.5 MG Tab     HTN uncontrolled. No surgical clearance at this time. Adjust meds two weeks and follow up in person to recheck. May consider Vv after that.   Hold surgical clearnace. She will need exam when BP stable.     Follow up in about 2 weeks (around 6/22/2020).    ________________________________________________________________  ________________________________________________________________      Chief Complaint   Patient presents with    Surgical Clearance     History of present illness  This 40 y.o. presents today for complaint of surgical clearance.  Plans have a tummy tuck in July.  Patient notes elevated blood pressure.  No headaches chest pain shortness of breath.  Taking meds as directed.  She notes she took her medication before leaving the house this morning.  Denies any swelling.  Answers for HPI/ROS submitted by the patient on 6/2/2020   activity change: No  unexpected weight change: No  neck pain: No  hearing loss: No  rhinorrhea: No  trouble swallowing: No  eye discharge: No  visual disturbance: No  chest tightness: No  wheezing: No  chest pain: No  palpitations: No  blood in stool: No  constipation: No  vomiting: No  diarrhea: No  polydipsia: No  polyuria: No  difficulty urinating: No  hematuria: No  menstrual problem: No  dysuria: No  joint swelling: No  arthralgias: No  headaches: No  weakness: No  confusion: No  dysphoric mood: No        Past Medical History:   Diagnosis Date    GERD (gastroesophageal reflux disease)      Hypertension     Sciatica        Past Surgical History:   Procedure Laterality Date     SECTION      x 1       Family History   Problem Relation Age of Onset    Heart disease Mother         CAD age 38    Hypertension Mother 38        MI    Diabetes Mother     Hypertension Father        Social History     Socioeconomic History    Marital status: Single     Spouse name: Not on file    Number of children: Not on file    Years of education: Not on file    Highest education level: Not on file   Occupational History    Not on file   Social Needs    Financial resource strain: Not hard at all    Food insecurity:     Worry: Never true     Inability: Never true    Transportation needs:     Medical: No     Non-medical: No   Tobacco Use    Smoking status: Never Smoker    Smokeless tobacco: Never Used   Substance and Sexual Activity    Alcohol use: Yes     Frequency: 2-4 times a month     Drinks per session: 1 or 2     Binge frequency: Never     Comment: RARELY    Drug use: No     Comment: occasional    Sexual activity: Yes     Birth control/protection: IUD   Lifestyle    Physical activity:     Days per week: 4 days     Minutes per session: 60 min    Stress: Not at all   Relationships    Social connections:     Talks on phone: Twice a week     Gets together: Never     Attends Scientologist service: Not on file     Active member of club or organization: No     Attends meetings of clubs or organizations: Never     Relationship status: Never    Other Topics Concern    Not on file   Social History Narrative    Not on file       Current Outpatient Medications   Medication Sig Dispense Refill    atorvastatin (LIPITOR) 20 MG tablet Take 1 tablet (20 mg total) by mouth once daily. 90 tablet 3    diclofenac sodium (VOLTAREN) 1 % Gel Apply 2 g topically 4 (four) times daily. Apply to painful area for 10 days 1 Tube 6    fluticasone propionate (FLONASE) 50 mcg/actuation nasal spray 1 spray (50 mcg  total) by Each Nostril route 2 (two) times daily. 1 Bottle 0    hydrocortisone 2.5 % cream Apply topically 2 (two) times daily. 28 g 6    losartan (COZAAR) 50 MG tablet Take 2 tablets (100 mg total) by mouth once daily. Followup with doctor JULY 2020 180 tablet 1    albuterol (PROVENTIL/VENTOLIN HFA) 90 mcg/actuation inhaler Inhale 1-2 puffs into the lungs every 6 (six) hours as needed for Wheezing (And cough). Rescue 1 Inhaler 0    amLODIPine (NORVASC) 10 MG tablet Take 1 tablet (10 mg total) by mouth once daily. 90 tablet 11    diphenhydrAMINE-aluminum-magnesium hydroxide-simethicone-lidocaine HCl 2% Swish and spit 5 mLs every 4 (four) hours as needed. (Patient not taking: Reported on 6/8/2020) 118 mL 0    fluconazole (DIFLUCAN) 150 MG Tab       hydroCHLOROthiazide (HYDRODIURIL) 12.5 MG Tab Take 1 tablet (12.5 mg total) by mouth once daily. 90 tablet 11     No current facility-administered medications for this visit.        Review of patient's allergies indicates:   Allergen Reactions    Penicillins Rash       Physical examination  Vitals Reviewed  Gen. Well-dressed well-nourished   Skin warm dry and intact.  No rashes noted.  Chest.  Respirations are even unlabored.  Lungs are clear to auscultation.  Cardiac regular rate and rhythm.  No chest wall adenopathy noted.  Abdomen is soft and not distended.  Bowel sounds are present.  No tenderness during palpation of the abdomen.    Neuro. Awake alert oriented x4.  Normal judgment and cognition noted.  Extremities no clubbing cyanosis or edema noted.     Call or return to clinic prn if these symptoms worsen or fail to improve as anticipated.

## 2020-06-08 NOTE — PROGRESS NOTES
Chart review completed 06/08/2020.  Care Everywhere updates requested and reviewed.  Immunizations reconciled. Media reviewed.     Portal Message Sent.

## 2020-06-09 ENCOUNTER — PATIENT MESSAGE (OUTPATIENT)
Dept: FAMILY MEDICINE | Facility: CLINIC | Age: 40
End: 2020-06-09

## 2020-06-11 ENCOUNTER — PATIENT MESSAGE (OUTPATIENT)
Dept: FAMILY MEDICINE | Facility: CLINIC | Age: 40
End: 2020-06-11

## 2020-06-12 ENCOUNTER — PATIENT MESSAGE (OUTPATIENT)
Dept: FAMILY MEDICINE | Facility: CLINIC | Age: 40
End: 2020-06-12

## 2020-06-12 DIAGNOSIS — E11.9 TYPE 2 DIABETES MELLITUS WITHOUT COMPLICATION, UNSPECIFIED WHETHER LONG TERM INSULIN USE: ICD-10-CM

## 2020-06-22 ENCOUNTER — LAB VISIT (OUTPATIENT)
Dept: LAB | Facility: HOSPITAL | Age: 40
End: 2020-06-22
Attending: NURSE PRACTITIONER
Payer: MEDICAID

## 2020-06-22 ENCOUNTER — OFFICE VISIT (OUTPATIENT)
Dept: FAMILY MEDICINE | Facility: CLINIC | Age: 40
End: 2020-06-22
Payer: MEDICAID

## 2020-06-22 ENCOUNTER — PATIENT MESSAGE (OUTPATIENT)
Dept: FAMILY MEDICINE | Facility: CLINIC | Age: 40
End: 2020-06-22

## 2020-06-22 VITALS
HEIGHT: 64 IN | SYSTOLIC BLOOD PRESSURE: 132 MMHG | WEIGHT: 181.44 LBS | RESPIRATION RATE: 18 BRPM | HEART RATE: 90 BPM | OXYGEN SATURATION: 98 % | BODY MASS INDEX: 30.98 KG/M2 | DIASTOLIC BLOOD PRESSURE: 80 MMHG | TEMPERATURE: 97 F

## 2020-06-22 DIAGNOSIS — Z01.818 PRE-OP EVALUATION: Primary | ICD-10-CM

## 2020-06-22 DIAGNOSIS — E78.49 OTHER HYPERLIPIDEMIA: ICD-10-CM

## 2020-06-22 DIAGNOSIS — I10 ESSENTIAL (PRIMARY) HYPERTENSION: Primary | ICD-10-CM

## 2020-06-22 DIAGNOSIS — E11.9 CONTROLLED TYPE 2 DIABETES MELLITUS WITHOUT COMPLICATION, WITHOUT LONG-TERM CURRENT USE OF INSULIN: ICD-10-CM

## 2020-06-22 LAB
ALBUMIN SERPL BCP-MCNC: 4.4 G/DL (ref 3.5–5.2)
ALP SERPL-CCNC: 97 U/L (ref 55–135)
ALT SERPL W/O P-5'-P-CCNC: 17 U/L (ref 10–44)
ANION GAP SERPL CALC-SCNC: 10 MMOL/L (ref 8–16)
AST SERPL-CCNC: 20 U/L (ref 10–40)
BASOPHILS # BLD AUTO: 0.04 K/UL (ref 0–0.2)
BASOPHILS NFR BLD: 0.6 % (ref 0–1.9)
BILIRUB SERPL-MCNC: 0.4 MG/DL (ref 0.1–1)
BUN SERPL-MCNC: 20 MG/DL (ref 6–20)
CALCIUM SERPL-MCNC: 9.8 MG/DL (ref 8.7–10.5)
CHLORIDE SERPL-SCNC: 103 MMOL/L (ref 95–110)
CO2 SERPL-SCNC: 24 MMOL/L (ref 23–29)
CREAT SERPL-MCNC: 0.9 MG/DL (ref 0.5–1.4)
DIFFERENTIAL METHOD: ABNORMAL
EOSINOPHIL # BLD AUTO: 0 K/UL (ref 0–0.5)
EOSINOPHIL NFR BLD: 0.6 % (ref 0–8)
ERYTHROCYTE [DISTWIDTH] IN BLOOD BY AUTOMATED COUNT: 17.2 % (ref 11.5–14.5)
EST. GFR  (AFRICAN AMERICAN): >60 ML/MIN/1.73 M^2
EST. GFR  (NON AFRICAN AMERICAN): >60 ML/MIN/1.73 M^2
GLUCOSE SERPL-MCNC: 114 MG/DL (ref 70–110)
HCT VFR BLD AUTO: 36 % (ref 37–48.5)
HGB BLD-MCNC: 10.4 G/DL (ref 12–16)
IMM GRANULOCYTES # BLD AUTO: 0.02 K/UL (ref 0–0.04)
IMM GRANULOCYTES NFR BLD AUTO: 0.3 % (ref 0–0.5)
INR PPP: 1 (ref 0.8–1.2)
LYMPHOCYTES # BLD AUTO: 1.6 K/UL (ref 1–4.8)
LYMPHOCYTES NFR BLD: 25.9 % (ref 18–48)
MCH RBC QN AUTO: 24.4 PG (ref 27–31)
MCHC RBC AUTO-ENTMCNC: 28.9 G/DL (ref 32–36)
MCV RBC AUTO: 84 FL (ref 82–98)
MONOCYTES # BLD AUTO: 0.5 K/UL (ref 0.3–1)
MONOCYTES NFR BLD: 8.2 % (ref 4–15)
NEUTROPHILS # BLD AUTO: 4 K/UL (ref 1.8–7.7)
NEUTROPHILS NFR BLD: 64.4 % (ref 38–73)
NRBC BLD-RTO: 0 /100 WBC
PLATELET # BLD AUTO: 390 K/UL (ref 150–350)
PMV BLD AUTO: 10.1 FL (ref 9.2–12.9)
POTASSIUM SERPL-SCNC: 4 MMOL/L (ref 3.5–5.1)
PROT SERPL-MCNC: 7.8 G/DL (ref 6–8.4)
PROTHROMBIN TIME: 10.5 SEC (ref 9–12.5)
RBC # BLD AUTO: 4.27 M/UL (ref 4–5.4)
SODIUM SERPL-SCNC: 137 MMOL/L (ref 136–145)
WBC # BLD AUTO: 6.25 K/UL (ref 3.9–12.7)

## 2020-06-22 PROCEDURE — 36415 COLL VENOUS BLD VENIPUNCTURE: CPT | Mod: PO

## 2020-06-22 PROCEDURE — 99999 PR PBB SHADOW E&M-EST. PATIENT-LVL V: CPT | Mod: PBBFAC,,, | Performed by: NURSE PRACTITIONER

## 2020-06-22 PROCEDURE — 93010 ELECTROCARDIOGRAM REPORT: CPT | Mod: S$PBB,,, | Performed by: INTERNAL MEDICINE

## 2020-06-22 PROCEDURE — 99214 PR OFFICE/OUTPT VISIT, EST, LEVL IV, 30-39 MIN: ICD-10-PCS | Mod: S$PBB,,, | Performed by: NURSE PRACTITIONER

## 2020-06-22 PROCEDURE — 93005 ELECTROCARDIOGRAM TRACING: CPT | Mod: PBBFAC,PO | Performed by: INTERNAL MEDICINE

## 2020-06-22 PROCEDURE — 80053 COMPREHEN METABOLIC PANEL: CPT

## 2020-06-22 PROCEDURE — 99999 PR PBB SHADOW E&M-EST. PATIENT-LVL V: ICD-10-PCS | Mod: PBBFAC,,, | Performed by: NURSE PRACTITIONER

## 2020-06-22 PROCEDURE — 99215 OFFICE O/P EST HI 40 MIN: CPT | Mod: PBBFAC,PO,25 | Performed by: NURSE PRACTITIONER

## 2020-06-22 PROCEDURE — 85025 COMPLETE CBC W/AUTO DIFF WBC: CPT

## 2020-06-22 PROCEDURE — 99214 OFFICE O/P EST MOD 30 MIN: CPT | Mod: S$PBB,,, | Performed by: NURSE PRACTITIONER

## 2020-06-22 PROCEDURE — 93010 EKG 12-LEAD: ICD-10-PCS | Mod: S$PBB,,, | Performed by: INTERNAL MEDICINE

## 2020-06-22 PROCEDURE — 86703 HIV-1/HIV-2 1 RESULT ANTBDY: CPT

## 2020-06-22 PROCEDURE — 85610 PROTHROMBIN TIME: CPT

## 2020-06-22 NOTE — PROGRESS NOTES
Patient ID: Shelli Iverson is a 40 y.o. female.    Chief Complaint: Hypertension      Shelli Iverson is in the office surgical clearance tummy tuck planned surgery date July 8 Dr Zeyad Reyes.    Patient is interested reduction in her abdominal area and has surgery planned in [place].  Needs surgical clearance.  She has tried weight loss measures.      Past Medical History:   Diagnosis Date    GERD (gastroesophageal reflux disease)     Hypertension     Sciatica        STOP BANG Questionnaire  Patient diagnosed with Obstructive Sleep Apnea?: No  Has loud snoring: No  Disturbed sleep, daytime fatigue, daytime somnolence: No  Observed to have interrupted breathing during sleep: No  Takes medication for high blood pressure: Yes  BMI (Calculated): 31.1      Current Outpatient Medications:     amLODIPine (NORVASC) 10 MG tablet, Take 1 tablet (10 mg total) by mouth once daily., Disp: 90 tablet, Rfl: 11    atorvastatin (LIPITOR) 20 MG tablet, Take 1 tablet (20 mg total) by mouth once daily., Disp: 90 tablet, Rfl: 3    fluticasone propionate (FLONASE) 50 mcg/actuation nasal spray, 1 spray (50 mcg total) by Each Nostril route 2 (two) times daily., Disp: 1 Bottle, Rfl: 0    hydroCHLOROthiazide (HYDRODIURIL) 12.5 MG Tab, Take 1 tablet (12.5 mg total) by mouth once daily., Disp: 90 tablet, Rfl: 11    hydrocortisone 2.5 % cream, Apply topically 2 (two) times daily., Disp: 28 g, Rfl: 6    losartan (COZAAR) 50 MG tablet, Take 2 tablets (100 mg total) by mouth once daily. Followup with doctor JULY 2020, Disp: 180 tablet, Rfl: 1    albuterol (PROVENTIL/VENTOLIN HFA) 90 mcg/actuation inhaler, Inhale 1-2 puffs into the lungs every 6 (six) hours as needed for Wheezing (And cough). Rescue, Disp: 1 Inhaler, Rfl: 0    diclofenac sodium (VOLTAREN) 1 % Gel, Apply 2 g topically 4 (four) times daily. Apply to painful area for 10 days, Disp: 1 Tube, Rfl: 6    diphenhydrAMINE-aluminum-magnesium hydroxide-simethicone-lidocaine HCl  2%, Swish and spit 5 mLs every 4 (four) hours as needed. (Patient not taking: Reported on 6/8/2020), Disp: 118 mL, Rfl: 0    fluconazole (DIFLUCAN) 150 MG Tab, , Disp: , Rfl:     The 10-year ASCVD risk score (Lupe DAWN Jr., et al., 2013) is: 3.5%    Values used to calculate the score:      Age: 40 years      Sex: Female      Is Non- : Yes      Diabetic: Yes      Tobacco smoker: No      Systolic Blood Pressure: 132 mmHg      Is BP treated: Yes      HDL Cholesterol: 58 mg/dL      Total Cholesterol: 174 mg/dL     Wt Readings from Last 3 Encounters:   06/22/20 82.3 kg (181 lb 7 oz)   06/08/20 85.3 kg (188 lb 0.8 oz)   01/07/20 81.9 kg (180 lb 8.9 oz)     Temp Readings from Last 3 Encounters:   06/22/20 96.8 °F (36 °C) (Temporal)   06/08/20 97.9 °F (36.6 °C) (Oral)   01/07/20 97.7 °F (36.5 °C) (Oral)     BP Readings from Last 3 Encounters:   06/22/20 132/80   06/08/20 (!) 182/126   01/07/20 137/63     Pulse Readings from Last 3 Encounters:   06/22/20 90   06/08/20 (!) 112   01/07/20 76     Resp Readings from Last 3 Encounters:   06/22/20 18   10/22/19 16   10/10/19 18     PF Readings from Last 3 Encounters:   No data found for PF     SpO2 Readings from Last 3 Encounters:   06/22/20 98%   06/08/20 99%   01/07/20 99%        Lab Results   Component Value Date    HGBA1C 6.5 (H) 04/23/2019    HGBA1C 6.6 (H) 06/22/2018    HGBA1C 6.3 (H) 12/11/2015     Lab Results   Component Value Date    LDLCALC 97.6 04/23/2019    CREATININE 0.7 04/23/2019       Review of Systems   All other systems reviewed and are negative.          Objective:      Physical Exam  Vitals signs reviewed.   Constitutional:       Appearance: She is obese.   HENT:      Head: Normocephalic.   Neck:      Musculoskeletal: Normal range of motion.   Cardiovascular:      Rate and Rhythm: Normal rate and regular rhythm.   Pulmonary:      Effort: Pulmonary effort is normal.      Breath sounds: Normal breath sounds.   Abdominal:      General:  Abdomen is flat. Bowel sounds are normal.      Palpations: Abdomen is soft.   Musculoskeletal: Normal range of motion.   Skin:     General: Skin is warm and dry.      Capillary Refill: Capillary refill takes less than 2 seconds.   Neurological:      General: No focal deficit present.      Mental Status: She is alert and oriented to person, place, and time.   Psychiatric:         Mood and Affect: Mood normal.         Thought Content: Thought content normal.         Judgment: Judgment normal.             Screening recommendations appropriate to age and health status were reviewed.    Essential (primary) hypertension  -     IN OFFICE EKG 12-LEAD (to [facility])    Other hyperlipidemia    Diet-controlled type 2 DM  -     CBC auto differential; Future; Expected date: 06/22/2020  -     Comprehensive metabolic panel; Future; Expected date: 06/22/2020  -     HIV 1/2 Ag/Ab (4th Gen); Future; Expected date: 06/22/2020  -     Protime-INR; Future; Expected date: 06/22/2020  -     Urinalysis; Future; Expected date: 06/22/2020  -     IN OFFICE EKG 12-LEAD (to [facility])    Surgery request labs.     RCRI risk factors include: no known RCRI risk factors. As such, per RCRI the risk of cardiac death, nonfatal myocardial infarction, or nonfatal cardiac arrest is 0.4% and the risk of myocardial infarction, pulmonary edema, ventricular fibrillation, primary cardiac arrest, or complete heart block is 0.5%.  Overall this patient can be considered low risk for this low risk procedure. No further cardiac testing is recommended at this time.     Patient denies any symptoms (as per HPI) concerning for undiagnosed lung disease including ARSALAN. Would not recommend obtaining sleep study, or PFTs at this time. Patient is a non-smoker. We discussed the benefits of early mobilization and deep breathing after surgery.      Screened patient for alcohol misuse, use of illicit drugs, and personal or family history of anesthetic complications or bleeding diathesis  and no substantial concerns were identified.     All current medications were reviewed and at this time no changes to medications are recommended prior to surgery.  Hold hctz AM of surgery.  OK amlodipine and losartan the am of surgery.     I recommend use of standard pre-op and post-op precautions for this patient. In my opinion, she is medically optimized for this procedure, and can proceed without further evaluation.

## 2020-06-23 ENCOUNTER — HOSPITAL ENCOUNTER (OUTPATIENT)
Dept: RADIOLOGY | Facility: HOSPITAL | Age: 40
Discharge: HOME | End: 2020-06-23
Attending: NURSE PRACTITIONER
Payer: MEDICAID

## 2020-06-23 ENCOUNTER — PATIENT MESSAGE (OUTPATIENT)
Dept: FAMILY MEDICINE | Facility: CLINIC | Age: 40
End: 2020-06-23

## 2020-06-23 DIAGNOSIS — Z01.818 PRE-OP EVALUATION: ICD-10-CM

## 2020-06-23 LAB — HIV 1+2 AB+HIV1 P24 AG SERPL QL IA: NEGATIVE

## 2020-06-23 PROCEDURE — 71046 XR CHEST PA AND LATERAL: ICD-10-PCS | Mod: 26,,, | Performed by: RADIOLOGY

## 2020-06-23 PROCEDURE — 71046 X-RAY EXAM CHEST 2 VIEWS: CPT | Mod: TC,FY,PO

## 2020-06-23 PROCEDURE — 71046 X-RAY EXAM CHEST 2 VIEWS: CPT | Mod: 26,,, | Performed by: RADIOLOGY

## 2020-06-24 ENCOUNTER — PATIENT MESSAGE (OUTPATIENT)
Dept: FAMILY MEDICINE | Facility: CLINIC | Age: 40
End: 2020-06-24

## 2020-06-25 ENCOUNTER — TELEPHONE (OUTPATIENT)
Dept: FAMILY MEDICINE | Facility: CLINIC | Age: 40
End: 2020-06-25

## 2020-06-25 ENCOUNTER — PATIENT MESSAGE (OUTPATIENT)
Dept: FAMILY MEDICINE | Facility: CLINIC | Age: 40
End: 2020-06-25

## 2020-06-25 NOTE — TELEPHONE ENCOUNTER
----- Message from Shahana Lynch sent at 6/25/2020 10:43 AM CDT -----  Regarding: Paper work  Contact: pt  Patient called back the  [facility] called her they have not received the   Clearance letter you faxed for the pt to have surgery,  The patient is asking if she can come by the office today to get a copy of the letter.  Her surgery is scheduled for July 8th in [place].    Call back [phone]

## 2020-06-25 NOTE — TELEPHONE ENCOUNTER
See e-mail 6/25/2020. Clearance was faxed again and copy place at  for patient to .   Left detailed voicemail to inform patient as well.

## 2020-07-11 ENCOUNTER — PATIENT MESSAGE (OUTPATIENT)
Dept: FAMILY MEDICINE | Facility: CLINIC | Age: 40
End: 2020-07-11

## 2020-07-13 ENCOUNTER — PATIENT MESSAGE (OUTPATIENT)
Dept: FAMILY MEDICINE | Facility: CLINIC | Age: 40
End: 2020-07-13

## 2020-07-20 ENCOUNTER — PATIENT MESSAGE (OUTPATIENT)
Dept: FAMILY MEDICINE | Facility: CLINIC | Age: 40
End: 2020-07-20

## 2020-07-23 ENCOUNTER — PATIENT MESSAGE (OUTPATIENT)
Dept: FAMILY MEDICINE | Facility: CLINIC | Age: 40
End: 2020-07-23

## 2020-07-24 ENCOUNTER — TELEPHONE (OUTPATIENT)
Dept: FAMILY MEDICINE | Facility: CLINIC | Age: 40
End: 2020-07-24

## 2020-07-24 RX ORDER — FLUCONAZOLE 150 MG/1
150 TABLET ORAL DAILY
Qty: 1 TABLET | Refills: 1 | Status: SHIPPED | OUTPATIENT
Start: 2020-07-24 | End: 2022-11-22

## 2020-08-25 ENCOUNTER — OFFICE VISIT (OUTPATIENT)
Dept: FAMILY MEDICINE | Facility: CLINIC | Age: 40
End: 2020-08-25
Payer: MEDICAID

## 2020-08-25 VITALS
TEMPERATURE: 98 F | SYSTOLIC BLOOD PRESSURE: 138 MMHG | HEIGHT: 64 IN | WEIGHT: 187.81 LBS | DIASTOLIC BLOOD PRESSURE: 96 MMHG | BODY MASS INDEX: 32.06 KG/M2 | HEART RATE: 92 BPM

## 2020-08-25 DIAGNOSIS — Z01.818 PRE-OP EVALUATION: Primary | ICD-10-CM

## 2020-08-25 DIAGNOSIS — I10 ESSENTIAL (PRIMARY) HYPERTENSION: ICD-10-CM

## 2020-08-25 DIAGNOSIS — E11.9 CONTROLLED TYPE 2 DIABETES MELLITUS WITHOUT COMPLICATION, WITHOUT LONG-TERM CURRENT USE OF INSULIN: ICD-10-CM

## 2020-08-25 PROCEDURE — 99213 PR OFFICE/OUTPT VISIT, EST, LEVL III, 20-29 MIN: ICD-10-PCS | Mod: S$PBB,,, | Performed by: NURSE PRACTITIONER

## 2020-08-25 PROCEDURE — 99213 OFFICE O/P EST LOW 20 MIN: CPT | Mod: S$PBB,,, | Performed by: NURSE PRACTITIONER

## 2020-08-25 PROCEDURE — 93010 ELECTROCARDIOGRAM REPORT: CPT | Mod: S$PBB,,, | Performed by: INTERNAL MEDICINE

## 2020-08-25 PROCEDURE — 99999 PR PBB SHADOW E&M-EST. PATIENT-LVL IV: ICD-10-PCS | Mod: PBBFAC,,, | Performed by: NURSE PRACTITIONER

## 2020-08-25 PROCEDURE — 99214 OFFICE O/P EST MOD 30 MIN: CPT | Mod: PBBFAC,PO | Performed by: NURSE PRACTITIONER

## 2020-08-25 PROCEDURE — 93005 ELECTROCARDIOGRAM TRACING: CPT | Mod: PBBFAC,PO | Performed by: INTERNAL MEDICINE

## 2020-08-25 PROCEDURE — 99999 PR PBB SHADOW E&M-EST. PATIENT-LVL IV: CPT | Mod: PBBFAC,,, | Performed by: NURSE PRACTITIONER

## 2020-08-25 PROCEDURE — 93010 EKG 12-LEAD: ICD-10-PCS | Mod: S$PBB,,, | Performed by: INTERNAL MEDICINE

## 2020-08-28 ENCOUNTER — PATIENT MESSAGE (OUTPATIENT)
Dept: FAMILY MEDICINE | Facility: CLINIC | Age: 40
End: 2020-08-28

## 2020-08-28 DIAGNOSIS — D50.9 IRON DEFICIENCY ANEMIA, UNSPECIFIED IRON DEFICIENCY ANEMIA TYPE: ICD-10-CM

## 2020-08-28 DIAGNOSIS — Z01.818 PRE-OP EVALUATION: Primary | ICD-10-CM

## 2020-08-29 ENCOUNTER — LAB VISIT (OUTPATIENT)
Dept: LAB | Facility: HOSPITAL | Age: 40
End: 2020-08-29
Attending: NURSE PRACTITIONER
Payer: MEDICAID

## 2020-08-29 ENCOUNTER — PATIENT MESSAGE (OUTPATIENT)
Dept: FAMILY MEDICINE | Facility: CLINIC | Age: 40
End: 2020-08-29

## 2020-08-29 DIAGNOSIS — E11.9 CONTROLLED TYPE 2 DIABETES MELLITUS WITHOUT COMPLICATION, WITHOUT LONG-TERM CURRENT USE OF INSULIN: ICD-10-CM

## 2020-08-29 DIAGNOSIS — Z01.818 PRE-OP EVALUATION: ICD-10-CM

## 2020-08-29 LAB
ANION GAP SERPL CALC-SCNC: 8 MMOL/L (ref 8–16)
BASOPHILS # BLD AUTO: 0.02 K/UL (ref 0–0.2)
BASOPHILS NFR BLD: 0.3 % (ref 0–1.9)
BUN SERPL-MCNC: 14 MG/DL (ref 6–20)
CALCIUM SERPL-MCNC: 9.4 MG/DL (ref 8.7–10.5)
CHLORIDE SERPL-SCNC: 104 MMOL/L (ref 95–110)
CO2 SERPL-SCNC: 25 MMOL/L (ref 23–29)
CREAT SERPL-MCNC: 0.7 MG/DL (ref 0.5–1.4)
DIFFERENTIAL METHOD: ABNORMAL
EOSINOPHIL # BLD AUTO: 0 K/UL (ref 0–0.5)
EOSINOPHIL NFR BLD: 0.6 % (ref 0–8)
ERYTHROCYTE [DISTWIDTH] IN BLOOD BY AUTOMATED COUNT: 16.8 % (ref 11.5–14.5)
EST. GFR  (AFRICAN AMERICAN): >60 ML/MIN/1.73 M^2
EST. GFR  (NON AFRICAN AMERICAN): >60 ML/MIN/1.73 M^2
ESTIMATED AVG GLUCOSE: 151 MG/DL (ref 68–131)
GLUCOSE SERPL-MCNC: 144 MG/DL (ref 70–110)
HBA1C MFR BLD HPLC: 6.9 % (ref 4–5.6)
HCG INTACT+B SERPL-ACNC: <1.2 MIU/ML
HCT VFR BLD AUTO: 37.4 % (ref 37–48.5)
HGB BLD-MCNC: 11 G/DL (ref 12–16)
IMM GRANULOCYTES # BLD AUTO: 0.01 K/UL (ref 0–0.04)
IMM GRANULOCYTES NFR BLD AUTO: 0.2 % (ref 0–0.5)
INR PPP: 0.9 (ref 0.8–1.2)
IRON SERPL-MCNC: 61 UG/DL (ref 30–160)
LYMPHOCYTES # BLD AUTO: 2 K/UL (ref 1–4.8)
LYMPHOCYTES NFR BLD: 31.5 % (ref 18–48)
MCH RBC QN AUTO: 25.1 PG (ref 27–31)
MCHC RBC AUTO-ENTMCNC: 29.4 G/DL (ref 32–36)
MCV RBC AUTO: 85 FL (ref 82–98)
MONOCYTES # BLD AUTO: 0.5 K/UL (ref 0.3–1)
MONOCYTES NFR BLD: 8.6 % (ref 4–15)
NEUTROPHILS # BLD AUTO: 3.7 K/UL (ref 1.8–7.7)
NEUTROPHILS NFR BLD: 58.8 % (ref 38–73)
NRBC BLD-RTO: 0 /100 WBC
PLATELET # BLD AUTO: 340 K/UL (ref 150–350)
PMV BLD AUTO: 9.9 FL (ref 9.2–12.9)
POTASSIUM SERPL-SCNC: 4.2 MMOL/L (ref 3.5–5.1)
PROTHROMBIN TIME: 9.8 SEC (ref 9–12.5)
RBC # BLD AUTO: 4.38 M/UL (ref 4–5.4)
SATURATED IRON: 19 % (ref 20–50)
SODIUM SERPL-SCNC: 137 MMOL/L (ref 136–145)
TOTAL IRON BINDING CAPACITY: 327 UG/DL (ref 250–450)
TRANSFERRIN SERPL-MCNC: 221 MG/DL (ref 200–375)
WBC # BLD AUTO: 6.28 K/UL (ref 3.9–12.7)

## 2020-08-29 PROCEDURE — 84702 CHORIONIC GONADOTROPIN TEST: CPT

## 2020-08-29 PROCEDURE — 83036 HEMOGLOBIN GLYCOSYLATED A1C: CPT

## 2020-08-29 PROCEDURE — 36415 COLL VENOUS BLD VENIPUNCTURE: CPT | Mod: PO

## 2020-08-29 PROCEDURE — 85610 PROTHROMBIN TIME: CPT

## 2020-08-29 PROCEDURE — 85025 COMPLETE CBC W/AUTO DIFF WBC: CPT

## 2020-08-29 PROCEDURE — 86703 HIV-1/HIV-2 1 RESULT ANTBDY: CPT

## 2020-08-29 PROCEDURE — 80048 BASIC METABOLIC PNL TOTAL CA: CPT

## 2020-08-29 PROCEDURE — 83540 ASSAY OF IRON: CPT

## 2020-08-31 ENCOUNTER — PATIENT MESSAGE (OUTPATIENT)
Dept: FAMILY MEDICINE | Facility: CLINIC | Age: 40
End: 2020-08-31

## 2020-08-31 ENCOUNTER — TELEPHONE (OUTPATIENT)
Dept: HEMATOLOGY/ONCOLOGY | Facility: CLINIC | Age: 40
End: 2020-08-31

## 2020-08-31 LAB — HIV 1+2 AB+HIV1 P24 AG SERPL QL IA: NEGATIVE

## 2020-08-31 NOTE — TELEPHONE ENCOUNTER
Spoke to patient in regards to referral received for heme/onc.  Patient reports that she lives in [place] and would like appt closer to her home, not in [place].  Referral transferred to the appropriate workque.

## 2020-09-01 ENCOUNTER — HOSPITAL ENCOUNTER (OUTPATIENT)
Dept: RADIOLOGY | Facility: HOSPITAL | Age: 40
Discharge: HOME | End: 2020-09-01
Attending: NURSE PRACTITIONER
Payer: MEDICAID

## 2020-09-01 ENCOUNTER — TELEPHONE (OUTPATIENT)
Dept: HEMATOLOGY/ONCOLOGY | Facility: CLINIC | Age: 40
End: 2020-09-01

## 2020-09-01 DIAGNOSIS — Z01.818 PRE-OP EVALUATION: ICD-10-CM

## 2020-09-01 PROCEDURE — 71046 XR CHEST PA AND LATERAL: ICD-10-PCS | Mod: 26,,, | Performed by: RADIOLOGY

## 2020-09-01 PROCEDURE — 71046 X-RAY EXAM CHEST 2 VIEWS: CPT | Mod: 26,,, | Performed by: RADIOLOGY

## 2020-09-01 PROCEDURE — 71046 X-RAY EXAM CHEST 2 VIEWS: CPT | Mod: TC,FY,PO

## 2020-09-03 ENCOUNTER — PATIENT MESSAGE (OUTPATIENT)
Dept: FAMILY MEDICINE | Facility: CLINIC | Age: 40
End: 2020-09-03

## 2020-09-04 ENCOUNTER — DOCUMENTATION ONLY (OUTPATIENT)
Dept: FAMILY MEDICINE | Facility: CLINIC | Age: 40
End: 2020-09-04

## 2020-09-04 ENCOUNTER — OFFICE VISIT (OUTPATIENT)
Dept: HEMATOLOGY/ONCOLOGY | Facility: CLINIC | Age: 40
End: 2020-09-04
Payer: MEDICAID

## 2020-09-04 DIAGNOSIS — L30.8 OTHER ECZEMA: ICD-10-CM

## 2020-09-04 DIAGNOSIS — D50.8 IRON DEFICIENCY ANEMIA SECONDARY TO INADEQUATE DIETARY IRON INTAKE: ICD-10-CM

## 2020-09-04 DIAGNOSIS — D50.9 IRON DEFICIENCY ANEMIA, UNSPECIFIED IRON DEFICIENCY ANEMIA TYPE: Primary | ICD-10-CM

## 2020-09-04 PROCEDURE — 99205 PR OFFICE/OUTPT VISIT, NEW, LEVL V, 60-74 MIN: ICD-10-PCS | Mod: 95,,, | Performed by: INTERNAL MEDICINE

## 2020-09-04 PROCEDURE — 99205 OFFICE O/P NEW HI 60 MIN: CPT | Mod: 95,,, | Performed by: INTERNAL MEDICINE

## 2020-09-04 NOTE — Clinical Note
1. Labs on 9/9 [place] [facility] [place]: cbc,cmp, iron, ferritin, hgb electrophoresis  2. See me in 3 months with cbc,cmp, iron, ferritin

## 2020-09-04 NOTE — PROGRESS NOTES
Hematology and Medical Oncology   New Patient Consult -- Virtual Visit     09/04/2020  Referred by:  Washington Salvador    Reason For Diagnosis: Iron Deficiency Anemia    Telemedicine Documentation:  The patient location is: her car   The chief complaint leading to consultation is: iron deficiency    Visit type: audiovisual    Face to Face time with patient: 25    30 minutes of total time spent on the encounter, which includes face to face time and non-face to face time preparing to see the patient (eg, review of tests), Obtaining and/or reviewing separately obtained history, Documenting clinical information in the electronic or other health record, Independently interpreting results (not separately reported) and communicating results to the patient/family/caregiver, or Care coordination (not separately reported).         Each patient to whom he or she provides medical services by telemedicine is:  (1) informed of the relationship between the physician and patient and the respective role of any other health care provider with respect to management of the patient; and (2) notified that he or she may decline to receive medical services by telemedicine and may withdraw from such care at any time.        History of Present Ilness:   Shelli Rocha) is a pleasant 40 y.o.female who presents virtually to discuss his iron deficiency anemia. She is planning on undergoing a plastic surgery procedure later this month but needs her hemoglobin to be a little higher. The surgery has been postponed in the past due to low hemoglobin.     She has been taking liquid and PO iron since she saw her PCP about a month ago.  Iron supplementation has made her stools very regular.       On chart review her hemoglobin is consistently in the 10-12 range, with the exception of when she had her child.       PAST MEDICAL HISTORY:   Past Medical History:   Diagnosis Date    GERD (gastroesophageal reflux disease)     Hypertension      Sciatica        PAST SURGICAL HISTORY:   Past Surgical History:   Procedure Laterality Date     SECTION      x 1       PAST SOCIAL HISTORY:   reports that she has never smoked. She has never used smokeless tobacco. She reports previous alcohol use. She reports that she does not use drugs.    FAMILY HISTORY:  Family History   Problem Relation Age of Onset    Heart disease Mother         CAD age 38    Hypertension Mother 38        MI    Diabetes Mother     Hypertension Father        CURRENT MEDICATIONS:   Current Outpatient Medications   Medication Sig    albuterol (PROVENTIL/VENTOLIN HFA) 90 mcg/actuation inhaler Inhale 1-2 puffs into the lungs every 6 (six) hours as needed for Wheezing (And cough). Rescue    amLODIPine (NORVASC) 10 MG tablet Take 1 tablet (10 mg total) by mouth once daily.    atorvastatin (LIPITOR) 20 MG tablet Take 1 tablet (20 mg total) by mouth once daily.    diclofenac sodium (VOLTAREN) 1 % Gel Apply 2 g topically 4 (four) times daily. Apply to painful area for 10 days    diphenhydrAMINE-aluminum-magnesium hydroxide-simethicone-lidocaine HCl 2% Swish and spit 5 mLs every 4 (four) hours as needed. (Patient not taking: Reported on 2020)    fluconazole (DIFLUCAN) 150 MG Tab Take 1 tablet (150 mg total) by mouth once daily.    fluticasone propionate (FLONASE) 50 mcg/actuation nasal spray 1 spray (50 mcg total) by Each Nostril route 2 (two) times daily.    hydroCHLOROthiazide (HYDRODIURIL) 12.5 MG Tab Take 1 tablet (12.5 mg total) by mouth once daily.    hydrocortisone 2.5 % cream Apply topically 2 (two) times daily.    losartan (COZAAR) 50 MG tablet Take 2 tablets (100 mg total) by mouth once daily. Followup with doctor 2020    prenatal vit 10-iron fum-folic 65-1 mg Tab Take 1 tablet by mouth once daily.     No current facility-administered medications for this visit.      ALLERGIES:   Review of patient's allergies indicates:   Allergen Reactions    Penicillins Rash          Review of Systems:     Review of Systems   Constitutional: Positive for fatigue. Negative for appetite change, chills, diaphoresis, fever and unexpected weight change.   HENT:   Negative for hearing loss, mouth sores, nosebleeds, sore throat, trouble swallowing and voice change.    Eyes: Negative for eye problems and icterus.   Respiratory: Negative for chest tightness, cough, hemoptysis, shortness of breath and wheezing.    Cardiovascular: Negative for chest pain, leg swelling and palpitations.   Gastrointestinal: Negative for abdominal distention, abdominal pain, blood in stool, diarrhea, nausea and vomiting.   Endocrine: Negative for hot flashes.   Genitourinary: Negative for bladder incontinence, difficulty urinating, dysuria and hematuria.    Musculoskeletal: Negative for arthralgias, back pain, flank pain, gait problem, myalgias, neck pain and neck stiffness.   Skin: Negative for itching, rash and wound.   Neurological: Negative for dizziness, extremity weakness, gait problem, headaches, numbness, seizures and speech difficulty.   Hematological: Negative for adenopathy. Does not bruise/bleed easily.   Psychiatric/Behavioral: Negative for confusion, depression and sleep disturbance. The patient is not nervous/anxious.           Physical Exam:     Limited secondary to virtual visit    ECOG Performance Status: (foot note - ECOG PS provided by Eastern Cooperative Oncology Group) 0 - Asymptomatic    Karnofsky Performance Score:  100%- Normal, No Complaints, No Evidence of Disease    Labs:   Lab Results   Component Value Date    WBC 6.28 08/29/2020    HGB 11.0 (L) 08/29/2020    HCT 37.4 08/29/2020     08/29/2020    CHOL 174 04/23/2019    TRIG 92 04/23/2019    HDL 58 04/23/2019    ALT 17 06/22/2020    AST 20 06/22/2020     08/29/2020    K 4.2 08/29/2020     08/29/2020    CREATININE 0.7 08/29/2020    BUN 14 08/29/2020    CO2 25 08/29/2020    TSH 1.754 06/22/2018    INR 0.9 08/29/2020     HGBA1C 6.9 (H) 08/29/2020         Imaging: Previous imaging has been reviewed    Assessment and Plan:     Ms. Iverson is a pleasant 40 year old female with known DOUG    Iron deficiency anemia, unspecified iron deficiency anemia type  --Currently taking iron supplementation  --Will recheck labs next week to assess response to therapy  --If hemoglobin is in the 11-12 range she is safe to proceed with therapy  --If iron levels remain low we have discussed IV Iron supplementation      30 minutes were spent face to face with the patient to discuss the disease, natural history, treatment options. I have provided the patient with an opportunity to ask questions and have all questions answered to her satisfaction.       she will return to clinic in 3 months, but knows to call in the interim if symptoms change or should a problem arise.        Poonam Jo MD  Hematology and Medical Oncology  Bone Marrow Transplant  [facility]

## 2020-09-04 NOTE — LETTER
September 8, 2020      Washington Salvador, ANNE-MARIE  [address]  [place] [address]           [facility]  [address]  [facility] [address]  Phone: [phone]          Patient: Shelli Iverson   MR Number: [id number]   YOB: 1980   Date of Visit: 9/4/2020       Dear Washington Salvador:    Thank you for referring Shelli Iverson to me for evaluation. Attached you will find relevant portions of my assessment and plan of care.    If you have questions, please do not hesitate to call me. I look forward to following Shelli Iverson along with you.    Sincerely,    Poonam Jo MD    Enclosure  CC:  No Recipients    If you would like to receive this communication electronically, please contact [email or web address] or [phone] to request more information on [facility] access.    For providers and/or their staff who would like to refer a patient to [facility], please contact us through our one-stop-shop provider referral line, Pedrito Velasquez, at [phone].    If you feel you have received this communication in error or would no longer like to receive these types of communications, please e-mail [email or web address]

## 2020-09-05 ENCOUNTER — PATIENT MESSAGE (OUTPATIENT)
Dept: FAMILY MEDICINE | Facility: CLINIC | Age: 40
End: 2020-09-05

## 2020-09-08 ENCOUNTER — PATIENT MESSAGE (OUTPATIENT)
Dept: HEMATOLOGY/ONCOLOGY | Facility: CLINIC | Age: 40
End: 2020-09-08

## 2020-09-08 DIAGNOSIS — D50.9 IRON DEFICIENCY ANEMIA, UNSPECIFIED IRON DEFICIENCY ANEMIA TYPE: Primary | ICD-10-CM

## 2020-09-09 ENCOUNTER — PATIENT MESSAGE (OUTPATIENT)
Dept: FAMILY MEDICINE | Facility: CLINIC | Age: 40
End: 2020-09-09

## 2020-09-10 ENCOUNTER — PATIENT MESSAGE (OUTPATIENT)
Dept: FAMILY MEDICINE | Facility: CLINIC | Age: 40
End: 2020-09-10

## 2020-09-12 ENCOUNTER — LAB VISIT (OUTPATIENT)
Dept: LAB | Facility: HOSPITAL | Age: 40
End: 2020-09-12
Attending: INTERNAL MEDICINE
Payer: MEDICAID

## 2020-09-12 DIAGNOSIS — D50.9 IRON DEFICIENCY ANEMIA, UNSPECIFIED IRON DEFICIENCY ANEMIA TYPE: ICD-10-CM

## 2020-09-12 LAB
ALBUMIN SERPL BCP-MCNC: 4 G/DL (ref 3.5–5.2)
ALP SERPL-CCNC: 103 U/L (ref 55–135)
ALT SERPL W/O P-5'-P-CCNC: 24 U/L (ref 10–44)
ANION GAP SERPL CALC-SCNC: 9 MMOL/L (ref 8–16)
AST SERPL-CCNC: 18 U/L (ref 10–40)
BASOPHILS # BLD AUTO: 0.03 K/UL (ref 0–0.2)
BASOPHILS NFR BLD: 0.4 % (ref 0–1.9)
BILIRUB SERPL-MCNC: 0.4 MG/DL (ref 0.1–1)
BUN SERPL-MCNC: 12 MG/DL (ref 6–20)
CALCIUM SERPL-MCNC: 8.9 MG/DL (ref 8.7–10.5)
CHLORIDE SERPL-SCNC: 101 MMOL/L (ref 95–110)
CO2 SERPL-SCNC: 27 MMOL/L (ref 23–29)
CREAT SERPL-MCNC: 0.8 MG/DL (ref 0.5–1.4)
DIFFERENTIAL METHOD: ABNORMAL
EOSINOPHIL # BLD AUTO: 0.1 K/UL (ref 0–0.5)
EOSINOPHIL NFR BLD: 0.9 % (ref 0–8)
ERYTHROCYTE [DISTWIDTH] IN BLOOD BY AUTOMATED COUNT: 15.9 % (ref 11.5–14.5)
EST. GFR  (AFRICAN AMERICAN): >60 ML/MIN/1.73 M^2
EST. GFR  (NON AFRICAN AMERICAN): >60 ML/MIN/1.73 M^2
FERRITIN SERPL-MCNC: 45 NG/ML (ref 20–300)
GLUCOSE SERPL-MCNC: 192 MG/DL (ref 70–110)
HCT VFR BLD AUTO: 35.6 % (ref 37–48.5)
HGB BLD-MCNC: 10.8 G/DL (ref 12–16)
IMM GRANULOCYTES # BLD AUTO: 0.02 K/UL (ref 0–0.04)
IMM GRANULOCYTES NFR BLD AUTO: 0.3 % (ref 0–0.5)
IRON SERPL-MCNC: 120 UG/DL (ref 30–160)
LYMPHOCYTES # BLD AUTO: 2.6 K/UL (ref 1–4.8)
LYMPHOCYTES NFR BLD: 37.6 % (ref 18–48)
MCH RBC QN AUTO: 26 PG (ref 27–31)
MCHC RBC AUTO-ENTMCNC: 30.3 G/DL (ref 32–36)
MCV RBC AUTO: 86 FL (ref 82–98)
MONOCYTES # BLD AUTO: 0.5 K/UL (ref 0.3–1)
MONOCYTES NFR BLD: 7.5 % (ref 4–15)
NEUTROPHILS # BLD AUTO: 3.6 K/UL (ref 1.8–7.7)
NEUTROPHILS NFR BLD: 53.3 % (ref 38–73)
NRBC BLD-RTO: 0 /100 WBC
PLATELET # BLD AUTO: 362 K/UL (ref 150–350)
PMV BLD AUTO: 10 FL (ref 9.2–12.9)
POTASSIUM SERPL-SCNC: 4.2 MMOL/L (ref 3.5–5.1)
PROT SERPL-MCNC: 7.2 G/DL (ref 6–8.4)
RBC # BLD AUTO: 4.15 M/UL (ref 4–5.4)
SATURATED IRON: 40 % (ref 20–50)
SODIUM SERPL-SCNC: 137 MMOL/L (ref 136–145)
TOTAL IRON BINDING CAPACITY: 297 UG/DL (ref 250–450)
TRANSFERRIN SERPL-MCNC: 201 MG/DL (ref 200–375)
WBC # BLD AUTO: 6.79 K/UL (ref 3.9–12.7)

## 2020-09-12 PROCEDURE — 83540 ASSAY OF IRON: CPT

## 2020-09-12 PROCEDURE — 85025 COMPLETE CBC W/AUTO DIFF WBC: CPT

## 2020-09-12 PROCEDURE — 82728 ASSAY OF FERRITIN: CPT

## 2020-09-12 PROCEDURE — 83020 HEMOGLOBIN ELECTROPHORESIS: CPT

## 2020-09-12 PROCEDURE — 36415 COLL VENOUS BLD VENIPUNCTURE: CPT | Mod: PO

## 2020-09-12 PROCEDURE — 80053 COMPREHEN METABOLIC PANEL: CPT

## 2020-09-13 ENCOUNTER — PATIENT MESSAGE (OUTPATIENT)
Dept: HEMATOLOGY/ONCOLOGY | Facility: CLINIC | Age: 40
End: 2020-09-13

## 2020-09-14 ENCOUNTER — TELEPHONE (OUTPATIENT)
Dept: HEMATOLOGY/ONCOLOGY | Facility: CLINIC | Age: 40
End: 2020-09-14

## 2020-09-14 LAB
HGB A2 MFR BLD HPLC: 2.4 % (ref 2.2–3.2)
HGB FRACT BLD ELPH-IMP: NORMAL
HGB FRACT BLD ELPH-IMP: NORMAL

## 2020-09-14 NOTE — TELEPHONE ENCOUNTER
Hematology and Medical Oncology         Hematologic Clearance for Surgery      Ms. Iverson is a  40 year old female with known Iron Deficiency Anemia. She is currently taking iron supplementation with improvement in her hemoglobin and iron values. Labs drawn Saturday 9/12/2020 include:    Lab Results   Component Value Date    WBC 6.79 09/12/2020    HGB 10.8 (L) 09/12/2020    HCT 35.6 (L) 09/12/2020     (H) 09/12/2020    CHOL 174 04/23/2019    TRIG 92 04/23/2019    HDL 58 04/23/2019    ALT 24 09/12/2020    AST 18 09/12/2020     09/12/2020    K 4.2 09/12/2020     09/12/2020    CREATININE 0.8 09/12/2020    BUN 12 09/12/2020    CO2 27 09/12/2020    TSH 1.754 06/22/2018    INR 0.9 08/29/2020    HGBA1C 6.9 (H) 08/29/2020     Iron:120 [up from 61 two weeks ago]  TIBC: 297 [down from 327 tow weeks ago]  Ferritin: 45    She is hematologically cleared to proceed with the scheduled surgery. Her hemoglobin is rising and iron store has been replaced. I am comfortable with her going to surgery with a hemoglobin over 10.0      Please do not hesitate to call our clinic should you wish to discuss this further.       Poonam Jo MD  Hematology and Medical Oncology  Bone Marrow Transplant  [facility]

## 2020-09-16 ENCOUNTER — PATIENT MESSAGE (OUTPATIENT)
Dept: FAMILY MEDICINE | Facility: CLINIC | Age: 40
End: 2020-09-16

## 2020-09-16 ENCOUNTER — PATIENT MESSAGE (OUTPATIENT)
Dept: HEMATOLOGY/ONCOLOGY | Facility: CLINIC | Age: 40
End: 2020-09-16

## 2020-09-17 ENCOUNTER — PATIENT MESSAGE (OUTPATIENT)
Dept: FAMILY MEDICINE | Facility: CLINIC | Age: 40
End: 2020-09-17

## 2020-09-17 ENCOUNTER — LAB VISIT (OUTPATIENT)
Dept: LAB | Facility: HOSPITAL | Age: 40
End: 2020-09-17
Attending: INTERNAL MEDICINE
Payer: MEDICAID

## 2020-09-17 DIAGNOSIS — Z01.818 PRE-OP EVALUATION: ICD-10-CM

## 2020-09-17 DIAGNOSIS — Z01.818 PRE-OP EVALUATION: Primary | ICD-10-CM

## 2020-09-17 PROCEDURE — 36415 COLL VENOUS BLD VENIPUNCTURE: CPT | Mod: PO

## 2020-09-17 PROCEDURE — 85730 THROMBOPLASTIN TIME PARTIAL: CPT

## 2020-09-18 LAB — APTT BLDCRRT: 24.9 SEC (ref 21–32)

## 2020-10-05 ENCOUNTER — PATIENT MESSAGE (OUTPATIENT)
Dept: ADMINISTRATIVE | Facility: HOSPITAL | Age: 40
End: 2020-10-05

## 2020-10-20 ENCOUNTER — PATIENT MESSAGE (OUTPATIENT)
Dept: HEMATOLOGY/ONCOLOGY | Facility: CLINIC | Age: 40
End: 2020-10-20

## 2020-10-21 ENCOUNTER — LAB VISIT (OUTPATIENT)
Dept: LAB | Facility: HOSPITAL | Age: 40
End: 2020-10-21
Attending: INTERNAL MEDICINE
Payer: MEDICAID

## 2020-10-21 DIAGNOSIS — D50.9 IRON DEFICIENCY ANEMIA, UNSPECIFIED IRON DEFICIENCY ANEMIA TYPE: Primary | ICD-10-CM

## 2020-10-21 DIAGNOSIS — D50.9 IRON DEFICIENCY ANEMIA, UNSPECIFIED IRON DEFICIENCY ANEMIA TYPE: ICD-10-CM

## 2020-10-21 LAB
ALBUMIN SERPL BCP-MCNC: 3.9 G/DL (ref 3.5–5.2)
ALP SERPL-CCNC: 87 U/L (ref 55–135)
ALT SERPL W/O P-5'-P-CCNC: 18 U/L (ref 10–44)
ANION GAP SERPL CALC-SCNC: 10 MMOL/L (ref 8–16)
AST SERPL-CCNC: 18 U/L (ref 10–40)
BASOPHILS # BLD AUTO: 0.02 K/UL (ref 0–0.2)
BASOPHILS NFR BLD: 0.3 % (ref 0–1.9)
BILIRUB SERPL-MCNC: 0.5 MG/DL (ref 0.1–1)
BUN SERPL-MCNC: 13 MG/DL (ref 6–20)
CALCIUM SERPL-MCNC: 8.5 MG/DL (ref 8.7–10.5)
CHLORIDE SERPL-SCNC: 104 MMOL/L (ref 95–110)
CO2 SERPL-SCNC: 22 MMOL/L (ref 23–29)
CREAT SERPL-MCNC: 0.8 MG/DL (ref 0.5–1.4)
DIFFERENTIAL METHOD: ABNORMAL
EOSINOPHIL # BLD AUTO: 0.1 K/UL (ref 0–0.5)
EOSINOPHIL NFR BLD: 1.5 % (ref 0–8)
ERYTHROCYTE [DISTWIDTH] IN BLOOD BY AUTOMATED COUNT: 14.4 % (ref 11.5–14.5)
EST. GFR  (AFRICAN AMERICAN): >60 ML/MIN/1.73 M^2
EST. GFR  (NON AFRICAN AMERICAN): >60 ML/MIN/1.73 M^2
FERRITIN SERPL-MCNC: 87 NG/ML (ref 20–300)
GLUCOSE SERPL-MCNC: 219 MG/DL (ref 70–110)
HCT VFR BLD AUTO: 35.4 % (ref 37–48.5)
HGB BLD-MCNC: 11.2 G/DL (ref 12–16)
IMM GRANULOCYTES # BLD AUTO: 0.02 K/UL (ref 0–0.04)
IMM GRANULOCYTES NFR BLD AUTO: 0.3 % (ref 0–0.5)
IRON SERPL-MCNC: 60 UG/DL (ref 30–160)
LYMPHOCYTES # BLD AUTO: 2.1 K/UL (ref 1–4.8)
LYMPHOCYTES NFR BLD: 31.8 % (ref 18–48)
MCH RBC QN AUTO: 26.7 PG (ref 27–31)
MCHC RBC AUTO-ENTMCNC: 31.6 G/DL (ref 32–36)
MCV RBC AUTO: 84 FL (ref 82–98)
MONOCYTES # BLD AUTO: 0.5 K/UL (ref 0.3–1)
MONOCYTES NFR BLD: 7.5 % (ref 4–15)
NEUTROPHILS # BLD AUTO: 3.9 K/UL (ref 1.8–7.7)
NEUTROPHILS NFR BLD: 58.6 % (ref 38–73)
NRBC BLD-RTO: 0 /100 WBC
PLATELET # BLD AUTO: 302 K/UL (ref 150–350)
PMV BLD AUTO: 10.7 FL (ref 9.2–12.9)
POTASSIUM SERPL-SCNC: 3.5 MMOL/L (ref 3.5–5.1)
PROT SERPL-MCNC: 7.2 G/DL (ref 6–8.4)
RBC # BLD AUTO: 4.2 M/UL (ref 4–5.4)
SATURATED IRON: 22 % (ref 20–50)
SODIUM SERPL-SCNC: 136 MMOL/L (ref 136–145)
TOTAL IRON BINDING CAPACITY: 275 UG/DL (ref 250–450)
TRANSFERRIN SERPL-MCNC: 186 MG/DL (ref 200–375)
WBC # BLD AUTO: 6.57 K/UL (ref 3.9–12.7)

## 2020-10-21 PROCEDURE — 82728 ASSAY OF FERRITIN: CPT

## 2020-10-21 PROCEDURE — 83540 ASSAY OF IRON: CPT

## 2020-10-21 PROCEDURE — 85025 COMPLETE CBC W/AUTO DIFF WBC: CPT

## 2020-10-21 PROCEDURE — 36415 COLL VENOUS BLD VENIPUNCTURE: CPT | Mod: PO

## 2020-10-21 PROCEDURE — 80053 COMPREHEN METABOLIC PANEL: CPT

## 2020-10-22 ENCOUNTER — PATIENT MESSAGE (OUTPATIENT)
Dept: FAMILY MEDICINE | Facility: CLINIC | Age: 40
End: 2020-10-22

## 2020-10-23 ENCOUNTER — PATIENT MESSAGE (OUTPATIENT)
Dept: FAMILY MEDICINE | Facility: CLINIC | Age: 40
End: 2020-10-23

## 2020-11-01 ENCOUNTER — PATIENT MESSAGE (OUTPATIENT)
Dept: HEMATOLOGY/ONCOLOGY | Facility: CLINIC | Age: 40
End: 2020-11-01

## 2020-11-10 ENCOUNTER — OFFICE VISIT (OUTPATIENT)
Dept: HEMATOLOGY/ONCOLOGY | Facility: CLINIC | Age: 40
End: 2020-11-10
Payer: MEDICAID

## 2020-11-10 DIAGNOSIS — D50.8 IRON DEFICIENCY ANEMIA SECONDARY TO INADEQUATE DIETARY IRON INTAKE: ICD-10-CM

## 2020-11-10 DIAGNOSIS — I10 ESSENTIAL (PRIMARY) HYPERTENSION: ICD-10-CM

## 2020-11-10 DIAGNOSIS — E11.9 CONTROLLED TYPE 2 DIABETES MELLITUS WITHOUT COMPLICATION, WITHOUT LONG-TERM CURRENT USE OF INSULIN: Primary | ICD-10-CM

## 2020-11-10 DIAGNOSIS — L30.8 OTHER ECZEMA: ICD-10-CM

## 2020-11-10 PROCEDURE — 99215 PR OFFICE/OUTPT VISIT, EST, LEVL V, 40-54 MIN: ICD-10-PCS | Mod: 95,,, | Performed by: INTERNAL MEDICINE

## 2020-11-10 PROCEDURE — 99215 OFFICE O/P EST HI 40 MIN: CPT | Mod: 95,,, | Performed by: INTERNAL MEDICINE

## 2020-11-10 NOTE — PROGRESS NOTES
Hematology and Medical Oncology   New Patient Consult -- Virtual Visit     11/10/2020    Reason For Diagnosis: Iron Deficiency Anemia    Telemedicine Documentation:  The patient location is: her car   The chief complaint leading to consultation is: iron deficiency    Visit type: audiovisual    Face to Face time with patient: 25    30 minutes of total time spent on the encounter, which includes face to face time and non-face to face time preparing to see the patient (eg, review of tests), Obtaining and/or reviewing separately obtained history, Documenting clinical information in the electronic or other health record, Independently interpreting results (not separately reported) and communicating results to the patient/family/caregiver, or Care coordination (not separately reported).     Each patient to whom he or she provides medical services by telemedicine is:  (1) informed of the relationship between the physician and patient and the respective role of any other health care provider with respect to management of the patient; and (2) notified that he or she may decline to receive medical services by telemedicine and may withdraw from such care at any time.    History of Present Ilness:   Shelli Iverson ([facility]) is a pleasant 40 y.o.female who presents virtually to discuss his iron deficiency anemia. She is planning on undergoing a plastic surgery procedure later this month but needs her hemoglobin to be a little higher. The surgery has been postponed in the past due to low hemoglobin.     She has been taking liquid and PO iron since she saw her PCP about a month ago.  Iron supplementation has made her stools very regular.    On chart review her hemoglobin is consistently in the 10-12 range, with the exception of when she had her child.     Interval History:  Ms. Iverson  Is doing well. Energy remains good and she is not having symptoms of DOUG. She is taking a large amount of iron supplements. Her surgery was  postponed last time because her whole family was diagnosed with COVID.    PAST MEDICAL HISTORY:   Past Medical History:   Diagnosis Date    GERD (gastroesophageal reflux disease)     Hypertension     Sciatica        PAST SURGICAL HISTORY:   Past Surgical History:   Procedure Laterality Date     SECTION      x 1       PAST SOCIAL HISTORY:   reports that she has never smoked. She has never used smokeless tobacco. She reports previous alcohol use. She reports that she does not use drugs.    FAMILY HISTORY:  Family History   Problem Relation Age of Onset    Heart disease Mother         CAD age 38    Hypertension Mother 38        MI    Diabetes Mother     Hypertension Father        CURRENT MEDICATIONS:   Current Outpatient Medications   Medication Sig    albuterol (PROVENTIL/VENTOLIN HFA) 90 mcg/actuation inhaler Inhale 1-2 puffs into the lungs every 6 (six) hours as needed for Wheezing (And cough). Rescue    amLODIPine (NORVASC) 10 MG tablet Take 1 tablet (10 mg total) by mouth once daily.    atorvastatin (LIPITOR) 20 MG tablet Take 1 tablet (20 mg total) by mouth once daily.    diclofenac sodium (VOLTAREN) 1 % Gel Apply 2 g topically 4 (four) times daily. Apply to painful area for 10 days    diphenhydrAMINE-aluminum-magnesium hydroxide-simethicone-lidocaine HCl 2% Swish and spit 5 mLs every 4 (four) hours as needed. (Patient not taking: Reported on 2020)    fluconazole (DIFLUCAN) 150 MG Tab Take 1 tablet (150 mg total) by mouth once daily.    fluticasone propionate (FLONASE) 50 mcg/actuation nasal spray 1 spray (50 mcg total) by Each Nostril route 2 (two) times daily.    hydroCHLOROthiazide (HYDRODIURIL) 12.5 MG Tab Take 1 tablet (12.5 mg total) by mouth once daily.    hydrocortisone 2.5 % cream Apply topically 2 (two) times daily.    losartan (COZAAR) 50 MG tablet Take 2 tablets (100 mg total) by mouth once daily. Followup with doctor 2020    prenatal vit 10-iron fum-folic 65-1 mg  Tab Take 1 tablet by mouth once daily.     No current facility-administered medications for this visit.      ALLERGIES:   Review of patient's allergies indicates:   Allergen Reactions    Penicillins Rash         Review of Systems:     Review of Systems   Constitutional: Positive for fatigue. Negative for appetite change, chills, diaphoresis, fever and unexpected weight change.   HENT:   Negative for hearing loss, mouth sores, nosebleeds, sore throat, trouble swallowing and voice change.    Eyes: Negative for eye problems and icterus.   Respiratory: Negative for chest tightness, cough, hemoptysis, shortness of breath and wheezing.    Cardiovascular: Negative for chest pain, leg swelling and palpitations.   Gastrointestinal: Negative for abdominal distention, abdominal pain, blood in stool, diarrhea, nausea and vomiting.   Endocrine: Negative for hot flashes.   Genitourinary: Negative for bladder incontinence, difficulty urinating, dysuria and hematuria.    Musculoskeletal: Negative for arthralgias, back pain, flank pain, gait problem, myalgias, neck pain and neck stiffness.   Skin: Negative for itching, rash and wound.   Neurological: Negative for dizziness, extremity weakness, gait problem, headaches, numbness, seizures and speech difficulty.   Hematological: Negative for adenopathy. Does not bruise/bleed easily.   Psychiatric/Behavioral: Negative for confusion, depression and sleep disturbance. The patient is not nervous/anxious.           Physical Exam:     Limited secondary to virtual visit    ECOG Performance Status: (foot note - ECOG PS provided by Eastern Cooperative Oncology Group) 0 - Asymptomatic    Karnofsky Performance Score:  100%- Normal, No Complaints, No Evidence of Disease    Labs:   Lab Results   Component Value Date    WBC 6.57 10/21/2020    HGB 11.2 (L) 10/21/2020    HCT 35.4 (L) 10/21/2020     10/21/2020    CHOL 174 04/23/2019    TRIG 92 04/23/2019    HDL 58 04/23/2019    ALT 18  10/21/2020    AST 18 10/21/2020     10/21/2020    K 3.5 10/21/2020     10/21/2020    CREATININE 0.8 10/21/2020    BUN 13 10/21/2020    CO2 22 (L) 10/21/2020    TSH 1.754 06/22/2018    INR 0.9 08/29/2020    HGBA1C 6.9 (H) 08/29/2020         Imaging: Previous imaging has been reviewed    Assessment and Plan:     Ms. Iverson is a pleasant 40 year old female with known DOUG    Iron deficiency anemia, unspecified iron deficiency anemia type  --Currently taking iron supplementation  --Will recheck labs the week of her rescheduled surgery  --If hemoglobin is in the 11-12 range she is safe to proceed with therapy  --If iron levels remain low we have discussed IV Iron supplementation      30 minutes were spent face to face with the patient to discuss the disease, natural history, treatment options. I have provided the patient with an opportunity to ask questions and have all questions answered to her satisfaction.         she will return to clinic in 2 months, but knows to call in the interim if symptoms change or should a problem arise.        Poonam Jo MD  Hematology and Medical Oncology  Bone Marrow Transplant  [facility]

## 2020-11-12 ENCOUNTER — TELEPHONE (OUTPATIENT)
Dept: HEMATOLOGY/ONCOLOGY | Facility: CLINIC | Age: 40
End: 2020-11-12

## 2020-11-14 ENCOUNTER — PATIENT MESSAGE (OUTPATIENT)
Dept: FAMILY MEDICINE | Facility: CLINIC | Age: 40
End: 2020-11-14

## 2020-11-14 ENCOUNTER — HOSPITAL ENCOUNTER (EMERGENCY)
Facility: HOSPITAL | Age: 40
Discharge: HOME | End: 2020-11-14
Attending: EMERGENCY MEDICINE
Payer: MEDICAID

## 2020-11-14 VITALS
WEIGHT: 184 LBS | DIASTOLIC BLOOD PRESSURE: 77 MMHG | OXYGEN SATURATION: 100 % | BODY MASS INDEX: 31.58 KG/M2 | TEMPERATURE: 98 F | HEART RATE: 88 BPM | SYSTOLIC BLOOD PRESSURE: 122 MMHG | RESPIRATION RATE: 18 BRPM

## 2020-11-14 DIAGNOSIS — N39.0 ACUTE URINARY TRACT INFECTION: Primary | ICD-10-CM

## 2020-11-14 LAB
ALBUMIN SERPL-MCNC: 4 G/DL (ref 3.3–5.5)
ALP SERPL-CCNC: 85 U/L (ref 42–141)
B-HCG UR QL: NEGATIVE
BILIRUB SERPL-MCNC: 1.1 MG/DL (ref 0.2–1.6)
BILIRUBIN, POC UA: NEGATIVE
BLOOD, POC UA: NEGATIVE
BUN SERPL-MCNC: 14 MG/DL (ref 7–22)
CALCIUM SERPL-MCNC: 9.7 MG/DL (ref 8–10.3)
CHLORIDE SERPL-SCNC: 105 MMOL/L (ref 98–108)
CLARITY, POC UA: CLEAR
COLOR, POC UA: YELLOW
CREAT SERPL-MCNC: 0.8 MG/DL (ref 0.6–1.2)
CTP QC/QA: YES
GLUCOSE SERPL-MCNC: 112 MG/DL (ref 73–118)
GLUCOSE, POC UA: NEGATIVE
KETONES, POC UA: NEGATIVE
LEUKOCYTE EST, POC UA: ABNORMAL
NITRITE, POC UA: NEGATIVE
PH UR STRIP: 6 [PH]
POC ALT (SGPT): 19 U/L (ref 10–47)
POC AST (SGOT): 23 U/L (ref 11–38)
POC TCO2: 27 MMOL/L (ref 18–33)
POCT GLUCOSE: 116 MG/DL (ref 70–110)
POTASSIUM BLD-SCNC: 3.8 MMOL/L (ref 3.6–5.1)
PROTEIN, POC UA: NEGATIVE
PROTEIN, POC: 7.4 G/DL (ref 6.4–8.1)
SODIUM BLD-SCNC: 141 MMOL/L (ref 128–145)
SPECIFIC GRAVITY, POC UA: 1.02
UROBILINOGEN, POC UA: 0.2 E.U./DL

## 2020-11-14 PROCEDURE — 85025 COMPLETE CBC W/AUTO DIFF WBC: CPT | Mod: ER

## 2020-11-14 PROCEDURE — 99284 EMERGENCY DEPT VISIT MOD MDM: CPT | Mod: 25,ER

## 2020-11-14 PROCEDURE — 80053 COMPREHEN METABOLIC PANEL: CPT | Mod: ER

## 2020-11-14 PROCEDURE — 82962 GLUCOSE BLOOD TEST: CPT | Mod: ER

## 2020-11-14 PROCEDURE — 83036 HEMOGLOBIN GLYCOSYLATED A1C: CPT

## 2020-11-14 PROCEDURE — 81025 URINE PREGNANCY TEST: CPT | Mod: ER | Performed by: EMERGENCY MEDICINE

## 2020-11-14 PROCEDURE — 87086 URINE CULTURE/COLONY COUNT: CPT

## 2020-11-14 PROCEDURE — 25000003 PHARM REV CODE 250: Mod: ER | Performed by: EMERGENCY MEDICINE

## 2020-11-14 PROCEDURE — 81003 URINALYSIS AUTO W/O SCOPE: CPT | Mod: ER

## 2020-11-14 RX ORDER — PHENAZOPYRIDINE HYDROCHLORIDE 200 MG/1
200 TABLET, FILM COATED ORAL 3 TIMES DAILY PRN
Qty: 6 TABLET | Refills: 0 | Status: SHIPPED | OUTPATIENT
Start: 2020-11-14 | End: 2020-11-16

## 2020-11-14 RX ORDER — PHENAZOPYRIDINE HYDROCHLORIDE 100 MG/1
200 TABLET, FILM COATED ORAL
Status: COMPLETED | OUTPATIENT
Start: 2020-11-14 | End: 2020-11-14

## 2020-11-14 RX ORDER — ACETAMINOPHEN 500 MG
1000 TABLET ORAL EVERY 6 HOURS PRN
Qty: 30 TABLET | Refills: 0 | Status: SHIPPED | OUTPATIENT
Start: 2020-11-14

## 2020-11-14 RX ORDER — NITROFURANTOIN 25; 75 MG/1; MG/1
100 CAPSULE ORAL 2 TIMES DAILY
Qty: 10 CAPSULE | Refills: 0 | Status: SHIPPED | OUTPATIENT
Start: 2020-11-14 | End: 2020-11-19

## 2020-11-14 RX ORDER — IBUPROFEN 600 MG/1
600 TABLET ORAL EVERY 6 HOURS PRN
Qty: 20 TABLET | Refills: 0 | Status: SHIPPED | OUTPATIENT
Start: 2020-11-14 | End: 2022-11-22

## 2020-11-14 RX ADMIN — PHENAZOPYRIDINE HYDROCHLORIDE 200 MG: 100 TABLET ORAL at 09:11

## 2020-11-14 NOTE — Clinical Note
"Shelli "Shellilucila Iverson was seen and treated in our emergency department on 11/14/2020.  She may return to work on 11/16/2020.       If you have any questions or concerns, please don't hesitate to call.      Deysi Ta, DO"

## 2020-11-14 NOTE — DISCHARGE INSTRUCTIONS
Please follow-up with your primary care for hemoglobin A1c lab which is pending.   Return to the ED immediately if symptoms worsen or do not resolve.

## 2020-11-14 NOTE — ED PROVIDER NOTES
"Encounter Date: 2020    SCRIBE #1 NOTE: I, Lalitha Ozuna, am scribing for, and in the presence of,  Dr. Ta. I have scribed the following portions of the note - Other sections scribed: HPI, ROS, PE.       History     Chief Complaint   Patient presents with    Hyperglycemia     Pt states,' My sugar must be high. for the past week I am peeing like crazy and am thirsty all the time."     Shelli Iverson is a 40 y.o. female who presents to the ED complaining of urinary frequency and pain with urination x 1 week. Denies fever, chills, chest pain, headache, n/v/d, back pain, flank pain. She reports that she was previously on Metformin for pre-diabetes but was taken off of it, and is concerned today that her blood sugar is elevated again. Patient requesting basic labs today.    The history is provided by the patient. No  was used.     Review of patient's allergies indicates:   Allergen Reactions    Penicillins Rash     Past Medical History:   Diagnosis Date    GERD (gastroesophageal reflux disease)     Hypertension     Sciatica      Past Surgical History:   Procedure Laterality Date     SECTION      x 1     Family History   Problem Relation Age of Onset    Heart disease Mother         CAD age 38    Hypertension Mother 38        MI    Diabetes Mother     Hypertension Father      Social History     Tobacco Use    Smoking status: Never Smoker    Smokeless tobacco: Never Used   Substance Use Topics    Alcohol use: Not Currently     Frequency: 2-4 times a month     Drinks per session: 1 or 2     Binge frequency: Never     Comment: RARELY    Drug use: No     Comment: occasional     Review of Systems   Constitutional: Negative for chills and fever.   Gastrointestinal: Negative for diarrhea, nausea and vomiting.   Genitourinary: Positive for dysuria and frequency. Negative for flank pain.   Musculoskeletal: Negative for back pain.   Neurological: Negative for headaches.   All other " systems reviewed and are negative.      Physical Exam     Patient gave consent to have physical exam performed.    Initial Vitals [11/14/20 0907]   BP Pulse Resp Temp SpO2   (!) 145/97 82 16 98 °F (36.7 °C) 100 %      MAP       --         Physical Exam    Nursing note and vitals reviewed.  Constitutional: She appears well-developed and well-nourished.   HENT:   Head: Normocephalic and atraumatic.   Right Ear: External ear normal.   Left Ear: External ear normal.   Nose: Nose normal.   Mouth/Throat: Oropharynx is clear and moist.   Eyes: Conjunctivae and EOM are normal. Pupils are equal, round, and reactive to light.   Neck: Normal range of motion and phonation normal. Neck supple.   Cardiovascular: Normal rate, regular rhythm, normal heart sounds and intact distal pulses. Exam reveals no gallop and no friction rub.    No murmur heard.  Pulmonary/Chest: Effort normal and breath sounds normal. No stridor. No respiratory distress. She has no wheezes. She has no rhonchi. She has no rales. She exhibits no tenderness.   Abdominal: Soft. Bowel sounds are normal. She exhibits no distension. There is no abdominal tenderness. There is no rigidity, no rebound and no guarding.   Musculoskeletal: Normal range of motion. No tenderness or edema.   Neurological: She is alert and oriented to person, place, and time. She has normal strength. No cranial nerve deficit or sensory deficit. GCS score is 15. GCS eye subscore is 4. GCS verbal subscore is 5. GCS motor subscore is 6.   Skin: Skin is warm and dry. Capillary refill takes less than 2 seconds. No rash noted.   Psychiatric: She has a normal mood and affect. Her behavior is normal.         ED Course   Procedures  Labs Reviewed   POCT URINALYSIS W/O SCOPE - Abnormal; Notable for the following components:       Result Value    Leukocytes, UA Trace (*)     All other components within normal limits   POCT GLUCOSE - Abnormal; Notable for the following components:    POCT Glucose 116 (*)      All other components within normal limits   CULTURE, URINE   HEMOGLOBIN A1C   POCT URINE PREGNANCY   POCT URINALYSIS W/O SCOPE   POCT CBC   POCT GLUCOSE MONITORING CONTINUOUS   POCT CMP   POCT CMP                   Medical Decision Making:   History:   Old Medical Records: I decided to obtain old medical records.  Clinical Tests:   Lab Tests: Ordered and Reviewed  The following lab test(s) were unremarkable: CBC, CMP and Urinalysis    Chief complaint: urinary frequency, dysuria  Differential diagnosis: hyperglycemia, hypoglycemia, pregnancy, UTI, hematuria, dysuria    Treatment in the ED: PE, phenazopyridine tablet 200 mg  Patient reports feeling better after treatment in the ER.      Discussed treatment, prescriptions, labs, and imaging results. Patient requesting labs. Will obtain CBC, CMP, hemoglobin A1C. Patient to follow up with primary care in 2 days.  10:34 AM Anion gap of 9. Pending urine culture. CBC, CMP, UA, glucose all within normal limits.      Discharge home with   acetaminophen (TYLENOL) 500 MG tablet     ibuprofen (ADVIL,MOTRIN) 600 MG tablet     nitrofurantoin, macrocrystal-monohydrate, (MACROBID) 100 MG capsule     phenazopyridine (PYRIDIUM) 200 MG tablet   Fill and take prescriptions as directed.  Return to the ED if symptoms worsen or do not resolve.   Answered questions and discussed discharge plan.    Patient feels better and is ready for discharge.  Follow up with PCP/specialist in 1 day.            Scribe Attestation:   Scribe #1: I performed the above scribed service and the documentation accurately describes the services I performed. I attest to the accuracy of the note.     I, Dr. Deysi Ta, personally performed the services described in this documentation. This document was produced by a scribe under my direction and in my presence. All medical record entries made by the scribe were at my direction and in my presence.  I have reviewed the chart and agree that the record  reflects my personal performance and is accurate and complete. Deysi Ta DO.     11/14/2020 12:24 PM                    Clinical Impression:     ICD-10-CM ICD-9-CM   1. Acute urinary tract infection  N39.0 599.0                          ED Disposition Condition    Discharge Stable        ED Prescriptions     Medication Sig Dispense Start Date End Date Auth. Provider    phenazopyridine (PYRIDIUM) 200 MG tablet Take 1 tablet (200 mg total) by mouth 3 (three) times daily as needed for Pain. 6 tablet 11/14/2020 11/16/2020 Deysi Ta DO    nitrofurantoin, macrocrystal-monohydrate, (MACROBID) 100 MG capsule Take 1 capsule (100 mg total) by mouth 2 (two) times daily. for 5 days 10 capsule 11/14/2020 11/19/2020 Deysi Ta DO    ibuprofen (ADVIL,MOTRIN) 600 MG tablet Take 1 tablet (600 mg total) by mouth every 6 (six) hours as needed for Pain (Take with food as needed for mild-to-moderate pain). 20 tablet 11/14/2020  Deysi Ta DO    acetaminophen (TYLENOL) 500 MG tablet Take 2 tablets (1,000 mg total) by mouth every 6 (six) hours as needed for Pain. 30 tablet 11/14/2020  Deysi Ta DO        Follow-up Information     Follow up With Specialties Details Why Contact Info    Octavio Bernstein MD Family Medicine Schedule an appointment as soon as possible for a visit in 1 day  [address] [address]  [phone]      [facility] Emergency Department Emergency Medicine Go to  If symptoms worsen [address] [address] [phone]                                       Deysi Ta DO  11/14/20 6152

## 2020-11-15 LAB
ESTIMATED AVG GLUCOSE: 151 MG/DL (ref 68–131)
HBA1C MFR BLD HPLC: 6.9 % (ref 4–5.6)

## 2020-11-16 LAB — BACTERIA UR CULT: NORMAL

## 2020-11-16 NOTE — TELEPHONE ENCOUNTER
Spoke with pt  didn't have anything till 2021 asked pt if it was ok to be schedule with someone pt agreed she will be seen on 12/01/2020 with

## 2020-11-23 ENCOUNTER — OFFICE VISIT (OUTPATIENT)
Dept: FAMILY MEDICINE | Facility: CLINIC | Age: 40
End: 2020-11-23
Payer: MEDICAID

## 2020-11-23 VITALS
BODY MASS INDEX: 31.39 KG/M2 | HEART RATE: 97 BPM | TEMPERATURE: 97 F | DIASTOLIC BLOOD PRESSURE: 84 MMHG | HEIGHT: 64 IN | SYSTOLIC BLOOD PRESSURE: 128 MMHG | WEIGHT: 183.88 LBS

## 2020-11-23 DIAGNOSIS — E11.9 CONTROLLED TYPE 2 DIABETES MELLITUS WITHOUT COMPLICATION, WITHOUT LONG-TERM CURRENT USE OF INSULIN: Primary | ICD-10-CM

## 2020-11-23 PROCEDURE — 99214 OFFICE O/P EST MOD 30 MIN: CPT | Mod: PBBFAC,PO | Performed by: NURSE PRACTITIONER

## 2020-11-23 PROCEDURE — 99214 OFFICE O/P EST MOD 30 MIN: CPT | Mod: S$PBB,,, | Performed by: NURSE PRACTITIONER

## 2020-11-23 PROCEDURE — 99999 PR PBB SHADOW E&M-EST. PATIENT-LVL IV: CPT | Mod: PBBFAC,,, | Performed by: NURSE PRACTITIONER

## 2020-11-23 PROCEDURE — 99214 PR OFFICE/OUTPT VISIT, EST, LEVL IV, 30-39 MIN: ICD-10-PCS | Mod: S$PBB,,, | Performed by: NURSE PRACTITIONER

## 2020-11-23 PROCEDURE — 99999 PR PBB SHADOW E&M-EST. PATIENT-LVL IV: ICD-10-PCS | Mod: PBBFAC,,, | Performed by: NURSE PRACTITIONER

## 2020-11-23 RX ORDER — METFORMIN HYDROCHLORIDE 500 MG/1
500 TABLET, FILM COATED, EXTENDED RELEASE ORAL DAILY
Qty: 30 TABLET | Refills: 11 | Status: SHIPPED | OUTPATIENT
Start: 2020-11-23 | End: 2022-01-27 | Stop reason: SDUPTHER

## 2020-11-23 NOTE — PROGRESS NOTES
This dictation has been generated using Modal Fluency Dictation some phonetic errors may occur. Please contact author for clarification if needed.     Problem List Items Addressed This Visit     Controlled type 2 diabetes mellitus without complication, without long-term current use of insulin - Primary    Relevant Medications    metFORMIN (GLUMETZA) 500 MG (MOD) 24hr tablet          Orders Placed This Encounter    metFORMIN (GLUMETZA) 500 MG (MOD) 24hr tablet     Patient Instructions   FOLLOW UP for pre op     Diabetes diet controlled.  Interested in some weight loss.  Extended release metformin as above.  Continue diet exercise modifications.    No follow-ups on file.    ________________________________________________________________  ________________________________________________________________      Chief Complaint   Patient presents with    Discus medication     History of present illness  This 40 y.o. presents today for complaint of interested in resuming metformin.  She is technically diabetic.  Has done well with management with the diet for 4 years now.  Does have polyuria and polydipsia.  Last A1c 6.9.  Discussed diet exercise modifications.  She does walk 5 miles multiple times per week for 3 miles plus stairs depending on the weather.  Does stay away from Rice bread pasta potatoes.  Complete review of systems otherwise negative  Past medical social surgical history reviewed.  Holding surgery at this time.  Anemia improving.  Past Medical History:   Diagnosis Date    GERD (gastroesophageal reflux disease)     Hypertension     Sciatica        Past Surgical History:   Procedure Laterality Date     SECTION      x 1       Family History   Problem Relation Age of Onset    Heart disease Mother         CAD age 38    Hypertension Mother 38        MI    Diabetes Mother     Hypertension Father        Social History     Socioeconomic History    Marital status: Single     Spouse name: Not on file     Number of children: Not on file    Years of education: Not on file    Highest education level: Not on file   Occupational History    Not on file   Social Needs    Financial resource strain: Not hard at all    Food insecurity     Worry: Never true     Inability: Never true    Transportation needs     Medical: No     Non-medical: No   Tobacco Use    Smoking status: Never Smoker    Smokeless tobacco: Never Used   Substance and Sexual Activity    Alcohol use: Not Currently     Frequency: 2-4 times a month     Drinks per session: 1 or 2     Binge frequency: Never     Comment: RARELY    Drug use: No     Comment: occasional    Sexual activity: Yes     Birth control/protection: I.U.D.   Lifestyle    Physical activity     Days per week: 4 days     Minutes per session: 60 min    Stress: Not at all   Relationships    Social connections     Talks on phone: Twice a week     Gets together: Never     Attends [employer] service: Not on file     Active member of club or organization: No     Attends meetings of clubs or organizations: Never     Relationship status: Never    Other Topics Concern    Not on file   Social History Narrative    Not on file       Current Outpatient Medications   Medication Sig Dispense Refill    acetaminophen (TYLENOL) 500 MG tablet Take 2 tablets (1,000 mg total) by mouth every 6 (six) hours as needed for Pain. 30 tablet 0    amLODIPine (NORVASC) 10 MG tablet Take 1 tablet (10 mg total) by mouth once daily. 90 tablet 11    atorvastatin (LIPITOR) 20 MG tablet Take 1 tablet (20 mg total) by mouth once daily. 90 tablet 3    fluticasone propionate (FLONASE) 50 mcg/actuation nasal spray 1 spray (50 mcg total) by Each Nostril route 2 (two) times daily. 1 Bottle 0    hydroCHLOROthiazide (HYDRODIURIL) 12.5 MG Tab Take 1 tablet (12.5 mg total) by mouth once daily. 90 tablet 11    hydrocortisone 2.5 % cream Apply topically 2 (two) times daily. 28 g 6    ibuprofen (ADVIL,MOTRIN) 600  MG tablet Take 1 tablet (600 mg total) by mouth every 6 (six) hours as needed for Pain (Take with food as needed for mild-to-moderate pain). 20 tablet 0    losartan (COZAAR) 50 MG tablet Take 2 tablets (100 mg total) by mouth once daily. Followup with doctor JULY 2020 180 tablet 1    prenatal vit 10-iron fum-folic 65-1 mg Tab Take 1 tablet by mouth once daily. 30 tablet 11    albuterol (PROVENTIL/VENTOLIN HFA) 90 mcg/actuation inhaler Inhale 1-2 puffs into the lungs every 6 (six) hours as needed for Wheezing (And cough). Rescue 1 Inhaler 0    diclofenac sodium (VOLTAREN) 1 % Gel Apply 2 g topically 4 (four) times daily. Apply to painful area for 10 days 1 Tube 6    diphenhydrAMINE-aluminum-magnesium hydroxide-simethicone-lidocaine HCl 2% Swish and spit 5 mLs every 4 (four) hours as needed. (Patient not taking: Reported on 6/8/2020) 118 mL 0    fluconazole (DIFLUCAN) 150 MG Tab Take 1 tablet (150 mg total) by mouth once daily. 1 tablet 1    metFORMIN (GLUMETZA) 500 MG (MOD) 24hr tablet Take 1 tablet (500 mg total) by mouth once daily. 30 tablet 11     No current facility-administered medications for this visit.        Review of patient's allergies indicates:   Allergen Reactions    Penicillins Rash       Physical examination  Vitals Reviewed\  Vitals:    11/23/20 1117   BP: 128/84   Pulse: 97   Temp: 97.3 °F (36.3 °C)     Weight: 83.4 kg (183 lb 13.8 oz)    Gen. Well-dressed well-nourished   Skin warm dry and intact.  No rashes noted.  Neck is supple without adenopathy  Chest.  Respirations are even unlabored.    Neuro. Awake alert oriented x4.  Normal judgment and cognition noted.  Extremities no clubbing cyanosis or edema noted.     Call or return to clinic prn if these symptoms worsen or fail to improve as anticipated.  In excessive of 25 minutes spent with patient with greater than 50% of time dedicated to education on symptoms, diagnosis, treatments, and coordination of care.

## 2020-12-01 ENCOUNTER — TELEPHONE (OUTPATIENT)
Dept: HEMATOLOGY/ONCOLOGY | Facility: CLINIC | Age: 40
End: 2020-12-01

## 2020-12-08 ENCOUNTER — LAB VISIT (OUTPATIENT)
Dept: LAB | Facility: HOSPITAL | Age: 40
End: 2020-12-08
Attending: INTERNAL MEDICINE
Payer: MEDICAID

## 2020-12-08 DIAGNOSIS — D50.9 IRON DEFICIENCY ANEMIA, UNSPECIFIED IRON DEFICIENCY ANEMIA TYPE: ICD-10-CM

## 2020-12-08 LAB
ALBUMIN SERPL BCP-MCNC: 4.2 G/DL (ref 3.5–5.2)
ALP SERPL-CCNC: 87 U/L (ref 55–135)
ALT SERPL W/O P-5'-P-CCNC: 19 U/L (ref 10–44)
ANION GAP SERPL CALC-SCNC: 8 MMOL/L (ref 8–16)
AST SERPL-CCNC: 16 U/L (ref 10–40)
BASOPHILS # BLD AUTO: 0.03 K/UL (ref 0–0.2)
BASOPHILS NFR BLD: 0.5 % (ref 0–1.9)
BILIRUB SERPL-MCNC: 0.3 MG/DL (ref 0.1–1)
BUN SERPL-MCNC: 16 MG/DL (ref 6–20)
CALCIUM SERPL-MCNC: 9.2 MG/DL (ref 8.7–10.5)
CHLORIDE SERPL-SCNC: 104 MMOL/L (ref 95–110)
CO2 SERPL-SCNC: 25 MMOL/L (ref 23–29)
CREAT SERPL-MCNC: 0.8 MG/DL (ref 0.5–1.4)
DIFFERENTIAL METHOD: ABNORMAL
EOSINOPHIL # BLD AUTO: 0 K/UL (ref 0–0.5)
EOSINOPHIL NFR BLD: 0.7 % (ref 0–8)
ERYTHROCYTE [DISTWIDTH] IN BLOOD BY AUTOMATED COUNT: 13.7 % (ref 11.5–14.5)
EST. GFR  (AFRICAN AMERICAN): >60 ML/MIN/1.73 M^2
EST. GFR  (NON AFRICAN AMERICAN): >60 ML/MIN/1.73 M^2
FERRITIN SERPL-MCNC: 81 NG/ML (ref 20–300)
GLUCOSE SERPL-MCNC: 116 MG/DL (ref 70–110)
HCT VFR BLD AUTO: 36.7 % (ref 37–48.5)
HGB BLD-MCNC: 11.4 G/DL (ref 12–16)
IMM GRANULOCYTES # BLD AUTO: 0.01 K/UL (ref 0–0.04)
IMM GRANULOCYTES NFR BLD AUTO: 0.2 % (ref 0–0.5)
IRON SERPL-MCNC: 19 UG/DL (ref 30–160)
LYMPHOCYTES # BLD AUTO: 2.3 K/UL (ref 1–4.8)
LYMPHOCYTES NFR BLD: 37.5 % (ref 18–48)
MCH RBC QN AUTO: 26.5 PG (ref 27–31)
MCHC RBC AUTO-ENTMCNC: 31.1 G/DL (ref 32–36)
MCV RBC AUTO: 85 FL (ref 82–98)
MONOCYTES # BLD AUTO: 0.5 K/UL (ref 0.3–1)
MONOCYTES NFR BLD: 8.8 % (ref 4–15)
NEUTROPHILS # BLD AUTO: 3.2 K/UL (ref 1.8–7.7)
NEUTROPHILS NFR BLD: 52.3 % (ref 38–73)
NRBC BLD-RTO: 0 /100 WBC
PLATELET # BLD AUTO: 342 K/UL (ref 150–350)
PMV BLD AUTO: 9.5 FL (ref 9.2–12.9)
POTASSIUM SERPL-SCNC: 3.7 MMOL/L (ref 3.5–5.1)
PROT SERPL-MCNC: 7.4 G/DL (ref 6–8.4)
RBC # BLD AUTO: 4.3 M/UL (ref 4–5.4)
SODIUM SERPL-SCNC: 137 MMOL/L (ref 136–145)
WBC # BLD AUTO: 6.14 K/UL (ref 3.9–12.7)

## 2020-12-08 PROCEDURE — 83540 ASSAY OF IRON: CPT

## 2020-12-08 PROCEDURE — 82728 ASSAY OF FERRITIN: CPT

## 2020-12-08 PROCEDURE — 85025 COMPLETE CBC W/AUTO DIFF WBC: CPT

## 2020-12-08 PROCEDURE — 80053 COMPREHEN METABOLIC PANEL: CPT

## 2020-12-08 PROCEDURE — 36415 COLL VENOUS BLD VENIPUNCTURE: CPT | Mod: PO

## 2020-12-09 ENCOUNTER — OFFICE VISIT (OUTPATIENT)
Dept: HEMATOLOGY/ONCOLOGY | Facility: CLINIC | Age: 40
End: 2020-12-09
Payer: MEDICAID

## 2020-12-09 DIAGNOSIS — E66.9 OBESITY (BMI 30-39.9): ICD-10-CM

## 2020-12-09 DIAGNOSIS — D50.0 IRON DEFICIENCY ANEMIA DUE TO CHRONIC BLOOD LOSS: Primary | ICD-10-CM

## 2020-12-09 DIAGNOSIS — I10 ESSENTIAL (PRIMARY) HYPERTENSION: ICD-10-CM

## 2020-12-09 PROCEDURE — 99214 OFFICE O/P EST MOD 30 MIN: CPT | Mod: 95,,, | Performed by: INTERNAL MEDICINE

## 2020-12-09 PROCEDURE — 99214 PR OFFICE/OUTPT VISIT, EST, LEVL IV, 30-39 MIN: ICD-10-PCS | Mod: 95,,, | Performed by: INTERNAL MEDICINE

## 2020-12-09 NOTE — PROGRESS NOTES
Hematology and Medical Oncology   Follow Up -- Virtual Visit     12/09/2020    Reason For Diagnosis: Iron Deficiency Anemia    Telemedicine Documentation:  The patient location is: her car   The chief complaint leading to consultation is: iron deficiency    Visit type: audiovisual    Face to Face time with patient: 25    30 minutes of total time spent on the encounter, which includes face to face time and non-face to face time preparing to see the patient (eg, review of tests), Obtaining and/or reviewing separately obtained history, Documenting clinical information in the electronic or other health record, Independently interpreting results (not separately reported) and communicating results to the patient/family/caregiver, or Care coordination (not separately reported).     Each patient to whom he or she provides medical services by telemedicine is:  (1) informed of the relationship between the physician and patient and the respective role of any other health care provider with respect to management of the patient; and (2) notified that he or she may decline to receive medical services by telemedicine and may withdraw from such care at any time.    History of Present Ilness:   Shelli Iverson ([facility]) is a pleasant 40 y.o.female who presents virtually to discuss his iron deficiency anemia. She is planning on undergoing a plastic surgery procedure later this month but needs her hemoglobin to be a little higher. The surgery has been postponed in the past due to low hemoglobin.     She has been taking liquid and PO iron since she saw her PCP about a month ago.  Iron supplementation has made her stools very regular.    On chart review her hemoglobin is consistently in the 10-12 range, with the exception of when she had her child.     Interval History:  Ms. Iverson  Is doing well. Energy remains good and she is not having symptoms of DOUG. She is taking a large amount of iron supplements. Her surgery was postponed last  time because her whole family was diagnosed with COVID. Repeat labs will be done on the  in preporation for surgery later this month.     She's disappointed to hear about the decrease in hgb. She will continue to work to get her hgb over 12 prior to surgery.      PAST MEDICAL HISTORY:   Past Medical History:   Diagnosis Date    GERD (gastroesophageal reflux disease)     Hypertension     Sciatica        PAST SURGICAL HISTORY:   Past Surgical History:   Procedure Laterality Date     SECTION      x 1       PAST SOCIAL HISTORY:   reports that she has never smoked. She has never used smokeless tobacco. She reports previous alcohol use. She reports that she does not use drugs.    FAMILY HISTORY:  Family History   Problem Relation Age of Onset    Heart disease Mother         CAD age 38    Hypertension Mother 38        MI    Diabetes Mother     Hypertension Father        CURRENT MEDICATIONS:   Current Outpatient Medications   Medication Sig    acetaminophen (TYLENOL) 500 MG tablet Take 2 tablets (1,000 mg total) by mouth every 6 (six) hours as needed for Pain.    albuterol (PROVENTIL/VENTOLIN HFA) 90 mcg/actuation inhaler Inhale 1-2 puffs into the lungs every 6 (six) hours as needed for Wheezing (And cough). Rescue    amLODIPine (NORVASC) 10 MG tablet Take 1 tablet (10 mg total) by mouth once daily.    atorvastatin (LIPITOR) 20 MG tablet Take 1 tablet (20 mg total) by mouth once daily.    diclofenac sodium (VOLTAREN) 1 % Gel Apply 2 g topically 4 (four) times daily. Apply to painful area for 10 days    diphenhydrAMINE-aluminum-magnesium hydroxide-simethicone-lidocaine HCl 2% Swish and spit 5 mLs every 4 (four) hours as needed. (Patient not taking: Reported on 2020)    fluconazole (DIFLUCAN) 150 MG Tab Take 1 tablet (150 mg total) by mouth once daily.    fluticasone propionate (FLONASE) 50 mcg/actuation nasal spray 1 spray (50 mcg total) by Each Nostril route 2 (two) times daily.     hydroCHLOROthiazide (HYDRODIURIL) 12.5 MG Tab Take 1 tablet (12.5 mg total) by mouth once daily.    hydrocortisone 2.5 % cream Apply topically 2 (two) times daily.    ibuprofen (ADVIL,MOTRIN) 600 MG tablet Take 1 tablet (600 mg total) by mouth every 6 (six) hours as needed for Pain (Take with food as needed for mild-to-moderate pain).    losartan (COZAAR) 50 MG tablet Take 2 tablets (100 mg total) by mouth once daily. Followup with doctor JULY 2020    metFORMIN (GLUMETZA) 500 MG (MOD) 24hr tablet Take 1 tablet (500 mg total) by mouth once daily.    prenatal vit 10-iron fum-folic 65-1 mg Tab Take 1 tablet by mouth once daily.     No current facility-administered medications for this visit.      ALLERGIES:   Review of patient's allergies indicates:   Allergen Reactions    Penicillins Rash         Review of Systems:     Review of Systems   Constitutional: Positive for fatigue. Negative for appetite change, chills, diaphoresis, fever and unexpected weight change.   HENT:   Negative for hearing loss, mouth sores, nosebleeds, sore throat, trouble swallowing and voice change.    Eyes: Negative for eye problems and icterus.   Respiratory: Negative for chest tightness, cough, hemoptysis, shortness of breath and wheezing.    Cardiovascular: Negative for chest pain, leg swelling and palpitations.   Gastrointestinal: Negative for abdominal distention, abdominal pain, blood in stool, diarrhea, nausea and vomiting.   Endocrine: Negative for hot flashes.   Genitourinary: Negative for bladder incontinence, difficulty urinating, dysuria and hematuria.    Musculoskeletal: Negative for arthralgias, back pain, flank pain, gait problem, myalgias, neck pain and neck stiffness.   Skin: Negative for itching, rash and wound.   Neurological: Negative for dizziness, extremity weakness, gait problem, headaches, numbness, seizures and speech difficulty.   Hematological: Negative for adenopathy. Does not bruise/bleed easily.    Psychiatric/Behavioral: Negative for confusion, depression and sleep disturbance. The patient is not nervous/anxious.           Physical Exam:     Limited secondary to virtual visit    ECOG Performance Status: (foot note - ECOG PS provided by Eastern Cooperative Oncology Group) 0 - Asymptomatic    Karnofsky Performance Score:  100%- Normal, No Complaints, No Evidence of Disease    Labs:   Lab Results   Component Value Date    WBC 6.14 12/08/2020    HGB 11.4 (L) 12/08/2020    HCT 36.7 (L) 12/08/2020     12/08/2020    CHOL 174 04/23/2019    TRIG 92 04/23/2019    HDL 58 04/23/2019    ALT 19 12/08/2020    AST 16 12/08/2020     12/08/2020    K 3.7 12/08/2020     12/08/2020    CREATININE 0.8 12/08/2020    BUN 16 12/08/2020    CO2 25 12/08/2020    TSH 1.754 06/22/2018    INR 0.9 08/29/2020    HGBA1C 6.9 (H) 11/14/2020       Imaging: Previous imaging has been reviewed    Assessment and Plan:     Ms. Iverson is a pleasant 40 year old female with known DOUG    Iron deficiency anemia, unspecified iron deficiency anemia type  --Currently taking iron supplementation  --Will recheck labs the week of her rescheduled surgery  --If hemoglobin is in the 11-12 range she is safe to proceed with therapy  --Currently no indication for IV Iron supplementation  --Will continue to follow PRN    30 minutes were spent face to face with the patient to discuss the disease, natural history, treatment options. I have provided the patient with an opportunity to ask questions and have all questions answered to her satisfaction.         she will return to clinic PRN, but knows to call in the interim if symptoms change or should a problem arise.        Poonam Jo MD  Hematology and Medical Oncology  Bone Marrow Transplant  [facility]

## 2020-12-12 ENCOUNTER — PATIENT MESSAGE (OUTPATIENT)
Dept: FAMILY MEDICINE | Facility: CLINIC | Age: 40
End: 2020-12-12

## 2020-12-15 ENCOUNTER — PATIENT MESSAGE (OUTPATIENT)
Dept: FAMILY MEDICINE | Facility: CLINIC | Age: 40
End: 2020-12-15

## 2020-12-15 ENCOUNTER — LAB VISIT (OUTPATIENT)
Dept: LAB | Facility: HOSPITAL | Age: 40
End: 2020-12-15
Attending: NURSE PRACTITIONER
Payer: MEDICAID

## 2020-12-15 ENCOUNTER — OFFICE VISIT (OUTPATIENT)
Dept: FAMILY MEDICINE | Facility: CLINIC | Age: 40
End: 2020-12-15
Payer: MEDICAID

## 2020-12-15 VITALS
BODY MASS INDEX: 30.75 KG/M2 | TEMPERATURE: 98 F | DIASTOLIC BLOOD PRESSURE: 80 MMHG | SYSTOLIC BLOOD PRESSURE: 128 MMHG | WEIGHT: 180.13 LBS | HEIGHT: 64 IN | HEART RATE: 88 BPM

## 2020-12-15 DIAGNOSIS — Z01.818 PRE-OP EVALUATION: ICD-10-CM

## 2020-12-15 DIAGNOSIS — Z01.818 PRE-OP EVALUATION: Primary | ICD-10-CM

## 2020-12-15 LAB
BASOPHILS # BLD AUTO: 0.02 K/UL (ref 0–0.2)
BASOPHILS NFR BLD: 0.3 % (ref 0–1.9)
DIFFERENTIAL METHOD: ABNORMAL
EOSINOPHIL # BLD AUTO: 0.1 K/UL (ref 0–0.5)
EOSINOPHIL NFR BLD: 0.8 % (ref 0–8)
ERYTHROCYTE [DISTWIDTH] IN BLOOD BY AUTOMATED COUNT: 13.9 % (ref 11.5–14.5)
HCG INTACT+B SERPL-ACNC: <1.2 MIU/ML
HCT VFR BLD AUTO: 38.5 % (ref 37–48.5)
HGB BLD-MCNC: 12 G/DL (ref 12–16)
IMM GRANULOCYTES # BLD AUTO: 0.01 K/UL (ref 0–0.04)
IMM GRANULOCYTES NFR BLD AUTO: 0.2 % (ref 0–0.5)
LYMPHOCYTES # BLD AUTO: 2.5 K/UL (ref 1–4.8)
LYMPHOCYTES NFR BLD: 40 % (ref 18–48)
MCH RBC QN AUTO: 26.7 PG (ref 27–31)
MCHC RBC AUTO-ENTMCNC: 31.2 G/DL (ref 32–36)
MCV RBC AUTO: 86 FL (ref 82–98)
MONOCYTES # BLD AUTO: 0.5 K/UL (ref 0.3–1)
MONOCYTES NFR BLD: 8.1 % (ref 4–15)
NEUTROPHILS # BLD AUTO: 3.2 K/UL (ref 1.8–7.7)
NEUTROPHILS NFR BLD: 50.6 % (ref 38–73)
NRBC BLD-RTO: 0 /100 WBC
PLATELET # BLD AUTO: 359 K/UL (ref 150–350)
PMV BLD AUTO: 10.3 FL (ref 9.2–12.9)
RBC # BLD AUTO: 4.49 M/UL (ref 4–5.4)
WBC # BLD AUTO: 6.28 K/UL (ref 3.9–12.7)

## 2020-12-15 PROCEDURE — 99214 OFFICE O/P EST MOD 30 MIN: CPT | Mod: S$PBB,,, | Performed by: NURSE PRACTITIONER

## 2020-12-15 PROCEDURE — 84702 CHORIONIC GONADOTROPIN TEST: CPT

## 2020-12-15 PROCEDURE — 99999 PR PBB SHADOW E&M-EST. PATIENT-LVL III: ICD-10-PCS | Mod: PBBFAC,,, | Performed by: NURSE PRACTITIONER

## 2020-12-15 PROCEDURE — 86703 HIV-1/HIV-2 1 RESULT ANTBDY: CPT

## 2020-12-15 PROCEDURE — 99213 OFFICE O/P EST LOW 20 MIN: CPT | Mod: PBBFAC,PO | Performed by: NURSE PRACTITIONER

## 2020-12-15 PROCEDURE — 85025 COMPLETE CBC W/AUTO DIFF WBC: CPT

## 2020-12-15 PROCEDURE — 99999 PR PBB SHADOW E&M-EST. PATIENT-LVL III: CPT | Mod: PBBFAC,,, | Performed by: NURSE PRACTITIONER

## 2020-12-15 PROCEDURE — 85730 THROMBOPLASTIN TIME PARTIAL: CPT

## 2020-12-15 PROCEDURE — 36415 COLL VENOUS BLD VENIPUNCTURE: CPT | Mod: PO

## 2020-12-15 PROCEDURE — 85610 PROTHROMBIN TIME: CPT

## 2020-12-15 PROCEDURE — 99214 PR OFFICE/OUTPT VISIT, EST, LEVL IV, 30-39 MIN: ICD-10-PCS | Mod: S$PBB,,, | Performed by: NURSE PRACTITIONER

## 2020-12-15 NOTE — PROGRESS NOTES
Patient ID: Shelli Iverson is a 40 y.o. female.    Chief Complaint: Pre-op Exam      Shelli Iverson is in the office for preoperative clearance. Planned tummy tuck 1/15/2020 with Dr. SANDHYA Baer in [place].    Pt has anemia. Hemoglobin has been stable. She is on iron supplements-oral and has seen hem/onc who has cleared her if hemoglobin is the the 11-12 range. She is currently 11.4. Htn is controlled. DM is controlled. No complaints.   Hip pain took ibuprofen last night. Takes prn.       Past Medical History:   Diagnosis Date    GERD (gastroesophageal reflux disease)     Hypertension     Sciatica        STOP BANG Questionnaire  Patient diagnosed with Obstructive Sleep Apnea?: No  Has loud snoring: No  Disturbed sleep, daytime fatigue, daytime somnolence: No  Observed to have interrupted breathing during sleep: No  Takes medication for high blood pressure: Yes  Not taking BP medication but supposed to be: No  BMI (Calculated): 30.9        Current Outpatient Medications:     acetaminophen (TYLENOL) 500 MG tablet, Take 2 tablets (1,000 mg total) by mouth every 6 (six) hours as needed for Pain., Disp: 30 tablet, Rfl: 0    amLODIPine (NORVASC) 10 MG tablet, Take 1 tablet (10 mg total) by mouth once daily., Disp: 90 tablet, Rfl: 11    atorvastatin (LIPITOR) 20 MG tablet, Take 1 tablet (20 mg total) by mouth once daily., Disp: 90 tablet, Rfl: 3    diphenhydrAMINE-aluminum-magnesium hydroxide-simethicone-lidocaine HCl 2%, Swish and spit 5 mLs every 4 (four) hours as needed., Disp: 118 mL, Rfl: 0    fluticasone propionate (FLONASE) 50 mcg/actuation nasal spray, 1 spray (50 mcg total) by Each Nostril route 2 (two) times daily., Disp: 1 Bottle, Rfl: 0    hydroCHLOROthiazide (HYDRODIURIL) 12.5 MG Tab, Take 1 tablet (12.5 mg total) by mouth once daily., Disp: 90 tablet, Rfl: 11    hydrocortisone 2.5 % cream, Apply topically 2 (two) times daily., Disp: 28 g, Rfl: 6    ibuprofen (ADVIL,MOTRIN) 600 MG tablet, Take 1  tablet (600 mg total) by mouth every 6 (six) hours as needed for Pain (Take with food as needed for mild-to-moderate pain)., Disp: 20 tablet, Rfl: 0    losartan (COZAAR) 50 MG tablet, Take 2 tablets (100 mg total) by mouth once daily. Followup with doctor JULY 2020, Disp: 180 tablet, Rfl: 1    metFORMIN (GLUMETZA) 500 MG (MOD) 24hr tablet, Take 1 tablet (500 mg total) by mouth once daily., Disp: 30 tablet, Rfl: 11    prenatal vit 10-iron fum-folic 65-1 mg Tab, Take 1 tablet by mouth once daily., Disp: 30 tablet, Rfl: 11    albuterol (PROVENTIL/VENTOLIN HFA) 90 mcg/actuation inhaler, Inhale 1-2 puffs into the lungs every 6 (six) hours as needed for Wheezing (And cough). Rescue, Disp: 1 Inhaler, Rfl: 0    diclofenac sodium (VOLTAREN) 1 % Gel, Apply 2 g topically 4 (four) times daily. Apply to painful area for 10 days, Disp: 1 Tube, Rfl: 6    fluconazole (DIFLUCAN) 150 MG Tab, Take 1 tablet (150 mg total) by mouth once daily., Disp: 1 tablet, Rfl: 1    The 10-year ASCVD risk score ([place] DC Jr., et al., 2013) is: 3%    Values used to calculate the score:      Age: 40 years      Sex: Female      Is Non- : Yes      Diabetic: Yes      Tobacco smoker: No      Systolic Blood Pressure: 128 mmHg      Is BP treated: Yes      HDL Cholesterol: 58 mg/dL      Total Cholesterol: 174 mg/dL     Wt Readings from Last 3 Encounters:   12/15/20 81.7 kg (180 lb 1.9 oz)   11/23/20 83.4 kg (183 lb 13.8 oz)   11/14/20 83.5 kg (184 lb)     Temp Readings from Last 3 Encounters:   12/15/20 97.9 °F (36.6 °C) (Temporal)   11/23/20 97.3 °F (36.3 °C) (Temporal)   11/14/20 98 °F (36.7 °C) (Oral)     BP Readings from Last 3 Encounters:   12/15/20 128/80   11/23/20 128/84   11/14/20 122/77     Pulse Readings from Last 3 Encounters:   12/15/20 88   11/23/20 97   11/14/20 88     Resp Readings from Last 3 Encounters:   11/14/20 18   06/22/20 18   10/22/19 16     PF Readings from Last 3 Encounters:   No data found for PF      SpO2 Readings from Last 3 Encounters:   11/14/20 100%   06/22/20 98%   06/08/20 99%        Lab Results   Component Value Date    HGBA1C 6.9 (H) 11/14/2020    HGBA1C 6.9 (H) 08/29/2020    HGBA1C 6.5 (H) 04/23/2019     Lab Results   Component Value Date    LDLCALC 97.6 04/23/2019    CREATININE 0.8 12/08/2020       Review of Systems   Constitutional: Negative for activity change and unexpected weight change.   HENT: Negative for hearing loss, rhinorrhea and trouble swallowing.    Eyes: Negative for discharge and visual disturbance.   Respiratory: Negative for chest tightness and wheezing.    Cardiovascular: Negative for chest pain and palpitations.   Gastrointestinal: Negative for blood in stool, constipation, diarrhea and vomiting.   Endocrine: Negative for polydipsia and polyuria.   Genitourinary: Negative for difficulty urinating, dysuria, hematuria and menstrual problem.   Musculoskeletal: Negative for arthralgias, joint swelling and neck pain.   Neurological: Negative for weakness and headaches.   Psychiatric/Behavioral: Negative for confusion and dysphoric mood.   All other systems reviewed and are negative.          Objective:      Physical Exam  Vitals signs reviewed.   Constitutional:       Appearance: Normal appearance.   HENT:      Head: Normocephalic and atraumatic.   Neck:      Musculoskeletal: Normal range of motion and neck supple.   Cardiovascular:      Rate and Rhythm: Normal rate and regular rhythm.      Pulses: Normal pulses.      Heart sounds: Normal heart sounds.   Pulmonary:      Effort: Pulmonary effort is normal.      Breath sounds: Normal breath sounds.   Abdominal:      General: Abdomen is flat.      Palpations: Abdomen is soft.   Musculoskeletal: Normal range of motion.   Skin:     General: Skin is warm and dry.      Capillary Refill: Capillary refill takes less than 2 seconds.   Neurological:      General: No focal deficit present.      Mental Status: She is alert.   Psychiatric:          Mood and Affect: Mood normal.         Behavior: Behavior normal.             Screening recommendations appropriate to age and health status were reviewed.    Pre-op evaluation  -     HCG, Quantitative; Future; Expected date: 12/15/2020  -     HIV 1/2 Ag/Ab (4th Gen); Future; Expected date: 12/15/2020  -     APTT; Future; Expected date: 12/15/2020  -     Protime-INR; Future; Expected date: 12/15/2020  -     Urinalysis; Future; Expected date: 12/15/2020  -     IN OFFICE EKG 12-LEAD (to Muse)    The 10-year ASCVD risk score (Lupe DAWN Jr., et al., 2013) is: 3%    Values used to calculate the score:      Age: 40 years      Sex: Female      Is Non- : Yes      Diabetic: Yes      Tobacco smoker: No      Systolic Blood Pressure: 128 mmHg      Is BP treated: Yes      HDL Cholesterol: 58 mg/dL      Total Cholesterol: 174 mg/dL      RCRI risk factors include: no known RCRI risk factors. As such, per RCRI the risk of cardiac death, nonfatal myocardial infarction, or nonfatal cardiac arrest is 0.4% and the risk of myocardial infarction, pulmonary edema, ventricular fibrillation, primary cardiac arrest, or complete heart block is 0.5%.  Overall this patient can be considered low risk for this low risk procedure. No further cardiac testing is recommended at this time.     Patient denies any symptoms (as per HPI) concerning for undiagnosed lung disease including ARSALAN. Would not recommend obtaining chest X-ray, sleep study, or PFTs at this time. Patient is a non-smoker. We discussed the benefits of early mobilization and deep breathing after surgery.      Screened patient for alcohol misuse, use of illicit drugs, and personal or family history of anesthetic complications or bleeding diathesis and no substantial concerns were identified. None    All current medications were reviewed and at this time no changes to medications are recommended prior to surgery.   HBA1C is indicated if the Hemoglobin A1C is  consistently elevated, and if the patient has not had one drawn in the last three months.   The latest value available is:  Lab Results   Component Value Date    HGBA1C 6.9 (H) 11/14/2020     Regarding pre-operative diabetic medications she is also advised to stop metformin 48 hours prior to surgery and restart 48 hours after surgery.  Hold all meds morning of surgery expect losartan and amlodipine with sips of water unless otherwise instructed by anesthesia. Take 2 hours before.       I recommend use of standard pre-op and post-op precautions for this patient. In my opinion, she is medically optimized for this procedure, and can proceed without further evaluation.

## 2020-12-16 ENCOUNTER — PATIENT MESSAGE (OUTPATIENT)
Dept: FAMILY MEDICINE | Facility: CLINIC | Age: 40
End: 2020-12-16

## 2020-12-16 DIAGNOSIS — Z01.818 PRE-OP EVALUATION: Primary | ICD-10-CM

## 2020-12-16 LAB
APTT BLDCRRT: 24.6 SEC (ref 21–32)
HIV 1+2 AB+HIV1 P24 AG SERPL QL IA: NEGATIVE
INR PPP: 1 (ref 0.8–1.2)
PROTHROMBIN TIME: 10.9 SEC (ref 9–12.5)

## 2020-12-18 ENCOUNTER — HOSPITAL ENCOUNTER (OUTPATIENT)
Dept: RADIOLOGY | Facility: HOSPITAL | Age: 40
Discharge: HOME | End: 2020-12-18
Attending: NURSE PRACTITIONER
Payer: MEDICAID

## 2020-12-18 ENCOUNTER — HOSPITAL ENCOUNTER (OUTPATIENT)
Dept: CARDIOLOGY | Facility: HOSPITAL | Age: 40
Discharge: HOME | End: 2020-12-18
Attending: NURSE PRACTITIONER
Payer: MEDICAID

## 2020-12-18 DIAGNOSIS — Z01.818 PRE-OP EVALUATION: ICD-10-CM

## 2020-12-18 PROCEDURE — 71046 X-RAY EXAM CHEST 2 VIEWS: CPT | Mod: TC,FY

## 2020-12-18 PROCEDURE — 71046 XR CHEST PA AND LATERAL: ICD-10-PCS | Mod: 26,,, | Performed by: SPECIALIST

## 2020-12-18 PROCEDURE — 71046 X-RAY EXAM CHEST 2 VIEWS: CPT | Mod: 26,,, | Performed by: SPECIALIST

## 2020-12-22 ENCOUNTER — PATIENT MESSAGE (OUTPATIENT)
Dept: FAMILY MEDICINE | Facility: CLINIC | Age: 40
End: 2020-12-22

## 2020-12-23 ENCOUNTER — PATIENT MESSAGE (OUTPATIENT)
Dept: FAMILY MEDICINE | Facility: CLINIC | Age: 40
End: 2020-12-23

## 2020-12-26 ENCOUNTER — PATIENT MESSAGE (OUTPATIENT)
Dept: FAMILY MEDICINE | Facility: CLINIC | Age: 40
End: 2020-12-26

## 2020-12-28 ENCOUNTER — TELEPHONE (OUTPATIENT)
Dept: FAMILY MEDICINE | Facility: CLINIC | Age: 40
End: 2020-12-28

## 2020-12-28 DIAGNOSIS — I10 ESSENTIAL (PRIMARY) HYPERTENSION: ICD-10-CM

## 2020-12-28 DIAGNOSIS — E66.9 OBESITY (BMI 30-39.9): ICD-10-CM

## 2020-12-28 DIAGNOSIS — E11.9 CONTROLLED TYPE 2 DIABETES MELLITUS WITHOUT COMPLICATION, WITHOUT LONG-TERM CURRENT USE OF INSULIN: Primary | ICD-10-CM

## 2020-12-28 NOTE — TELEPHONE ENCOUNTER
Patient states she received notification from [facility] that BMP & A1C needed related to upcoming procedure (Tummy Tuck). States she recently had A1C performed, but surgeon requesting lab within last 30 days. Surgery center also requesting EKG (actual graph) be faxed to [phone]. Please advise. Thank you.            ----- Message from Gayle Serrato sent at 12/28/2020 11:25 AM CST -----  Contact: self [phone]  Pt is requesting orders for a BMP and A1C. Please call and advise.

## 2020-12-29 ENCOUNTER — TELEPHONE (OUTPATIENT)
Dept: FAMILY MEDICINE | Facility: CLINIC | Age: 40
End: 2020-12-29

## 2020-12-29 NOTE — TELEPHONE ENCOUNTER
----- Message from Lindsey Michel sent at 12/29/2020  9:28 AM CST -----  Regarding: advice  Contact: self  Type: Needs Medical Advice  Who Called:  self   Symptoms (please be specific):   How long has patient had these symptoms:    Pharmacy name and phone #:    Best Call Back Number: [phone] Additional Information: Patient called asking for an update for request for Ekg graph, orders for A1C and bmp. Patient states she need the lab order before 01/01/2020

## 2020-12-29 NOTE — TELEPHONE ENCOUNTER
----- Message from Yen Porras sent at 12/29/2020  9:38 AM CST -----  Contact: call  pt [phone]    Type: Needs Medical Advice  Who Called:pt  Best Call Back Number:call  pt [phone]  Additional Information  pt is  calling  to  get  lab  order // pt  needs   blood  wk  before   her  surgery  on  candy  15 // please call  pt   has  been asking   at  least  twice   before // please   reply  please call pt

## 2020-12-29 NOTE — TELEPHONE ENCOUNTER
SURGERY IS REQUESTING A1C WITHIN 30 DAYS DESPITE THE FACT IT IS A 90 DAY AVERAGE.  THEY REFUSED TO PROCEED WITH SURGERY WITHOUT

## 2020-12-30 ENCOUNTER — LAB VISIT (OUTPATIENT)
Dept: LAB | Facility: HOSPITAL | Age: 40
End: 2020-12-30
Attending: NURSE PRACTITIONER
Payer: MEDICAID

## 2020-12-30 ENCOUNTER — PATIENT MESSAGE (OUTPATIENT)
Dept: FAMILY MEDICINE | Facility: CLINIC | Age: 40
End: 2020-12-30

## 2020-12-30 DIAGNOSIS — E11.9 CONTROLLED TYPE 2 DIABETES MELLITUS WITHOUT COMPLICATION, WITHOUT LONG-TERM CURRENT USE OF INSULIN: ICD-10-CM

## 2020-12-30 DIAGNOSIS — I10 ESSENTIAL (PRIMARY) HYPERTENSION: ICD-10-CM

## 2020-12-30 DIAGNOSIS — E66.9 OBESITY (BMI 30-39.9): ICD-10-CM

## 2020-12-30 LAB
ANION GAP SERPL CALC-SCNC: 12 MMOL/L (ref 8–16)
BUN SERPL-MCNC: 12 MG/DL (ref 6–20)
CALCIUM SERPL-MCNC: 9 MG/DL (ref 8.7–10.5)
CHLORIDE SERPL-SCNC: 104 MMOL/L (ref 95–110)
CO2 SERPL-SCNC: 22 MMOL/L (ref 23–29)
CREAT SERPL-MCNC: 0.7 MG/DL (ref 0.5–1.4)
EST. GFR  (AFRICAN AMERICAN): >60 ML/MIN/1.73 M^2
EST. GFR  (NON AFRICAN AMERICAN): >60 ML/MIN/1.73 M^2
ESTIMATED AVG GLUCOSE: 137 MG/DL (ref 68–131)
GLUCOSE SERPL-MCNC: 133 MG/DL (ref 70–110)
HBA1C MFR BLD HPLC: 6.4 % (ref 4–5.6)
POTASSIUM SERPL-SCNC: 4.2 MMOL/L (ref 3.5–5.1)
SODIUM SERPL-SCNC: 138 MMOL/L (ref 136–145)

## 2020-12-30 PROCEDURE — 83036 HEMOGLOBIN GLYCOSYLATED A1C: CPT

## 2020-12-30 PROCEDURE — 36415 COLL VENOUS BLD VENIPUNCTURE: CPT | Mod: PO

## 2020-12-30 PROCEDURE — 80048 BASIC METABOLIC PNL TOTAL CA: CPT

## 2020-12-31 ENCOUNTER — PATIENT MESSAGE (OUTPATIENT)
Dept: FAMILY MEDICINE | Facility: CLINIC | Age: 40
End: 2020-12-31

## 2020-12-31 ENCOUNTER — PATIENT OUTREACH (OUTPATIENT)
Dept: ADMINISTRATIVE | Facility: OTHER | Age: 40
End: 2020-12-31

## 2020-12-31 ENCOUNTER — OFFICE VISIT (OUTPATIENT)
Dept: CARDIOLOGY | Facility: CLINIC | Age: 40
End: 2020-12-31
Payer: MEDICAID

## 2020-12-31 VITALS
DIASTOLIC BLOOD PRESSURE: 80 MMHG | SYSTOLIC BLOOD PRESSURE: 135 MMHG | OXYGEN SATURATION: 99 % | BODY MASS INDEX: 30.71 KG/M2 | WEIGHT: 179.88 LBS | HEART RATE: 95 BPM | RESPIRATION RATE: 16 BRPM | HEIGHT: 64 IN

## 2020-12-31 DIAGNOSIS — I10 ESSENTIAL (PRIMARY) HYPERTENSION: ICD-10-CM

## 2020-12-31 DIAGNOSIS — E66.9 OBESITY (BMI 30-39.9): ICD-10-CM

## 2020-12-31 DIAGNOSIS — E11.9 CONTROLLED TYPE 2 DIABETES MELLITUS WITHOUT COMPLICATION, WITHOUT LONG-TERM CURRENT USE OF INSULIN: ICD-10-CM

## 2020-12-31 DIAGNOSIS — Z01.810 PREOP CARDIOVASCULAR EXAM: Primary | ICD-10-CM

## 2020-12-31 DIAGNOSIS — E78.49 OTHER HYPERLIPIDEMIA: ICD-10-CM

## 2020-12-31 PROCEDURE — 99204 PR OFFICE/OUTPT VISIT, NEW, LEVL IV, 45-59 MIN: ICD-10-PCS | Mod: S$PBB,,, | Performed by: INTERNAL MEDICINE

## 2020-12-31 PROCEDURE — 99214 OFFICE O/P EST MOD 30 MIN: CPT | Mod: PBBFAC | Performed by: INTERNAL MEDICINE

## 2020-12-31 PROCEDURE — 99999 PR PBB SHADOW E&M-EST. PATIENT-LVL IV: ICD-10-PCS | Mod: PBBFAC,,, | Performed by: INTERNAL MEDICINE

## 2020-12-31 PROCEDURE — 99999 PR PBB SHADOW E&M-EST. PATIENT-LVL IV: CPT | Mod: PBBFAC,,, | Performed by: INTERNAL MEDICINE

## 2020-12-31 PROCEDURE — 99204 OFFICE O/P NEW MOD 45 MIN: CPT | Mod: S$PBB,,, | Performed by: INTERNAL MEDICINE

## 2020-12-31 NOTE — PROGRESS NOTES
CARDIOVASCULAR CONSULTATION    REASON FOR CONSULT:   Shelli Iverson is a 40 y.o. female who presents for preoperative clearance.      HISTORY OF PRESENT ILLNESS:     Patient is a pleasant 40-year-old lady.  Here for preoperative risk assessment prior to tummy tuck operation.  Denies any chest pains at rest on exertion, orthopnea, PND.  Family history significant for mother having sudden cardiac death in the 30s.  She states that mother is started developing significant shortness of breath and then passed shortly thereafter.  Denies orthopnea, PND, swelling of feet.  EKG personally reviewed and the shows normal sinus rhythm with lateral infarct         EKG personally reviewed.  Normal sinus rhythm, lateral infarct.    PAST MEDICAL HISTORY:     Past Medical History:   Diagnosis Date    GERD (gastroesophageal reflux disease)     Hypertension     Sciatica        PAST SURGICAL HISTORY:     Past Surgical History:   Procedure Laterality Date     SECTION      x 1       ALLERGIES AND MEDICATION:     Review of patient's allergies indicates:   Allergen Reactions    Penicillins Rash        Medication List          Accurate as of 2020  8:45 AM. If you have any questions, ask your nurse or doctor.            CONTINUE taking these medications    acetaminophen 500 MG tablet  Commonly known as: TYLENOL  Take 2 tablets (1,000 mg total) by mouth every 6 (six) hours as needed for Pain.     albuterol 90 mcg/actuation inhaler  Commonly known as: PROVENTIL/VENTOLIN HFA  Inhale 1-2 puffs into the lungs every 6 (six) hours as needed for Wheezing (And cough). Rescue     amLODIPine 10 MG tablet  Commonly known as: NORVASC  Take 1 tablet (10 mg total) by mouth once daily.     atorvastatin 20 MG tablet  Commonly known as: LIPITOR  Take 1 tablet (20 mg total) by mouth once daily.     diclofenac sodium 1 % Gel  Commonly known as: VOLTAREN  Apply 2 g topically 4 (four) times daily. Apply to painful area for 10 days      diphenhydrAMINE-aluminum-magnesium hydroxide-simethicone-lidocaine HCl 2%  Swish and spit 5 mLs every 4 (four) hours as needed.     fluconazole 150 MG Tab  Commonly known as: DIFLUCAN  Take 1 tablet (150 mg total) by mouth once daily.     fluticasone propionate 50 mcg/actuation nasal spray  Commonly known as: FLONASE  1 spray (50 mcg total) by Each Nostril route 2 (two) times daily.     hydroCHLOROthiazide 12.5 MG Tab  Commonly known as: HYDRODIURIL  Take 1 tablet (12.5 mg total) by mouth once daily.     hydrocortisone 2.5 % cream  Apply topically 2 (two) times daily.     ibuprofen 600 MG tablet  Commonly known as: ADVIL,MOTRIN  Take 1 tablet (600 mg total) by mouth every 6 (six) hours as needed for Pain (Take with food as needed for mild-to-moderate pain).     losartan 50 MG tablet  Commonly known as: COZAAR  Take 2 tablets (100 mg total) by mouth once daily. Followup with doctor JULY 2020     metFORMIN 500 MG (MOD) 24hr tablet  Commonly known as: GLUMETZA  Take 1 tablet (500 mg total) by mouth once daily.     prenatal vit 10-iron fum-folic 65-1 mg Tab  Take 1 tablet by mouth once daily.            SOCIAL HISTORY:     Social History     Socioeconomic History    Marital status: Single     Spouse name: Not on file    Number of children: Not on file    Years of education: Not on file    Highest education level: Not on file   Occupational History    Not on file   Social Needs    Financial resource strain: Not hard at all    Food insecurity     Worry: Never true     Inability: Never true    Transportation needs     Medical: No     Non-medical: No   Tobacco Use    Smoking status: Never Smoker    Smokeless tobacco: Never Used   Substance and Sexual Activity    Alcohol use: Not Currently     Frequency: 2-4 times a month     Drinks per session: 1 or 2     Binge frequency: Never     Comment: RARELY    Drug use: No     Comment: occasional    Sexual activity: Yes     Birth control/protection: I.U.D.   Lifestyle  "   Physical activity     Days per week: 4 days     Minutes per session: 60 min    Stress: Not at all   Relationships    Social connections     Talks on phone: Twice a week     Gets together: Never     Attends Scientologist service: Not on file     Active member of club or organization: No     Attends meetings of clubs or organizations: Never     Relationship status: Never    Other Topics Concern    Not on file   Social History Narrative    Not on file       FAMILY HISTORY:     Family History   Problem Relation Age of Onset    Heart disease Mother         CAD age 38    Hypertension Mother 38        MI    Diabetes Mother     Hypertension Father        REVIEW OF SYSTEMS:   Review of Systems   Constitution: Negative.   HENT: Negative.    Eyes: Negative.    Cardiovascular: Negative.    Respiratory: Negative.    Endocrine: Negative.    Hematologic/Lymphatic: Negative.    Skin: Negative.    Musculoskeletal: Negative.    Gastrointestinal: Negative.    Genitourinary: Negative.    Neurological: Negative.    Psychiatric/Behavioral: Negative.    Allergic/Immunologic: Negative.        A 10 point review of systems was performed and all the pertinent positives have been mentioned. Rest of review of systems was negative.        PHYSICAL EXAM:     Vitals:    12/31/20 0831   BP: 135/80   Pulse: 95   Resp: 16    Body mass index is 30.88 kg/m².  Weight: 81.6 kg (179 lb 14.3 oz)   Height: 5' 4" (162.6 cm)     Physical Exam   Constitutional: She is oriented to person, place, and time. She appears well-developed and well-nourished.   HENT:   Head: Normocephalic.   Eyes: Pupils are equal, round, and reactive to light.   Neck: Normal range of motion. Neck supple.   Cardiovascular: Normal rate and regular rhythm.   Pulmonary/Chest: Effort normal and breath sounds normal.   Abdominal: Soft. Normal appearance and bowel sounds are normal. There is no abdominal tenderness.   Musculoskeletal: Normal range of motion.   Neurological: " She is alert and oriented to person, place, and time.   Skin: Skin is warm.   Psychiatric: She has a normal mood and affect.         DATA:     Laboratory:  CBC:  Recent Labs   Lab 10/21/20  1542 12/08/20  1609 12/15/20  1710   WBC 6.57 6.14 6.28   Hemoglobin 11.2 L 11.4 L 12.0   Hematocrit 35.4 L 36.7 L 38.5   Platelets 302 342 359 H       CHEMISTRIES:  Recent Labs   Lab 10/21/20  1542 12/08/20  1609 12/30/20  0900   Glucose 219 H 116 H 133 H   Sodium 136 137 138   Potassium 3.5 3.7 4.2   BUN 13 16 12   Creatinine 0.8 0.8 0.7   eGFR if African American >60 >60 >60.0   eGFR if non African American >60 >60 >60.0   Calcium 8.5 L 9.2 9.0       CARDIAC BIOMARKERS:        COAGS:  Recent Labs   Lab 06/22/20  1228 08/29/20  0957 12/15/20  1710   INR 1.0 0.9 1.0       LIPIDS/LFTS:  Recent Labs   Lab 06/22/18  0808 04/23/19  0942  09/12/20  1040 10/21/20  1542 12/08/20  1609   Cholesterol 194 174  --   --   --   --    Triglycerides 74 92  --   --   --   --    HDL 56 58  --   --   --   --    LDL Cholesterol 123.2 97.6  --   --   --   --    Non-HDL Cholesterol 138 116  --   --   --   --    AST 17 16   < > 18 18 16   ALT 15 16   < > 24 18 19    < > = values in this interval not displayed.       Hemoglobin A1C   Date Value Ref Range Status   12/30/2020 6.4 (H) 4.0 - 5.6 % Final     Comment:     ADA Screening Guidelines:  5.7-6.4%  Consistent with prediabetes  >or=6.5%  Consistent with diabetes  High levels of fetal hemoglobin interfere with the HbA1C  assay. Heterozygous hemoglobin variants (HbS, HgC, etc)do  not significantly interfere with this assay.   However, presence of multiple variants may affect accuracy.     11/14/2020 6.9 (H) 4.0 - 5.6 % Final     Comment:     ADA Screening Guidelines:  5.7-6.4%  Consistent with prediabetes  >or=6.5%  Consistent with diabetes  High levels of fetal hemoglobin interfere with the HbA1C  assay. Heterozygous hemoglobin variants (HbS, HgC, etc)do  not significantly interfere with this assay.    However, presence of multiple variants may affect accuracy.     08/29/2020 6.9 (H) 4.0 - 5.6 % Final     Comment:     ADA Screening Guidelines:  5.7-6.4%  Consistent with prediabetes  >or=6.5%  Consistent with diabetes  High levels of fetal hemoglobin interfere with the HbA1C  assay. Heterozygous hemoglobin variants (HbS, HgC, etc)do  not significantly interfere with this assay.   However, presence of multiple variants may affect accuracy.         TSH  Recent Labs   Lab 06/22/18  0808   TSH 1.754       The 10-year ASCVD risk score (Mantuadenise DAWN Jr., et al., 2013) is: 4%    Values used to calculate the score:      Age: 40 years      Sex: Female      Is Non- : Yes      Diabetic: Yes      Tobacco smoker: No      Systolic Blood Pressure: 135 mmHg      Is BP treated: Yes      HDL Cholesterol: 58 mg/dL      Total Cholesterol: 174 mg/dL             ASSESSMENT AND PLAN     Patient Active Problem List   Diagnosis    Essential (primary) hypertension    Controlled type 2 diabetes mellitus without complication, without long-term current use of insulin    Obesity (BMI 30-39.9)    Other hyperlipidemia    Eczema    Chronic knee pain    Iron deficiency anemia       Preoperative risk assessment in a patient with multiple risk factors for coronary artery disease.  And abnormal EKG.  Further evaluation with the help of a stress echocardiogram.    Follow-up after testing          Thank you very much for involving me in the care of your patient.  Please do not hesitate to contact me if there are any questions.      Westley Crocker MD, FAC, [facility]  Interventional Cardiologist, [facility].           This note was dictated with the help of speech recognition software.  There might be un-intended errors and/or substitutions.

## 2021-01-04 ENCOUNTER — PATIENT MESSAGE (OUTPATIENT)
Dept: FAMILY MEDICINE | Facility: CLINIC | Age: 41
End: 2021-01-04

## 2021-01-04 ENCOUNTER — HOSPITAL ENCOUNTER (OUTPATIENT)
Dept: CARDIOLOGY | Facility: HOSPITAL | Age: 41
Discharge: HOME | End: 2021-01-04
Attending: INTERNAL MEDICINE
Payer: MEDICAID

## 2021-01-04 DIAGNOSIS — Z01.810 PREOP CARDIOVASCULAR EXAM: ICD-10-CM

## 2021-01-04 LAB
ASCENDING AORTA: 3.46 CM
AV INDEX (PROSTH): 0.77
AV MEAN GRADIENT: 5 MMHG
AV PEAK GRADIENT: 7 MMHG
AV VALVE AREA: 2.6 CM2
AV VELOCITY RATIO: 0.75
CV ECHO LV RWT: 0.35 CM
CV STRESS BASE HR: 86 BPM
DIASTOLIC BLOOD PRESSURE: 89 MMHG
DOP CALC AO PEAK VEL: 1.31 M/S
DOP CALC AO VTI: 28.42 CM
DOP CALC LVOT AREA: 3.4 CM2
DOP CALC LVOT DIAMETER: 2.07 CM
DOP CALC LVOT PEAK VEL: 0.98 M/S
DOP CALC LVOT STROKE VOLUME: 74.03 CM3
DOP CALCLVOT PEAK VEL VTI: 22.01 CM
E WAVE DECELERATION TIME: 209.1 MSEC
E/A RATIO: 1.06
ECHO LV POSTERIOR WALL: 0.84 CM (ref 0.6–1.1)
FRACTIONAL SHORTENING: 47 % (ref 28–44)
INTERVENTRICULAR SEPTUM: 0.84 CM (ref 0.6–1.1)
IVRT: 64.59 MSEC
LA MAJOR: 5.41 CM
LA MINOR: 5.16 CM
LA WIDTH: 4.04 CM
LEFT ATRIUM SIZE: 3.44 CM
LEFT ATRIUM VOLUME: 62.4 CM3
LEFT INTERNAL DIMENSION IN SYSTOLE: 2.55 CM (ref 2.1–4)
LEFT VENTRICLE DIASTOLIC VOLUME: 106.59 ML
LEFT VENTRICLE SYSTOLIC VOLUME: 23.5 ML
LEFT VENTRICULAR INTERNAL DIMENSION IN DIASTOLE: 4.78 CM (ref 3.5–6)
LEFT VENTRICULAR MASS: 134.03 G
MV PEAK A VEL: 0.88 M/S
MV PEAK E VEL: 0.93 M/S
OHS CV CPX 1 MINUTE RECOVERY HEART RATE: 151 BPM
OHS CV CPX 85 PERCENT MAX PREDICTED HEART RATE MALE: 144
OHS CV CPX ESTIMATED METS: 13
OHS CV CPX MAX PREDICTED HEART RATE: 170
OHS CV CPX PATIENT IS FEMALE: 1
OHS CV CPX PATIENT IS MALE: 0
OHS CV CPX PEAK DIASTOLIC BLOOD PRESSURE: 99 MMHG
OHS CV CPX PEAK HEAR RATE: 187 BPM
OHS CV CPX PEAK RATE PRESSURE PRODUCT: NORMAL
OHS CV CPX PEAK SYSTOLIC BLOOD PRESSURE: 204 MMHG
OHS CV CPX PERCENT MAX PREDICTED HEART RATE ACHIEVED: 110
OHS CV CPX RATE PRESSURE PRODUCT PRESENTING: NORMAL
PISA TR MAX VEL: 2.48 M/S
PULM VEIN S/D RATIO: 0.88
PV PEAK D VEL: 0.42 M/S
PV PEAK S VEL: 0.37 M/S
PV PEAK VELOCITY: 0.95 CM/S
RA MAJOR: 5.22 CM
RA PRESSURE: 8 MMHG
RA WIDTH: 2.86 CM
RIGHT VENTRICULAR END-DIASTOLIC DIMENSION: 3.33 CM
RV TISSUE DOPPLER FREE WALL SYSTOLIC VELOCITY 1 (APICAL 4 CHAMBER VIEW): 12.9 CM/S
SINUS: 3.47 CM
STJ: 2.9 CM
STRESS ECHO POST EXERCISE DUR MIN: 10 MINUTES
STRESS ECHO POST EXERCISE DUR SEC: 35 SECONDS
STRESS ST DEPRESSION: 0.5 MM
SYSTOLIC BLOOD PRESSURE: 130 MMHG
TR MAX PG: 25 MMHG
TRICUSPID ANNULAR PLANE SYSTOLIC EXCURSION: 2 CM
TV REST PULMONARY ARTERY PRESSURE: 33 MMHG

## 2021-01-04 PROCEDURE — 93351 STRESS TTE COMPLETE: CPT | Mod: 26,,, | Performed by: INTERNAL MEDICINE

## 2021-01-04 PROCEDURE — 93320 STRESS ECHO (CUPID ONLY): ICD-10-PCS | Mod: 26,,, | Performed by: INTERNAL MEDICINE

## 2021-01-04 PROCEDURE — 93320 DOPPLER ECHO COMPLETE: CPT

## 2021-01-04 PROCEDURE — 93351 STRESS ECHO (CUPID ONLY): ICD-10-PCS | Mod: 26,,, | Performed by: INTERNAL MEDICINE

## 2021-01-04 PROCEDURE — 93325 STRESS ECHO (CUPID ONLY): ICD-10-PCS | Mod: 26,,, | Performed by: INTERNAL MEDICINE

## 2021-01-04 PROCEDURE — 93325 DOPPLER ECHO COLOR FLOW MAPG: CPT | Mod: 26,,, | Performed by: INTERNAL MEDICINE

## 2021-01-04 PROCEDURE — 93320 DOPPLER ECHO COMPLETE: CPT | Mod: 26,,, | Performed by: INTERNAL MEDICINE

## 2021-01-05 ENCOUNTER — PATIENT MESSAGE (OUTPATIENT)
Dept: FAMILY MEDICINE | Facility: CLINIC | Age: 41
End: 2021-01-05

## 2021-01-05 ENCOUNTER — PATIENT MESSAGE (OUTPATIENT)
Dept: ADMINISTRATIVE | Facility: HOSPITAL | Age: 41
End: 2021-01-05

## 2021-01-07 ENCOUNTER — OFFICE VISIT (OUTPATIENT)
Dept: CARDIOLOGY | Facility: CLINIC | Age: 41
End: 2021-01-07
Payer: MEDICAID

## 2021-01-07 VITALS
OXYGEN SATURATION: 100 % | DIASTOLIC BLOOD PRESSURE: 92 MMHG | BODY MASS INDEX: 31.79 KG/M2 | HEART RATE: 79 BPM | SYSTOLIC BLOOD PRESSURE: 138 MMHG | WEIGHT: 185.19 LBS

## 2021-01-07 DIAGNOSIS — E66.9 OBESITY (BMI 30-39.9): ICD-10-CM

## 2021-01-07 DIAGNOSIS — E78.49 OTHER HYPERLIPIDEMIA: ICD-10-CM

## 2021-01-07 DIAGNOSIS — Z01.810 PREOP CARDIOVASCULAR EXAM: Primary | ICD-10-CM

## 2021-01-07 DIAGNOSIS — Z12.31 OTHER SCREENING MAMMOGRAM: ICD-10-CM

## 2021-01-07 DIAGNOSIS — E11.9 CONTROLLED TYPE 2 DIABETES MELLITUS WITHOUT COMPLICATION, WITHOUT LONG-TERM CURRENT USE OF INSULIN: ICD-10-CM

## 2021-01-07 DIAGNOSIS — I10 ESSENTIAL (PRIMARY) HYPERTENSION: ICD-10-CM

## 2021-01-07 PROCEDURE — 99214 OFFICE O/P EST MOD 30 MIN: CPT | Mod: PBBFAC | Performed by: INTERNAL MEDICINE

## 2021-01-07 PROCEDURE — 99999 PR PBB SHADOW E&M-EST. PATIENT-LVL IV: ICD-10-PCS | Mod: PBBFAC,,, | Performed by: INTERNAL MEDICINE

## 2021-01-07 PROCEDURE — 99214 PR OFFICE/OUTPT VISIT, EST, LEVL IV, 30-39 MIN: ICD-10-PCS | Mod: S$PBB,,, | Performed by: INTERNAL MEDICINE

## 2021-01-07 PROCEDURE — 99214 OFFICE O/P EST MOD 30 MIN: CPT | Mod: S$PBB,,, | Performed by: INTERNAL MEDICINE

## 2021-01-07 PROCEDURE — 99999 PR PBB SHADOW E&M-EST. PATIENT-LVL IV: CPT | Mod: PBBFAC,,, | Performed by: INTERNAL MEDICINE

## 2021-01-15 ENCOUNTER — PATIENT OUTREACH (OUTPATIENT)
Dept: ADMINISTRATIVE | Facility: HOSPITAL | Age: 41
End: 2021-01-15

## 2021-01-15 ENCOUNTER — PATIENT MESSAGE (OUTPATIENT)
Dept: ADMINISTRATIVE | Facility: HOSPITAL | Age: 41
End: 2021-01-15

## 2021-01-25 ENCOUNTER — PATIENT MESSAGE (OUTPATIENT)
Dept: FAMILY MEDICINE | Facility: CLINIC | Age: 41
End: 2021-01-25

## 2021-01-25 ENCOUNTER — HOSPITAL ENCOUNTER (EMERGENCY)
Facility: HOSPITAL | Age: 41
Discharge: HOME | End: 2021-01-25
Attending: EMERGENCY MEDICINE
Payer: MEDICAID

## 2021-01-25 ENCOUNTER — OFFICE VISIT (OUTPATIENT)
Dept: URGENT CARE | Facility: CLINIC | Age: 41
End: 2021-01-25
Payer: MEDICAID

## 2021-01-25 VITALS
RESPIRATION RATE: 16 BRPM | SYSTOLIC BLOOD PRESSURE: 133 MMHG | DIASTOLIC BLOOD PRESSURE: 94 MMHG | TEMPERATURE: 98 F | OXYGEN SATURATION: 100 % | HEART RATE: 101 BPM

## 2021-01-25 VITALS
DIASTOLIC BLOOD PRESSURE: 77 MMHG | TEMPERATURE: 98 F | RESPIRATION RATE: 19 BRPM | HEIGHT: 64 IN | BODY MASS INDEX: 28.85 KG/M2 | SYSTOLIC BLOOD PRESSURE: 113 MMHG | HEART RATE: 109 BPM | OXYGEN SATURATION: 97 % | WEIGHT: 169 LBS

## 2021-01-25 DIAGNOSIS — Z48.89 ENCOUNTER FOR POST SURGICAL WOUND CHECK: Primary | ICD-10-CM

## 2021-01-25 DIAGNOSIS — Z98.890 S/P ABDOMINOPLASTY: ICD-10-CM

## 2021-01-25 DIAGNOSIS — Z48.02 ENCOUNTER FOR REMOVAL OF SUTURES: Primary | ICD-10-CM

## 2021-01-25 PROCEDURE — 99024 SUTURE REMOVAL: ICD-10-PCS | Mod: S$GLB,,, | Performed by: INTERNAL MEDICINE

## 2021-01-25 PROCEDURE — 99282 EMERGENCY DEPT VISIT SF MDM: CPT | Mod: ER

## 2021-01-25 PROCEDURE — 99024 POSTOP FOLLOW-UP VISIT: CPT | Mod: S$GLB,,, | Performed by: INTERNAL MEDICINE

## 2021-01-26 ENCOUNTER — PATIENT MESSAGE (OUTPATIENT)
Dept: FAMILY MEDICINE | Facility: CLINIC | Age: 41
End: 2021-01-26

## 2021-02-01 ENCOUNTER — PATIENT MESSAGE (OUTPATIENT)
Dept: FAMILY MEDICINE | Facility: CLINIC | Age: 41
End: 2021-02-01

## 2021-02-03 ENCOUNTER — PATIENT MESSAGE (OUTPATIENT)
Dept: FAMILY MEDICINE | Facility: CLINIC | Age: 41
End: 2021-02-03

## 2021-02-04 ENCOUNTER — PATIENT MESSAGE (OUTPATIENT)
Dept: FAMILY MEDICINE | Facility: CLINIC | Age: 41
End: 2021-02-04

## 2021-02-04 ENCOUNTER — TELEPHONE (OUTPATIENT)
Dept: FAMILY MEDICINE | Facility: CLINIC | Age: 41
End: 2021-02-04

## 2021-02-11 ENCOUNTER — PATIENT MESSAGE (OUTPATIENT)
Dept: FAMILY MEDICINE | Facility: CLINIC | Age: 41
End: 2021-02-11

## 2021-02-15 ENCOUNTER — PATIENT MESSAGE (OUTPATIENT)
Dept: FAMILY MEDICINE | Facility: CLINIC | Age: 41
End: 2021-02-15

## 2021-02-18 ENCOUNTER — PATIENT MESSAGE (OUTPATIENT)
Dept: FAMILY MEDICINE | Facility: CLINIC | Age: 41
End: 2021-02-18

## 2021-02-19 ENCOUNTER — PATIENT MESSAGE (OUTPATIENT)
Dept: FAMILY MEDICINE | Facility: CLINIC | Age: 41
End: 2021-02-19

## 2021-03-15 ENCOUNTER — PATIENT MESSAGE (OUTPATIENT)
Dept: FAMILY MEDICINE | Facility: CLINIC | Age: 41
End: 2021-03-15

## 2021-03-18 ENCOUNTER — PATIENT MESSAGE (OUTPATIENT)
Dept: FAMILY MEDICINE | Facility: CLINIC | Age: 41
End: 2021-03-18

## 2021-03-19 ENCOUNTER — OFFICE VISIT (OUTPATIENT)
Dept: FAMILY MEDICINE | Facility: CLINIC | Age: 41
End: 2021-03-19
Payer: MEDICAID

## 2021-03-19 DIAGNOSIS — T81.30XS ABDOMINAL WOUND DEHISCENCE, SEQUELA: Primary | ICD-10-CM

## 2021-03-19 DIAGNOSIS — T81.49XA ABDOMINAL WALL ABSCESS AT SITE OF SURGICAL WOUND: ICD-10-CM

## 2021-03-19 PROCEDURE — 99214 OFFICE O/P EST MOD 30 MIN: CPT | Mod: 95,,, | Performed by: NURSE PRACTITIONER

## 2021-03-19 PROCEDURE — 99214 PR OFFICE/OUTPT VISIT, EST, LEVL IV, 30-39 MIN: ICD-10-PCS | Mod: 95,,, | Performed by: NURSE PRACTITIONER

## 2021-03-24 ENCOUNTER — PATIENT MESSAGE (OUTPATIENT)
Dept: FAMILY MEDICINE | Facility: CLINIC | Age: 41
End: 2021-03-24

## 2021-04-06 ENCOUNTER — PATIENT MESSAGE (OUTPATIENT)
Dept: ADMINISTRATIVE | Facility: HOSPITAL | Age: 41
End: 2021-04-06

## 2021-04-07 ENCOUNTER — PATIENT MESSAGE (OUTPATIENT)
Dept: FAMILY MEDICINE | Facility: CLINIC | Age: 41
End: 2021-04-07

## 2021-04-14 ENCOUNTER — PATIENT MESSAGE (OUTPATIENT)
Dept: FAMILY MEDICINE | Facility: CLINIC | Age: 41
End: 2021-04-14

## 2021-04-19 ENCOUNTER — PATIENT MESSAGE (OUTPATIENT)
Dept: FAMILY MEDICINE | Facility: CLINIC | Age: 41
End: 2021-04-19

## 2021-04-20 ENCOUNTER — PATIENT MESSAGE (OUTPATIENT)
Dept: FAMILY MEDICINE | Facility: CLINIC | Age: 41
End: 2021-04-20

## 2021-04-22 ENCOUNTER — OFFICE VISIT (OUTPATIENT)
Dept: FAMILY MEDICINE | Facility: CLINIC | Age: 41
End: 2021-04-22
Payer: MEDICAID

## 2021-04-22 ENCOUNTER — PATIENT MESSAGE (OUTPATIENT)
Dept: FAMILY MEDICINE | Facility: CLINIC | Age: 41
End: 2021-04-22

## 2021-04-22 DIAGNOSIS — T81.49XA ABDOMINAL WALL ABSCESS AT SITE OF SURGICAL WOUND: ICD-10-CM

## 2021-04-22 DIAGNOSIS — T81.30XS ABDOMINAL WOUND DEHISCENCE, SEQUELA: Primary | ICD-10-CM

## 2021-04-22 PROCEDURE — 99214 OFFICE O/P EST MOD 30 MIN: CPT | Mod: 95,,, | Performed by: NURSE PRACTITIONER

## 2021-04-22 PROCEDURE — 99214 PR OFFICE/OUTPT VISIT, EST, LEVL IV, 30-39 MIN: ICD-10-PCS | Mod: 95,,, | Performed by: NURSE PRACTITIONER

## 2021-06-23 ENCOUNTER — PATIENT OUTREACH (OUTPATIENT)
Dept: ADMINISTRATIVE | Facility: OTHER | Age: 41
End: 2021-06-23

## 2021-06-30 ENCOUNTER — PATIENT OUTREACH (OUTPATIENT)
Dept: ADMINISTRATIVE | Facility: OTHER | Age: 41
End: 2021-06-30

## 2021-06-30 ENCOUNTER — OFFICE VISIT (OUTPATIENT)
Dept: OBSTETRICS AND GYNECOLOGY | Facility: CLINIC | Age: 41
End: 2021-06-30
Payer: MEDICAID

## 2021-06-30 VITALS
BODY MASS INDEX: 28.36 KG/M2 | WEIGHT: 166.13 LBS | DIASTOLIC BLOOD PRESSURE: 86 MMHG | SYSTOLIC BLOOD PRESSURE: 128 MMHG | HEIGHT: 64 IN

## 2021-06-30 DIAGNOSIS — Z01.419 WELL WOMAN EXAM WITH ROUTINE GYNECOLOGICAL EXAM: Primary | ICD-10-CM

## 2021-06-30 DIAGNOSIS — Z30.011 OCP (ORAL CONTRACEPTIVE PILLS) INITIATION: ICD-10-CM

## 2021-06-30 DIAGNOSIS — N93.9 ABNORMAL UTERINE BLEEDING (AUB): ICD-10-CM

## 2021-06-30 DIAGNOSIS — E11.9 CONTROLLED TYPE 2 DIABETES MELLITUS WITHOUT COMPLICATION, WITHOUT LONG-TERM CURRENT USE OF INSULIN: Primary | ICD-10-CM

## 2021-06-30 PROCEDURE — 99213 OFFICE O/P EST LOW 20 MIN: CPT | Mod: PBBFAC | Performed by: OBSTETRICS & GYNECOLOGY

## 2021-06-30 PROCEDURE — 99396 PR PREVENTIVE VISIT,EST,40-64: ICD-10-PCS | Mod: S$PBB,,, | Performed by: OBSTETRICS & GYNECOLOGY

## 2021-06-30 PROCEDURE — 99396 PREV VISIT EST AGE 40-64: CPT | Mod: S$PBB,,, | Performed by: OBSTETRICS & GYNECOLOGY

## 2021-06-30 PROCEDURE — 99999 PR PBB SHADOW E&M-EST. PATIENT-LVL III: ICD-10-PCS | Mod: PBBFAC,,, | Performed by: OBSTETRICS & GYNECOLOGY

## 2021-06-30 PROCEDURE — 99999 PR PBB SHADOW E&M-EST. PATIENT-LVL III: CPT | Mod: PBBFAC,,, | Performed by: OBSTETRICS & GYNECOLOGY

## 2021-06-30 RX ORDER — NORGESTIMATE AND ETHINYL ESTRADIOL 7DAYSX3 28
1 KIT ORAL DAILY
Qty: 90 TABLET | Refills: 3 | Status: SHIPPED | OUTPATIENT
Start: 2021-06-30 | End: 2022-01-07

## 2021-07-01 ENCOUNTER — CLINICAL SUPPORT (OUTPATIENT)
Dept: FAMILY MEDICINE | Facility: CLINIC | Age: 41
End: 2021-07-01
Attending: NURSE PRACTITIONER
Payer: MEDICAID

## 2021-07-01 ENCOUNTER — HOSPITAL ENCOUNTER (OUTPATIENT)
Dept: RADIOLOGY | Facility: HOSPITAL | Age: 41
Discharge: HOME | End: 2021-07-01
Attending: FAMILY MEDICINE
Payer: MEDICAID

## 2021-07-01 DIAGNOSIS — Z12.31 OTHER SCREENING MAMMOGRAM: ICD-10-CM

## 2021-07-01 DIAGNOSIS — E11.9 CONTROLLED TYPE 2 DIABETES MELLITUS WITHOUT COMPLICATION, WITHOUT LONG-TERM CURRENT USE OF INSULIN: ICD-10-CM

## 2021-07-01 PROCEDURE — 77067 SCR MAMMO BI INCL CAD: CPT | Mod: TC

## 2021-07-01 PROCEDURE — 77067 SCR MAMMO BI INCL CAD: CPT | Mod: 26,,, | Performed by: RADIOLOGY

## 2021-07-01 PROCEDURE — 77063 BREAST TOMOSYNTHESIS BI: CPT | Mod: 26,,, | Performed by: RADIOLOGY

## 2021-07-01 PROCEDURE — 77067 MAMMO DIGITAL SCREENING BILAT WITH TOMO: ICD-10-PCS | Mod: 26,,, | Performed by: RADIOLOGY

## 2021-07-01 PROCEDURE — 77063 MAMMO DIGITAL SCREENING BILAT WITH TOMO: ICD-10-PCS | Mod: 26,,, | Performed by: RADIOLOGY

## 2021-07-06 ENCOUNTER — TELEPHONE (OUTPATIENT)
Dept: FAMILY MEDICINE | Facility: CLINIC | Age: 41
End: 2021-07-06

## 2021-07-08 ENCOUNTER — PATIENT MESSAGE (OUTPATIENT)
Dept: FAMILY MEDICINE | Facility: CLINIC | Age: 41
End: 2021-07-08

## 2021-07-09 DIAGNOSIS — E78.49 OTHER HYPERLIPIDEMIA: ICD-10-CM

## 2021-07-09 RX ORDER — ATORVASTATIN CALCIUM 20 MG/1
20 TABLET, FILM COATED ORAL DAILY
Qty: 30 TABLET | Refills: 3 | Status: SHIPPED | OUTPATIENT
Start: 2021-07-09 | End: 2021-12-27

## 2021-07-15 ENCOUNTER — OFFICE VISIT (OUTPATIENT)
Dept: FAMILY MEDICINE | Facility: CLINIC | Age: 41
End: 2021-07-15
Payer: MEDICAID

## 2021-07-15 VITALS
DIASTOLIC BLOOD PRESSURE: 82 MMHG | HEIGHT: 64 IN | SYSTOLIC BLOOD PRESSURE: 120 MMHG | WEIGHT: 166.25 LBS | RESPIRATION RATE: 18 BRPM | OXYGEN SATURATION: 97 % | HEART RATE: 100 BPM | BODY MASS INDEX: 28.38 KG/M2 | TEMPERATURE: 98 F

## 2021-07-15 DIAGNOSIS — F41.9 ANXIETY: Primary | ICD-10-CM

## 2021-07-15 DIAGNOSIS — G47.00 INSOMNIA, UNSPECIFIED TYPE: ICD-10-CM

## 2021-07-15 PROCEDURE — 99214 OFFICE O/P EST MOD 30 MIN: CPT | Mod: S$PBB,,, | Performed by: NURSE PRACTITIONER

## 2021-07-15 PROCEDURE — 99214 OFFICE O/P EST MOD 30 MIN: CPT | Mod: PBBFAC,PO | Performed by: NURSE PRACTITIONER

## 2021-07-15 PROCEDURE — 99999 PR PBB SHADOW E&M-EST. PATIENT-LVL IV: CPT | Mod: PBBFAC,,, | Performed by: NURSE PRACTITIONER

## 2021-07-15 PROCEDURE — 99214 PR OFFICE/OUTPT VISIT, EST, LEVL IV, 30-39 MIN: ICD-10-PCS | Mod: S$PBB,,, | Performed by: NURSE PRACTITIONER

## 2021-07-15 PROCEDURE — 99999 PR PBB SHADOW E&M-EST. PATIENT-LVL IV: ICD-10-PCS | Mod: PBBFAC,,, | Performed by: NURSE PRACTITIONER

## 2021-07-15 RX ORDER — QUETIAPINE FUMARATE 25 MG/1
25 TABLET, FILM COATED ORAL NIGHTLY
Qty: 30 TABLET | Refills: 1 | Status: SHIPPED | OUTPATIENT
Start: 2021-07-15 | End: 2022-01-18

## 2021-08-04 ENCOUNTER — PATIENT MESSAGE (OUTPATIENT)
Dept: ADMINISTRATIVE | Facility: HOSPITAL | Age: 41
End: 2021-08-04

## 2021-08-24 ENCOUNTER — PATIENT MESSAGE (OUTPATIENT)
Dept: FAMILY MEDICINE | Facility: CLINIC | Age: 41
End: 2021-08-24

## 2021-10-19 ENCOUNTER — PATIENT MESSAGE (OUTPATIENT)
Dept: OBSTETRICS AND GYNECOLOGY | Facility: CLINIC | Age: 41
End: 2021-10-19
Payer: MEDICAID

## 2021-11-03 ENCOUNTER — PATIENT MESSAGE (OUTPATIENT)
Dept: ADMINISTRATIVE | Facility: HOSPITAL | Age: 41
End: 2021-11-03
Payer: MEDICAID

## 2021-11-04 ENCOUNTER — PATIENT MESSAGE (OUTPATIENT)
Dept: ADMINISTRATIVE | Facility: HOSPITAL | Age: 41
End: 2021-11-04
Payer: MEDICAID

## 2021-11-04 DIAGNOSIS — E11.9 TYPE 2 DIABETES MELLITUS WITHOUT COMPLICATION, UNSPECIFIED WHETHER LONG TERM INSULIN USE: ICD-10-CM

## 2021-11-18 DIAGNOSIS — E11.9 TYPE 2 DIABETES MELLITUS WITHOUT COMPLICATION, UNSPECIFIED WHETHER LONG TERM INSULIN USE: ICD-10-CM

## 2021-12-02 ENCOUNTER — PATIENT MESSAGE (OUTPATIENT)
Dept: OBSTETRICS AND GYNECOLOGY | Facility: CLINIC | Age: 41
End: 2021-12-02
Payer: MEDICAID

## 2021-12-08 ENCOUNTER — PATIENT MESSAGE (OUTPATIENT)
Dept: HEMATOLOGY/ONCOLOGY | Facility: CLINIC | Age: 41
End: 2021-12-08
Payer: MEDICAID

## 2021-12-08 DIAGNOSIS — D50.0 IRON DEFICIENCY ANEMIA DUE TO CHRONIC BLOOD LOSS: Primary | ICD-10-CM

## 2021-12-09 ENCOUNTER — PATIENT MESSAGE (OUTPATIENT)
Dept: HEMATOLOGY/ONCOLOGY | Facility: CLINIC | Age: 41
End: 2021-12-09
Payer: MEDICAID

## 2021-12-13 ENCOUNTER — PROCEDURE VISIT (OUTPATIENT)
Dept: OBSTETRICS AND GYNECOLOGY | Facility: CLINIC | Age: 41
End: 2021-12-13
Payer: MEDICAID

## 2021-12-13 VITALS
DIASTOLIC BLOOD PRESSURE: 74 MMHG | BODY MASS INDEX: 29.06 KG/M2 | SYSTOLIC BLOOD PRESSURE: 108 MMHG | WEIGHT: 169.31 LBS

## 2021-12-13 DIAGNOSIS — Z32.02 URINE PREGNANCY TEST NEGATIVE: ICD-10-CM

## 2021-12-13 DIAGNOSIS — Z30.430 ENCOUNTER FOR INSERTION OF MIRENA IUD: Primary | ICD-10-CM

## 2021-12-13 LAB
B-HCG UR QL: NEGATIVE
CTP QC/QA: YES

## 2021-12-13 PROCEDURE — 81025 URINE PREGNANCY TEST: CPT | Mod: PBBFAC | Performed by: OBSTETRICS & GYNECOLOGY

## 2021-12-13 PROCEDURE — 58300 INSERT INTRAUTERINE DEVICE: CPT | Mod: PBBFAC | Performed by: OBSTETRICS & GYNECOLOGY

## 2021-12-13 PROCEDURE — 58300 PR INSERT INTRAUTERINE DEVICE: ICD-10-PCS | Mod: S$PBB,,, | Performed by: OBSTETRICS & GYNECOLOGY

## 2021-12-13 PROCEDURE — 99499 NO LOS: ICD-10-PCS | Mod: S$PBB,,, | Performed by: OBSTETRICS & GYNECOLOGY

## 2021-12-13 PROCEDURE — 58300 INSERT INTRAUTERINE DEVICE: CPT | Mod: S$PBB,,, | Performed by: OBSTETRICS & GYNECOLOGY

## 2021-12-13 PROCEDURE — 99499 UNLISTED E&M SERVICE: CPT | Mod: S$PBB,,, | Performed by: OBSTETRICS & GYNECOLOGY

## 2021-12-18 ENCOUNTER — LAB VISIT (OUTPATIENT)
Dept: LAB | Facility: HOSPITAL | Age: 41
End: 2021-12-18
Attending: INTERNAL MEDICINE
Payer: MEDICAID

## 2021-12-18 DIAGNOSIS — D50.0 IRON DEFICIENCY ANEMIA DUE TO CHRONIC BLOOD LOSS: ICD-10-CM

## 2021-12-18 LAB
ALBUMIN SERPL BCP-MCNC: 3.8 G/DL (ref 3.5–5.2)
ALP SERPL-CCNC: 95 U/L (ref 55–135)
ALT SERPL W/O P-5'-P-CCNC: 14 U/L (ref 10–44)
ANION GAP SERPL CALC-SCNC: 10 MMOL/L (ref 8–16)
AST SERPL-CCNC: 13 U/L (ref 10–40)
BASOPHILS # BLD AUTO: 0.02 K/UL (ref 0–0.2)
BASOPHILS NFR BLD: 0.3 % (ref 0–1.9)
BILIRUB SERPL-MCNC: 0.5 MG/DL (ref 0.1–1)
BUN SERPL-MCNC: 17 MG/DL (ref 6–20)
CALCIUM SERPL-MCNC: 8.8 MG/DL (ref 8.7–10.5)
CHLORIDE SERPL-SCNC: 107 MMOL/L (ref 95–110)
CO2 SERPL-SCNC: 22 MMOL/L (ref 23–29)
CREAT SERPL-MCNC: 1 MG/DL (ref 0.5–1.4)
DIFFERENTIAL METHOD: ABNORMAL
EOSINOPHIL # BLD AUTO: 0.1 K/UL (ref 0–0.5)
EOSINOPHIL NFR BLD: 0.9 % (ref 0–8)
ERYTHROCYTE [DISTWIDTH] IN BLOOD BY AUTOMATED COUNT: 15.8 % (ref 11.5–14.5)
EST. GFR  (AFRICAN AMERICAN): >60 ML/MIN/1.73 M^2
EST. GFR  (NON AFRICAN AMERICAN): >60 ML/MIN/1.73 M^2
FERRITIN SERPL-MCNC: 18 NG/ML (ref 20–300)
GLUCOSE SERPL-MCNC: 213 MG/DL (ref 70–110)
HCT VFR BLD AUTO: 33 % (ref 37–48.5)
HGB BLD-MCNC: 9.9 G/DL (ref 12–16)
IMM GRANULOCYTES # BLD AUTO: 0.02 K/UL (ref 0–0.04)
IMM GRANULOCYTES NFR BLD AUTO: 0.3 % (ref 0–0.5)
IRON SERPL-MCNC: 23 UG/DL (ref 30–160)
LYMPHOCYTES # BLD AUTO: 2.1 K/UL (ref 1–4.8)
LYMPHOCYTES NFR BLD: 36.5 % (ref 18–48)
MCH RBC QN AUTO: 25 PG (ref 27–31)
MCHC RBC AUTO-ENTMCNC: 30 G/DL (ref 32–36)
MCV RBC AUTO: 83 FL (ref 82–98)
MONOCYTES # BLD AUTO: 0.5 K/UL (ref 0.3–1)
MONOCYTES NFR BLD: 9 % (ref 4–15)
NEUTROPHILS # BLD AUTO: 3.1 K/UL (ref 1.8–7.7)
NEUTROPHILS NFR BLD: 53 % (ref 38–73)
NRBC BLD-RTO: 0 /100 WBC
PLATELET # BLD AUTO: 367 K/UL (ref 150–450)
PMV BLD AUTO: 9.5 FL (ref 9.2–12.9)
POTASSIUM SERPL-SCNC: 4.2 MMOL/L (ref 3.5–5.1)
PROT SERPL-MCNC: 6.8 G/DL (ref 6–8.4)
RBC # BLD AUTO: 3.96 M/UL (ref 4–5.4)
SATURATED IRON: 7 % (ref 20–50)
SODIUM SERPL-SCNC: 139 MMOL/L (ref 136–145)
TOTAL IRON BINDING CAPACITY: 343 UG/DL (ref 250–450)
TRANSFERRIN SERPL-MCNC: 232 MG/DL (ref 200–375)
WBC # BLD AUTO: 5.76 K/UL (ref 3.9–12.7)

## 2021-12-18 PROCEDURE — 85025 COMPLETE CBC W/AUTO DIFF WBC: CPT | Performed by: INTERNAL MEDICINE

## 2021-12-18 PROCEDURE — 84466 ASSAY OF TRANSFERRIN: CPT | Performed by: INTERNAL MEDICINE

## 2021-12-18 PROCEDURE — 82728 ASSAY OF FERRITIN: CPT | Performed by: INTERNAL MEDICINE

## 2021-12-18 PROCEDURE — 36415 COLL VENOUS BLD VENIPUNCTURE: CPT | Mod: PO | Performed by: INTERNAL MEDICINE

## 2021-12-18 PROCEDURE — 80053 COMPREHEN METABOLIC PANEL: CPT | Performed by: INTERNAL MEDICINE

## 2021-12-27 ENCOUNTER — PATIENT MESSAGE (OUTPATIENT)
Dept: OBSTETRICS AND GYNECOLOGY | Facility: CLINIC | Age: 41
End: 2021-12-27
Payer: MEDICAID

## 2021-12-29 ENCOUNTER — PATIENT MESSAGE (OUTPATIENT)
Dept: OBSTETRICS AND GYNECOLOGY | Facility: CLINIC | Age: 41
End: 2021-12-29
Payer: MEDICAID

## 2022-01-05 ENCOUNTER — PATIENT MESSAGE (OUTPATIENT)
Dept: HEMATOLOGY/ONCOLOGY | Facility: CLINIC | Age: 42
End: 2022-01-05
Payer: MEDICAID

## 2022-01-07 ENCOUNTER — PATIENT OUTREACH (OUTPATIENT)
Dept: ADMINISTRATIVE | Facility: OTHER | Age: 42
End: 2022-01-07
Payer: MEDICAID

## 2022-01-07 ENCOUNTER — OFFICE VISIT (OUTPATIENT)
Dept: OBSTETRICS AND GYNECOLOGY | Facility: CLINIC | Age: 42
End: 2022-01-07
Payer: MEDICAID

## 2022-01-07 VITALS
BODY MASS INDEX: 29.3 KG/M2 | HEIGHT: 64 IN | DIASTOLIC BLOOD PRESSURE: 82 MMHG | SYSTOLIC BLOOD PRESSURE: 122 MMHG | WEIGHT: 171.63 LBS

## 2022-01-07 DIAGNOSIS — N92.1 BREAKTHROUGH BLEEDING WITH IUD: Primary | ICD-10-CM

## 2022-01-07 DIAGNOSIS — Z97.5 BREAKTHROUGH BLEEDING WITH IUD: Primary | ICD-10-CM

## 2022-01-07 PROCEDURE — 3074F PR MOST RECENT SYSTOLIC BLOOD PRESSURE < 130 MM HG: ICD-10-PCS | Mod: CPTII,,, | Performed by: OBSTETRICS & GYNECOLOGY

## 2022-01-07 PROCEDURE — 99213 PR OFFICE/OUTPT VISIT, EST, LEVL III, 20-29 MIN: ICD-10-PCS | Mod: S$PBB,,, | Performed by: OBSTETRICS & GYNECOLOGY

## 2022-01-07 PROCEDURE — 1160F RVW MEDS BY RX/DR IN RCRD: CPT | Mod: CPTII,,, | Performed by: OBSTETRICS & GYNECOLOGY

## 2022-01-07 PROCEDURE — 99213 OFFICE O/P EST LOW 20 MIN: CPT | Mod: PBBFAC | Performed by: OBSTETRICS & GYNECOLOGY

## 2022-01-07 PROCEDURE — 3074F SYST BP LT 130 MM HG: CPT | Mod: CPTII,,, | Performed by: OBSTETRICS & GYNECOLOGY

## 2022-01-07 PROCEDURE — 1160F PR REVIEW ALL MEDS BY PRESCRIBER/CLIN PHARMACIST DOCUMENTED: ICD-10-PCS | Mod: CPTII,,, | Performed by: OBSTETRICS & GYNECOLOGY

## 2022-01-07 PROCEDURE — 99999 PR PBB SHADOW E&M-EST. PATIENT-LVL III: ICD-10-PCS | Mod: PBBFAC,,, | Performed by: OBSTETRICS & GYNECOLOGY

## 2022-01-07 PROCEDURE — 1159F MED LIST DOCD IN RCRD: CPT | Mod: CPTII,,, | Performed by: OBSTETRICS & GYNECOLOGY

## 2022-01-07 PROCEDURE — 3008F BODY MASS INDEX DOCD: CPT | Mod: CPTII,,, | Performed by: OBSTETRICS & GYNECOLOGY

## 2022-01-07 PROCEDURE — 99999 PR PBB SHADOW E&M-EST. PATIENT-LVL III: CPT | Mod: PBBFAC,,, | Performed by: OBSTETRICS & GYNECOLOGY

## 2022-01-07 PROCEDURE — 99213 OFFICE O/P EST LOW 20 MIN: CPT | Mod: S$PBB,,, | Performed by: OBSTETRICS & GYNECOLOGY

## 2022-01-07 PROCEDURE — 1159F PR MEDICATION LIST DOCUMENTED IN MEDICAL RECORD: ICD-10-PCS | Mod: CPTII,,, | Performed by: OBSTETRICS & GYNECOLOGY

## 2022-01-07 PROCEDURE — 3008F PR BODY MASS INDEX (BMI) DOCUMENTED: ICD-10-PCS | Mod: CPTII,,, | Performed by: OBSTETRICS & GYNECOLOGY

## 2022-01-07 PROCEDURE — 3079F PR MOST RECENT DIASTOLIC BLOOD PRESSURE 80-89 MM HG: ICD-10-PCS | Mod: CPTII,,, | Performed by: OBSTETRICS & GYNECOLOGY

## 2022-01-07 PROCEDURE — 3079F DIAST BP 80-89 MM HG: CPT | Mod: CPTII,,, | Performed by: OBSTETRICS & GYNECOLOGY

## 2022-01-07 RX ORDER — ESTRADIOL 2 MG/1
2 TABLET ORAL DAILY
Qty: 30 TABLET | Refills: 1 | Status: SHIPPED | OUTPATIENT
Start: 2022-01-07 | End: 2022-11-22

## 2022-01-07 NOTE — PROGRESS NOTES
"[facility]  Ambulatory Clinic  Obstetrics & Gynecology    Visit Date:  2022    Chief Complaint:  Mirena IUD check      Subjective:      Shelli Iverson is a 41 y.o.  here for Mirena IUD check.      Pt reports irregular bleeding with Mirena IUD.    Pt would like to continue IUD at this time.    Pt denies vaginal discharge, dysmenorrhea, dyspareunia, pelvic pain, breast mass/skin changes, GI or urinary complaints.     Otherwise, pt is in her usual state of health.    Review of Systems:     Constitutional:  No fever, fatigue  Respiratory:  No shortness of breath  Cardiovascular:  No chest pain, leg swelling  Gastrointestinal:  No abdominal pain  Genitourinary:  No dysuria, frequency  Reproductive:  See HPI     Objective:    /82   Ht 5' 4" (1.626 m)   Wt 77.9 kg (171 lb 10.1 oz)   LMP 2021   BMI 29.46 kg/m²      GENERAL:  NAD. Well-nourished.  HEENT:  NCAT, moist mucus membranes. Neck supple.  LUNGS:  CTA-B.  HEART:  RRR, no m/g/r.  ABDOMEN:  Soft, non-tender, non-distended. Normoactive BS. No obvious organomegaly.   EXT:  Symmetric w/o cramping, claudication, or edema. +2 distal pulses.   SKIN:  No rashes or bruising.  NEURO:  Grossly intact bilaterally.  PSYCH:  Mood & affect appropriate.      PELVIC:  Female external genitalia w/o any obvious lesions. Female hair distribution. Adequate perineal body. Normal urethral meatus. No gross lymphadenopathy.   Vagina:  Pink, moist, well-rugated. Adequate support. No obvious lesion. No discharge.   Cervix:  No cervical motion tenderness, discharge, or obvious lesions. IUD string visualized ~3 cm.  Uterus:  Small, non-tender, normal contour.  Adnexa:  No masses, non-tender.   Rectal:  Declined. No obvious external lesions.     Chaperone present for exam.    Assessment:     41 y.o. :    1. Mirena IUD check  2. Breakthrough bleeding on Mirena IUD     Plan:    Risks, benefits, and alternatives to Mirena reviewed.  Pt " tolerating it well and would like to continue.    Pt advised on how to check for IUD strings.    Pt was clearly reminded that the Mirena IUD will need to be removed within 6 years from date of insertion.    We discussed breakthrough bleeding on Mirena IUD including various mgt options.  Pt is interested in continuing IUD for now.  Will start a trial of estrace 2 mg qday x 10 days each month prn for estrogen supplementation.  Bleeding/pelvic precautions.     Encourage healthy lifestyle modifications.      F/u with PCP for health maintenance.    Return 1 year for well woman exam, or sooner as needed.  All questions answered, pt voiced understanding.       Galdino Bernstein MD

## 2022-01-10 ENCOUNTER — PATIENT MESSAGE (OUTPATIENT)
Dept: FAMILY MEDICINE | Facility: CLINIC | Age: 42
End: 2022-01-10
Payer: MEDICAID

## 2022-01-10 LAB
COMMENTS: ABNORMAL
EST. AVERAGE GLUCOSE BLD GHB EST-MCNC: 171 MG/DL
HBA1C MFR BLD: 7.6 % (ref 4.8–5.6)

## 2022-01-11 ENCOUNTER — OFFICE VISIT (OUTPATIENT)
Dept: HEMATOLOGY/ONCOLOGY | Facility: CLINIC | Age: 42
End: 2022-01-11
Payer: MEDICAID

## 2022-01-11 DIAGNOSIS — D50.0 IRON DEFICIENCY ANEMIA DUE TO CHRONIC BLOOD LOSS: Primary | ICD-10-CM

## 2022-01-11 PROCEDURE — 1160F RVW MEDS BY RX/DR IN RCRD: CPT | Mod: CPTII,95,, | Performed by: NURSE PRACTITIONER

## 2022-01-11 PROCEDURE — 1159F MED LIST DOCD IN RCRD: CPT | Mod: CPTII,95,, | Performed by: NURSE PRACTITIONER

## 2022-01-11 PROCEDURE — 99214 PR OFFICE/OUTPT VISIT, EST, LEVL IV, 30-39 MIN: ICD-10-PCS | Mod: 95,,, | Performed by: NURSE PRACTITIONER

## 2022-01-11 PROCEDURE — 1160F PR REVIEW ALL MEDS BY PRESCRIBER/CLIN PHARMACIST DOCUMENTED: ICD-10-PCS | Mod: CPTII,95,, | Performed by: NURSE PRACTITIONER

## 2022-01-11 PROCEDURE — 99214 OFFICE O/P EST MOD 30 MIN: CPT | Mod: 95,,, | Performed by: NURSE PRACTITIONER

## 2022-01-11 PROCEDURE — 1159F PR MEDICATION LIST DOCUMENTED IN MEDICAL RECORD: ICD-10-PCS | Mod: CPTII,95,, | Performed by: NURSE PRACTITIONER

## 2022-01-11 NOTE — PROGRESS NOTES
Hematology and Medical Oncology   Follow Up -- Virtual Visit     01/11/2022    Reason For Diagnosis: Iron Deficiency Anemia    Telemedicine Documentation:  The patient location is: car   The chief complaint leading to consultation is: iron deficiency    Visit type: audiovisual    Face to Face time with patient: 20min    30 minutes of total time spent on the encounter, which includes face to face time and non-face to face time preparing to see the patient (eg, review of tests), Obtaining and/or reviewing separately obtained history, Documenting clinical information in the electronic or other health record, Independently interpreting results (not separately reported) and communicating results to the patient/family/caregiver, or Care coordination (not separately reported).     Each patient to whom he or she provides medical services by telemedicine is:  (1) informed of the relationship between the physician and patient and the respective role of any other health care provider with respect to management of the patient; and (2) notified that he or she may decline to receive medical services by telemedicine and may withdraw from such care at any time.    History of Present Ilness:   Shelli Iverson (Shelli) is a pleasant 41 y.o.female who presents virtually to discuss his iron deficiency anemia. She is planning on undergoing a plastic surgery procedure later in February (25th) but needs her hemoglobin to be close to 12g/dl. The surgery has been postponed in the past due to low hemoglobin.     She has been taking liquid and PO iron however her ferritin remains <20. She has been approved for IV iron and will have her first infusion scheduled after this appointment. Continues with fatigue and general malaise      PAST MEDICAL HISTORY:   Past Medical History:   Diagnosis Date    GERD (gastroesophageal reflux disease)     Hypertension     Sciatica        PAST SURGICAL HISTORY:   Past Surgical History:   Procedure  Laterality Date     SECTION      x 1    tummy tuck         PAST SOCIAL HISTORY:   reports that she has never smoked. She has never used smokeless tobacco. She reports previous alcohol use. She reports that she does not use drugs.    FAMILY HISTORY:  Family History   Problem Relation Age of Onset    Heart disease Mother         CAD age 38    Hypertension Mother 38        MI    Diabetes Mother     Hypertension Father        CURRENT MEDICATIONS:   Current Outpatient Medications   Medication Sig    acetaminophen (TYLENOL) 500 MG tablet Take 2 tablets (1,000 mg total) by mouth every 6 (six) hours as needed for Pain.    albuterol (PROVENTIL/VENTOLIN HFA) 90 mcg/actuation inhaler Inhale 1-2 puffs into the lungs every 6 (six) hours as needed for Wheezing (And cough). Rescue    amLODIPine (NORVASC) 10 MG tablet TAKE 1 TABLET(10 MG) BY MOUTH EVERY DAY    atorvastatin (LIPITOR) 20 MG tablet TAKE 1 TABLET(20 MG) BY MOUTH EVERY DAY    diclofenac sodium (VOLTAREN) 1 % Gel Apply 2 g topically 4 (four) times daily. Apply to painful area for 10 days    diphenhydrAMINE-aluminum-magnesium hydroxide-simethicone-lidocaine HCl 2% Swish and spit 5 mLs every 4 (four) hours as needed. (Patient not taking: No sig reported)    estradioL (ESTRACE) 2 MG tablet Take 1 tablet (2 mg total) by mouth once daily.    fluconazole (DIFLUCAN) 150 MG Tab Take 1 tablet (150 mg total) by mouth once daily. (Patient not taking: No sig reported)    fluticasone propionate (FLONASE) 50 mcg/actuation nasal spray 1 spray (50 mcg total) by Each Nostril route 2 (two) times daily.    hydroCHLOROthiazide (HYDRODIURIL) 12.5 MG Tab TAKE 1 TABLET(12.5 MG) BY MOUTH EVERY DAY    hydrocortisone 2.5 % cream Apply topically 2 (two) times daily.    ibuprofen (ADVIL,MOTRIN) 600 MG tablet Take 1 tablet (600 mg total) by mouth every 6 (six) hours as needed for Pain (Take with food as needed for mild-to-moderate pain).    losartan (COZAAR) 50 MG tablet  TAKE 2 TABLETS BY MOUTH DAILY(FOLLOW-UP WITH DOCTOR JULY 2020)    metFORMIN (GLUMETZA) 500 MG (MOD) 24hr tablet Take 1 tablet (500 mg total) by mouth once daily.    prenatal vit 10-iron fum-folic 65-1 mg Tab Take 1 tablet by mouth once daily.    QUEtiapine (SEROQUEL) 25 MG Tab Take 1 tablet (25 mg total) by mouth every evening. (Patient not taking: No sig reported)     No current facility-administered medications for this visit.     ALLERGIES:   Review of patient's allergies indicates:   Allergen Reactions    Penicillins Rash         Review of Systems:     Review of Systems   Constitutional: Positive for fatigue. Negative for appetite change, chills, diaphoresis, fever and unexpected weight change.   HENT:   Negative for hearing loss, mouth sores, nosebleeds, sore throat, trouble swallowing and voice change.    Eyes: Negative for eye problems and icterus.   Respiratory: Negative for chest tightness, cough, hemoptysis, shortness of breath and wheezing.    Cardiovascular: Negative for chest pain, leg swelling and palpitations.   Gastrointestinal: Negative for abdominal distention, abdominal pain, blood in stool, diarrhea, nausea and vomiting.   Endocrine: Negative for hot flashes.   Genitourinary: Negative for bladder incontinence, difficulty urinating, dysuria and hematuria.    Musculoskeletal: Negative for arthralgias, back pain, flank pain, gait problem, myalgias, neck pain and neck stiffness.   Skin: Negative for itching, rash and wound.   Neurological: Negative for dizziness, extremity weakness, gait problem, headaches, numbness, seizures and speech difficulty.   Hematological: Negative for adenopathy. Does not bruise/bleed easily.   Psychiatric/Behavioral: Negative for confusion, depression and sleep disturbance. The patient is not nervous/anxious.           Physical Exam:     Limited secondary to virtual visit    ECOG Performance Status: (foot note - ECOG PS provided by Eastern Cooperative Oncology Group) 0  - Asymptomatic    Karnofsky Performance Score:  100%- Normal, No Complaints, No Evidence of Disease    Labs:   Lab Results   Component Value Date    WBC 5.76 12/18/2021    HGB 9.9 (L) 12/18/2021    HCT 33.0 (L) 12/18/2021     12/18/2021    CHOL 174 04/23/2019    TRIG 92 04/23/2019    HDL 58 04/23/2019    ALT 14 12/18/2021    AST 13 12/18/2021     12/18/2021    K 4.2 12/18/2021     12/18/2021    CREATININE 1.0 12/18/2021    BUN 17 12/18/2021    CO2 22 (L) 12/18/2021    TSH 1.754 06/22/2018    INR 1.0 12/15/2020    HGBA1C 6.4 (H) 12/30/2020       Imaging: Previous imaging has been reviewed    Assessment and Plan:     Ms. Iverson is a pleasant 41 y.o. female with known DOUG    Iron deficiency anemia, unspecified iron deficiency anemia type  --Currently taking PO iron supplementation however ferritin remains <20  --Plan for 2 doses of IV iron, one week apart, with repeat blood work in ~4 weeks  -- Discussed with patient today the risk involved with IV iron, specifically anaphylaxis due to hypersensitivity. She is agreeable to proceed with treatment.  --If hemoglobin is in the 11-12 range she is safe to proceed with therapy  --Will continue to follow PRN; will schedule labs on [facility] ~4 weeks after completion of IV iron      she will return to clinic PRN, but knows to call in the interim if symptoms change or should a problem arise.      Mari Brown NP  Hematology/Oncology/BMT

## 2022-01-11 NOTE — Clinical Note
Please schedule IV iron x2 doses, 7 days apart, as soon as possible. (I do not know where the auth is for her IV iron but she lives on the [facility] and should have scheduled there if able) Patient requesting earliest appointment in the morning. Please schedule a CBC at [facility] for ~4 weeks after the second dose of IV iron

## 2022-01-19 ENCOUNTER — INFUSION (OUTPATIENT)
Dept: INFUSION THERAPY | Facility: HOSPITAL | Age: 42
End: 2022-01-19
Attending: INTERNAL MEDICINE
Payer: MEDICAID

## 2022-01-19 VITALS
HEART RATE: 93 BPM | OXYGEN SATURATION: 100 % | TEMPERATURE: 99 F | DIASTOLIC BLOOD PRESSURE: 85 MMHG | RESPIRATION RATE: 16 BRPM | SYSTOLIC BLOOD PRESSURE: 137 MMHG

## 2022-01-19 DIAGNOSIS — D50.0 IRON DEFICIENCY ANEMIA DUE TO CHRONIC BLOOD LOSS: Primary | ICD-10-CM

## 2022-01-19 PROCEDURE — 25000003 PHARM REV CODE 250: Performed by: INTERNAL MEDICINE

## 2022-01-19 PROCEDURE — 96365 THER/PROPH/DIAG IV INF INIT: CPT

## 2022-01-19 PROCEDURE — 63600175 PHARM REV CODE 636 W HCPCS: Mod: JG | Performed by: INTERNAL MEDICINE

## 2022-01-19 RX ADMIN — FERRIC CARBOXYMALTOSE INJECTION 750 MG: 50 INJECTION, SOLUTION INTRAVENOUS at 08:01

## 2022-01-19 NOTE — PLAN OF CARE
Pt presents to unit for 1/2 Injectafer. Oriented to unit. Pt reports this is her first iron infusion. Was taking oral iron supplements at home for a short period of time. Reports craving cold water. VSS. Pt tolerated Injectafer well with a 30 minute watch period. No issues noted during visit. Has next appointment for 1/26. Left unit in NAD at time of discharge.

## 2022-01-24 ENCOUNTER — TELEPHONE (OUTPATIENT)
Dept: FAMILY MEDICINE | Facility: CLINIC | Age: 42
End: 2022-01-24
Payer: MEDICAID

## 2022-01-24 DIAGNOSIS — G47.00 INSOMNIA, UNSPECIFIED TYPE: Primary | ICD-10-CM

## 2022-01-24 DIAGNOSIS — F41.1 GAD (GENERALIZED ANXIETY DISORDER): ICD-10-CM

## 2022-01-24 RX ORDER — QUETIAPINE FUMARATE 25 MG/1
25 TABLET, FILM COATED ORAL NIGHTLY
Qty: 30 TABLET | Refills: 3 | Status: SHIPPED | OUTPATIENT
Start: 2022-01-24 | End: 2022-03-28 | Stop reason: SDUPTHER

## 2022-01-24 NOTE — TELEPHONE ENCOUNTER
Fax received in regards to pt's quetiapine. Pharmacy states in order to fill this they will need dx code attached to med.

## 2022-01-26 ENCOUNTER — INFUSION (OUTPATIENT)
Dept: INFUSION THERAPY | Facility: HOSPITAL | Age: 42
End: 2022-01-26
Attending: INTERNAL MEDICINE
Payer: MEDICAID

## 2022-01-26 ENCOUNTER — PATIENT MESSAGE (OUTPATIENT)
Dept: FAMILY MEDICINE | Facility: CLINIC | Age: 42
End: 2022-01-26
Payer: MEDICAID

## 2022-01-26 VITALS
SYSTOLIC BLOOD PRESSURE: 114 MMHG | TEMPERATURE: 99 F | RESPIRATION RATE: 16 BRPM | HEART RATE: 85 BPM | OXYGEN SATURATION: 100 % | DIASTOLIC BLOOD PRESSURE: 75 MMHG

## 2022-01-26 DIAGNOSIS — D50.0 IRON DEFICIENCY ANEMIA DUE TO CHRONIC BLOOD LOSS: Primary | ICD-10-CM

## 2022-01-26 PROCEDURE — 96365 THER/PROPH/DIAG IV INF INIT: CPT

## 2022-01-26 PROCEDURE — 25000003 PHARM REV CODE 250: Performed by: INTERNAL MEDICINE

## 2022-01-26 PROCEDURE — 63600175 PHARM REV CODE 636 W HCPCS: Mod: JG | Performed by: INTERNAL MEDICINE

## 2022-01-26 RX ADMIN — FERRIC CARBOXYMALTOSE INJECTION 750 MG: 50 INJECTION, SOLUTION INTRAVENOUS at 08:01

## 2022-01-26 NOTE — PLAN OF CARE
Pt presents to unit for 2 of 2 Injectafer. No issues to report after receiving her first dose last week. Still endorses generalized fatigue today. VSS. 2nd Injectafer dose given. Pt tolerated well with some slight nausea that subsided. Has follow-up appointments through [facility]. Left unit in NAD at time of discharge.

## 2022-01-27 ENCOUNTER — OFFICE VISIT (OUTPATIENT)
Dept: FAMILY MEDICINE | Facility: CLINIC | Age: 42
End: 2022-01-27
Payer: MEDICAID

## 2022-01-27 ENCOUNTER — LAB VISIT (OUTPATIENT)
Dept: LAB | Facility: HOSPITAL | Age: 42
End: 2022-01-27
Attending: NURSE PRACTITIONER
Payer: MEDICAID

## 2022-01-27 ENCOUNTER — HOSPITAL ENCOUNTER (OUTPATIENT)
Dept: RADIOLOGY | Facility: CLINIC | Age: 42
Discharge: HOME | End: 2022-01-27
Attending: NURSE PRACTITIONER
Payer: MEDICAID

## 2022-01-27 ENCOUNTER — PATIENT MESSAGE (OUTPATIENT)
Dept: HEMATOLOGY/ONCOLOGY | Facility: CLINIC | Age: 42
End: 2022-01-27
Payer: MEDICAID

## 2022-01-27 VITALS
WEIGHT: 168.19 LBS | HEART RATE: 86 BPM | SYSTOLIC BLOOD PRESSURE: 118 MMHG | BODY MASS INDEX: 28.71 KG/M2 | HEIGHT: 64 IN | DIASTOLIC BLOOD PRESSURE: 84 MMHG | TEMPERATURE: 98 F | OXYGEN SATURATION: 98 %

## 2022-01-27 DIAGNOSIS — Z01.818 PREOP EXAMINATION: Primary | ICD-10-CM

## 2022-01-27 DIAGNOSIS — E11.9 CONTROLLED TYPE 2 DIABETES MELLITUS WITH INSULIN THERAPY: ICD-10-CM

## 2022-01-27 DIAGNOSIS — E11.9 CONTROLLED TYPE 2 DIABETES MELLITUS WITHOUT COMPLICATION, WITHOUT LONG-TERM CURRENT USE OF INSULIN: ICD-10-CM

## 2022-01-27 DIAGNOSIS — Z79.4 CONTROLLED TYPE 2 DIABETES MELLITUS WITH INSULIN THERAPY: ICD-10-CM

## 2022-01-27 DIAGNOSIS — Z01.818 PRE-OP EVALUATION: ICD-10-CM

## 2022-01-27 DIAGNOSIS — Z01.818 PRE-OP EVALUATION: Primary | ICD-10-CM

## 2022-01-27 LAB
ALBUMIN SERPL BCP-MCNC: 3.9 G/DL (ref 3.5–5.2)
ALP SERPL-CCNC: 83 U/L (ref 55–135)
ALT SERPL W/O P-5'-P-CCNC: 18 U/L (ref 10–44)
ANION GAP SERPL CALC-SCNC: 8 MMOL/L (ref 8–16)
AST SERPL-CCNC: 15 U/L (ref 10–40)
BASOPHILS # BLD AUTO: 0.03 K/UL (ref 0–0.2)
BASOPHILS NFR BLD: 0.5 % (ref 0–1.9)
BILIRUB SERPL-MCNC: 0.3 MG/DL (ref 0.1–1)
BUN SERPL-MCNC: 13 MG/DL (ref 6–20)
CALCIUM SERPL-MCNC: 8.7 MG/DL (ref 8.7–10.5)
CHLORIDE SERPL-SCNC: 107 MMOL/L (ref 95–110)
CO2 SERPL-SCNC: 22 MMOL/L (ref 23–29)
CREAT SERPL-MCNC: 0.7 MG/DL (ref 0.5–1.4)
DIFFERENTIAL METHOD: ABNORMAL
EOSINOPHIL # BLD AUTO: 0 K/UL (ref 0–0.5)
EOSINOPHIL NFR BLD: 0.7 % (ref 0–8)
ERYTHROCYTE [DISTWIDTH] IN BLOOD BY AUTOMATED COUNT: 16.9 % (ref 11.5–14.5)
EST. GFR  (AFRICAN AMERICAN): >60 ML/MIN/1.73 M^2
EST. GFR  (NON AFRICAN AMERICAN): >60 ML/MIN/1.73 M^2
ESTIMATED AVG GLUCOSE: 140 MG/DL (ref 68–131)
GLUCOSE SERPL-MCNC: 103 MG/DL (ref 70–110)
HBA1C MFR BLD: 6.5 % (ref 4–5.6)
HCG INTACT+B SERPL-ACNC: <1.2 MIU/ML
HCT VFR BLD AUTO: 36.5 % (ref 37–48.5)
HGB BLD-MCNC: 10.8 G/DL (ref 12–16)
IMM GRANULOCYTES # BLD AUTO: 0.02 K/UL (ref 0–0.04)
IMM GRANULOCYTES NFR BLD AUTO: 0.3 % (ref 0–0.5)
INR PPP: 0.9 (ref 0.8–1.2)
LYMPHOCYTES # BLD AUTO: 1.7 K/UL (ref 1–4.8)
LYMPHOCYTES NFR BLD: 29.6 % (ref 18–48)
MCH RBC QN AUTO: 25.1 PG (ref 27–31)
MCHC RBC AUTO-ENTMCNC: 29.6 G/DL (ref 32–36)
MCV RBC AUTO: 85 FL (ref 82–98)
MONOCYTES # BLD AUTO: 0.5 K/UL (ref 0.3–1)
MONOCYTES NFR BLD: 7.7 % (ref 4–15)
NEUTROPHILS # BLD AUTO: 3.6 K/UL (ref 1.8–7.7)
NEUTROPHILS NFR BLD: 61.2 % (ref 38–73)
NRBC BLD-RTO: 0 /100 WBC
PLATELET # BLD AUTO: 376 K/UL (ref 150–450)
PMV BLD AUTO: 9.3 FL (ref 9.2–12.9)
POTASSIUM SERPL-SCNC: 3.8 MMOL/L (ref 3.5–5.1)
PROT SERPL-MCNC: 7.2 G/DL (ref 6–8.4)
PROTHROMBIN TIME: 10 SEC (ref 9–12.5)
RBC # BLD AUTO: 4.31 M/UL (ref 4–5.4)
SODIUM SERPL-SCNC: 137 MMOL/L (ref 136–145)
WBC # BLD AUTO: 5.85 K/UL (ref 3.9–12.7)

## 2022-01-27 PROCEDURE — 1159F MED LIST DOCD IN RCRD: CPT | Mod: CPTII,,, | Performed by: NURSE PRACTITIONER

## 2022-01-27 PROCEDURE — 84702 CHORIONIC GONADOTROPIN TEST: CPT | Performed by: NURSE PRACTITIONER

## 2022-01-27 PROCEDURE — 3074F SYST BP LT 130 MM HG: CPT | Mod: CPTII,,, | Performed by: NURSE PRACTITIONER

## 2022-01-27 PROCEDURE — 99214 OFFICE O/P EST MOD 30 MIN: CPT | Mod: S$PBB,,, | Performed by: NURSE PRACTITIONER

## 2022-01-27 PROCEDURE — 71046 X-RAY EXAM CHEST 2 VIEWS: CPT | Mod: TC,FY,PO

## 2022-01-27 PROCEDURE — 36415 COLL VENOUS BLD VENIPUNCTURE: CPT | Performed by: NURSE PRACTITIONER

## 2022-01-27 PROCEDURE — 99999 PR PBB SHADOW E&M-EST. PATIENT-LVL III: CPT | Mod: PBBFAC,,, | Performed by: NURSE PRACTITIONER

## 2022-01-27 PROCEDURE — 3008F PR BODY MASS INDEX (BMI) DOCUMENTED: ICD-10-PCS | Mod: CPTII,,, | Performed by: NURSE PRACTITIONER

## 2022-01-27 PROCEDURE — 1159F PR MEDICATION LIST DOCUMENTED IN MEDICAL RECORD: ICD-10-PCS | Mod: CPTII,,, | Performed by: NURSE PRACTITIONER

## 2022-01-27 PROCEDURE — 85025 COMPLETE CBC W/AUTO DIFF WBC: CPT | Performed by: NURSE PRACTITIONER

## 2022-01-27 PROCEDURE — 93010 EKG 12-LEAD: ICD-10-PCS | Mod: S$PBB,,, | Performed by: INTERNAL MEDICINE

## 2022-01-27 PROCEDURE — 93010 ELECTROCARDIOGRAM REPORT: CPT | Mod: S$PBB,,, | Performed by: INTERNAL MEDICINE

## 2022-01-27 PROCEDURE — 80053 COMPREHEN METABOLIC PANEL: CPT | Performed by: NURSE PRACTITIONER

## 2022-01-27 PROCEDURE — 99999 PR PBB SHADOW E&M-EST. PATIENT-LVL III: ICD-10-PCS | Mod: PBBFAC,,, | Performed by: NURSE PRACTITIONER

## 2022-01-27 PROCEDURE — 3079F DIAST BP 80-89 MM HG: CPT | Mod: CPTII,,, | Performed by: NURSE PRACTITIONER

## 2022-01-27 PROCEDURE — 3079F PR MOST RECENT DIASTOLIC BLOOD PRESSURE 80-89 MM HG: ICD-10-PCS | Mod: CPTII,,, | Performed by: NURSE PRACTITIONER

## 2022-01-27 PROCEDURE — 99214 PR OFFICE/OUTPT VISIT, EST, LEVL IV, 30-39 MIN: ICD-10-PCS | Mod: S$PBB,,, | Performed by: NURSE PRACTITIONER

## 2022-01-27 PROCEDURE — 87389 HIV-1 AG W/HIV-1&-2 AB AG IA: CPT | Performed by: NURSE PRACTITIONER

## 2022-01-27 PROCEDURE — 99213 OFFICE O/P EST LOW 20 MIN: CPT | Mod: PBBFAC,25,PO | Performed by: NURSE PRACTITIONER

## 2022-01-27 PROCEDURE — 3008F BODY MASS INDEX DOCD: CPT | Mod: CPTII,,, | Performed by: NURSE PRACTITIONER

## 2022-01-27 PROCEDURE — 71046 XR CHEST PA AND LATERAL: ICD-10-PCS | Mod: 26,,, | Performed by: RADIOLOGY

## 2022-01-27 PROCEDURE — 71046 X-RAY EXAM CHEST 2 VIEWS: CPT | Mod: 26,,, | Performed by: RADIOLOGY

## 2022-01-27 PROCEDURE — 83036 HEMOGLOBIN GLYCOSYLATED A1C: CPT | Performed by: NURSE PRACTITIONER

## 2022-01-27 PROCEDURE — 3074F PR MOST RECENT SYSTOLIC BLOOD PRESSURE < 130 MM HG: ICD-10-PCS | Mod: CPTII,,, | Performed by: NURSE PRACTITIONER

## 2022-01-27 PROCEDURE — 93005 ELECTROCARDIOGRAM TRACING: CPT | Mod: PBBFAC,PO | Performed by: INTERNAL MEDICINE

## 2022-01-27 PROCEDURE — 85610 PROTHROMBIN TIME: CPT | Performed by: NURSE PRACTITIONER

## 2022-01-27 RX ORDER — METFORMIN HYDROCHLORIDE 500 MG/1
500 TABLET, FILM COATED, EXTENDED RELEASE ORAL DAILY
Qty: 30 TABLET | Refills: 11 | Status: SHIPPED | OUTPATIENT
Start: 2022-01-27 | End: 2023-02-27

## 2022-01-27 NOTE — PATIENT INSTRUCTIONS
Hold metformin 2 days before and after surgery.  No NSAIDs.    If you are due for any health screening(s) below please notify me so we can arrange them to be ordered and scheduled to maintain your health.     Health Maintenance   Topic Date Due    Foot Exam  04/22/2020    Lipid Panel  04/23/2020    Hemoglobin A1c  03/09/2022    Mammogram  07/01/2022    Eye Exam  07/01/2022    Low Dose Statin  01/27/2023    TETANUS VACCINE  06/29/2026    Hepatitis C Screening  Completed

## 2022-01-27 NOTE — PROGRESS NOTES
Patient ID: Shelli Iverson is a 41 y.o. female.    Chief Complaint: Pre-op Exam      Shelli Iverson is in the office Preop eval revision of abdominal scar status post abdominal plasty with complication of wound.  Needs update of labs as above. Surgical date 2/25/22. GAGANDEEP Medrano.     Here for surgical clearance.  She needs revision of her abdominal scar.  Patient had a tummy tuck scheduled last year and had complications post surgery.  Had to be admitted to [place] and wound VAC applied.  That episode lasted about 6 weeks.  Now needs revision of the surgical scar.      Past Medical History:   Diagnosis Date    GERD (gastroesophageal reflux disease)     Hypertension     Sciatica                 Current Outpatient Medications:     acetaminophen (TYLENOL) 500 MG tablet, Take 2 tablets (1,000 mg total) by mouth every 6 (six) hours as needed for Pain., Disp: 30 tablet, Rfl: 0    amLODIPine (NORVASC) 10 MG tablet, TAKE 1 TABLET(10 MG) BY MOUTH EVERY DAY, Disp: 90 tablet, Rfl: 11    atorvastatin (LIPITOR) 20 MG tablet, TAKE 1 TABLET(20 MG) BY MOUTH EVERY DAY, Disp: 30 tablet, Rfl: 3    diphenhydrAMINE-aluminum-magnesium hydroxide-simethicone-lidocaine HCl 2%, Swish and spit 5 mLs every 4 (four) hours as needed., Disp: 118 mL, Rfl: 0    estradioL (ESTRACE) 2 MG tablet, Take 1 tablet (2 mg total) by mouth once daily., Disp: 30 tablet, Rfl: 1    fluconazole (DIFLUCAN) 150 MG Tab, Take 1 tablet (150 mg total) by mouth once daily., Disp: 1 tablet, Rfl: 1    fluticasone propionate (FLONASE) 50 mcg/actuation nasal spray, 1 spray (50 mcg total) by Each Nostril route 2 (two) times daily., Disp: 1 Bottle, Rfl: 0    hydroCHLOROthiazide (HYDRODIURIL) 12.5 MG Tab, TAKE 1 TABLET(12.5 MG) BY MOUTH EVERY DAY, Disp: 90 tablet, Rfl: 11    hydrocortisone 2.5 % cream, Apply topically 2 (two) times daily., Disp: 28 g, Rfl: 6    ibuprofen (ADVIL,MOTRIN) 600 MG tablet, Take 1 tablet (600 mg total) by mouth every 6  (six) hours as needed for Pain (Take with food as needed for mild-to-moderate pain)., Disp: 20 tablet, Rfl: 0    losartan (COZAAR) 50 MG tablet, TAKE 2 TABLETS BY MOUTH DAILY(FOLLOW-UP WITH DOCTOR JULY 2020), Disp: 180 tablet, Rfl: 1    QUEtiapine (SEROQUEL) 25 MG Tab, Take 1 tablet (25 mg total) by mouth every evening., Disp: 30 tablet, Rfl: 3    albuterol (PROVENTIL/VENTOLIN HFA) 90 mcg/actuation inhaler, Inhale 1-2 puffs into the lungs every 6 (six) hours as needed for Wheezing (And cough). Rescue, Disp: 1 Inhaler, Rfl: 0    diclofenac sodium (VOLTAREN) 1 % Gel, Apply 2 g topically 4 (four) times daily. Apply to painful area for 10 days, Disp: 1 Tube, Rfl: 6    metFORMIN (GLUMETZA) 500 MG (MOD) 24hr tablet, Take 1 tablet (500 mg total) by mouth once daily., Disp: 30 tablet, Rfl: 11    prenatal vit 10-iron fum-folic 65-1 mg Tab, Take 1 tablet by mouth once daily., Disp: 30 tablet, Rfl: 11    The 10-year ASCVD risk score (Vinton NEREYDA Jr., et al., 2013) is: 1.8%    Values used to calculate the score:      Age: 41 years      Sex: Female      Is Non- : Yes      Diabetic: Yes      Tobacco smoker: No      Systolic Blood Pressure: 114 mmHg      Is BP treated: Yes      HDL Cholesterol: 58 mg/dL      Total Cholesterol: 174 mg/dL     Wt Readings from Last 3 Encounters:   01/20/22 76.3 kg (168 lb 3.4 oz)   01/07/22 77.9 kg (171 lb 10.1 oz)   12/13/21 76.8 kg (169 lb 5 oz)     Temp Readings from Last 3 Encounters:   01/20/22 97.9 °F (36.6 °C)   01/26/22 98.7 °F (37.1 °C)   01/19/22 98.5 °F (36.9 °C)     BP Readings from Last 3 Encounters:   01/20/22 118/84   01/26/22 114/75   01/19/22 137/85     Pulse Readings from Last 3 Encounters:   01/20/22 86   01/26/22 85   01/19/22 93     Resp Readings from Last 3 Encounters:   01/26/22 16   01/19/22 16   07/15/21 18     PF Readings from Last 3 Encounters:   No data found for PF     SpO2 Readings from Last 3 Encounters:   01/20/22 98%   01/26/22 100%    01/19/22 100%        Lab Results   Component Value Date    HGBA1C 6.4 (H) 12/30/2020    HGBA1C 6.9 (H) 11/14/2020    HGBA1C 6.9 (H) 08/29/2020     Lab Results   Component Value Date    LDLCALC 97.6 04/23/2019    CREATININE 1.0 12/18/2021       Review of Systems   All other systems reviewed and are negative.          Objective:      Physical Exam  Vitals reviewed.   Constitutional:       Appearance: Normal appearance.   Cardiovascular:      Rate and Rhythm: Normal rate and regular rhythm.      Pulses: Normal pulses.      Heart sounds: Normal heart sounds.   Pulmonary:      Effort: Pulmonary effort is normal.      Breath sounds: Normal breath sounds.   Abdominal:      General: Abdomen is flat.      Palpations: Abdomen is soft.          Comments: Bikini cut scar with additional midline scar approx 1.5 inches.    Musculoskeletal:      Cervical back: Normal range of motion and neck supple.   Neurological:      Mental Status: She is alert.             Screening recommendations appropriate to age and health status were reviewed.    Pre-op evaluation  -     CBC Auto Differential; Future; Expected date: 01/27/2022  -     Comprehensive Metabolic Panel; Future; Expected date: 01/27/2022  -     HIV 1/2 Ag/Ab (4th Gen); Future; Expected date: 01/27/2022  -     HCG, Quantitative; Future; Expected date: 01/27/2022  -     Protime-INR; Future; Expected date: 01/27/2022  -     Urinalysis; Future; Expected date: 01/27/2022  -     Urine culture; Future; Expected date: 01/27/2022  -     IN OFFICE EKG 12-LEAD (to Muse)  -     X-Ray Chest PA And Lateral; Future; Expected date: 01/27/2022    Controlled type 2 diabetes mellitus without complication, without long-term current use of insulin        RCRI risk factors include: no known RCRI risk factors. As such, per RCRI the risk of cardiac death, nonfatal myocardial infarction, or nonfatal cardiac arrest is 0.4% and the risk of myocardial infarction, pulmonary edema, ventricular  fibrillation, primary cardiac arrest, or complete heart block is 0.5%.  Overall this patient can be considered low risk for this low risk procedure. No further cardiac testing is recommended at this time.     Patient denies any symptoms (as per HPI) concerning for undiagnosed lung disease including ARSALAN. Would not recommend obtaining chest X-ray, sleep study, or PFTs at this time. Patient is a non-smoker. We discussed the benefits of early mobilization and deep breathing after surgery.      Screened patient for alcohol misuse, use of illicit drugs, and personal or family history of anesthetic complications or bleeding diathesis and no substantial concerns were identified. none    All current medications were reviewed and at this time no changes to medications are recommended prior to surgery. Hold metformin 2 day before and after surgery. No Nsaids. OK bp med AM of surgery with sip of water.     I recommend use of standard pre-op and post-op precautions for this patient. In my opinion, she is medically optimized for this procedure, and can proceed without further evaluation.

## 2022-01-28 ENCOUNTER — PATIENT MESSAGE (OUTPATIENT)
Dept: FAMILY MEDICINE | Facility: CLINIC | Age: 42
End: 2022-01-28
Payer: MEDICAID

## 2022-01-31 LAB — HIV 1+2 AB+HIV1 P24 AG SERPL QL IA: NEGATIVE

## 2022-02-01 ENCOUNTER — OFFICE VISIT (OUTPATIENT)
Dept: CARDIOLOGY | Facility: CLINIC | Age: 42
End: 2022-02-01
Payer: MEDICAID

## 2022-02-01 ENCOUNTER — PATIENT MESSAGE (OUTPATIENT)
Dept: FAMILY MEDICINE | Facility: CLINIC | Age: 42
End: 2022-02-01
Payer: MEDICAID

## 2022-02-01 VITALS
OXYGEN SATURATION: 98 % | DIASTOLIC BLOOD PRESSURE: 74 MMHG | HEIGHT: 64 IN | SYSTOLIC BLOOD PRESSURE: 144 MMHG | HEART RATE: 98 BPM | WEIGHT: 168 LBS | BODY MASS INDEX: 28.68 KG/M2

## 2022-02-01 DIAGNOSIS — I10 ESSENTIAL (PRIMARY) HYPERTENSION: Primary | ICD-10-CM

## 2022-02-01 DIAGNOSIS — E11.9 CONTROLLED TYPE 2 DIABETES MELLITUS WITHOUT COMPLICATION, WITHOUT LONG-TERM CURRENT USE OF INSULIN: ICD-10-CM

## 2022-02-01 DIAGNOSIS — E78.49 OTHER HYPERLIPIDEMIA: ICD-10-CM

## 2022-02-01 DIAGNOSIS — E66.9 OBESITY (BMI 30-39.9): ICD-10-CM

## 2022-02-01 PROCEDURE — 1160F RVW MEDS BY RX/DR IN RCRD: CPT | Mod: CPTII,,, | Performed by: INTERNAL MEDICINE

## 2022-02-01 PROCEDURE — 3078F DIAST BP <80 MM HG: CPT | Mod: CPTII,,, | Performed by: INTERNAL MEDICINE

## 2022-02-01 PROCEDURE — 3078F PR MOST RECENT DIASTOLIC BLOOD PRESSURE < 80 MM HG: ICD-10-PCS | Mod: CPTII,,, | Performed by: INTERNAL MEDICINE

## 2022-02-01 PROCEDURE — 99214 OFFICE O/P EST MOD 30 MIN: CPT | Mod: S$PBB,,, | Performed by: INTERNAL MEDICINE

## 2022-02-01 PROCEDURE — 3008F BODY MASS INDEX DOCD: CPT | Mod: CPTII,,, | Performed by: INTERNAL MEDICINE

## 2022-02-01 PROCEDURE — 99999 PR PBB SHADOW E&M-EST. PATIENT-LVL IV: ICD-10-PCS | Mod: PBBFAC,,, | Performed by: INTERNAL MEDICINE

## 2022-02-01 PROCEDURE — 3044F PR MOST RECENT HEMOGLOBIN A1C LEVEL <7.0%: ICD-10-PCS | Mod: CPTII,,, | Performed by: INTERNAL MEDICINE

## 2022-02-01 PROCEDURE — 99999 PR PBB SHADOW E&M-EST. PATIENT-LVL IV: CPT | Mod: PBBFAC,,, | Performed by: INTERNAL MEDICINE

## 2022-02-01 PROCEDURE — 1159F MED LIST DOCD IN RCRD: CPT | Mod: CPTII,,, | Performed by: INTERNAL MEDICINE

## 2022-02-01 PROCEDURE — 3008F PR BODY MASS INDEX (BMI) DOCUMENTED: ICD-10-PCS | Mod: CPTII,,, | Performed by: INTERNAL MEDICINE

## 2022-02-01 PROCEDURE — 99214 PR OFFICE/OUTPT VISIT, EST, LEVL IV, 30-39 MIN: ICD-10-PCS | Mod: S$PBB,,, | Performed by: INTERNAL MEDICINE

## 2022-02-01 PROCEDURE — 99214 OFFICE O/P EST MOD 30 MIN: CPT | Mod: PBBFAC | Performed by: INTERNAL MEDICINE

## 2022-02-01 PROCEDURE — 1159F PR MEDICATION LIST DOCUMENTED IN MEDICAL RECORD: ICD-10-PCS | Mod: CPTII,,, | Performed by: INTERNAL MEDICINE

## 2022-02-01 PROCEDURE — 3077F PR MOST RECENT SYSTOLIC BLOOD PRESSURE >= 140 MM HG: ICD-10-PCS | Mod: CPTII,,, | Performed by: INTERNAL MEDICINE

## 2022-02-01 PROCEDURE — 3077F SYST BP >= 140 MM HG: CPT | Mod: CPTII,,, | Performed by: INTERNAL MEDICINE

## 2022-02-01 PROCEDURE — 3044F HG A1C LEVEL LT 7.0%: CPT | Mod: CPTII,,, | Performed by: INTERNAL MEDICINE

## 2022-02-01 PROCEDURE — 1160F PR REVIEW ALL MEDS BY PRESCRIBER/CLIN PHARMACIST DOCUMENTED: ICD-10-PCS | Mod: CPTII,,, | Performed by: INTERNAL MEDICINE

## 2022-02-01 NOTE — PROGRESS NOTES
CARDIOVASCULAR CONSULTATION    REASON FOR CONSULT:   Shelli Iverson is a 41 y.o. female who presents for preoperative clearance.      HISTORY OF PRESENT ILLNESS:     Patient is a pleasant 40-year-old lady.  Here for preoperative risk assessment prior to tummy tuck operation.  Denies any chest pains at rest on exertion, orthopnea, PND.  Family history significant for mother having sudden cardiac death in the 30s.  She states that mother is started developing significant shortness of breath and then passed shortly thereafter.  Denies orthopnea, PND, swelling of feet.  EKG personally reviewed and the shows normal sinus rhythm with lateral infarct         EKG personally reviewed.  Normal sinus rhythm, lateral infarct.    Notes from 2021:  Patient here follow-up.  Denies any chest pains.  Stress echo did not show any significant ischemia.    · The left ventricle is normal in size with normal systolic function. The estimated ejection fraction is 60%  · Normal right ventricular size with normal right ventricular systolic function.  · The estimated PA systolic pressure is 33 mmHg.  · The stress echo portion of this study is negative for myocardial ischemia.  · The ECG portion of this study is abnormal but not diagnostic for ischemia.      Notes from 2022:  Patient here for follow-up.  Denies any chest pains at rest on exertion, orthopnea PND.  Can walk 7-8 miles without any issues.  Needs to go for revision of her surgery.    PAST MEDICAL HISTORY:     Past Medical History:   Diagnosis Date    GERD (gastroesophageal reflux disease)     Hypertension     Sciatica        PAST SURGICAL HISTORY:     Past Surgical History:   Procedure Laterality Date     SECTION      x 1    REVISION OF ABDOMINAL SCAR      planned 22    tummy tuck         ALLERGIES AND MEDICATION:     Review of patient's allergies indicates:   Allergen Reactions    Penicillins Rash        Medication List          Accurate as  of February 1, 2022  8:58 AM. If you have any questions, ask your nurse or doctor.            CONTINUE taking these medications    acetaminophen 500 MG tablet  Commonly known as: TYLENOL  Take 2 tablets (1,000 mg total) by mouth every 6 (six) hours as needed for Pain.     albuterol 90 mcg/actuation inhaler  Commonly known as: PROVENTIL/VENTOLIN HFA  Inhale 1-2 puffs into the lungs every 6 (six) hours as needed for Wheezing (And cough). Rescue     amLODIPine 10 MG tablet  Commonly known as: NORVASC  TAKE 1 TABLET(10 MG) BY MOUTH EVERY DAY     atorvastatin 20 MG tablet  Commonly known as: LIPITOR  TAKE 1 TABLET(20 MG) BY MOUTH EVERY DAY     diclofenac sodium 1 % Gel  Commonly known as: VOLTAREN  Apply 2 g topically 4 (four) times daily. Apply to painful area for 10 days     diphenhydrAMINE-aluminum-magnesium hydroxide-simethicone-lidocaine HCl 2%  Swish and spit 5 mLs every 4 (four) hours as needed.     estradioL 2 MG tablet  Commonly known as: ESTRACE  Take 1 tablet (2 mg total) by mouth once daily.     fluconazole 150 MG Tab  Commonly known as: DIFLUCAN  Take 1 tablet (150 mg total) by mouth once daily.     fluticasone propionate 50 mcg/actuation nasal spray  Commonly known as: FLONASE  1 spray (50 mcg total) by Each Nostril route 2 (two) times daily.     hydroCHLOROthiazide 12.5 MG Tab  Commonly known as: HYDRODIURIL  TAKE 1 TABLET(12.5 MG) BY MOUTH EVERY DAY     hydrocortisone 2.5 % cream  Apply topically 2 (two) times daily.     ibuprofen 600 MG tablet  Commonly known as: ADVIL,MOTRIN  Take 1 tablet (600 mg total) by mouth every 6 (six) hours as needed for Pain (Take with food as needed for mild-to-moderate pain).     losartan 50 MG tablet  Commonly known as: COZAAR  TAKE 2 TABLETS BY MOUTH DAILY(FOLLOW-UP WITH DOCTOR JULY 2020)     metFORMIN 500 MG (MOD) 24hr tablet  Commonly known as: GLUMETZA  Take 1 tablet (500 mg total) by mouth once daily.     prenatal vit 10-iron fum-folic 65-1 mg Tab  Take 1 tablet by  mouth once daily.     QUEtiapine 25 MG Tab  Commonly known as: SEROQUEL  Take 1 tablet (25 mg total) by mouth every evening.            SOCIAL HISTORY:     Social History     Socioeconomic History    Marital status: Single   Tobacco Use    Smoking status: Never Smoker    Smokeless tobacco: Never Used   Substance and Sexual Activity    Alcohol use: Not Currently     Comment: RARELY    Drug use: No     Comment: occasional    Sexual activity: Yes     Birth control/protection: I.U.D.     Social Determinants of Health     Physical Activity: Inactive    Days of Exercise per Week: 0 days    Minutes of Exercise per Session: 0 min   Stress: No Stress Concern Present    Feeling of Stress : Not at all   Social Connections: Unknown    Frequency of Communication with Friends and Family: Twice a week    Frequency of Social Gatherings with Friends and Family: Three times a week    Active Member of Clubs or Organizations: No    Attends Club or Organization Meetings: Never    Marital Status: Never    Housing Stability: Unknown    Unable to Pay for Housing in the Last Year: Patient refused    Unstable Housing in the Last Year: Patient refused       FAMILY HISTORY:     Family History   Problem Relation Age of Onset    Heart disease Mother         CAD age 38    Hypertension Mother 38        MI    Diabetes Mother     Hypertension Father        REVIEW OF SYSTEMS:   Review of Systems   Constitution: Negative.   HENT: Negative.    Eyes: Negative.    Cardiovascular: Negative.    Respiratory: Negative.    Endocrine: Negative.    Hematologic/Lymphatic: Negative.    Skin: Negative.    Musculoskeletal: Negative.    Gastrointestinal: Negative.    Genitourinary: Negative.    Neurological: Negative.    Psychiatric/Behavioral: Negative.    Allergic/Immunologic: Negative.        A 10 point review of systems was performed and all the pertinent positives have been mentioned. Rest of review of systems was  "negative.        PHYSICAL EXAM:     Vitals:    02/01/22 0821   BP: (!) 144/74   Pulse: 98    Body mass index is 28.84 kg/m².  Weight: 76.2 kg (168 lb)   Height: 5' 4" (162.6 cm)     Physical Exam   Constitutional: She is oriented to person, place, and time. She appears well-developed and well-nourished.   HENT:   Head: Normocephalic.   Eyes: Pupils are equal, round, and reactive to light.   Neck: Normal range of motion. Neck supple.   Cardiovascular: Normal rate and regular rhythm.   Pulmonary/Chest: Effort normal and breath sounds normal.   Abdominal: Soft. Normal appearance and bowel sounds are normal. There is no abdominal tenderness.   Musculoskeletal: Normal range of motion.   Neurological: She is alert and oriented to person, place, and time.   Skin: Skin is warm.   Psychiatric: She has a normal mood and affect.         DATA:     Laboratory:  CBC:  Recent Labs   Lab 12/15/20  1710 12/18/21  0858 01/27/22  1219   WBC 6.28 5.76 5.85   Hemoglobin 12.0 9.9 L 10.8 L   Hematocrit 38.5 33.0 L 36.5 L   Platelets 359 H 367 376       CHEMISTRIES:  Recent Labs   Lab 12/30/20  0900 12/18/21  0858 01/27/22  1219   Glucose 133 H 213 H 103   Sodium 138 139 137   Potassium 4.2 4.2 3.8   BUN 12 17 13   Creatinine 0.7 1.0 0.7   eGFR if African American >60.0 >60 >60   eGFR if non African American >60.0 >60 >60   Calcium 9.0 8.8 8.7       CARDIAC BIOMARKERS:        COAGS:  Recent Labs   Lab 08/29/20  0957 12/15/20  1710 01/27/22  1219   INR 0.9 1.0 0.9       LIPIDS/LFTS:  Recent Labs   Lab 04/23/19  0942 06/22/20  1228 12/08/20  1609 12/18/21  0858 01/27/22  1219   Cholesterol 174  --   --   --   --    Triglycerides 92  --   --   --   --    HDL 58  --   --   --   --    LDL Cholesterol 97.6  --   --   --   --    Non-HDL Cholesterol 116  --   --   --   --    AST 16   < > 16 13 15   ALT 16   < > 19 14 18    < > = values in this interval not displayed.       Hemoglobin A1C   Date Value Ref Range Status   01/27/2022 6.5 (H) 4.0 - 5.6 " % Final     Comment:     ADA Screening Guidelines:  5.7-6.4%  Consistent with prediabetes  >or=6.5%  Consistent with diabetes    High levels of fetal hemoglobin interfere with the HbA1C  assay. Heterozygous hemoglobin variants (HbS, HgC, etc)do  not significantly interfere with this assay.   However, presence of multiple variants may affect accuracy.     12/30/2020 6.4 (H) 4.0 - 5.6 % Final     Comment:     ADA Screening Guidelines:  5.7-6.4%  Consistent with prediabetes  >or=6.5%  Consistent with diabetes  High levels of fetal hemoglobin interfere with the HbA1C  assay. Heterozygous hemoglobin variants (HbS, HgC, etc)do  not significantly interfere with this assay.   However, presence of multiple variants may affect accuracy.     11/14/2020 6.9 (H) 4.0 - 5.6 % Final     Comment:     ADA Screening Guidelines:  5.7-6.4%  Consistent with prediabetes  >or=6.5%  Consistent with diabetes  High levels of fetal hemoglobin interfere with the HbA1C  assay. Heterozygous hemoglobin variants (HbS, HgC, etc)do  not significantly interfere with this assay.   However, presence of multiple variants may affect accuracy.         TSH        The 10-year ASCVD risk score (Lupe NEREYDA Jr., et al., 2013) is: 6%    Values used to calculate the score:      Age: 41 years      Sex: Female      Is Non- : Yes      Diabetic: Yes      Tobacco smoker: No      Systolic Blood Pressure: 144 mmHg      Is BP treated: Yes      HDL Cholesterol: 58 mg/dL      Total Cholesterol: 174 mg/dL             ASSESSMENT AND PLAN     Patient Active Problem List   Diagnosis    Essential (primary) hypertension    Controlled type 2 diabetes mellitus without complication, without long-term current use of insulin    Obesity (BMI 30-39.9)    Other hyperlipidemia    Eczema    Chronic knee pain    Iron deficiency anemia       Preoperative risk assessment in a patient with multiple risk factors for coronary artery disease.  And abnormal EKG.   Stress echo did not show any significant ischemia.  Exercise tolerance greater than 4 Mets. Patient may proceed for surgery at low-to-moderate risk for coronary ischemia.  Follow-up in 1 year or as needed.          Thank you very much for involving me in the care of your patient.  Please do not hesitate to contact me if there are any questions.      Westley Crocker MD, FACC, [facility]  Interventional Cardiologist, [facility].           This note was dictated with the help of speech recognition software.  There might be un-intended errors and/or substitutions.

## 2022-02-02 ENCOUNTER — PATIENT MESSAGE (OUTPATIENT)
Dept: FAMILY MEDICINE | Facility: CLINIC | Age: 42
End: 2022-02-02
Payer: MEDICAID

## 2022-02-04 ENCOUNTER — LAB VISIT (OUTPATIENT)
Dept: LAB | Facility: HOSPITAL | Age: 42
End: 2022-02-04
Attending: INTERNAL MEDICINE
Payer: MEDICAID

## 2022-02-04 DIAGNOSIS — D50.0 IRON DEFICIENCY ANEMIA DUE TO CHRONIC BLOOD LOSS: ICD-10-CM

## 2022-02-04 LAB
BASOPHILS # BLD AUTO: 0.03 K/UL (ref 0–0.2)
BASOPHILS NFR BLD: 0.5 % (ref 0–1.9)
DIFFERENTIAL METHOD: ABNORMAL
EOSINOPHIL # BLD AUTO: 0.1 K/UL (ref 0–0.5)
EOSINOPHIL NFR BLD: 1.4 % (ref 0–8)
ERYTHROCYTE [DISTWIDTH] IN BLOOD BY AUTOMATED COUNT: 18.3 % (ref 11.5–14.5)
HCT VFR BLD AUTO: 39.2 % (ref 37–48.5)
HGB BLD-MCNC: 12 G/DL (ref 12–16)
IMM GRANULOCYTES # BLD AUTO: 0.01 K/UL (ref 0–0.04)
IMM GRANULOCYTES NFR BLD AUTO: 0.2 % (ref 0–0.5)
LYMPHOCYTES # BLD AUTO: 1.8 K/UL (ref 1–4.8)
LYMPHOCYTES NFR BLD: 28.3 % (ref 18–48)
MCH RBC QN AUTO: 25.9 PG (ref 27–31)
MCHC RBC AUTO-ENTMCNC: 30.6 G/DL (ref 32–36)
MCV RBC AUTO: 85 FL (ref 82–98)
MONOCYTES # BLD AUTO: 0.5 K/UL (ref 0.3–1)
MONOCYTES NFR BLD: 7.5 % (ref 4–15)
NEUTROPHILS # BLD AUTO: 4 K/UL (ref 1.8–7.7)
NEUTROPHILS NFR BLD: 62.1 % (ref 38–73)
NRBC BLD-RTO: 0 /100 WBC
PLATELET # BLD AUTO: 362 K/UL (ref 150–450)
PMV BLD AUTO: 10.2 FL (ref 9.2–12.9)
RBC # BLD AUTO: 4.64 M/UL (ref 4–5.4)
WBC # BLD AUTO: 6.5 K/UL (ref 3.9–12.7)

## 2022-02-04 PROCEDURE — 36415 COLL VENOUS BLD VENIPUNCTURE: CPT | Mod: PO | Performed by: NURSE PRACTITIONER

## 2022-02-04 PROCEDURE — 85025 COMPLETE CBC W/AUTO DIFF WBC: CPT | Performed by: NURSE PRACTITIONER

## 2022-02-04 PROCEDURE — 82728 ASSAY OF FERRITIN: CPT | Performed by: NURSE PRACTITIONER

## 2022-02-05 LAB — FERRITIN SERPL-MCNC: 986 NG/ML (ref 20–300)

## 2022-02-11 ENCOUNTER — PATIENT MESSAGE (OUTPATIENT)
Dept: FAMILY MEDICINE | Facility: CLINIC | Age: 42
End: 2022-02-11
Payer: MEDICAID

## 2022-02-11 ENCOUNTER — LAB VISIT (OUTPATIENT)
Dept: LAB | Facility: HOSPITAL | Age: 42
End: 2022-02-11
Attending: NURSE PRACTITIONER
Payer: MEDICAID

## 2022-02-11 ENCOUNTER — PATIENT MESSAGE (OUTPATIENT)
Dept: HEMATOLOGY/ONCOLOGY | Facility: CLINIC | Age: 42
End: 2022-02-11
Payer: MEDICAID

## 2022-02-11 DIAGNOSIS — Z01.818 PRE-OP EVALUATION: ICD-10-CM

## 2022-02-11 DIAGNOSIS — Z01.818 PRE-OP EVALUATION: Primary | ICD-10-CM

## 2022-02-11 LAB — APTT BLDCRRT: 25.1 SEC (ref 21–32)

## 2022-02-11 PROCEDURE — 85730 THROMBOPLASTIN TIME PARTIAL: CPT | Performed by: NURSE PRACTITIONER

## 2022-02-11 PROCEDURE — 36415 COLL VENOUS BLD VENIPUNCTURE: CPT | Mod: PO | Performed by: NURSE PRACTITIONER

## 2022-02-14 ENCOUNTER — PATIENT MESSAGE (OUTPATIENT)
Dept: FAMILY MEDICINE | Facility: CLINIC | Age: 42
End: 2022-02-14
Payer: MEDICAID

## 2022-02-15 ENCOUNTER — LAB VISIT (OUTPATIENT)
Dept: LAB | Facility: HOSPITAL | Age: 42
End: 2022-02-15
Attending: INTERNAL MEDICINE
Payer: MEDICAID

## 2022-02-15 ENCOUNTER — PATIENT MESSAGE (OUTPATIENT)
Dept: HEMATOLOGY/ONCOLOGY | Facility: CLINIC | Age: 42
End: 2022-02-15
Payer: MEDICAID

## 2022-02-15 DIAGNOSIS — D50.0 IRON DEFICIENCY ANEMIA DUE TO CHRONIC BLOOD LOSS: Primary | ICD-10-CM

## 2022-02-15 DIAGNOSIS — D50.0 IRON DEFICIENCY ANEMIA DUE TO CHRONIC BLOOD LOSS: ICD-10-CM

## 2022-02-15 LAB
BASOPHILS # BLD AUTO: 0.01 K/UL (ref 0–0.2)
BASOPHILS NFR BLD: 0.2 % (ref 0–1.9)
DIFFERENTIAL METHOD: ABNORMAL
EOSINOPHIL # BLD AUTO: 0.1 K/UL (ref 0–0.5)
EOSINOPHIL NFR BLD: 1.3 % (ref 0–8)
ERYTHROCYTE [DISTWIDTH] IN BLOOD BY AUTOMATED COUNT: 18.4 % (ref 11.5–14.5)
FERRITIN SERPL-MCNC: 685 NG/ML (ref 20–300)
HCT VFR BLD AUTO: 39.1 % (ref 37–48.5)
HGB BLD-MCNC: 11.9 G/DL (ref 12–16)
IMM GRANULOCYTES # BLD AUTO: 0.01 K/UL (ref 0–0.04)
IMM GRANULOCYTES NFR BLD AUTO: 0.2 % (ref 0–0.5)
IRON SERPL-MCNC: 49 UG/DL (ref 30–160)
LYMPHOCYTES # BLD AUTO: 1.9 K/UL (ref 1–4.8)
LYMPHOCYTES NFR BLD: 37 % (ref 18–48)
MCH RBC QN AUTO: 26.2 PG (ref 27–31)
MCHC RBC AUTO-ENTMCNC: 30.4 G/DL (ref 32–36)
MCV RBC AUTO: 86 FL (ref 82–98)
MONOCYTES # BLD AUTO: 0.5 K/UL (ref 0.3–1)
MONOCYTES NFR BLD: 10 % (ref 4–15)
NEUTROPHILS # BLD AUTO: 2.7 K/UL (ref 1.8–7.7)
NEUTROPHILS NFR BLD: 51.3 % (ref 38–73)
NRBC BLD-RTO: 0 /100 WBC
PLATELET # BLD AUTO: 288 K/UL (ref 150–450)
PMV BLD AUTO: 9.7 FL (ref 9.2–12.9)
RBC # BLD AUTO: 4.55 M/UL (ref 4–5.4)
SATURATED IRON: 21 % (ref 20–50)
TOTAL IRON BINDING CAPACITY: 235 UG/DL (ref 250–450)
TRANSFERRIN SERPL-MCNC: 159 MG/DL (ref 200–375)
WBC # BLD AUTO: 5.19 K/UL (ref 3.9–12.7)

## 2022-02-15 PROCEDURE — 36415 COLL VENOUS BLD VENIPUNCTURE: CPT | Mod: PO | Performed by: INTERNAL MEDICINE

## 2022-02-15 PROCEDURE — 82728 ASSAY OF FERRITIN: CPT | Performed by: INTERNAL MEDICINE

## 2022-02-15 PROCEDURE — 84466 ASSAY OF TRANSFERRIN: CPT | Performed by: INTERNAL MEDICINE

## 2022-02-15 PROCEDURE — 85025 COMPLETE CBC W/AUTO DIFF WBC: CPT | Performed by: INTERNAL MEDICINE

## 2022-02-17 ENCOUNTER — PATIENT MESSAGE (OUTPATIENT)
Dept: FAMILY MEDICINE | Facility: CLINIC | Age: 42
End: 2022-02-17
Payer: MEDICAID

## 2022-05-25 ENCOUNTER — PATIENT MESSAGE (OUTPATIENT)
Dept: OBSTETRICS AND GYNECOLOGY | Facility: CLINIC | Age: 42
End: 2022-05-25
Payer: MEDICAID

## 2022-06-15 ENCOUNTER — OFFICE VISIT (OUTPATIENT)
Dept: URGENT CARE | Facility: CLINIC | Age: 42
End: 2022-06-15
Payer: MEDICAID

## 2022-06-15 VITALS
HEIGHT: 64 IN | SYSTOLIC BLOOD PRESSURE: 103 MMHG | WEIGHT: 168 LBS | BODY MASS INDEX: 28.68 KG/M2 | TEMPERATURE: 99 F | HEART RATE: 113 BPM | RESPIRATION RATE: 16 BRPM | OXYGEN SATURATION: 97 % | DIASTOLIC BLOOD PRESSURE: 66 MMHG

## 2022-06-15 DIAGNOSIS — U07.1 COVID-19 VIRUS INFECTION: Primary | ICD-10-CM

## 2022-06-15 LAB
CTP QC/QA: YES
SARS-COV-2 RDRP RESP QL NAA+PROBE: POSITIVE

## 2022-06-15 PROCEDURE — 1159F MED LIST DOCD IN RCRD: CPT | Mod: CPTII,S$GLB,, | Performed by: NURSE PRACTITIONER

## 2022-06-15 PROCEDURE — 1160F RVW MEDS BY RX/DR IN RCRD: CPT | Mod: CPTII,S$GLB,, | Performed by: NURSE PRACTITIONER

## 2022-06-15 PROCEDURE — 1160F PR REVIEW ALL MEDS BY PRESCRIBER/CLIN PHARMACIST DOCUMENTED: ICD-10-PCS | Mod: CPTII,S$GLB,, | Performed by: NURSE PRACTITIONER

## 2022-06-15 PROCEDURE — 1159F PR MEDICATION LIST DOCUMENTED IN MEDICAL RECORD: ICD-10-PCS | Mod: CPTII,S$GLB,, | Performed by: NURSE PRACTITIONER

## 2022-06-15 PROCEDURE — 3008F PR BODY MASS INDEX (BMI) DOCUMENTED: ICD-10-PCS | Mod: CPTII,S$GLB,, | Performed by: NURSE PRACTITIONER

## 2022-06-15 PROCEDURE — 3074F SYST BP LT 130 MM HG: CPT | Mod: CPTII,S$GLB,, | Performed by: NURSE PRACTITIONER

## 2022-06-15 PROCEDURE — U0002 COVID-19 LAB TEST NON-CDC: HCPCS | Mod: QW,S$GLB,, | Performed by: NURSE PRACTITIONER

## 2022-06-15 PROCEDURE — U0002: ICD-10-PCS | Mod: QW,S$GLB,, | Performed by: NURSE PRACTITIONER

## 2022-06-15 PROCEDURE — 3078F DIAST BP <80 MM HG: CPT | Mod: CPTII,S$GLB,, | Performed by: NURSE PRACTITIONER

## 2022-06-15 PROCEDURE — 4010F PR ACE/ARB THEARPY RXD/TAKEN: ICD-10-PCS | Mod: CPTII,S$GLB,, | Performed by: NURSE PRACTITIONER

## 2022-06-15 PROCEDURE — 3044F PR MOST RECENT HEMOGLOBIN A1C LEVEL <7.0%: ICD-10-PCS | Mod: CPTII,S$GLB,, | Performed by: NURSE PRACTITIONER

## 2022-06-15 PROCEDURE — 3074F PR MOST RECENT SYSTOLIC BLOOD PRESSURE < 130 MM HG: ICD-10-PCS | Mod: CPTII,S$GLB,, | Performed by: NURSE PRACTITIONER

## 2022-06-15 PROCEDURE — 99213 OFFICE O/P EST LOW 20 MIN: CPT | Mod: S$GLB,,, | Performed by: NURSE PRACTITIONER

## 2022-06-15 PROCEDURE — 3008F BODY MASS INDEX DOCD: CPT | Mod: CPTII,S$GLB,, | Performed by: NURSE PRACTITIONER

## 2022-06-15 PROCEDURE — 3078F PR MOST RECENT DIASTOLIC BLOOD PRESSURE < 80 MM HG: ICD-10-PCS | Mod: CPTII,S$GLB,, | Performed by: NURSE PRACTITIONER

## 2022-06-15 PROCEDURE — 99213 PR OFFICE/OUTPT VISIT, EST, LEVL III, 20-29 MIN: ICD-10-PCS | Mod: S$GLB,,, | Performed by: NURSE PRACTITIONER

## 2022-06-15 PROCEDURE — 3044F HG A1C LEVEL LT 7.0%: CPT | Mod: CPTII,S$GLB,, | Performed by: NURSE PRACTITIONER

## 2022-06-15 PROCEDURE — 4010F ACE/ARB THERAPY RXD/TAKEN: CPT | Mod: CPTII,S$GLB,, | Performed by: NURSE PRACTITIONER

## 2022-06-15 RX ORDER — AZELASTINE 1 MG/ML
1 SPRAY, METERED NASAL 2 TIMES DAILY
Qty: 30 ML | Refills: 0 | Status: SHIPPED | OUTPATIENT
Start: 2022-06-15 | End: 2022-11-22

## 2022-06-15 RX ORDER — BENZONATATE 200 MG/1
200 CAPSULE ORAL 3 TIMES DAILY PRN
Qty: 30 CAPSULE | Refills: 0 | Status: SHIPPED | OUTPATIENT
Start: 2022-06-15 | End: 2022-11-22

## 2022-06-15 RX ORDER — PROMETHAZINE HYDROCHLORIDE AND DEXTROMETHORPHAN HYDROBROMIDE 6.25; 15 MG/5ML; MG/5ML
5 SYRUP ORAL NIGHTLY PRN
Qty: 118 ML | Refills: 0 | Status: SHIPPED | OUTPATIENT
Start: 2022-06-15 | End: 2022-11-22

## 2022-06-16 NOTE — PROGRESS NOTES
"Subjective:       Patient ID: Shelli Iverson is a 42 y.o. female.    Vitals:  height is 5' 4" (1.626 m) and weight is 76.2 kg (168 lb). Her oral temperature is 98.7 °F (37.1 °C). Her blood pressure is 103/66 and her pulse is 113 (abnormal). Her respiration is 16 and oxygen saturation is 97%.     Chief Complaint: Sinus Problem    42-year-old female presents to clinic for evaluation of sore throat, postnasal drip, ear pressure, body aches x2 days.  Patient is vaccinated for COVID, denies any recent sick contacts.  She reports taking a previously prescribed pain pill for her body aches with minimal relief.  She does report a history of allergies, used Flonase last night.  She denies chest pain, fever, shortness of breath.  She is awake and alert, answers questions appropriately, no acute distress noted on today's visit.    Sinus Problem  This is a new problem. The current episode started in the past 7 days. The problem has been gradually worsening since onset. Maximum temperature: fever unmeasured. Her pain is at a severity of 0/10. She is experiencing no pain. Associated symptoms include chills, congestion, coughing, headaches, sinus pressure and a sore throat. Pertinent negatives include no diaphoresis, ear pain, neck pain or shortness of breath. Treatments tried: hydrocodone. The treatment provided mild relief.       Constitution: Positive for chills. Negative for activity change, appetite change, sweating, fatigue and fever.   HENT: Positive for congestion, sinus pressure and sore throat. Negative for ear pain.    Neck: Negative for neck pain.   Cardiovascular: Negative for chest pain.   Respiratory: Positive for cough. Negative for shortness of breath.    Gastrointestinal: Negative for abdominal pain, nausea and vomiting.   Neurological: Positive for headaches. Negative for dizziness.       Objective:      Physical Exam   Constitutional: She is oriented to person, place, and time.  Non-toxic appearance. She does " not appear ill. No distress.   HENT:   Head: Normocephalic and atraumatic.   Eyes: Conjunctivae are normal. Right eye exhibits no discharge. Left eye exhibits no discharge.   Cardiovascular: Tachycardia present.   Pulmonary/Chest: Effort normal. No respiratory distress.   Abdominal: Normal appearance.   Musculoskeletal: Normal range of motion.         General: Normal range of motion.   Neurological: She is alert and oriented to person, place, and time.   Skin: Skin is dry, not diaphoretic and not pale.   Psychiatric: Her behavior is normal. Mood, judgment and thought content normal.   Nursing note and vitals reviewed.    Results for orders placed or performed in visit on 06/15/22   POCT COVID-19 Rapid Screening   Result Value Ref Range    POC Rapid COVID Positive (A) Negative     Acceptable Yes            Assessment:       1. COVID-19 virus infection          Plan:         COVID-19 virus infection  -     POCT COVID-19 Rapid Screening  -     COVID-19 Home Symptom Monitoring  - Duration (days): 14  -     azelastine (ASTELIN) 137 mcg (0.1 %) nasal spray; 1 spray (137 mcg total) by Nasal route 2 (two) times daily. for 10 days  Dispense: 30 mL; Refill: 0  -     benzonatate (TESSALON) 200 MG capsule; Take 1 capsule (200 mg total) by mouth 3 (three) times daily as needed for Cough.  Dispense: 30 capsule; Refill: 0  -     promethazine-dextromethorphan (PROMETHAZINE-DM) 6.25-15 mg/5 mL Syrp; Take 5 mLs by mouth nightly as needed (cough).  Dispense: 118 mL; Refill: 0    - Recommend daily antihistamine (Claritin, Zyrtec, or Allegra), decongestant (Mucinex, or Coricidin if hx of HTN), cough suppressant, Nasal spray (Flonase), Tylenol (if not contraindicated) for pain or fever, along with plenty of fluids and rest. Discussed POSITIVE Covid result w/ patient. Discussed quarantine instructions. Patient verbally understood and agreed with treatment plan. ER for worsening symptoms.     Patient Instructions   - You  must understand that you have received an Urgent Care treatment only and that you may be released before all of your medical problems are known or treated.   - You, the patient, will arrange for follow up care as instructed.   - If your condition worsens or fails to improve we recommend that you receive another evaluation at the ER immediately or contact your PCP to discuss your concerns or return here.     You have tested POSITIVE for COVID-19 today.           ISOLATION    If you tested positive and do not have symptoms, you must isolate for 5 days starting on the day of the positive test. I       If you tested positive and have symptoms, you must isolate for 5 days starting on the day of the first symptoms,  not the day of the positive test.       This is the most important part, both the CDC and the [facility] emphasize that you do not test out of isolation.       After 5 days, if your symptoms have improved and you have not had fever on day 5, you can return to the community on day 6- NO TESTING REQUIRED!        In fact, we do not retest if you were positive in the last 90 days.       After your 5 days of isolation are completed, the CDC recommends strict mask use for the first 5 days that you come out of isolation.       CDC Testing and Quarantine Guidelines for patients with exposure to a known-positive COVID-19 person:    ·     A 'close exposure' is defined as anyone who has had an exposure (masked or unmasked) to a known COVID -19 positive person            within 6 feet of someone            for a cumulative total of 15 minutes or more over a 24-hour period.    ·     vaccinated Have been boosted or completed the primary series of Pfizer or Moderna vaccine within the last 6 months or completed the primary series of J&J vaccine within the last 2 months and/or had a positive test within 90 days            do NOT need to quarantine after contact with someone who had COVID-19 unless they have symptoms.            fully  vaccinated people who have not had a positive test within 90 days, should get tested 3-5 days after their exposure, even if they don't have symptoms and wear a mask indoors in public for 10 days following exposure or until their test result is negative on day 5.            If you develop symptoms test and quarantine.         ·     Unvaccinated, or are more than six months out from their second mRNA dose (or more than 2 months after the J&J vaccine) and not yet boosted,  and/or NOT had a positive test within 90 days and meet 'close exposure'    you are required by CDC guidelines to quarantine for at least 5 days from time of exposure followed by 5 days of strict masking. It is recommended, but not required to test after 5 days, unless you develop symptoms, in which case you should test at that time.    If you do decide to test at 5 days and are asymptomatic, the risk is that if you test without symptoms on Day 5 for example) and you are positive, your 5 day isolation begins on that day, and you turned your 5 day quarantine into 10 days.            If your exposure does not meet the above definition, you can return to your normal daily activities to include social distancing, wearing a mask and frequent handwashing.    Alternatively, if a 5-day quarantine is not feasible, it is imperative that an exposed person wear a well-fitting mask at all times when around others for 10 days after exposure.

## 2022-06-16 NOTE — PATIENT INSTRUCTIONS
- You must understand that you have received an Urgent Care treatment only and that you may be released before all of your medical problems are known or treated.   - You, the patient, will arrange for follow up care as instructed.   - If your condition worsens or fails to improve we recommend that you receive another evaluation at the ER immediately or contact your PCP to discuss your concerns or return here.     You have tested POSITIVE for COVID-19 today.           ISOLATION    If you tested positive and do not have symptoms, you must isolate for 5 days starting on the day of the positive test. I       If you tested positive and have symptoms, you must isolate for 5 days starting on the day of the first symptoms,  not the day of the positive test.       This is the most important part, both the CDC and the [facility] emphasize that you do not test out of isolation.       After 5 days, if your symptoms have improved and you have not had fever on day 5, you can return to the community on day 6- NO TESTING REQUIRED!        In fact, we do not retest if you were positive in the last 90 days.       After your 5 days of isolation are completed, the CDC recommends strict mask use for the first 5 days that you come out of isolation.       CDC Testing and Quarantine Guidelines for patients with exposure to a known-positive COVID-19 person:    ·     A 'close exposure' is defined as anyone who has had an exposure (masked or unmasked) to a known COVID -19 positive person            within 6 feet of someone            for a cumulative total of 15 minutes or more over a 24-hour period.    ·     vaccinated Have been boosted or completed the primary series of Pfizer or Moderna vaccine within the last 6 months or completed the primary series of J&J vaccine within the last 2 months and/or had a positive test within 90 days            do NOT need to quarantine after contact with someone who had COVID-19 unless they have symptoms.             fully vaccinated people who have not had a positive test within 90 days, should get tested 3-5 days after their exposure, even if they don't have symptoms and wear a mask indoors in public for 10 days following exposure or until their test result is negative on day 5.            If you develop symptoms test and quarantine.         ·     Unvaccinated, or are more than six months out from their second mRNA dose (or more than 2 months after the J&J vaccine) and not yet boosted,  and/or NOT had a positive test within 90 days and meet 'close exposure'    you are required by CDC guidelines to quarantine for at least 5 days from time of exposure followed by 5 days of strict masking. It is recommended, but not required to test after 5 days, unless you develop symptoms, in which case you should test at that time.    If you do decide to test at 5 days and are asymptomatic, the risk is that if you test without symptoms on Day 5 for example) and you are positive, your 5 day isolation begins on that day, and you turned your 5 day quarantine into 10 days.            If your exposure does not meet the above definition, you can return to your normal daily activities to include social distancing, wearing a mask and frequent handwashing.    Alternatively, if a 5-day quarantine is not feasible, it is imperative that an exposed person wear a well-fitting mask at all times when around others for 10 days after exposure.

## 2022-08-11 ENCOUNTER — OFFICE VISIT (OUTPATIENT)
Dept: URGENT CARE | Facility: CLINIC | Age: 42
End: 2022-08-11
Payer: MEDICAID

## 2022-08-11 ENCOUNTER — TELEPHONE (OUTPATIENT)
Dept: URGENT CARE | Facility: CLINIC | Age: 42
End: 2022-08-11
Payer: MEDICAID

## 2022-08-11 VITALS
SYSTOLIC BLOOD PRESSURE: 133 MMHG | BODY MASS INDEX: 28.68 KG/M2 | HEIGHT: 64 IN | DIASTOLIC BLOOD PRESSURE: 84 MMHG | OXYGEN SATURATION: 98 % | HEART RATE: 103 BPM | RESPIRATION RATE: 18 BRPM | WEIGHT: 168 LBS | TEMPERATURE: 98 F

## 2022-08-11 DIAGNOSIS — R07.9 CHEST PAIN, UNSPECIFIED TYPE: Primary | ICD-10-CM

## 2022-08-11 DIAGNOSIS — R06.02 SOB (SHORTNESS OF BREATH): ICD-10-CM

## 2022-08-11 DIAGNOSIS — I10 PRIMARY HYPERTENSION: ICD-10-CM

## 2022-08-11 PROCEDURE — 1160F PR REVIEW ALL MEDS BY PRESCRIBER/CLIN PHARMACIST DOCUMENTED: ICD-10-PCS | Mod: CPTII,S$GLB,, | Performed by: FAMILY MEDICINE

## 2022-08-11 PROCEDURE — 3008F PR BODY MASS INDEX (BMI) DOCUMENTED: ICD-10-PCS | Mod: CPTII,S$GLB,, | Performed by: FAMILY MEDICINE

## 2022-08-11 PROCEDURE — 99214 PR OFFICE/OUTPT VISIT, EST, LEVL IV, 30-39 MIN: ICD-10-PCS | Mod: S$GLB,,, | Performed by: FAMILY MEDICINE

## 2022-08-11 PROCEDURE — 3075F SYST BP GE 130 - 139MM HG: CPT | Mod: CPTII,S$GLB,, | Performed by: FAMILY MEDICINE

## 2022-08-11 PROCEDURE — 1159F PR MEDICATION LIST DOCUMENTED IN MEDICAL RECORD: ICD-10-PCS | Mod: CPTII,S$GLB,, | Performed by: FAMILY MEDICINE

## 2022-08-11 PROCEDURE — 3075F PR MOST RECENT SYSTOLIC BLOOD PRESS GE 130-139MM HG: ICD-10-PCS | Mod: CPTII,S$GLB,, | Performed by: FAMILY MEDICINE

## 2022-08-11 PROCEDURE — 3044F HG A1C LEVEL LT 7.0%: CPT | Mod: CPTII,S$GLB,, | Performed by: FAMILY MEDICINE

## 2022-08-11 PROCEDURE — 3079F PR MOST RECENT DIASTOLIC BLOOD PRESSURE 80-89 MM HG: ICD-10-PCS | Mod: CPTII,S$GLB,, | Performed by: FAMILY MEDICINE

## 2022-08-11 PROCEDURE — 71046 X-RAY EXAM CHEST 2 VIEWS: CPT | Mod: FY,S$GLB,, | Performed by: RADIOLOGY

## 2022-08-11 PROCEDURE — 1159F MED LIST DOCD IN RCRD: CPT | Mod: CPTII,S$GLB,, | Performed by: FAMILY MEDICINE

## 2022-08-11 PROCEDURE — 71046 XR CHEST PA AND LATERAL: ICD-10-PCS | Mod: FY,S$GLB,, | Performed by: RADIOLOGY

## 2022-08-11 PROCEDURE — 1160F RVW MEDS BY RX/DR IN RCRD: CPT | Mod: CPTII,S$GLB,, | Performed by: FAMILY MEDICINE

## 2022-08-11 PROCEDURE — 3008F BODY MASS INDEX DOCD: CPT | Mod: CPTII,S$GLB,, | Performed by: FAMILY MEDICINE

## 2022-08-11 PROCEDURE — 3079F DIAST BP 80-89 MM HG: CPT | Mod: CPTII,S$GLB,, | Performed by: FAMILY MEDICINE

## 2022-08-11 PROCEDURE — 4010F PR ACE/ARB THEARPY RXD/TAKEN: ICD-10-PCS | Mod: CPTII,S$GLB,, | Performed by: FAMILY MEDICINE

## 2022-08-11 PROCEDURE — 3044F PR MOST RECENT HEMOGLOBIN A1C LEVEL <7.0%: ICD-10-PCS | Mod: CPTII,S$GLB,, | Performed by: FAMILY MEDICINE

## 2022-08-11 PROCEDURE — 99214 OFFICE O/P EST MOD 30 MIN: CPT | Mod: S$GLB,,, | Performed by: FAMILY MEDICINE

## 2022-08-11 PROCEDURE — 4010F ACE/ARB THERAPY RXD/TAKEN: CPT | Mod: CPTII,S$GLB,, | Performed by: FAMILY MEDICINE

## 2022-08-11 NOTE — TELEPHONE ENCOUNTER
Work note done, pt notified           ----- Message from Mary Bernstein sent at 8/11/2022 10:05 AM CDT -----  Regarding: Doctor Note  Pt was just seen at  today, Aug 11. She forgot to get a doctor note. She does have access to [facility], if the note can be uploaded on there. Pt can be contacted at [phone].

## 2022-08-11 NOTE — PROGRESS NOTES
"Subjective:       Patient ID: Shelli Iverson is a 42 y.o. female.    Vitals:  height is 5' 4" (1.626 m) and weight is 76.2 kg (168 lb). Her temperature is 97.9 °F (36.6 °C). Her blood pressure is 133/84 and her pulse is 103. Her respiration is 18 and oxygen saturation is 98%.     Chief Complaint: Chest Pain    Patient reports brief episodes of chest pain which lasts about 3-5 seconds.  Reports mild shortness of breath associated with that.  Patient course 3 for such episodes in last 24 hours.  Denies any chest pain or shortness of at present.  Denies weakness, nausea/vomiting, diaphoresis, fever, chills, cough, abdominal pain.    Chest Pain   This is a new problem. The current episode started yesterday. The onset quality is sudden. The problem occurs constantly. The problem has been unchanged. The pain is at a severity of 10/10. The pain is severe. The quality of the pain is described as sharp and pressure. The pain does not radiate. Associated symptoms include shortness of breath.       Cardiovascular: Positive for chest pain.   Respiratory: Positive for chest tightness and shortness of breath.        Objective:      Physical Exam   Constitutional: She is oriented to person, place, and time. She appears well-developed. She is cooperative.  Non-toxic appearance. She does not appear ill. No distress.   HENT:   Head: Normocephalic and atraumatic.   Ears:   Right Ear: Hearing normal.   Left Ear: Hearing normal.   Nose: Nose normal. No mucosal edema, rhinorrhea or nasal deformity. No epistaxis. Right sinus exhibits no maxillary sinus tenderness and no frontal sinus tenderness. Left sinus exhibits no maxillary sinus tenderness and no frontal sinus tenderness.   Mouth/Throat: Uvula is midline, oropharynx is clear and moist and mucous membranes are normal. No trismus in the jaw. Normal dentition. No uvula swelling. No posterior oropharyngeal erythema.   Eyes: Conjunctivae and lids are normal. Right eye exhibits no " discharge. Left eye exhibits no discharge. No scleral icterus.   Neck: Trachea normal and phonation normal. Neck supple.   Cardiovascular: Normal rate, regular rhythm, normal heart sounds and normal pulses.   No murmur heard.  Pulmonary/Chest: Effort normal and breath sounds normal. No stridor. No respiratory distress. She has no wheezes. She has no rhonchi. She has no rales. She exhibits no tenderness.   Abdominal: Normal appearance and bowel sounds are normal. She exhibits no distension and no mass. Soft. There is no abdominal tenderness. There is no left CVA tenderness and no right CVA tenderness.   Musculoskeletal: Normal range of motion.         General: No deformity. Normal range of motion.   Neurological: no focal deficit. She is alert, oriented to person, place, and time and at baseline. She displays no weakness. She exhibits normal muscle tone. Coordination normal.   Skin: Skin is warm, dry, intact, not diaphoretic and not pale.   Psychiatric: Her speech is normal and behavior is normal. Mood, judgment and thought content normal.   Nursing note and vitals reviewed.        Assessment:       1. Chest pain, unspecified type    2. SOB (shortness of breath)    3. Primary hypertension          Plan:     ECG and echo just unremarkable.  Considering the patient is asymptomatic ans stable at present, I feel comfortable to discharge patient home with strict ER precuations.  An urgent cardiology referral given because of patient's cardiac risk factors.  Discussed results/diagnosis/plan with patient in clinic. Advised to f/u with PCP within 2-5 days. ER precautions given if symptoms get any worse. All questions answered. Patient verbally understood and agreed with treatment plan.    Chest pain, unspecified type  -     X-Ray Chest PA And Lateral; Future; Expected date: 08/11/2022  -     Ambulatory referral/consult to Cardiology    SOB (shortness of breath)    Primary hypertension

## 2022-08-25 ENCOUNTER — TELEPHONE (OUTPATIENT)
Dept: CARDIOLOGY | Facility: CLINIC | Age: 42
End: 2022-08-25
Payer: MEDICAID

## 2022-08-25 NOTE — TELEPHONE ENCOUNTER
Pt notified of appt with Dr. Crocker 10/7/22 @ 10:40am        ----- Message from Ericka Lopez sent at 8/11/2022  9:52 AM CDT -----  Regarding: Est pt STAT appt request  Good morning,    Current pt is being referred back to Dr Crocker from Dr Tracey at [facility] for chest pain. Notes and referral are in Epic. Please contact pt to schedule and let me know if I can help any further.    Thank you,  Ericka Lopez  [facility]  Ext [phone]

## 2022-08-30 ENCOUNTER — PATIENT MESSAGE (OUTPATIENT)
Dept: FAMILY MEDICINE | Facility: CLINIC | Age: 42
End: 2022-08-30
Payer: MEDICAID

## 2022-08-30 DIAGNOSIS — F41.9 ANXIETY: Primary | ICD-10-CM

## 2022-09-07 ENCOUNTER — TELEPHONE (OUTPATIENT)
Dept: FAMILY MEDICINE | Facility: CLINIC | Age: 42
End: 2022-09-07
Payer: MEDICAID

## 2022-09-08 ENCOUNTER — OFFICE VISIT (OUTPATIENT)
Dept: PSYCHIATRY | Facility: CLINIC | Age: 42
End: 2022-09-08
Payer: MEDICAID

## 2022-09-08 DIAGNOSIS — F43.21 ADJUSTMENT DISORDER WITH DEPRESSED MOOD: Primary | ICD-10-CM

## 2022-09-08 DIAGNOSIS — F41.9 ANXIETY: ICD-10-CM

## 2022-09-08 PROCEDURE — 3044F HG A1C LEVEL LT 7.0%: CPT | Mod: AJ,HB,CPTII, | Performed by: SOCIAL WORKER

## 2022-09-08 PROCEDURE — 90791 PR PSYCHIATRIC DIAGNOSTIC EVALUATION: ICD-10-PCS | Mod: AJ,HB,, | Performed by: SOCIAL WORKER

## 2022-09-08 PROCEDURE — 3044F PR MOST RECENT HEMOGLOBIN A1C LEVEL <7.0%: ICD-10-PCS | Mod: AJ,HB,CPTII, | Performed by: SOCIAL WORKER

## 2022-09-08 PROCEDURE — 4010F PR ACE/ARB THEARPY RXD/TAKEN: ICD-10-PCS | Mod: AJ,HB,CPTII, | Performed by: SOCIAL WORKER

## 2022-09-08 PROCEDURE — 4010F ACE/ARB THERAPY RXD/TAKEN: CPT | Mod: AJ,HB,CPTII, | Performed by: SOCIAL WORKER

## 2022-09-08 PROCEDURE — 90791 PSYCH DIAGNOSTIC EVALUATION: CPT | Mod: AJ,HB,, | Performed by: SOCIAL WORKER

## 2022-09-08 NOTE — PROGRESS NOTES
"Psychiatry Initial Visit (PhD/LCSW)  Diagnostic Interview - CPT 35584    Date: 9/8/2022    Site: [place]    Referral source: [place] Salvador NP    Clinical status of patient: Outpatient    Shelli Iverson, a 42 y.o. female, for initial evaluation visit.  Met with patient.    Chief complaint/reason for encounter: anger and anxiety    History of present illness: "is experiencing high stress and anxiety in current work"    Pain: noncontributory    Symptoms:   Mood: depressed mood and diminished interest  Anxiety: excessive anxiety/worry, irritability, and muscle tension  Substance abuse: denied  Cognitive functioning: denied  Health behaviors: noncontributory    Psychiatric history: none    Medical history:   Past Medical History:   Diagnosis Date    GERD (gastroesophageal reflux disease)     Hypertension     Sciatica        Medications:    Current Outpatient Medications:     acetaminophen (TYLENOL) 500 MG tablet, Take 2 tablets (1,000 mg total) by mouth every 6 (six) hours as needed for Pain., Disp: 30 tablet, Rfl: 0    albuterol (PROVENTIL/VENTOLIN HFA) 90 mcg/actuation inhaler, Inhale 1-2 puffs into the lungs every 6 (six) hours as needed for Wheezing (And cough). Rescue, Disp: 1 Inhaler, Rfl: 0    amLODIPine (NORVASC) 10 MG tablet, TAKE 1 TABLET(10 MG) BY MOUTH EVERY DAY, Disp: 90 tablet, Rfl: 11    atorvastatin (LIPITOR) 20 MG tablet, TAKE 1 TABLET(20 MG) BY MOUTH EVERY DAY, Disp: 30 tablet, Rfl: 3    azelastine (ASTELIN) 137 mcg (0.1 %) nasal spray, 1 spray (137 mcg total) by Nasal route 2 (two) times daily. for 10 days, Disp: 30 mL, Rfl: 0    benzonatate (TESSALON) 200 MG capsule, Take 1 capsule (200 mg total) by mouth 3 (three) times daily as needed for Cough., Disp: 30 capsule, Rfl: 0    diclofenac sodium (VOLTAREN) 1 % Gel, Apply 2 g topically 4 (four) times daily. Apply to painful area for 10 days, Disp: 1 Tube, Rfl: 6    diphenhydrAMINE-aluminum-magnesium hydroxide-simethicone-lidocaine HCl 2%, Swish " and spit 5 mLs every 4 (four) hours as needed., Disp: 118 mL, Rfl: 0    estradioL (ESTRACE) 2 MG tablet, Take 1 tablet (2 mg total) by mouth once daily., Disp: 30 tablet, Rfl: 1    fluconazole (DIFLUCAN) 150 MG Tab, Take 1 tablet (150 mg total) by mouth once daily., Disp: 1 tablet, Rfl: 1    fluticasone propionate (FLONASE) 50 mcg/actuation nasal spray, 1 spray (50 mcg total) by Each Nostril route 2 (two) times daily., Disp: 1 Bottle, Rfl: 0    hydroCHLOROthiazide (HYDRODIURIL) 12.5 MG Tab, TAKE 1 TABLET(12.5 MG) BY MOUTH EVERY DAY, Disp: 90 tablet, Rfl: 11    hydrocortisone 2.5 % cream, Apply topically 2 (two) times daily., Disp: 28 g, Rfl: 6    ibuprofen (ADVIL,MOTRIN) 600 MG tablet, Take 1 tablet (600 mg total) by mouth every 6 (six) hours as needed for Pain (Take with food as needed for mild-to-moderate pain)., Disp: 20 tablet, Rfl: 0    losartan (COZAAR) 50 MG tablet, TAKE 2 TABLETS BY MOUTH DAILY(FOLLOW-UP WITH DOCTOR JULY 2020), Disp: 180 tablet, Rfl: 1    metFORMIN (GLUMETZA) 500 MG (MOD) 24hr tablet, Take 1 tablet (500 mg total) by mouth once daily., Disp: 30 tablet, Rfl: 11    prenatal vit 10-iron fum-folic 65-1 mg Tab, Take 1 tablet by mouth once daily., Disp: 30 tablet, Rfl: 11    promethazine-dextromethorphan (PROMETHAZINE-DM) 6.25-15 mg/5 mL Syrp, Take 5 mLs by mouth nightly as needed (cough)., Disp: 118 mL, Rfl: 0    QUEtiapine (SEROQUEL) 25 MG Tab, TAKE 1 TABLET(25 MG) BY MOUTH EVERY EVENING, Disp: 30 tablet, Rfl: 3    Family history of psychiatric illness:   Family History   Problem Relation Age of Onset    Heart disease Mother         CAD age 38    Hypertension Mother 38        MI    Diabetes Mother     Hypertension Father        Social history (marriage, employment, etc.): Shelli is a 42 year old AA female, small daughter Giovanna, 6 years old, works as a special , has a Master's and working on Doctorate in Education, 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  Experiencing panic attacks, mother  at age 38 from heart attack; dad drowned post [place] at age 46, two siblings, older brother Irwin (47) and younger sister Brina (36) and is struggling emotionally, is tearful during the session.  Validation and support offered and will see her virtually as scheduled.   Social History     Tobacco Use    Smoking status: Never    Smokeless tobacco: Never   Substance Use Topics    Alcohol use: Not Currently     Comment: RARELY    Drug use: No     Comment: occasional       Current medications and drug reactions (include OTC, herbal): see medication list     Strengths and liabilities: Strength: Patient accepts guidance/feedback, Strength: Patient is expressive/articulate., Strength: Patient is intelligent., Strength: Patient is motivated for change., Strength: Patient is physically healthy., Strength: Patient has reasonable judgment., Strength: Patient is stable., Liability: Patient lacks coping skills.    Current Evaluation:     Mental Status Exam:  General Appearance:  unremarkable, age appropriate, normal weight, well dressed, neatly groomed   Speech: normal tone, normal rate, normal pitch, normal volume      Level of Cooperation: cooperative      Thought Processes: normal and logical, goal-directed   Mood: anxious, depressed, sad      Thought Content: normal, no suicidality, no homicidality, delusions, or paranoia   Affect: congruent and appropriate   Orientation: Oriented x3   Memory: recent >  intact, remote >  intact   Attention Span & Concentration: intact   Fund of General Knowledge: intact and appropriate to age and level of education   Abstract Reasoning: intact   Judgment & Insight:  fair     Language  intact     Diagnostic Impression - Plan:       ICD-10-CM ICD-9-CM   1. Adjustment disorder with depressed mood  F43.21 309.0   2. Anxiety  F41.9 300.00       Plan:individual psychotherapy    Return to Clinic: as scheduled    Length of Service (minutes): 45

## 2022-09-21 ENCOUNTER — TELEPHONE (OUTPATIENT)
Dept: PSYCHIATRY | Facility: CLINIC | Age: 42
End: 2022-09-21
Payer: MEDICAID

## 2022-09-21 NOTE — TELEPHONE ENCOUNTER
----- Message from Aj Bird MA sent at 9/21/2022  2:39 PM CDT -----  Regarding: Reshedule  virtual appt.  Patient needs to change virtual appt for a later time in the day having issues with appt.

## 2022-09-22 ENCOUNTER — PATIENT MESSAGE (OUTPATIENT)
Dept: PSYCHIATRY | Facility: CLINIC | Age: 42
End: 2022-09-22
Payer: MEDICAID

## 2022-09-22 NOTE — TELEPHONE ENCOUNTER
Sent reminder to client about today's appt and clinic policy for No Shows and Late Cancels as well.

## 2022-09-30 ENCOUNTER — OFFICE VISIT (OUTPATIENT)
Dept: FAMILY MEDICINE | Facility: CLINIC | Age: 42
End: 2022-09-30
Payer: MEDICAID

## 2022-09-30 ENCOUNTER — PATIENT MESSAGE (OUTPATIENT)
Dept: FAMILY MEDICINE | Facility: CLINIC | Age: 42
End: 2022-09-30
Payer: MEDICAID

## 2022-09-30 DIAGNOSIS — F41.0 GENERALIZED ANXIETY DISORDER WITH PANIC ATTACKS: Primary | ICD-10-CM

## 2022-09-30 DIAGNOSIS — F41.1 GENERALIZED ANXIETY DISORDER WITH PANIC ATTACKS: Primary | ICD-10-CM

## 2022-09-30 DIAGNOSIS — F43.21 ADJUSTMENT DISORDER WITH DEPRESSED MOOD: ICD-10-CM

## 2022-09-30 PROCEDURE — 99214 PR OFFICE/OUTPT VISIT, EST, LEVL IV, 30-39 MIN: ICD-10-PCS | Mod: 95,,,

## 2022-09-30 PROCEDURE — 3044F PR MOST RECENT HEMOGLOBIN A1C LEVEL <7.0%: ICD-10-PCS | Mod: CPTII,95,,

## 2022-09-30 PROCEDURE — 1159F PR MEDICATION LIST DOCUMENTED IN MEDICAL RECORD: ICD-10-PCS | Mod: CPTII,95,,

## 2022-09-30 PROCEDURE — 1160F RVW MEDS BY RX/DR IN RCRD: CPT | Mod: CPTII,95,,

## 2022-09-30 PROCEDURE — 99214 OFFICE O/P EST MOD 30 MIN: CPT | Mod: 95,,,

## 2022-09-30 PROCEDURE — 4010F PR ACE/ARB THEARPY RXD/TAKEN: ICD-10-PCS | Mod: CPTII,95,,

## 2022-09-30 PROCEDURE — 3044F HG A1C LEVEL LT 7.0%: CPT | Mod: CPTII,95,,

## 2022-09-30 PROCEDURE — 4010F ACE/ARB THERAPY RXD/TAKEN: CPT | Mod: CPTII,95,,

## 2022-09-30 PROCEDURE — 1160F PR REVIEW ALL MEDS BY PRESCRIBER/CLIN PHARMACIST DOCUMENTED: ICD-10-PCS | Mod: CPTII,95,,

## 2022-09-30 PROCEDURE — 1159F MED LIST DOCD IN RCRD: CPT | Mod: CPTII,95,,

## 2022-09-30 RX ORDER — ESCITALOPRAM OXALATE 10 MG/1
10 TABLET ORAL DAILY
Qty: 30 TABLET | Refills: 11 | Status: SHIPPED | OUTPATIENT
Start: 2022-09-30 | End: 2022-11-22 | Stop reason: ALTCHOICE

## 2022-09-30 RX ORDER — HYDROXYZINE HYDROCHLORIDE 25 MG/1
25 TABLET, FILM COATED ORAL 3 TIMES DAILY PRN
Qty: 90 TABLET | Refills: 1 | Status: SHIPPED | OUTPATIENT
Start: 2022-09-30 | End: 2022-11-22

## 2022-09-30 RX ORDER — BUSPIRONE HYDROCHLORIDE 10 MG/1
10 TABLET ORAL 3 TIMES DAILY
Qty: 90 TABLET | Refills: 11 | Status: SHIPPED | OUTPATIENT
Start: 2022-09-30 | End: 2022-11-22

## 2022-09-30 NOTE — PATIENT INSTRUCTIONS
Grayson Marques,     If you are due for any health screening(s) below please notify me so we can arrange them to be ordered and scheduled to maintain your health. Most healthy patients complete it. Don't lose out on improving your health.     Tests to Keep You Healthy    Mammogram: DUE  Eye Exam: DUE  Cervical Cancer Screening: Met on 1/22/2019  Last Blood Pressure <= 139/89 (2/1/2022): NO  Last HbA1c < 8 (01/27/2022): Yes      Breast Cancer Screening    Breast cancer is the second most common cancer in women after skin cancer, and the second leading cause of death from cancer after lung cancer. Mammograms can detect breast cancer early, which significantly increases the chances of curing the cancer.      A screening mammogram is an x-ray image of the breasts used for early breast cancer detection. It can help reduce the number of deaths from breast cancer among women. To get a clear image, the breast is placed between two plastic plates to make it flat. How often a mammogram is needed depends on your age and your breast cancer risk.    Although you are still overdue for this important screening, due to the COVID-19 pandemic, we recommend scheduling it in the near future.                  September 30, 2022       Shelli Iverson  [address] [place] [address]         Dear Shelli:    Your [facility] is dedicated to helping you stay healthy with regularly scheduled recommended screenings.  Scheduling routine screenings is important to maintaining good health. Our records indicate that you may be overdue for your screening pap smear. A pap smear screening can help identify patients at risk for developing cervical cancer at an early stage, when it is most likely to be successfully treated.    We encourage you to schedule your appointment with your womens health provider or some primary care providers also perform this screening.    If you have completed or scheduled your pap smear screening outside of [facility], please notify your primary care team so we can update your health record.      If you have questions or would like to schedule your screening, please contact your primary care clinic.    Sincerely,    Primary Doctor Jaz and your [facility]  Diabetic Retinal Eye Exam    Diabetes is the #1 cause of blindness in the [place] - early detection before signs or symptoms develop can prevent debilitating blindness.    Once-a-year screening is recommended for all diabetic patients. This exam can prevent and treat diabetes complications in the eye before developing symptoms. This can be done with a special camera is used to take photographs of the back of your eye without having to dilate them, or you can see an eye doctor for a full dilated exam.    Although you are still overdue for this important screening, due to the COVID-19 pandemic, we recommend scheduling it in the near future.  Diabetic A1c Testing    Your chart identifies you as having diabetes. It is recommended that all diabetes patients have an A1c test done at least once a year, but [facility] recommends twice a year for most patients. This helps your doctor to better help you manage your diabetes. This is a non-fasting lab test which can be completed at any time. Your result will be sent to your Primary Care Provider for review and that office will contact you with the results.

## 2022-09-30 NOTE — PROGRESS NOTES
Subjective:       Patient ID: Shelli Iverson is a 42 y.o. female.    Chief Complaint: No chief complaint on file.    Presents to this virtual appt w/ complaints of severely worsening anxiety and depression. Related symptoms to work stressors. 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 Has been doing well at her job prior to having a new principal come in. Had good working relationship with the principal until just this year. Not sure what changed. Has been taking seroquel for this problem with minimal relief. Previously seeing LCSW for this issue but missed appts due to being scheduled at inappropriate times for her. She is tearful during our conversation. States that she is not sure how much more of this she can withstand. Does not describe SI, HI.     The patient location is: [place]  The chief complaint leading to consultation is: Anxiety    Visit type: audiovisual      Face to Face time with patient: 10  20 minutes of total time spent on the encounter, which includes face to face time and non-face to face time preparing to see the patient (eg, review of tests), Obtaining and/or reviewing separately obtained history, Documenting clinical information in the electronic or other health record, Independently interpreting results (not separately reported) and communicating results to the patient/family/caregiver, or Care coordination (not separately reported).     Each patient to whom he or she provides medical services by telemedicine is:  (1) informed of the relationship between the physician and patient and the respective role of any other health care provider with respect to management of the patient; and (2) notified that he or she may decline to receive medical services by telemedicine and may withdraw from such care at any time.    Past Medical History:   Diagnosis Date    GERD (gastroesophageal reflux disease)     Hypertension     Sciatica        Review of patient's allergies indicates:    Allergen Reactions    Penicillins Rash         Current Outpatient Medications:     acetaminophen (TYLENOL) 500 MG tablet, Take 2 tablets (1,000 mg total) by mouth every 6 (six) hours as needed for Pain., Disp: 30 tablet, Rfl: 0    albuterol (PROVENTIL/VENTOLIN HFA) 90 mcg/actuation inhaler, Inhale 1-2 puffs into the lungs every 6 (six) hours as needed for Wheezing (And cough). Rescue, Disp: 1 Inhaler, Rfl: 0    amLODIPine (NORVASC) 10 MG tablet, TAKE 1 TABLET(10 MG) BY MOUTH EVERY DAY, Disp: 90 tablet, Rfl: 11    atorvastatin (LIPITOR) 20 MG tablet, TAKE 1 TABLET(20 MG) BY MOUTH EVERY DAY, Disp: 30 tablet, Rfl: 3    azelastine (ASTELIN) 137 mcg (0.1 %) nasal spray, 1 spray (137 mcg total) by Nasal route 2 (two) times daily. for 10 days, Disp: 30 mL, Rfl: 0    benzonatate (TESSALON) 200 MG capsule, Take 1 capsule (200 mg total) by mouth 3 (three) times daily as needed for Cough., Disp: 30 capsule, Rfl: 0    busPIRone (BUSPAR) 10 MG tablet, Take 1 tablet (10 mg total) by mouth 3 (three) times daily., Disp: 90 tablet, Rfl: 11    diclofenac sodium (VOLTAREN) 1 % Gel, Apply 2 g topically 4 (four) times daily. Apply to painful area for 10 days, Disp: 1 Tube, Rfl: 6    diphenhydrAMINE-aluminum-magnesium hydroxide-simethicone-lidocaine HCl 2%, Swish and spit 5 mLs every 4 (four) hours as needed., Disp: 118 mL, Rfl: 0    EScitalopram oxalate (LEXAPRO) 10 MG tablet, Take 1 tablet (10 mg total) by mouth once daily., Disp: 30 tablet, Rfl: 11    estradioL (ESTRACE) 2 MG tablet, Take 1 tablet (2 mg total) by mouth once daily., Disp: 30 tablet, Rfl: 1    fluconazole (DIFLUCAN) 150 MG Tab, Take 1 tablet (150 mg total) by mouth once daily., Disp: 1 tablet, Rfl: 1    fluticasone propionate (FLONASE) 50 mcg/actuation nasal spray, 1 spray (50 mcg total) by Each Nostril route 2 (two) times daily., Disp: 1 Bottle, Rfl: 0    hydroCHLOROthiazide (HYDRODIURIL) 12.5 MG Tab, TAKE 1 TABLET(12.5 MG) BY MOUTH EVERY DAY, Disp: 90 tablet, Rfl:  11    hydrocortisone 2.5 % cream, Apply topically 2 (two) times daily., Disp: 28 g, Rfl: 6    hydrOXYzine HCL (ATARAX) 25 MG tablet, Take 1 tablet (25 mg total) by mouth 3 (three) times daily as needed for Anxiety., Disp: 90 tablet, Rfl: 1    ibuprofen (ADVIL,MOTRIN) 600 MG tablet, Take 1 tablet (600 mg total) by mouth every 6 (six) hours as needed for Pain (Take with food as needed for mild-to-moderate pain)., Disp: 20 tablet, Rfl: 0    losartan (COZAAR) 50 MG tablet, TAKE 2 TABLETS BY MOUTH DAILY(FOLLOW-UP WITH DOCTOR JULY 2020), Disp: 180 tablet, Rfl: 1    metFORMIN (GLUMETZA) 500 MG (MOD) 24hr tablet, Take 1 tablet (500 mg total) by mouth once daily., Disp: 30 tablet, Rfl: 11    prenatal vit 10-iron fum-folic 65-1 mg Tab, Take 1 tablet by mouth once daily., Disp: 30 tablet, Rfl: 11    promethazine-dextromethorphan (PROMETHAZINE-DM) 6.25-15 mg/5 mL Syrp, Take 5 mLs by mouth nightly as needed (cough)., Disp: 118 mL, Rfl: 0    QUEtiapine (SEROQUEL) 25 MG Tab, TAKE 1 TABLET(25 MG) BY MOUTH EVERY EVENING, Disp: 30 tablet, Rfl: 3    Review of Systems   Constitutional:  Positive for activity change.   Psychiatric/Behavioral:  Positive for agitation, dysphoric mood and sleep disturbance. Negative for self-injury and suicidal ideas. The patient is nervous/anxious.      Objective:      There were no vitals taken for this visit.  Physical Exam  Constitutional:       General: She is not in acute distress.     Appearance: Normal appearance. She is not ill-appearing or toxic-appearing.      Comments: PE limited due to this being a virtual appt   Pulmonary:      Effort: Pulmonary effort is normal. No respiratory distress.   Neurological:      Mental Status: She is alert.   Psychiatric:         Behavior: Behavior normal.         Thought Content: Thought content normal.         Judgment: Judgment normal.      Comments: Very Tearful       Assessment:       1. Generalized anxiety disorder with panic attacks    2. Adjustment  disorder with depressed mood          Plan:       Generalized anxiety disorder with panic attacks  -     EScitalopram oxalate (LEXAPRO) 10 MG tablet; Take 1 tablet (10 mg total) by mouth once daily.  Dispense: 30 tablet; Refill: 11  -     busPIRone (BUSPAR) 10 MG tablet; Take 1 tablet (10 mg total) by mouth 3 (three) times daily.  Dispense: 90 tablet; Refill: 11  -     Ambulatory referral/consult to Psychiatry; Future; Expected date: 10/07/2022  -     hydrOXYzine HCL (ATARAX) 25 MG tablet; Take 1 tablet (25 mg total) by mouth 3 (three) times daily as needed for Anxiety.  Dispense: 90 tablet; Refill: 1    Adjustment disorder with depressed mood  -     EScitalopram oxalate (LEXAPRO) 10 MG tablet; Take 1 tablet (10 mg total) by mouth once daily.  Dispense: 30 tablet; Refill: 11  -     busPIRone (BUSPAR) 10 MG tablet; Take 1 tablet (10 mg total) by mouth 3 (three) times daily.  Dispense: 90 tablet; Refill: 11  -     Ambulatory referral/consult to Psychiatry; Future; Expected date: 10/07/2022  -     hydrOXYzine HCL (ATARAX) 25 MG tablet; Take 1 tablet (25 mg total) by mouth 3 (three) times daily as needed for Anxiety.  Dispense: 90 tablet; Refill: 1        One month with me to assess efficacy of therapy. Pt informed that I will not be able to provide extended time away from work and that this would need to be established with a psychiatrist. Had Tiffanie provide psych resources.        Ha Baum PA-C  Family Medicine Physician Assistant       I spent a total of 20 minutes on the day of the visit.This includes face to face time and non-face to face time preparing to see the patient (eg, review of tests), obtaining and/or reviewing separately obtained history, documenting clinical information in the electronic or other health record, independently interpreting results and communicating results to the patient/family/caregiver, or care coordinator.      We have addressed [4] Moderate: 1 or more chronic illnesses with  exacerbation, progression, or side effects of treatment / 2 or more stable chronic illnesses / 1 undiagnosed new problem with uncertain prognosis / 1 acute illness with systemic symptoms / 1 acute complicated injury  The complexity of the data reviewed and analyzed for this visit was [2] Minimal or None  The risk of complications and/or morbidity or mortality are [4] Moderate risk (I.e. prescription drug management / decision regarding minor surgery with identified pt or procedure risk factors / decision regarding elective major surgery without identified pt or procedure risk factors / diagnosis or treatment significantly limited by social determinants of health)   The level of Medical Decision Making for this visit is [4] Moderate

## 2022-10-03 ENCOUNTER — PATIENT MESSAGE (OUTPATIENT)
Dept: FAMILY MEDICINE | Facility: CLINIC | Age: 42
End: 2022-10-03
Payer: MEDICAID

## 2022-10-03 ENCOUNTER — TELEPHONE (OUTPATIENT)
Dept: PSYCHIATRY | Facility: CLINIC | Age: 42
End: 2022-10-03
Payer: MEDICAID

## 2022-10-03 NOTE — TELEPHONE ENCOUNTER
----- Message from Aj Bird MA sent at 10/3/2022  9:41 AM CDT -----  Regarding: Schedule new pt appt.  Patient called to schedule new pt appt. [phone].

## 2022-10-03 NOTE — TELEPHONE ENCOUNTER
Spoke to the patient scheduled a new patient medication management appointment with dr. Young 11/11/22 @ 9 am. Tried to schedule follow up appointment with Bryanna Reyes LCSW, patient phone disconnected, Called patient back and voice mail was full.

## 2022-10-04 ENCOUNTER — OFFICE VISIT (OUTPATIENT)
Dept: PSYCHIATRY | Facility: CLINIC | Age: 42
End: 2022-10-04
Payer: MEDICAID

## 2022-10-04 DIAGNOSIS — F41.9 ANXIETY: Primary | ICD-10-CM

## 2022-10-04 DIAGNOSIS — F43.21 ADJUSTMENT DISORDER WITH DEPRESSED MOOD: ICD-10-CM

## 2022-10-04 PROCEDURE — 90832 PSYTX W PT 30 MINUTES: CPT | Mod: AJ,HB,95, | Performed by: SOCIAL WORKER

## 2022-10-04 PROCEDURE — 3044F PR MOST RECENT HEMOGLOBIN A1C LEVEL <7.0%: ICD-10-PCS | Mod: AJ,HB,CPTII,95 | Performed by: SOCIAL WORKER

## 2022-10-04 PROCEDURE — 4010F ACE/ARB THERAPY RXD/TAKEN: CPT | Mod: AJ,HB,CPTII,95 | Performed by: SOCIAL WORKER

## 2022-10-04 PROCEDURE — 4010F PR ACE/ARB THEARPY RXD/TAKEN: ICD-10-PCS | Mod: AJ,HB,CPTII,95 | Performed by: SOCIAL WORKER

## 2022-10-04 PROCEDURE — 3044F HG A1C LEVEL LT 7.0%: CPT | Mod: AJ,HB,CPTII,95 | Performed by: SOCIAL WORKER

## 2022-10-04 PROCEDURE — 90832 PR PSYCHOTHERAPY W/PATIENT, 30 MIN: ICD-10-PCS | Mod: AJ,HB,95, | Performed by: SOCIAL WORKER

## 2022-10-04 NOTE — PROGRESS NOTES
Individual Psychotherapy (PhD/LCSW)    10/4/2022    Site:  [facility]        Therapeutic Intervention: Met with patient.  This is a virtual visit and client affirms she is in her home located in [place], [place] as she took the last two days off so not in school setting. 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 Rescue, Disp: 1 Inhaler, Rfl: 0    amLODIPine (NORVASC) 10 MG tablet, TAKE 1 TABLET(10 MG) BY MOUTH EVERY DAY, Disp: 90 tablet, Rfl: 11    atorvastatin (LIPITOR) 20 MG tablet, TAKE 1 TABLET(20 MG) BY MOUTH EVERY DAY, Disp: 30 tablet, Rfl: 3    azelastine (ASTELIN) 137 mcg (0.1 %) nasal spray, 1 spray (137 mcg total) by Nasal route 2 (two) times daily. for 10 days, Disp: 30 mL, Rfl: 0    benzonatate (TESSALON) 200 MG capsule, Take 1 capsule (200 mg total) by mouth 3 (three) times daily as needed for Cough., Disp: 30 capsule, Rfl: 0    busPIRone (BUSPAR) 10 MG tablet, Take 1 tablet (10 mg total) by mouth 3 (three) times daily., Disp: 90 tablet, Rfl: 11    diclofenac sodium (VOLTAREN) 1 % Gel, Apply 2 g topically 4 (four) times daily. Apply to painful area for 10 days, Disp: 1 Tube, Rfl: 6    diphenhydrAMINE-aluminum-magnesium hydroxide-simethicone-lidocaine HCl 2%, Swish and  spit 5 mLs every 4 (four) hours as needed., Disp: 118 mL, Rfl: 0    EScitalopram oxalate (LEXAPRO) 10 MG tablet, Take 1 tablet (10 mg total) by mouth once daily., Disp: 30 tablet, Rfl: 11    estradioL (ESTRACE) 2 MG tablet, Take 1 tablet (2 mg total) by mouth once daily., Disp: 30 tablet, Rfl: 1    fluconazole (DIFLUCAN) 150 MG Tab, Take 1 tablet (150 mg total) by mouth once daily., Disp: 1 tablet, Rfl: 1    fluticasone propionate (FLONASE) 50 mcg/actuation nasal spray, 1 spray (50 mcg total) by Each Nostril route 2 (two) times daily., Disp: 1 Bottle, Rfl: 0    hydroCHLOROthiazide (HYDRODIURIL) 12.5 MG Tab, TAKE 1 TABLET(12.5 MG) BY MOUTH EVERY DAY, Disp: 90 tablet, Rfl: 11    hydrocortisone 2.5 % cream, Apply topically 2 (two) times daily., Disp: 28 g, Rfl: 6    hydrOXYzine HCL (ATARAX) 25 MG tablet, Take 1 tablet (25 mg total) by mouth 3 (three) times daily as needed for Anxiety., Disp: 90 tablet, Rfl: 1    ibuprofen (ADVIL,MOTRIN) 600 MG tablet, Take 1 tablet (600 mg total) by mouth every 6 (six) hours as needed for Pain (Take with food as needed for mild-to-moderate pain)., Disp: 20 tablet, Rfl: 0    losartan (COZAAR) 50 MG tablet, TAKE 2 TABLETS BY MOUTH DAILY(FOLLOW-UP WITH DOCTOR JULY 2020), Disp: 180 tablet, Rfl: 1    metFORMIN (GLUMETZA) 500 MG (MOD) 24hr tablet, Take 1 tablet (500 mg total) by mouth once daily., Disp: 30 tablet, Rfl: 11    prenatal vit 10-iron fum-folic 65-1 mg Tab, Take 1 tablet by mouth once daily., Disp: 30 tablet, Rfl: 11    promethazine-dextromethorphan (PROMETHAZINE-DM) 6.25-15 mg/5 mL Syrp, Take 5 mLs by mouth nightly as needed (cough)., Disp: 118 mL, Rfl: 0    QUEtiapine (SEROQUEL) 25 MG Tab, TAKE 1 TABLET(25 MG) BY MOUTH EVERY EVENING, Disp: 30 tablet, Rfl: 3    Family history of psychiatric illness:   Family History   Problem Relation Age of Onset    Heart disease Mother         CAD age 38    Hypertension Mother 38        MI    Diabetes Mother     Hypertension Father         Social history (marriage, employment, etc.):   Social History     Tobacco Use    Smoking status: Never    Smokeless tobacco: Never   Substance Use Topics    Alcohol use: Not Currently     Comment: RARELY    Drug use: No     Comment: occasional       Interval history and content of current session: Speaks to tomorrow's meeting addressing the concerns her principal has drawn up against her and accepts coaching in preparation for tomorrow including that a job does not define who she is, she defines who she is, and to rise above whatever transpires tomorrow.  Stay calm, collected, and breathe.  Is scheduled to see Dr. Young in November and will follow up with clinician as to what transpires tomorrow. Anxious mood much relieved by end of session, was tearful throughout as recognizes that it is time for her to find something more suited to her skills and comfort level.  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  Remind her of her passion.     Treatment plan:  Target symptoms: anxiety , adjustment, grief, work stress  Why chosen therapy is appropriate versus another modality: relevant to diagnosis, patient responds to this modality, evidence based practice  Outcome monitoring methods: self-report, observation  Therapeutic intervention type: insight oriented psychotherapy, behavior modifying psychotherapy, supportive psychotherapy    Risk parameters:  Patient reports no suicidal ideation  Patient reports no homicidal ideation  Patient reports no self-injurious behavior  Patient reports no violent behavior    Verbal deficits: None    Patient's response to intervention:  The patient's response to intervention is accepting, motivated.    Progress toward goals and other mental status changes:  The patient's progress toward goals is good.    Diagnosis:   1. Anxiety    2. Adjustment disorder with depressed mood        Plan:  individual psychotherapy    Return to clinic:  as scheduled    Length of Service (minutes): 30

## 2022-10-05 ENCOUNTER — PATIENT MESSAGE (OUTPATIENT)
Dept: PSYCHIATRY | Facility: CLINIC | Age: 42
End: 2022-10-05
Payer: MEDICAID

## 2022-10-24 ENCOUNTER — TELEPHONE (OUTPATIENT)
Dept: FAMILY MEDICINE | Facility: CLINIC | Age: 42
End: 2022-10-24
Payer: MEDICAID

## 2022-10-24 NOTE — TELEPHONE ENCOUNTER
----- Message from Kita Jackson sent at 10/24/2022  3:05 PM CDT -----  Contact: Patient  Type:  Patient Returning Call    Who Called: Patient  Who Left Message for Patient:  Tiffanie Uribe  Does the patient know what this is regarding?: Yes  Best Call Back Number: [phone]   Additional Information:  Patient returning phone call  Please call Patient

## 2022-10-26 ENCOUNTER — OFFICE VISIT (OUTPATIENT)
Dept: FAMILY MEDICINE | Facility: CLINIC | Age: 42
End: 2022-10-26
Payer: MEDICAID

## 2022-10-26 DIAGNOSIS — F41.1 GENERALIZED ANXIETY DISORDER WITH PANIC ATTACKS: ICD-10-CM

## 2022-10-26 DIAGNOSIS — F43.21 ADJUSTMENT DISORDER WITH DEPRESSED MOOD: ICD-10-CM

## 2022-10-26 DIAGNOSIS — F41.0 GENERALIZED ANXIETY DISORDER WITH PANIC ATTACKS: ICD-10-CM

## 2022-10-26 DIAGNOSIS — I10 ESSENTIAL (PRIMARY) HYPERTENSION: Primary | ICD-10-CM

## 2022-10-26 PROCEDURE — 4010F PR ACE/ARB THEARPY RXD/TAKEN: ICD-10-PCS | Mod: CPTII,95,,

## 2022-10-26 PROCEDURE — 1160F RVW MEDS BY RX/DR IN RCRD: CPT | Mod: CPTII,95,,

## 2022-10-26 PROCEDURE — 4010F ACE/ARB THERAPY RXD/TAKEN: CPT | Mod: CPTII,95,,

## 2022-10-26 PROCEDURE — 1159F MED LIST DOCD IN RCRD: CPT | Mod: CPTII,95,,

## 2022-10-26 PROCEDURE — 99214 OFFICE O/P EST MOD 30 MIN: CPT | Mod: 95,,,

## 2022-10-26 PROCEDURE — 1160F PR REVIEW ALL MEDS BY PRESCRIBER/CLIN PHARMACIST DOCUMENTED: ICD-10-PCS | Mod: CPTII,95,,

## 2022-10-26 PROCEDURE — 99214 PR OFFICE/OUTPT VISIT, EST, LEVL IV, 30-39 MIN: ICD-10-PCS | Mod: 95,,,

## 2022-10-26 PROCEDURE — 1159F PR MEDICATION LIST DOCUMENTED IN MEDICAL RECORD: ICD-10-PCS | Mod: CPTII,95,,

## 2022-10-26 PROCEDURE — 3044F HG A1C LEVEL LT 7.0%: CPT | Mod: CPTII,95,,

## 2022-10-26 PROCEDURE — 3044F PR MOST RECENT HEMOGLOBIN A1C LEVEL <7.0%: ICD-10-PCS | Mod: CPTII,95,,

## 2022-10-26 RX ORDER — AMLODIPINE BESYLATE 10 MG/1
10 TABLET ORAL DAILY
Qty: 90 TABLET | Refills: 3 | Status: SHIPPED | OUTPATIENT
Start: 2022-10-26 | End: 2023-07-06 | Stop reason: SDUPTHER

## 2022-10-26 NOTE — PROGRESS NOTES
Subjective:       Patient ID: Shelli Iverson is a 42 y.o. female.    Chief Complaint: No chief complaint on file.    Presents to this virtual appt for follow up of anxiety and depression. Doing better now. Saw LCSW recently. Taking buspar and lexapro. Has some side effects however she feels significantly improved from previous. Scheduled to see Dr. Young next month.     Requests med refill on norvasc.       Past Medical History:   Diagnosis Date    GERD (gastroesophageal reflux disease)     Hypertension     Sciatica        Review of patient's allergies indicates:   Allergen Reactions    Penicillins Rash         Current Outpatient Medications:     acetaminophen (TYLENOL) 500 MG tablet, Take 2 tablets (1,000 mg total) by mouth every 6 (six) hours as needed for Pain., Disp: 30 tablet, Rfl: 0    albuterol (PROVENTIL/VENTOLIN HFA) 90 mcg/actuation inhaler, Inhale 1-2 puffs into the lungs every 6 (six) hours as needed for Wheezing (And cough). Rescue, Disp: 1 Inhaler, Rfl: 0    amLODIPine (NORVASC) 10 MG tablet, Take 1 tablet (10 mg total) by mouth once daily., Disp: 90 tablet, Rfl: 3    atorvastatin (LIPITOR) 20 MG tablet, TAKE 1 TABLET(20 MG) BY MOUTH EVERY DAY, Disp: 30 tablet, Rfl: 3    azelastine (ASTELIN) 137 mcg (0.1 %) nasal spray, 1 spray (137 mcg total) by Nasal route 2 (two) times daily. for 10 days, Disp: 30 mL, Rfl: 0    benzonatate (TESSALON) 200 MG capsule, Take 1 capsule (200 mg total) by mouth 3 (three) times daily as needed for Cough., Disp: 30 capsule, Rfl: 0    busPIRone (BUSPAR) 10 MG tablet, Take 1 tablet (10 mg total) by mouth 3 (three) times daily., Disp: 90 tablet, Rfl: 11    diclofenac sodium (VOLTAREN) 1 % Gel, Apply 2 g topically 4 (four) times daily. Apply to painful area for 10 days, Disp: 1 Tube, Rfl: 6    diphenhydrAMINE-aluminum-magnesium hydroxide-simethicone-lidocaine HCl 2%, Swish and spit 5 mLs every 4 (four) hours as needed., Disp: 118 mL, Rfl: 0    EScitalopram oxalate (LEXAPRO) 10  MG tablet, Take 1 tablet (10 mg total) by mouth once daily., Disp: 30 tablet, Rfl: 11    estradioL (ESTRACE) 2 MG tablet, Take 1 tablet (2 mg total) by mouth once daily., Disp: 30 tablet, Rfl: 1    fluconazole (DIFLUCAN) 150 MG Tab, Take 1 tablet (150 mg total) by mouth once daily., Disp: 1 tablet, Rfl: 1    fluticasone propionate (FLONASE) 50 mcg/actuation nasal spray, 1 spray (50 mcg total) by Each Nostril route 2 (two) times daily., Disp: 1 Bottle, Rfl: 0    hydroCHLOROthiazide (HYDRODIURIL) 12.5 MG Tab, TAKE 1 TABLET(12.5 MG) BY MOUTH EVERY DAY, Disp: 90 tablet, Rfl: 11    hydrocortisone 2.5 % cream, Apply topically 2 (two) times daily., Disp: 28 g, Rfl: 6    hydrOXYzine HCL (ATARAX) 25 MG tablet, Take 1 tablet (25 mg total) by mouth 3 (three) times daily as needed for Anxiety., Disp: 90 tablet, Rfl: 1    ibuprofen (ADVIL,MOTRIN) 600 MG tablet, Take 1 tablet (600 mg total) by mouth every 6 (six) hours as needed for Pain (Take with food as needed for mild-to-moderate pain)., Disp: 20 tablet, Rfl: 0    losartan (COZAAR) 50 MG tablet, TAKE 2 TABLETS BY MOUTH DAILY(FOLLOW-UP WITH DOCTOR JULY 2020), Disp: 180 tablet, Rfl: 1    metFORMIN (GLUMETZA) 500 MG (MOD) 24hr tablet, Take 1 tablet (500 mg total) by mouth once daily., Disp: 30 tablet, Rfl: 11    prenatal vit 10-iron fum-folic 65-1 mg Tab, Take 1 tablet by mouth once daily., Disp: 30 tablet, Rfl: 11    promethazine-dextromethorphan (PROMETHAZINE-DM) 6.25-15 mg/5 mL Syrp, Take 5 mLs by mouth nightly as needed (cough)., Disp: 118 mL, Rfl: 0    QUEtiapine (SEROQUEL) 25 MG Tab, TAKE 1 TABLET(25 MG) BY MOUTH EVERY EVENING, Disp: 30 tablet, Rfl: 3    Review of Systems   Psychiatric/Behavioral:  Negative for dysphoric mood, self-injury and suicidal ideas. The patient is not nervous/anxious.      Objective:      There were no vitals taken for this visit.  Physical Exam  Constitutional:       General: She is not in acute distress.     Appearance: Normal appearance. She is  not ill-appearing or toxic-appearing.      Comments: PE limited due to this being a virtual appt   Pulmonary:      Effort: Pulmonary effort is normal. No respiratory distress.   Neurological:      Mental Status: She is alert.   Psychiatric:         Mood and Affect: Mood normal.         Behavior: Behavior normal.         Thought Content: Thought content normal.         Judgment: Judgment normal.       Assessment:       1. Essential (primary) hypertension    2. Generalized anxiety disorder with panic attacks    3. Adjustment disorder with depressed mood          Plan:       Essential (primary) hypertension  -     amLODIPine (NORVASC) 10 MG tablet; Take 1 tablet (10 mg total) by mouth once daily.  Dispense: 90 tablet; Refill: 3    Generalized anxiety disorder with panic attacks        -   Improved. Continue meds as prescribed. Will continue to monitor.    Adjustment disorder with depressed mood         -    As above.            Ha Baum PA-C  Family Medicine Physician Assistant       I spent a total of 20 minutes on the day of the visit.This includes face to face time and non-face to face time preparing to see the patient (eg, review of tests), obtaining and/or reviewing separately obtained history, documenting clinical information in the electronic or other health record, independently interpreting results and communicating results to the patient/family/caregiver, or care coordinator.      We have addressed [4] Moderate: 1 or more chronic illnesses with exacerbation, progression, or side effects of treatment / 2 or more stable chronic illnesses / 1 undiagnosed new problem with uncertain prognosis / 1 acute illness with systemic symptoms / 1 acute complicated injury  The complexity of the data reviewed and analyzed for this visit was [2] Minimal or None  The risk of complications and/or morbidity or mortality are [4] Moderate risk (I.e. prescription drug management / decision regarding minor surgery with  identified pt or procedure risk factors / decision regarding elective major surgery without identified pt or procedure risk factors / diagnosis or treatment significantly limited by social determinants of health)   The level of Medical Decision Making for this visit is [4] Moderate

## 2022-11-11 PROBLEM — E78.5 HLD (HYPERLIPIDEMIA): Status: ACTIVE | Noted: 2019-04-22

## 2022-11-11 PROBLEM — T81.40XA POST-OPERATIVE INFECTION: Status: ACTIVE | Noted: 2021-01-27

## 2022-11-22 ENCOUNTER — OFFICE VISIT (OUTPATIENT)
Dept: PSYCHIATRY | Facility: CLINIC | Age: 42
End: 2022-11-22
Payer: MEDICAID

## 2022-11-22 VITALS
HEIGHT: 64 IN | BODY MASS INDEX: 32.09 KG/M2 | WEIGHT: 187.94 LBS | SYSTOLIC BLOOD PRESSURE: 149 MMHG | HEART RATE: 70 BPM | DIASTOLIC BLOOD PRESSURE: 106 MMHG

## 2022-11-22 DIAGNOSIS — F41.0 GENERALIZED ANXIETY DISORDER WITH PANIC ATTACKS: ICD-10-CM

## 2022-11-22 DIAGNOSIS — F41.1 GENERALIZED ANXIETY DISORDER WITH PANIC ATTACKS: ICD-10-CM

## 2022-11-22 DIAGNOSIS — F33.1 MODERATE EPISODE OF RECURRENT MAJOR DEPRESSIVE DISORDER: Primary | ICD-10-CM

## 2022-11-22 PROCEDURE — 1159F MED LIST DOCD IN RCRD: CPT | Mod: AF,HB,CPTII, | Performed by: STUDENT IN AN ORGANIZED HEALTH CARE EDUCATION/TRAINING PROGRAM

## 2022-11-22 PROCEDURE — 3044F HG A1C LEVEL LT 7.0%: CPT | Mod: AF,HB,CPTII, | Performed by: STUDENT IN AN ORGANIZED HEALTH CARE EDUCATION/TRAINING PROGRAM

## 2022-11-22 PROCEDURE — 3077F SYST BP >= 140 MM HG: CPT | Mod: AF,HB,CPTII, | Performed by: STUDENT IN AN ORGANIZED HEALTH CARE EDUCATION/TRAINING PROGRAM

## 2022-11-22 PROCEDURE — 3008F BODY MASS INDEX DOCD: CPT | Mod: AF,HB,CPTII, | Performed by: STUDENT IN AN ORGANIZED HEALTH CARE EDUCATION/TRAINING PROGRAM

## 2022-11-22 PROCEDURE — 1159F PR MEDICATION LIST DOCUMENTED IN MEDICAL RECORD: ICD-10-PCS | Mod: AF,HB,CPTII, | Performed by: STUDENT IN AN ORGANIZED HEALTH CARE EDUCATION/TRAINING PROGRAM

## 2022-11-22 PROCEDURE — 1160F PR REVIEW ALL MEDS BY PRESCRIBER/CLIN PHARMACIST DOCUMENTED: ICD-10-PCS | Mod: AF,HB,CPTII, | Performed by: STUDENT IN AN ORGANIZED HEALTH CARE EDUCATION/TRAINING PROGRAM

## 2022-11-22 PROCEDURE — 96136 PR PSYCH/NEUROPSYCH TEST ADMIN/SCORING, 2+ TESTS, 1ST 30 MIN: ICD-10-PCS | Mod: AF,HB,59, | Performed by: STUDENT IN AN ORGANIZED HEALTH CARE EDUCATION/TRAINING PROGRAM

## 2022-11-22 PROCEDURE — 4010F ACE/ARB THERAPY RXD/TAKEN: CPT | Mod: AF,HB,CPTII, | Performed by: STUDENT IN AN ORGANIZED HEALTH CARE EDUCATION/TRAINING PROGRAM

## 2022-11-22 PROCEDURE — 3077F PR MOST RECENT SYSTOLIC BLOOD PRESSURE >= 140 MM HG: ICD-10-PCS | Mod: AF,HB,CPTII, | Performed by: STUDENT IN AN ORGANIZED HEALTH CARE EDUCATION/TRAINING PROGRAM

## 2022-11-22 PROCEDURE — 99215 PR OFFICE/OUTPT VISIT, EST, LEVL V, 40-54 MIN: ICD-10-PCS | Mod: S$PBB,AF,HB, | Performed by: STUDENT IN AN ORGANIZED HEALTH CARE EDUCATION/TRAINING PROGRAM

## 2022-11-22 PROCEDURE — 4010F PR ACE/ARB THEARPY RXD/TAKEN: ICD-10-PCS | Mod: AF,HB,CPTII, | Performed by: STUDENT IN AN ORGANIZED HEALTH CARE EDUCATION/TRAINING PROGRAM

## 2022-11-22 PROCEDURE — 99215 OFFICE O/P EST HI 40 MIN: CPT | Mod: PBBFAC,PN | Performed by: STUDENT IN AN ORGANIZED HEALTH CARE EDUCATION/TRAINING PROGRAM

## 2022-11-22 PROCEDURE — 3008F PR BODY MASS INDEX (BMI) DOCUMENTED: ICD-10-PCS | Mod: AF,HB,CPTII, | Performed by: STUDENT IN AN ORGANIZED HEALTH CARE EDUCATION/TRAINING PROGRAM

## 2022-11-22 PROCEDURE — 99215 OFFICE O/P EST HI 40 MIN: CPT | Mod: S$PBB,AF,HB, | Performed by: STUDENT IN AN ORGANIZED HEALTH CARE EDUCATION/TRAINING PROGRAM

## 2022-11-22 PROCEDURE — 3080F PR MOST RECENT DIASTOLIC BLOOD PRESSURE >= 90 MM HG: ICD-10-PCS | Mod: AF,HB,CPTII, | Performed by: STUDENT IN AN ORGANIZED HEALTH CARE EDUCATION/TRAINING PROGRAM

## 2022-11-22 PROCEDURE — 99999 PR PBB SHADOW E&M-EST. PATIENT-LVL V: CPT | Mod: PBBFAC,AF,HB, | Performed by: STUDENT IN AN ORGANIZED HEALTH CARE EDUCATION/TRAINING PROGRAM

## 2022-11-22 PROCEDURE — 3080F DIAST BP >= 90 MM HG: CPT | Mod: AF,HB,CPTII, | Performed by: STUDENT IN AN ORGANIZED HEALTH CARE EDUCATION/TRAINING PROGRAM

## 2022-11-22 PROCEDURE — 96136 PSYCL/NRPSYC TST PHY/QHP 1ST: CPT | Mod: AF,HB,59, | Performed by: STUDENT IN AN ORGANIZED HEALTH CARE EDUCATION/TRAINING PROGRAM

## 2022-11-22 PROCEDURE — 99999 PR PBB SHADOW E&M-EST. PATIENT-LVL V: ICD-10-PCS | Mod: PBBFAC,AF,HB, | Performed by: STUDENT IN AN ORGANIZED HEALTH CARE EDUCATION/TRAINING PROGRAM

## 2022-11-22 PROCEDURE — 1160F RVW MEDS BY RX/DR IN RCRD: CPT | Mod: AF,HB,CPTII, | Performed by: STUDENT IN AN ORGANIZED HEALTH CARE EDUCATION/TRAINING PROGRAM

## 2022-11-22 PROCEDURE — 3044F PR MOST RECENT HEMOGLOBIN A1C LEVEL <7.0%: ICD-10-PCS | Mod: AF,HB,CPTII, | Performed by: STUDENT IN AN ORGANIZED HEALTH CARE EDUCATION/TRAINING PROGRAM

## 2022-11-22 RX ORDER — BUSPIRONE HYDROCHLORIDE 5 MG/1
5 TABLET ORAL 2 TIMES DAILY
Qty: 60 TABLET | Refills: 0 | Status: SHIPPED | OUTPATIENT
Start: 2022-11-22 | End: 2022-12-19 | Stop reason: DRUGHIGH

## 2022-11-22 RX ORDER — FLUOXETINE 10 MG/1
10 CAPSULE ORAL DAILY
Qty: 30 CAPSULE | Refills: 0 | Status: SHIPPED | OUTPATIENT
Start: 2022-11-22 | End: 2022-12-19 | Stop reason: DRUGHIGH

## 2022-11-22 NOTE — PROGRESS NOTES
"Outpatient Psychiatry Initial Visit (MD/NP)    11/22/2022    Shelli Iverson, a 42 y.o. female, presenting for initial evaluation visit. Met with patient.    Reason for Encounter:     Referral from:    Ha Baum PA-C  [address] [place] [address]    Patient complains of Anxiety, Depression, Medication Problem, and Panic Attack      History of Present Illness:     Pt is pleasant and cooperative on interview. I could easily understanding the pt's meanings. Pt seemed to enjoy the attention received. Pt is a good historian. This visit was done in person in the clinic.    Pt opens with describing stress at her work as a . 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 She reports things are better at her new school, but she is having trouble transitioning to the healthier working environment. At her previous position at the [place] school she was bullied by her principal, was constantly on edge, and did not feel supported. When asked to describe her relationship with the principal, the patient said, "she was constantly picking on me" and "she was looking for a way to fire me." For the last several months in that position she was having multiple panic attacks per day, sometimes 3-4x per day.    Baseline GAD7, MDI, MDQ, PHQ9, and PSS-10 done today.    Patient's PSS-10 score (29/40) suggests perceived stress at this time is HIGH (14-27 cutoffs). Pt described stress at work.    Mood is "sad" and "on edge". Anhedonia noted.  Pt does not have symptoms of neither guilt nor hopelessness nor worthlessness. Pt qualified energy as "low". Pt is socializing less. Pt does not exercise.    Pt reported generalized and free-floating worry. GAD7 is below. Pt described panic attacks. Pt described classic symptoms. Pt's panic attacks occur almost daily. Discussed medication history at length. Discussed anxiety triggers. Discussed " "medication use. Pt is not taking ATARAX/VISTARIL, BUSPAR, LEXAPRO, or SEROQUEL as prescribed.    Pt reports sleep as poor overall and usually non-restorative. Sleep onset is taking more than an hour and pt lies awake worrying and planning. Duration is 3 or 4 hours recently. Sleep hygiene is fair.  Patient does not use a sleep aid. Nightmares have not been a problem. Pt denies naps.    Pt reports concentration is down. Pt is easily distracted. Memory is worse than usual.  Pt complains about having a "foggy memory."  Pt's working memory is poor. Productivity is down. Last COVID infection was in . Cognitive symptoms worsened with stress in .    Pt reports increased appetite. Pt has gained about 20lbs in 3months.    No history of hypomania, miguel a or psychosis. First symptoms of depression occurred at 18yo when mother , and then when pt was 22yo when father . No obsessions or compulsions. No phobias.    Patient did not display signs of nor endorse symptoms of overt psychosis or acute mood disorder requiring hospitalization during the encounter. Patient denied violent thoughts or suicidal or homicidal ideation, intent, or plan.    Suicide risk assessment performed. Pt denies suicidal ideation, intent or plan. Pt has never attempted suicide in the past. Risk factors include having been diagnosed with a mood disorder and anxiety. Protective factors include wanting to live for the family, receptivity to treatment, and a belief that suicide is wrong or immoral. Suicide risk at this time is: LOW. Pt agrees to safety. No safety concerns identified at this time.     Past Psychiatric History:    Prior diagnosis(es): anxiety, adjustment disorder, and depression  Inpatient psychiatric treatment(s):  None  Outpatient psychiatric treament(s): NO  Prior psychotherapy: currently in therapy; previously in  for grief    Psychopharmacological History:  Prior Psychotropic Medication(s): ATARAX/VISTARIL, BUSPAR, " LEXAPRO, and SEROQUEL.  Current Psychotropic Medication(s): inconsistently using LEXAPRO, BUSPAR and SEROQUEL  Other Current Psychoactive Agent(s): Dihydropyridine: AMLODIPINE (Norvasc) (deliriogenic)    Prior suicide attempts: NO  Prior hx self harm: NO    Prior psychological testing: NO    Personal Psychosocial History:    Childhood: deferred due to time  Marital Status: has a long-term boyfriend  Children: Yes; 20F, 12F, 6F  Residence: private residence, with friends  Occupation: employed, works as a   Hobbies: watching TV shows and movies  Oriental orthodox Practice: raised Holiness, regularly prays, and attends a formal Congregation service on a regular basis  Education History: Pt has a tertiary formal educational level (completed a master's degree). Pt is currently working on a PhD.   History: None.  Legal History: Denied.  Abuse History: Negative  Trauma History: Negative  Sexual History: Deferred       Family History:     Family Medical History: diabetes, heart disease, hypertension, obesity  Family Psychiatric History:  Suicides: Denies  Substance abuse: Denies  Bipolar Disorder: Denies  Anxiety: Yes - First-degree relative (sister).  Depression: Yes - First-degree relatives (brother and sister).    Past Medical History:    Past Medical History:   Diagnosis Date    GERD (gastroesophageal reflux disease)     Hypertension     Sciatica       Active Problem List with Overview Notes    Diagnosis Date Noted    Moderate episode of recurrent major depressive disorder 11/22/2022    Generalized anxiety disorder with panic attacks 11/22/2022    Post-operative infection 01/27/2021    Iron deficiency anemia 09/08/2020    Eczema 01/07/2020    Chronic knee pain 01/07/2020    Controlled type 2 diabetes mellitus without complication, without long-term current use of insulin 04/22/2019    BMI 32.0-32.9,adult 04/22/2019    HLD (hyperlipidemia) 04/22/2019    HTN (hypertension) 01/18/2016     [place] 250  "tid          Hx TBI: NO  Hx seizures: NO    Past Surgical History:    Past Surgical History:   Procedure Laterality Date     SECTION      x 1    REVISION OF ABDOMINAL SCAR      planned 21    tummy tuck        Substance History:  Alcohol: Pt binge drinks weekly. Currently pt endorses 1 or 2 standard drinks at a time.  Tobacco and Nicotine: Denies  Caffeine use: NONE  Marijuana: NO  Other Recreational Substances: NONE  Rehab: No    Instruments Today (2022):    PSS10 Interpretation: 2940 (2022); HIGH using 14-27 cutoffs. Perceived helplessness noted (answered "fairly often" or "very often") on 6/6 items (specifically item(s) 1, 2, 3, 6, 9, and 10). Deficits in self-efficacy noted (answered "almost never" or "never") on 2/4 items (specifically items(s) 5 and 8).    PHQ9 Interpretation: 15/27 (2022); MODERATE using 7-15-21 cutoffs. There tends to be significant variability in sensitivity of cutoff scores between 7 and 15. The PHQ-9 was found to have acceptable diagnostic properties for screening for MDD for cut-off scores between 8 and 11. PHQ-9 is useful for screening, but not always for diagnosis of a current epsiode of MDD in a psychiatric specialty clinic; according to the literature the optimal cutoff score for a current episode of MDD is most reliably 13 or 14.     MDI Interpretation: 31 (2022); SEVERE DEPRESSION SEVERITY using 21-26-31 cutoffs.    GAD7 Interpretation: 15/21 (2022); SEVERE using 5-10-15 cutoffs. The GAD7 has acceptable properties for identifying LAKE at cutoff scores 7 to 10; a cutoff score of 10 is fairly sensitive (0.8) but not specific (0.4).     MDQ (Mood Disorder Questionnaire) Interpretation: NEGATIVE (Scored 0-6, and/or answered "No" to question #2 and/or labeled problem as "minor" or less) and I suspect anxiety symptoms are falsely elevating the score    Bipolarity Index  2022   I. Episode Characteristics 2: Recurrent unipolar major " "depressive disorder (=3 episode)   II. Age of Onset (first affective episode or syndrome) 20: 15 to 19 years.   III. Course of Illness & Associated Features 2: Has more than two advanced degrees.   IV. Response to Treatment 0: None of the above, or no treatment.   V. Family History 10: First-degree relative with recurrent unipolar MDD or schizoaffective disorder;   Total Score 34/100 BI    Results Interpretation: Score total is in the range of 0 to 39; this score is associated with a predisposition to UNIPOLARITY of mood states with a relatively low risk for conversion to bipolar disorder if prescribed an antidepressant. Scores 50 and above have a high sensitivity (0.91) and specificity (0.90) for bipolar disorder.          Review Of Systems:     Pertinent items are noted in HPI.    Current Evaluation:     Nutritional Screening: Considering the patient's height and weight, medications, medical history and preferences, should a referral be made to the dietitian? not at this time    Constitutional  Vitals:  Most recent vital signs, dated less 90 days prior to this appointment, were reviewed.    Vitals:    11/22/22 1035   BP: (!) 149/106   Pulse: 70   Weight: 85.2 kg (187 lb 15.1 oz)   Height: 5' 4" (1.626 m)        General:  unremarkable, age appropriate     Musculoskeletal  Muscle Strength/Tone:  No tremor   Gait & Station:  non-ataxic     Psychiatric  Appearance: Dress is informal but appropriate.   Motor Activity: Normal.  Speech, Language Associations and Form of Thought: Clear, normal rate and volume. Associations intact. No tangentiality or thought blocking. Orderly thought process.   Mood and Affect: Mood is depressed and anxious. Affect is congruent with mood, appropriate, and tearful.  Content of Thought: Normal, no suicidality, no homicidality, delusions, or paranoia   Perceptions: No hallucinations.  Orientation: Grossly intact.  Memory: Intact for content of interview.  Attention Span & Concentration: Able " to focus for interview.  Fund of Knowledge: Intact and appropriate to age and level of education.  Insight: Intact.  Judgment: Behavior is adequate to circumstances.    Relevant Elements of Neurological Exam: normal gait    Functioning in Relationships:  Spouse/partner: healthy  Peers: no concerns identified  Employers:  good at new job    Laboratory Data  No visits with results within 1 Month(s) from this visit.   Latest known visit with results is:   Office Visit on 06/15/2022   Component Date Value Ref Range Status    POC Rapid COVID 06/15/2022 Positive (A)  Negative Final     Acceptable 06/15/2022 Yes   Final       Medications  Outpatient Encounter Medications as of 11/22/2022   Medication Sig Dispense Refill    acetaminophen (TYLENOL) 500 MG tablet Take 2 tablets (1,000 mg total) by mouth every 6 (six) hours as needed for Pain. 30 tablet 0    amLODIPine (NORVASC) 10 MG tablet Take 1 tablet (10 mg total) by mouth once daily. 90 tablet 3    atorvastatin (LIPITOR) 20 MG tablet TAKE 1 TABLET(20 MG) BY MOUTH EVERY DAY 30 tablet 3    diclofenac sodium (VOLTAREN) 1 % Gel Apply 2 g topically 4 (four) times daily. Apply to painful area for 10 days 1 Tube 6    hydroCHLOROthiazide (HYDRODIURIL) 12.5 MG Tab TAKE 1 TABLET(12.5 MG) BY MOUTH EVERY DAY 90 tablet 11    hydrocortisone 2.5 % cream Apply topically 2 (two) times daily. 28 g 6    losartan (COZAAR) 50 MG tablet TAKE 2 TABLETS BY MOUTH DAILY(FOLLOW-UP WITH DOCTOR JULY 2020) 180 tablet 1    metFORMIN (GLUMETZA) 500 MG (MOD) 24hr tablet Take 1 tablet (500 mg total) by mouth once daily. 30 tablet 11    [DISCONTINUED] albuterol (PROVENTIL/VENTOLIN HFA) 90 mcg/actuation inhaler Inhale 1-2 puffs into the lungs every 6 (six) hours as needed for Wheezing (And cough). Rescue 1 Inhaler 0    [DISCONTINUED] azelastine (ASTELIN) 137 mcg (0.1 %) nasal spray 1 spray (137 mcg total) by Nasal route 2 (two) times daily. for 10 days 30 mL 0    [DISCONTINUED] benzonatate  (TESSALON) 200 MG capsule Take 1 capsule (200 mg total) by mouth 3 (three) times daily as needed for Cough. 30 capsule 0    [DISCONTINUED] busPIRone (BUSPAR) 10 MG tablet Take 1 tablet (10 mg total) by mouth 3 (three) times daily. 90 tablet 11    [DISCONTINUED] EScitalopram oxalate (LEXAPRO) 10 MG tablet Take 1 tablet (10 mg total) by mouth once daily. 30 tablet 11    [DISCONTINUED] estradioL (ESTRACE) 2 MG tablet Take 1 tablet (2 mg total) by mouth once daily. 30 tablet 1    [DISCONTINUED] hydrOXYzine HCL (ATARAX) 25 MG tablet Take 1 tablet (25 mg total) by mouth 3 (three) times daily as needed for Anxiety. 90 tablet 1    [DISCONTINUED] ibuprofen (ADVIL,MOTRIN) 600 MG tablet Take 1 tablet (600 mg total) by mouth every 6 (six) hours as needed for Pain (Take with food as needed for mild-to-moderate pain). 20 tablet 0    [DISCONTINUED] QUEtiapine (SEROQUEL) 25 MG Tab TAKE 1 TABLET(25 MG) BY MOUTH EVERY EVENING 30 tablet 3    busPIRone (BUSPAR) 5 MG Tab Take 1 tablet (5 mg total) by mouth 2 (two) times daily. 60 tablet 0    FLUoxetine 10 MG capsule Take 1 capsule (10 mg total) by mouth once daily. 30 capsule 0    [DISCONTINUED] diphenhydrAMINE-aluminum-magnesium hydroxide-simethicone-lidocaine HCl 2% Swish and spit 5 mLs every 4 (four) hours as needed. (Patient not taking: Reported on 11/22/2022) 118 mL 0    [DISCONTINUED] fluconazole (DIFLUCAN) 150 MG Tab Take 1 tablet (150 mg total) by mouth once daily. (Patient not taking: Reported on 11/22/2022) 1 tablet 1    [DISCONTINUED] fluticasone propionate (FLONASE) 50 mcg/actuation nasal spray 1 spray (50 mcg total) by Each Nostril route 2 (two) times daily. (Patient not taking: Reported on 11/22/2022) 1 Bottle 0    [DISCONTINUED] prenatal vit 10-iron fum-folic 65-1 mg Tab Take 1 tablet by mouth once daily. (Patient not taking: Reported on 11/22/2022) 30 tablet 11    [DISCONTINUED] promethazine-dextromethorphan (PROMETHAZINE-DM) 6.25-15 mg/5 mL Syrp Take 5 mLs by mouth  "nightly as needed (cough). (Patient not taking: Reported on 11/22/2022) 118 mL 0     No facility-administered encounter medications on file as of 11/22/2022.       Assessment - Diagnosis - Goals:     Impression:      ICD-10-CM ICD-9-CM   1. Moderate episode of recurrent major depressive disorder  F33.1 296.32   2. Generalized anxiety disorder with panic attacks  F41.1 300.02    F41.0 300.01   3. BMI 32.0-32.9,adult  Z68.32 V85.32       Shelli Iverson is a 42 y.o. female presenting at this clinic for intake with Anxiety, Depression, Medication Problem, and Panic Attack      Patient has a history of recurrent depression episodes. At this time patient does meet criteria for an acute episode for depression. A negative history of elevated mood episodes (hypomanic or manic), negative history of "mixed mood" states, Bipolarity Index scored as low (34/100), and MDQ scored as negative indicate the patient's depression is unipolar type. The patient meets criteria for major depression disorder (MDD). Subclassification of the patient's MDD to an evidence-based biotype would provide greater precision to guide treatment. Based on the patient's symptom profile, the patient most likely has MDD biotype 1. At this time additional testing of this hypothesis, like EEG or fMRI, is too costly for a clinical setting. MDD Biotype 1 is associated with reduced activity between frontal cortex and amygdala. MDD Biotype 1 is associated with dysfunctions in emotional assessment and fear processing. A review of the literature suggests a patient classified as biotype 1 has an 80% probability to respond favorably to TMS, whereas the other biotypes the response rate is below 50%. TMS might be considered if there is lack of improvement to therapy and medications. Patient meets criteria for LAKE (generalized anxiety disorder). Pt scored positive on GAD7. The GAD7 has acceptable properties for identifying LAKE at cutoff scores 7 to 10. Pt described " classic symptoms of panic attacks. Potential organic and biomedical influencing the case's presentation will need to be ruled out. Psychological and social factors predominate in this case.    This case formulation is informed by multiple biomedical and psychosocial factors.     Biomedical factors complicate psychopharmacological treatment, including concurrent medications for medical problems with potential for drug-to-drug interactions, pt's treatment history with psychotropics, and medical comorbidities. Medical comorbidities of concern include a history of vitamin D deficiency, child-bearing age status, concerns about weight gain, diabetes mellitus, high blood pressure, and obesity. Psychotropics to avoid may include psychotropics with unfavorable metabolic profiles, agents which have the potential to worsen anxiety, SSRIs that have a shorter half-life, and medications which could elevate blood pressure.     Psychosocial factors which would inform the treatment plan and psychotherapy would include role transitions and previous exposure to psychotherapy. Relevant social determinants of health include physical inactivity, stress, and depressed mood. This patient will benefit from treatment that would include both psychotherapy and medications. The patient's receptivity to treatment, established social support structures, being employed, and Sabianism practice are good prognostic factors.    Pt agrees to safety and no safety concerns were identified during the interview. Violence risk, suicide risk and case risk at this time are not high.     Strengths and Liabilities: Strength: Patient accepts guidance/feedback, Strength: Patient is expressive/articulate., Strength: Patient is intelligent., Strength: Patient is motivated for change., Strength: Patient has positive support network., Liability: Patient has poor health.    Treatment Goals:  Specify outcomes written in observable, behavioral terms:   Anxiety:  acquiring relapse prevention skills, reducing negative automatic thoughts, and reducing physical symptoms of anxiety  Depression: acquiring relapse prevention skills, increasing energy, reducing fatigue, and reducing negative automatic thoughts  Panic: acquiring breathing skills, acquiring relapse prevention skills, and reducing physical symptoms of anxiety/panic    Adherence Risk Assessment: Patient has a questionable history of adherence to the treatment plan. Barriers to adherence to treatment are unclear. Barriers to adherence may include previous unfavorable experiences during treatment, having risk factors for volitional nonadherence (including a history of volitional nonadherence and fear of adverse effects), and having risk factors for secondary nonadherence (including a history of nonpersistence). Strategies to improve adherence may include addressing potential barriers and reducing risks for nonadherence (adjusting frequency of doses, adjusting frequency of visits, integrating the pt preferences, counseling and psychoeducation, addressing patient expectations, maintaining contact with therapist).    Treatment Plan/Recommendations:   Chart reviewed.  Medication management: The risks and benefits of medication were discussed with the patient.  Previous medications tried included ATARAX/VISTARIL, BUSPAR, LEXAPRO, and SEROQUEL.  Discontinue SEROQUEL  Weight gain is a known side effect of this medication, and further weight gain should be avoided for the patient's health  The current dosing schedule is not realistic long-term for this patient  Medication Adjustments:  [id number]: Discontinue SEROQUEL  Prior to evaluation pt had been taking 25mg HS  Discontinue LEXAPRO  This medication has a short half life and has increased risk for discontinuation symptoms  Symptom coverage is insufficient  Medication Adjustments:  [id number]: Discontinue 10mg daily  Prior to a few weeks ago pt had been taking  10mg  "daily  Discontinue ATARAX/VISTARIL  Weight gain is a known side effect of this medication, and further weight gain should be avoided for the patient's health  Medication Adjustments:  22NOV2022: Discontinue 25mg TID  Prior to several weeks ago pt had been taking  25mg TID  Start PROZAC  If well tolerated, plan to increase to 20mg next visit in 2-3w, then reassess again about a month or two later to determine whether a dose increase to 40mg would be necessary  Will "prime" with BUSPAR  BI is less than 40; risk for excessive activation is minimal.  Chosen for MDD (FDA approved), panic (FDA approved), long half life (reduced risk for SSRI discontinuation syndrome), relative safety in pregnancy compared to other SSRIs, low risk for weight gain, and possible weight loss.  Titration:  22NOV2022: Start 10mg daily  Prior to evaluation pt had been taking LEXAPRO  Adjust BUSPAR  Chosen for anxiolytic properties by partial agonism at 5HT1A receptors and antagonism at D2, D3 and D4 receptors  BUSPAR is effective for "priming" an SSRI  Pt had nonpersistence with TID dosing and 10mg TID caused symptoms resembling dysphoria or affective blunting or derealization   Medication Adjustments:  22NOV2022: Start 5mg BID  Prior to a few months ago pt had been taking  10mg less than TID  Counseling, support and psychoeducation provided.  Pt was provided counseling on sleep hygiene. Pt was provided educational material in the AVS.  Discussed sleep diary  Counseled on proper medication administration and adherence.   Discussed starting digital therapeutic [facility] and provided referral.  Counseled on BOX breathing for insomnia and panic  Continue psychotherapy - will defer to therapist  Labs:  Liver function testing with blood chemistry: CMP  Hematology: CBC  Thyroid function testing: TSH, free T4  Metabolic risk assessment and monitoring: HgbA1c and Lipid Profile  Vitamin B12 (Relevant risk factor(s) for deficiency or insufficiency: poor diet " and using a medication that interferes with absorption or stability: METFORMIN)  Vitamin D (Relevant risk factor(s) for deficiency or insufficiency: insufficient sunlight exposure, obesity, skin type ([place] 4+) associated with a greater reduction in the UVR available for vitamin D synthesis, using a medication that interferes with absorption or stability (Statin), and depressed mood)  Monitor:  Monitor PHQ9 score  Monitor GAD7 score  Monitor MDI and PSS-10 score  Monitor sleep - Pt will be starting a sleep diary.  Monitor labs (see above)  Consider sleep apnea referral  The treatment plan and followup plan were reviewed with the patient  Pt understands to contact clinic if symptoms worsen, and to call 911 or go to nearest ER for suicidal ideation, intent or plan.      Return to Clinic: 2 weeks    Billing Documentation    Method of Encounter IN PERSON visit at the clinic   Type of Encounter New patient to me, BUT seen by department within last 3 years   Counseling;  Psychotherapy Not applicable: Not done or UNDER 16 minutes   Counseling;  Tobacco and/or Nicotine Not applicable: Not done or UNDER 3 minutes   Additional Codes and Modifiers 76345: administered and scored more than one psychological or neuropsychological tests (GAD7, MDI, MDQ, PHQ9, and PSS-10) (18 mins)   Time Remaining Chart/Pt 49060: SEEN BY DEPT WITHIN THREE YEARS, new patient to me, 54 minutes   Total Mins  (11/22/2022) 225           Gerry Young DO  Department of Psychiatry

## 2022-11-22 NOTE — PATIENT INSTRUCTIONS
"Log your sleep in the "Sleep Diary."  Please complete lab work at your earliest convenience. Your labs are FASTING, so no food or Calories for 8-12 hours prior to the blood draw.  Continue seeing therapist  Chat with [facility] for a few minutes everyday.    Practice BOX BREATHING by breathing while you slowly count to four for a total of four times -- four counts of breathing in, four counts of holding your breath, four counts of exhaling and four more counts of holding after your exhale.    Discontinue SEROQUEL (quetiapine)  Discontinue ATARAX/VISTARIL (hydroxyzine)  Discontinue LEXAPRO (escitalopram)    Reduce BUSPAR to 5mg BID  Start PROZAC 10mg daily    "

## 2022-12-19 ENCOUNTER — LAB VISIT (OUTPATIENT)
Dept: LAB | Facility: HOSPITAL | Age: 42
End: 2022-12-19
Attending: STUDENT IN AN ORGANIZED HEALTH CARE EDUCATION/TRAINING PROGRAM
Payer: MEDICAID

## 2022-12-19 ENCOUNTER — OFFICE VISIT (OUTPATIENT)
Dept: PSYCHIATRY | Facility: CLINIC | Age: 42
End: 2022-12-19
Payer: MEDICAID

## 2022-12-19 VITALS
DIASTOLIC BLOOD PRESSURE: 92 MMHG | SYSTOLIC BLOOD PRESSURE: 130 MMHG | HEIGHT: 64 IN | WEIGHT: 189.38 LBS | HEART RATE: 82 BPM | BODY MASS INDEX: 32.33 KG/M2

## 2022-12-19 DIAGNOSIS — F41.1 GENERALIZED ANXIETY DISORDER WITH PANIC ATTACKS: ICD-10-CM

## 2022-12-19 DIAGNOSIS — F41.0 GENERALIZED ANXIETY DISORDER WITH PANIC ATTACKS: ICD-10-CM

## 2022-12-19 DIAGNOSIS — F33.1 MODERATE EPISODE OF RECURRENT MAJOR DEPRESSIVE DISORDER: ICD-10-CM

## 2022-12-19 DIAGNOSIS — F33.41 RECURRENT MAJOR DEPRESSIVE DISORDER, IN PARTIAL REMISSION: Primary | ICD-10-CM

## 2022-12-19 LAB
ALBUMIN SERPL BCP-MCNC: 4.1 G/DL (ref 3.5–5.2)
ALP SERPL-CCNC: 91 U/L (ref 55–135)
ALT SERPL W/O P-5'-P-CCNC: 23 U/L (ref 10–44)
ANION GAP SERPL CALC-SCNC: 7 MMOL/L (ref 8–16)
AST SERPL-CCNC: 20 U/L (ref 10–40)
BASOPHILS # BLD AUTO: 0.02 K/UL (ref 0–0.2)
BASOPHILS NFR BLD: 0.3 % (ref 0–1.9)
BILIRUB SERPL-MCNC: 0.9 MG/DL (ref 0.1–1)
BUN SERPL-MCNC: 10 MG/DL (ref 6–20)
CALCIUM SERPL-MCNC: 8.8 MG/DL (ref 8.7–10.5)
CHLORIDE SERPL-SCNC: 103 MMOL/L (ref 95–110)
CHOLEST SERPL-MCNC: 143 MG/DL (ref 120–199)
CHOLEST/HDLC SERPL: 2.7 {RATIO} (ref 2–5)
CO2 SERPL-SCNC: 25 MMOL/L (ref 23–29)
CREAT SERPL-MCNC: 0.7 MG/DL (ref 0.5–1.4)
DIFFERENTIAL METHOD: ABNORMAL
EOSINOPHIL # BLD AUTO: 0.1 K/UL (ref 0–0.5)
EOSINOPHIL NFR BLD: 1.5 % (ref 0–8)
ERYTHROCYTE [DISTWIDTH] IN BLOOD BY AUTOMATED COUNT: 13 % (ref 11.5–14.5)
EST. GFR  (NO RACE VARIABLE): >60 ML/MIN/1.73 M^2
ESTIMATED AVG GLUCOSE: 192 MG/DL (ref 68–131)
GLUCOSE SERPL-MCNC: 178 MG/DL (ref 70–110)
HBA1C MFR BLD: 8.3 % (ref 4.5–6.2)
HCT VFR BLD AUTO: 41.3 % (ref 37–48.5)
HDLC SERPL-MCNC: 53 MG/DL (ref 40–75)
HDLC SERPL: 37.1 % (ref 20–50)
HGB BLD-MCNC: 13.2 G/DL (ref 12–16)
IMM GRANULOCYTES # BLD AUTO: 0.02 K/UL (ref 0–0.04)
IMM GRANULOCYTES NFR BLD AUTO: 0.3 % (ref 0–0.5)
LDLC SERPL CALC-MCNC: 76.6 MG/DL (ref 63–159)
LYMPHOCYTES # BLD AUTO: 2.2 K/UL (ref 1–4.8)
LYMPHOCYTES NFR BLD: 35.6 % (ref 18–48)
MCH RBC QN AUTO: 27.3 PG (ref 27–31)
MCHC RBC AUTO-ENTMCNC: 32 G/DL (ref 32–36)
MCV RBC AUTO: 86 FL (ref 82–98)
MONOCYTES # BLD AUTO: 0.5 K/UL (ref 0.3–1)
MONOCYTES NFR BLD: 7.8 % (ref 4–15)
NEUTROPHILS # BLD AUTO: 3.3 K/UL (ref 1.8–7.7)
NEUTROPHILS NFR BLD: 54.5 % (ref 38–73)
NONHDLC SERPL-MCNC: 90 MG/DL
NRBC BLD-RTO: 0 /100 WBC
PLATELET # BLD AUTO: 319 K/UL (ref 150–450)
PMV BLD AUTO: 9.1 FL (ref 9.2–12.9)
POTASSIUM SERPL-SCNC: 3.7 MMOL/L (ref 3.5–5.1)
PROT SERPL-MCNC: 7.4 G/DL (ref 6–8.4)
RBC # BLD AUTO: 4.83 M/UL (ref 4–5.4)
SODIUM SERPL-SCNC: 135 MMOL/L (ref 136–145)
T4 FREE SERPL-MCNC: 0.6 NG/DL (ref 0.71–1.51)
TRIGL SERPL-MCNC: 67 MG/DL (ref 30–150)
TSH SERPL DL<=0.005 MIU/L-ACNC: 1.64 UIU/ML (ref 0.34–5.6)
VIT B12 SERPL-MCNC: 266 PG/ML (ref 210–950)
WBC # BLD AUTO: 6.12 K/UL (ref 3.9–12.7)

## 2022-12-19 PROCEDURE — 99213 OFFICE O/P EST LOW 20 MIN: CPT | Mod: PBBFAC,PN | Performed by: STUDENT IN AN ORGANIZED HEALTH CARE EDUCATION/TRAINING PROGRAM

## 2022-12-19 PROCEDURE — 3075F PR MOST RECENT SYSTOLIC BLOOD PRESS GE 130-139MM HG: ICD-10-PCS | Mod: AF,HB,CPTII, | Performed by: STUDENT IN AN ORGANIZED HEALTH CARE EDUCATION/TRAINING PROGRAM

## 2022-12-19 PROCEDURE — 99215 OFFICE O/P EST HI 40 MIN: CPT | Mod: 25,S$PBB,AF,HB | Performed by: STUDENT IN AN ORGANIZED HEALTH CARE EDUCATION/TRAINING PROGRAM

## 2022-12-19 PROCEDURE — 4010F PR ACE/ARB THEARPY RXD/TAKEN: ICD-10-PCS | Mod: AF,HB,CPTII, | Performed by: STUDENT IN AN ORGANIZED HEALTH CARE EDUCATION/TRAINING PROGRAM

## 2022-12-19 PROCEDURE — 3052F HG A1C>EQUAL 8.0%<EQUAL 9.0%: CPT | Mod: AF,HB,CPTII, | Performed by: STUDENT IN AN ORGANIZED HEALTH CARE EDUCATION/TRAINING PROGRAM

## 2022-12-19 PROCEDURE — 85025 COMPLETE CBC W/AUTO DIFF WBC: CPT | Performed by: STUDENT IN AN ORGANIZED HEALTH CARE EDUCATION/TRAINING PROGRAM

## 2022-12-19 PROCEDURE — 3052F PR MOST RECENT HEMOGLOBIN A1C LEVEL 8.0 - < 9.0%: ICD-10-PCS | Mod: AF,HB,CPTII, | Performed by: STUDENT IN AN ORGANIZED HEALTH CARE EDUCATION/TRAINING PROGRAM

## 2022-12-19 PROCEDURE — 4010F ACE/ARB THERAPY RXD/TAKEN: CPT | Mod: AF,HB,CPTII, | Performed by: STUDENT IN AN ORGANIZED HEALTH CARE EDUCATION/TRAINING PROGRAM

## 2022-12-19 PROCEDURE — 80053 COMPREHEN METABOLIC PANEL: CPT | Performed by: STUDENT IN AN ORGANIZED HEALTH CARE EDUCATION/TRAINING PROGRAM

## 2022-12-19 PROCEDURE — 80061 LIPID PANEL: CPT | Performed by: STUDENT IN AN ORGANIZED HEALTH CARE EDUCATION/TRAINING PROGRAM

## 2022-12-19 PROCEDURE — 82652 VIT D 1 25-DIHYDROXY: CPT | Performed by: STUDENT IN AN ORGANIZED HEALTH CARE EDUCATION/TRAINING PROGRAM

## 2022-12-19 PROCEDURE — 99999 PR PBB SHADOW E&M-EST. PATIENT-LVL III: ICD-10-PCS | Mod: PBBFAC,AF,HB, | Performed by: STUDENT IN AN ORGANIZED HEALTH CARE EDUCATION/TRAINING PROGRAM

## 2022-12-19 PROCEDURE — 1159F MED LIST DOCD IN RCRD: CPT | Mod: AF,HB,CPTII, | Performed by: STUDENT IN AN ORGANIZED HEALTH CARE EDUCATION/TRAINING PROGRAM

## 2022-12-19 PROCEDURE — 96136 PSYCL/NRPSYC TST PHY/QHP 1ST: CPT | Mod: AF,HB,, | Performed by: STUDENT IN AN ORGANIZED HEALTH CARE EDUCATION/TRAINING PROGRAM

## 2022-12-19 PROCEDURE — 3075F SYST BP GE 130 - 139MM HG: CPT | Mod: AF,HB,CPTII, | Performed by: STUDENT IN AN ORGANIZED HEALTH CARE EDUCATION/TRAINING PROGRAM

## 2022-12-19 PROCEDURE — 96136 PR PSYCH/NEUROPSYCH TEST ADMIN/SCORING, 2+ TESTS, 1ST 30 MIN: ICD-10-PCS | Mod: AF,HB,, | Performed by: STUDENT IN AN ORGANIZED HEALTH CARE EDUCATION/TRAINING PROGRAM

## 2022-12-19 PROCEDURE — 99215 PR OFFICE/OUTPT VISIT, EST, LEVL V, 40-54 MIN: ICD-10-PCS | Mod: 25,S$PBB,AF,HB | Performed by: STUDENT IN AN ORGANIZED HEALTH CARE EDUCATION/TRAINING PROGRAM

## 2022-12-19 PROCEDURE — 3008F PR BODY MASS INDEX (BMI) DOCUMENTED: ICD-10-PCS | Mod: AF,HB,CPTII, | Performed by: STUDENT IN AN ORGANIZED HEALTH CARE EDUCATION/TRAINING PROGRAM

## 2022-12-19 PROCEDURE — 83036 HEMOGLOBIN GLYCOSYLATED A1C: CPT | Performed by: STUDENT IN AN ORGANIZED HEALTH CARE EDUCATION/TRAINING PROGRAM

## 2022-12-19 PROCEDURE — 84443 ASSAY THYROID STIM HORMONE: CPT | Performed by: STUDENT IN AN ORGANIZED HEALTH CARE EDUCATION/TRAINING PROGRAM

## 2022-12-19 PROCEDURE — 82607 VITAMIN B-12: CPT | Performed by: STUDENT IN AN ORGANIZED HEALTH CARE EDUCATION/TRAINING PROGRAM

## 2022-12-19 PROCEDURE — 1159F PR MEDICATION LIST DOCUMENTED IN MEDICAL RECORD: ICD-10-PCS | Mod: AF,HB,CPTII, | Performed by: STUDENT IN AN ORGANIZED HEALTH CARE EDUCATION/TRAINING PROGRAM

## 2022-12-19 PROCEDURE — 3080F PR MOST RECENT DIASTOLIC BLOOD PRESSURE >= 90 MM HG: ICD-10-PCS | Mod: AF,HB,CPTII, | Performed by: STUDENT IN AN ORGANIZED HEALTH CARE EDUCATION/TRAINING PROGRAM

## 2022-12-19 PROCEDURE — 99999 PR PBB SHADOW E&M-EST. PATIENT-LVL III: CPT | Mod: PBBFAC,AF,HB, | Performed by: STUDENT IN AN ORGANIZED HEALTH CARE EDUCATION/TRAINING PROGRAM

## 2022-12-19 PROCEDURE — 1160F PR REVIEW ALL MEDS BY PRESCRIBER/CLIN PHARMACIST DOCUMENTED: ICD-10-PCS | Mod: AF,HB,CPTII, | Performed by: STUDENT IN AN ORGANIZED HEALTH CARE EDUCATION/TRAINING PROGRAM

## 2022-12-19 PROCEDURE — 84439 ASSAY OF FREE THYROXINE: CPT | Performed by: STUDENT IN AN ORGANIZED HEALTH CARE EDUCATION/TRAINING PROGRAM

## 2022-12-19 PROCEDURE — 3008F BODY MASS INDEX DOCD: CPT | Mod: AF,HB,CPTII, | Performed by: STUDENT IN AN ORGANIZED HEALTH CARE EDUCATION/TRAINING PROGRAM

## 2022-12-19 PROCEDURE — 1160F RVW MEDS BY RX/DR IN RCRD: CPT | Mod: AF,HB,CPTII, | Performed by: STUDENT IN AN ORGANIZED HEALTH CARE EDUCATION/TRAINING PROGRAM

## 2022-12-19 PROCEDURE — 3080F DIAST BP >= 90 MM HG: CPT | Mod: AF,HB,CPTII, | Performed by: STUDENT IN AN ORGANIZED HEALTH CARE EDUCATION/TRAINING PROGRAM

## 2022-12-19 RX ORDER — FLUOXETINE HYDROCHLORIDE 20 MG/1
20 CAPSULE ORAL DAILY
Qty: 30 CAPSULE | Refills: 1 | Status: SHIPPED | OUTPATIENT
Start: 2022-12-19 | End: 2023-01-18 | Stop reason: SDUPTHER

## 2022-12-19 NOTE — PROGRESS NOTES
Results reviewed and pt notified by portal. No change in treatment required at this time. Noted low free T4 and worsening A1C, with recommendation to follow up with primary care. Note that pt is now no longer on a psychotropic medication which has an unfavorable metabolic profile (like SEROQUEL). Offering referral to dietitian and advised to limit simple carbs. Would expect A1C, weight and BP to improve with pt working on improving sleep hygiene, stress and diet.

## 2022-12-19 NOTE — PATIENT INSTRUCTIONS
Discontinue BUSPAR  Increase PROZAC to 20mg daily  Discussed sleep hygiene:   Encouraged setting an alarm in the morning, including on weekends, to help establish a more consistent wake time.   Encouraged keeping a consistent bedtime, including on weekends.   No

## 2022-12-19 NOTE — PROGRESS NOTES
"Outpatient Psychiatry Followup Visit (MD/NP)    12/19/2022    Shelli Iverson, a 42 y.o. female, presenting for followup visit. Met with patient.    Reason for Encounter:     Medication management followup.    Interval History:     Pt is pleasant and cooperative on interview. Pt is a good historian. This visit was done in person in the clinic.    Taking medications as prescribed and discontinued medications as instructed. Pt reports feeling calmer and happier. No longer a sense of foreboding marcus. 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 Maintains contact with former coworkers but does not discuss the trauma, which is good. Pt reports daughter has commented on her improvement. Difficulty with BUSPAR dosing, and wants to be on fewer meds. Tolerating PROZAC.    GAD7 improved, from SEVERE 15/21 last time to this visit MILD 5/21 (5-10-15 cutoffs). MDI improved, from SEVERE DEPRESSION SEVERITY 31/50 last time to this visit NEGATIVE 14/50 (using 21-26-31 cutoffs). PHQ9 improved, from MODERATE 15/27 last time to this visit NEGATIVE 2/27 (7-15-21 cutoffs). PSS-10 improved, from HIGH 29/40 last time to this visit MODERATE 14/40 (14-27 cutoffs).    Patient's PSS-10 score (14/40) suggests perceived stress at this time is MODERATE (14-27 cutoffs). Pt reports stress improved.    Mood is "much better'. Pt described human emotional experiences usually associated with a good life (specifically Marcus, Calm, and Contentment). Interest in hobbies is intact.  No anhedonia noted.  Socialization is intact.    GAD7 is below. Pt denies having problems with panic attacks. Last panic attack was in SEP or OCT.    Reviewed sleep diary with patient and provided counseling. Patient produced about 2 weeks of data. Pt napped 0 times per week. Bedtime varied by about 7 hrs. Onset was typically "hours". Pt experienced 0 or 1 interruptions on a typical night. Wake time varied by about 2 hrs. Sleep is generally NOT restorative. Discussed sleep " "hygiene with patient. Encouraged setting an alarm in the morning, including on weekends, to help establish a more consistent wake time. Encouraged keeping a consistent bedtime, including on weekends.    No issues with concentration.    Appetite is normal. No issues identified.    Patient did not display signs of nor endorse symptoms of overt psychosis or acute mood disorder requiring hospitalization during the encounter. Patient denied violent thoughts or suicidal or homicidal ideation, intent, or plan.    Instruments Today (12/19/2022):    GAD7 Interpretation: 5/21 (12/19/2022); MILD using 5-10-15 cutoffs.    MDI Interpretation: 14/50 (12/19/2022); NEGATIVE using 21-26-31 cutoffs.    PHQ9 Interpretation: 2/27 (12/19/2022); NEGATIVE using 7-15-21 cutoffs.    PSS10 Interpretation: 14/40 (12/19/2022); MODERATE using 14-27 cutoffs.     Review Of Systems:     Pertinent items are noted in HPI.    Current Evaluation:       Nutritional Screening: Considering the patient's height and weight, medications, medical history and preferences, should a referral be made to the dietitian? not at this time    Constitutional  Vitals:  Most recent vital signs, dated less 90 days prior to this appointment, were reviewed.    Vitals:    12/19/22 1022   BP: (!) 130/92   Pulse: 82   Weight: 85.9 kg (189 lb 6 oz)   Height: 5' 4" (1.626 m)        General:  unremarkable, age appropriate     Musculoskeletal  Muscle Strength/Tone:  No tremor   Gait & Station:  non-ataxic     Psychiatric  Appearance: Dress is informal but appropriate.   Motor Activity: Normal.  Speech, Language Associations and Form of Thought: Clear, normal rate and volume. Associations intact. No tangentiality or thought blocking. Orderly thought process.   Mood and Affect: Mood is euthymic. Affect is congruent with mood.  Content of Thought: Normal, no suicidality, no homicidality, delusions, or paranoia   Perceptions: No hallucinations.  Orientation: Grossly intact.  Memory: " Intact for content of interview.  Attention Span & Concentration: Able to focus for interview.  Fund of Knowledge: Intact and appropriate to age and level of education.  Insight: Intact.  Judgment: Behavior is adequate to circumstances.    Relevant Elements of Neurological Exam: normal gait    Functioning in Relationships:  Spouse/partner: no concerns identified  Peers: no concerns identified  Employers: no concerns identified    Laboratory Data  Lab Visit on 12/19/2022   Component Date Value Ref Range Status    Hemoglobin A1C 12/19/2022 8.3 (H)  4.5 - 6.2 % Final    Estimated Avg Glucose 12/19/2022 192 (H)  68 - 131 mg/dL Final    Cholesterol 12/19/2022 143  120 - 199 mg/dL Final    Triglycerides 12/19/2022 67  30 - 150 mg/dL Final    HDL 12/19/2022 53  40 - 75 mg/dL Final    LDL Cholesterol 12/19/2022 76.6  63.0 - 159.0 mg/dL Final    HDL/Cholesterol Ratio 12/19/2022 37.1  20.0 - 50.0 % Final    Total Cholesterol/HDL Ratio 12/19/2022 2.7  2.0 - 5.0 Final    Non-HDL Cholesterol 12/19/2022 90  mg/dL Final    WBC 12/19/2022 6.12  3.90 - 12.70 K/uL Final    RBC 12/19/2022 4.83  4.00 - 5.40 M/uL Final    Hemoglobin 12/19/2022 13.2  12.0 - 16.0 g/dL Final    Hematocrit 12/19/2022 41.3  37.0 - 48.5 % Final    MCV 12/19/2022 86  82 - 98 fL Final    MCH 12/19/2022 27.3  27.0 - 31.0 pg Final    MCHC 12/19/2022 32.0  32.0 - 36.0 g/dL Final    RDW 12/19/2022 13.0  11.5 - 14.5 % Final    Platelets 12/19/2022 319  150 - 450 K/uL Final    MPV 12/19/2022 9.1 (L)  9.2 - 12.9 fL Final    Immature Granulocytes 12/19/2022 0.3  0.0 - 0.5 % Final    Gran # (ANC) 12/19/2022 3.3  1.8 - 7.7 K/uL Final    Immature Grans (Abs) 12/19/2022 0.02  0.00 - 0.04 K/uL Final    Lymph # 12/19/2022 2.2  1.0 - 4.8 K/uL Final    Mono # 12/19/2022 0.5  0.3 - 1.0 K/uL Final    Eos # 12/19/2022 0.1  0.0 - 0.5 K/uL Final    Baso # 12/19/2022 0.02  0.00 - 0.20 K/uL Final    nRBC 12/19/2022 0  0 /100 WBC Final    Gran % 12/19/2022 54.5  38.0 - 73.0 % Final     Lymph % 12/19/2022 35.6  18.0 - 48.0 % Final    Mono % 12/19/2022 7.8  4.0 - 15.0 % Final    Eosinophil % 12/19/2022 1.5  0.0 - 8.0 % Final    Basophil % 12/19/2022 0.3  0.0 - 1.9 % Final    Differential Method 12/19/2022 Automated   Final    Sodium 12/19/2022 135 (L)  136 - 145 mmol/L Final    Potassium 12/19/2022 3.7  3.5 - 5.1 mmol/L Final    Chloride 12/19/2022 103  95 - 110 mmol/L Final    CO2 12/19/2022 25  23 - 29 mmol/L Final    Glucose 12/19/2022 178 (H)  70 - 110 mg/dL Final    BUN 12/19/2022 10  6 - 20 mg/dL Final    Creatinine 12/19/2022 0.7  0.5 - 1.4 mg/dL Final    Calcium 12/19/2022 8.8  8.7 - 10.5 mg/dL Final    Total Protein 12/19/2022 7.4  6.0 - 8.4 g/dL Final    Albumin 12/19/2022 4.1  3.5 - 5.2 g/dL Final    Total Bilirubin 12/19/2022 0.9  0.1 - 1.0 mg/dL Final    Alkaline Phosphatase 12/19/2022 91  55 - 135 U/L Final    AST 12/19/2022 20  10 - 40 U/L Final    ALT 12/19/2022 23  10 - 44 U/L Final    Anion Gap 12/19/2022 7 (L)  8 - 16 mmol/L Final    eGFR 12/19/2022 >60.0  >60 mL/min/1.73 m^2 Final    TSH 12/19/2022 1.640  0.340 - 5.600 uIU/mL Final    Free T4 12/19/2022 0.60 (L)  0.71 - 1.51 ng/dL Final    Vitamin B-12 12/19/2022 266  210 - 950 pg/mL Final       Medications  Outpatient Encounter Medications as of 12/19/2022   Medication Sig Dispense Refill    acetaminophen (TYLENOL) 500 MG tablet Take 2 tablets (1,000 mg total) by mouth every 6 (six) hours as needed for Pain. 30 tablet 0    amLODIPine (NORVASC) 10 MG tablet Take 1 tablet (10 mg total) by mouth once daily. 90 tablet 3    atorvastatin (LIPITOR) 20 MG tablet Take 1 tablet (20 mg total) by mouth once daily. 30 tablet 3    hydroCHLOROthiazide (HYDRODIURIL) 12.5 MG Tab Take 1 tablet (12.5 mg total) by mouth once daily. 90 tablet 11    hydrocortisone 2.5 % cream Apply topically 2 (two) times daily. 28 g 6    losartan (COZAAR) 50 MG tablet TAKE 2 TABLETS BY MOUTH DAILY(FOLLOW-UP WITH DOCTOR JULY 2020) 180 tablet 1    metFORMIN (GLUMETZA)  500 MG (MOD) 24hr tablet Take 1 tablet (500 mg total) by mouth once daily. 30 tablet 11    [DISCONTINUED] busPIRone (BUSPAR) 5 MG Tab Take 1 tablet (5 mg total) by mouth 2 (two) times daily. 60 tablet 0    [DISCONTINUED] FLUoxetine 10 MG capsule Take 1 capsule (10 mg total) by mouth once daily. 30 capsule 0    diclofenac sodium (VOLTAREN) 1 % Gel Apply 2 g topically 4 (four) times daily. Apply to painful area for 10 days (Patient not taking: Reported on 12/19/2022) 1 Tube 6    FLUoxetine 20 MG capsule Take 1 capsule (20 mg total) by mouth once daily. 30 capsule 1     No facility-administered encounter medications on file as of 12/19/2022.       Assessment - Diagnosis - Goals:     Impression:      ICD-10-CM ICD-9-CM   1. Recurrent major depressive disorder, in partial remission  F33.41 296.35   2. Generalized anxiety disorder with panic attacks  F41.1 300.02    F41.0 300.01       See most recent case formulation ([id number])    Adherence Risk Assessment: Patient has a questionable history of adherence to the treatment plan. Barriers to adherence to treatment are unclear. Barriers to adherence may include previous unfavorable experiences during treatment, having risk factors for volitional nonadherence (including a history of volitional nonadherence and fear of adverse effects), and having risk factors for secondary nonadherence (including a history of nonpersistence). Strategies to improve adherence may include addressing potential barriers and reducing risks for nonadherence (adjusting frequency of doses, adjusting frequency of visits, integrating the pt preferences, counseling and psychoeducation, addressing patient expectations, maintaining contact with therapist).     Treatment Plan/Recommendations:   Chart reviewed.  Medication management: The risks and benefits of medication were discussed with the patient.  Previous medications tried included ATARAX/VISTARIL, BUSPAR, LEXAPRO, PROZAC and SEROQUEL.  Increase  "PROZAC  Chosen for MDD (FDA approved), panic (FDA approved), long half life (reduced risk for SSRI discontinuation syndrome), relative safety in pregnancy compared to other SSRIs, low risk for weight gain, and possible weight loss.  Titration:  [id number]: Increase to 20mg daily  [id number]: Start 10mg daily  Prior to evaluation pt had been taking LEXAPRO  Discontinue BUSPAR  Chosen for anxiolytic properties by partial agonism at 5HT1A receptors and antagonism at D2, D3 and D4 receptors  BUSPAR is effective for "priming" an SSRI  Pt had nonpersistence with TID dosing and 10mg TID caused symptoms resembling dysphoria or affective blunting or derealization   Medication Adjustments:  [id number]: Discontinue BUSPAR  [id number]: Start 5mg BID  Prior to a few months ago pt had been taking  10mg less than TID  Counseling, support and psychoeducation provided.  Discussed sleep diary. Discussed sleep hygiene with patient. Encouraged setting an alarm in the morning, including on weekends, to help establish a more consistent wake time. Encouraged keeping a consistent bedtime, including on weekends.  Counseled on proper medication administration and adherence.   Discussed starting digital therapeutic [place] and provided referral.  Continue psychotherapy - will defer to therapist (SOREN Petty LCSW)  Labs:  PENDING Vitamin D (Relevant risk factor(s) for deficiency or insufficiency: insufficient sunlight exposure, obesity, skin type ([place] 4+) associated with a greater reduction in the UVR available for vitamin D synthesis, using a medication that interferes with absorption or stability (Statin), and depressed mood)  Monitor:  Monitor PHQ9 score  Monitor GAD7 score  Monitor MDI and PSS-10 score  Monitor sleep - Pt will be starting a sleep diary.  Monitor labs (see results pending above; Discussed available results: CMP acceptable, lipids wnl, CBC wnl, B12 wnl, A1C 8.3, T4 0.6)  Consider sleep apnea referral  The " treatment plan and followup plan were reviewed with the patient  Pt understands to contact clinic if symptoms worsen, and to call 911 or go to nearest ER for suicidal ideation, intent or plan.      Return to Clinic: 1 month    Billing Documentation    Method of Encounter IN PERSON visit at the clinic   Type of Encounter Follow up visit with me   Counseling;  Psychotherapy Not applicable: Not done or UNDER 16 minutes   Counseling;  Tobacco and/or Nicotine Not applicable: Not done or UNDER 3 minutes   Additional Codes and Modifiers 46881: administered and scored more than one psychological or neuropsychological tests (GAD7, MDI, PHQ9, and PSS-10) (20 mins)   Time Remaining Chart/Pt 94121: FOLLOW UP VISIT 54 minutes   Total Mins  (12/19/2022) 795           Gerry Young DO  Department of Psychiatry

## 2022-12-22 LAB — 1,25(OH)2D3 SERPL-MCNC: 56 PG/ML (ref 24.8–81.5)

## 2022-12-22 NOTE — PROGRESS NOTES
Vitamin D level is sufficient. Patient notified by portal. No change in treatment required at this time.

## 2022-12-23 ENCOUNTER — TELEPHONE (OUTPATIENT)
Dept: PSYCHIATRY | Facility: CLINIC | Age: 42
End: 2022-12-23
Payer: MEDICAID

## 2022-12-23 DIAGNOSIS — E11.9 CONTROLLED TYPE 2 DIABETES MELLITUS WITHOUT COMPLICATION, WITHOUT LONG-TERM CURRENT USE OF INSULIN: ICD-10-CM

## 2022-12-23 DIAGNOSIS — F33.1 MODERATE EPISODE OF RECURRENT MAJOR DEPRESSIVE DISORDER: ICD-10-CM

## 2022-12-23 NOTE — TELEPHONE ENCOUNTER
Called pt on result encounter regarding results and recommendations from Dr. Young. Pt would like a referral placed to dietician as recommended by Dr. Young. Pended referral below.

## 2023-01-18 ENCOUNTER — OFFICE VISIT (OUTPATIENT)
Dept: PSYCHIATRY | Facility: CLINIC | Age: 43
End: 2023-01-18
Payer: MEDICAID

## 2023-01-18 VITALS
SYSTOLIC BLOOD PRESSURE: 114 MMHG | BODY MASS INDEX: 32.14 KG/M2 | DIASTOLIC BLOOD PRESSURE: 77 MMHG | HEIGHT: 64 IN | WEIGHT: 188.25 LBS | HEART RATE: 88 BPM

## 2023-01-18 DIAGNOSIS — F41.1 GENERALIZED ANXIETY DISORDER WITH PANIC ATTACKS: ICD-10-CM

## 2023-01-18 DIAGNOSIS — F33.40 RECURRENT MAJOR DEPRESSIVE DISORDER, IN REMISSION: Primary | ICD-10-CM

## 2023-01-18 DIAGNOSIS — F41.0 GENERALIZED ANXIETY DISORDER WITH PANIC ATTACKS: ICD-10-CM

## 2023-01-18 PROCEDURE — 3008F BODY MASS INDEX DOCD: CPT | Mod: AF,HB,CPTII, | Performed by: STUDENT IN AN ORGANIZED HEALTH CARE EDUCATION/TRAINING PROGRAM

## 2023-01-18 PROCEDURE — 3078F PR MOST RECENT DIASTOLIC BLOOD PRESSURE < 80 MM HG: ICD-10-PCS | Mod: AF,HB,CPTII, | Performed by: STUDENT IN AN ORGANIZED HEALTH CARE EDUCATION/TRAINING PROGRAM

## 2023-01-18 PROCEDURE — 1160F PR REVIEW ALL MEDS BY PRESCRIBER/CLIN PHARMACIST DOCUMENTED: ICD-10-PCS | Mod: AF,HB,CPTII, | Performed by: STUDENT IN AN ORGANIZED HEALTH CARE EDUCATION/TRAINING PROGRAM

## 2023-01-18 PROCEDURE — 3074F PR MOST RECENT SYSTOLIC BLOOD PRESSURE < 130 MM HG: ICD-10-PCS | Mod: AF,HB,CPTII, | Performed by: STUDENT IN AN ORGANIZED HEALTH CARE EDUCATION/TRAINING PROGRAM

## 2023-01-18 PROCEDURE — 99999 PR PBB SHADOW E&M-EST. PATIENT-LVL III: CPT | Mod: PBBFAC,AF,HB, | Performed by: STUDENT IN AN ORGANIZED HEALTH CARE EDUCATION/TRAINING PROGRAM

## 2023-01-18 PROCEDURE — 1160F RVW MEDS BY RX/DR IN RCRD: CPT | Mod: AF,HB,CPTII, | Performed by: STUDENT IN AN ORGANIZED HEALTH CARE EDUCATION/TRAINING PROGRAM

## 2023-01-18 PROCEDURE — 99999 PR PBB SHADOW E&M-EST. PATIENT-LVL III: ICD-10-PCS | Mod: PBBFAC,AF,HB, | Performed by: STUDENT IN AN ORGANIZED HEALTH CARE EDUCATION/TRAINING PROGRAM

## 2023-01-18 PROCEDURE — 1159F MED LIST DOCD IN RCRD: CPT | Mod: AF,HB,CPTII, | Performed by: STUDENT IN AN ORGANIZED HEALTH CARE EDUCATION/TRAINING PROGRAM

## 2023-01-18 PROCEDURE — 99214 OFFICE O/P EST MOD 30 MIN: CPT | Mod: S$PBB,AF,HB, | Performed by: STUDENT IN AN ORGANIZED HEALTH CARE EDUCATION/TRAINING PROGRAM

## 2023-01-18 PROCEDURE — 1159F PR MEDICATION LIST DOCUMENTED IN MEDICAL RECORD: ICD-10-PCS | Mod: AF,HB,CPTII, | Performed by: STUDENT IN AN ORGANIZED HEALTH CARE EDUCATION/TRAINING PROGRAM

## 2023-01-18 PROCEDURE — 3008F PR BODY MASS INDEX (BMI) DOCUMENTED: ICD-10-PCS | Mod: AF,HB,CPTII, | Performed by: STUDENT IN AN ORGANIZED HEALTH CARE EDUCATION/TRAINING PROGRAM

## 2023-01-18 PROCEDURE — 3078F DIAST BP <80 MM HG: CPT | Mod: AF,HB,CPTII, | Performed by: STUDENT IN AN ORGANIZED HEALTH CARE EDUCATION/TRAINING PROGRAM

## 2023-01-18 PROCEDURE — 99213 OFFICE O/P EST LOW 20 MIN: CPT | Mod: PBBFAC,PN | Performed by: STUDENT IN AN ORGANIZED HEALTH CARE EDUCATION/TRAINING PROGRAM

## 2023-01-18 PROCEDURE — 99214 PR OFFICE/OUTPT VISIT, EST, LEVL IV, 30-39 MIN: ICD-10-PCS | Mod: S$PBB,AF,HB, | Performed by: STUDENT IN AN ORGANIZED HEALTH CARE EDUCATION/TRAINING PROGRAM

## 2023-01-18 PROCEDURE — 3074F SYST BP LT 130 MM HG: CPT | Mod: AF,HB,CPTII, | Performed by: STUDENT IN AN ORGANIZED HEALTH CARE EDUCATION/TRAINING PROGRAM

## 2023-01-18 RX ORDER — FLUOXETINE HYDROCHLORIDE 20 MG/1
20 CAPSULE ORAL DAILY
Qty: 30 CAPSULE | Refills: 1 | Status: SHIPPED | OUTPATIENT
Start: 2023-01-18 | End: 2023-04-27 | Stop reason: DRUGHIGH

## 2023-01-18 NOTE — PROGRESS NOTES
Outpatient Psychiatry Followup Visit (DO/MD/NP)    1/18/2023    Shelli Iverson, a 42 y.o. female, presenting for followup visit.     Reason for Encounter: Medication management. Met with patient, in person.     Assessment - Diagnosis - Goals:     Impression:      ICD-10-CM ICD-9-CM   1. Recurrent major depressive disorder, in remission  F33.40 296.35   2. Generalized anxiety disorder with panic attacks  F41.1 300.02    F41.0 300.01       See most recent case formulation ([id number])     Adherence Risk Assessment: Patient has a questionable history of adherence to the treatment plan. Barriers to adherence to treatment are unclear. Barriers to adherence may include previous unfavorable experiences during treatment, having risk factors for volitional nonadherence (including a history of volitional nonadherence and fear of adverse effects), and having risk factors for secondary nonadherence (including a history of nonpersistence). Strategies to improve adherence may include addressing potential barriers and reducing risks for nonadherence (adjusting frequency of doses, adjusting frequency of visits, integrating the pt preferences, counseling and psychoeducation, addressing patient expectations, maintaining contact with therapist).     Treatment Plan/Recommendations:   Chart reviewed.  Medication management: The risks and benefits of medication were discussed with the patient.  Previous medications tried included ATARAX/VISTARIL, BUSPAR, LEXAPRO, PROZAC and SEROQUEL.  Continue PROZAC  Chosen for MDD (FDA approved), panic (FDA approved), long half life (reduced risk for SSRI discontinuation syndrome), relative safety in pregnancy compared to other SSRIs, low risk for weight gain, and possible weight loss.  Titration:  [id number]: Increase to 20mg daily  22NOV2022: Start 10mg daily  Prior to evaluation pt had been taking LEXAPRO  Counseling, support and psychoeducation provided.  Counseled on proper medication  administration and adherence.   Continue psychotherapy - will defer to therapist (SOREN Petty LCSW) as needed  Monitor:  GAD7 and PHQ9  MDI  PSS10  Monitor sleep - stable at this time  Monitor vitals, especially weight and BP  Discussed labs (Vit D wnl)  The treatment plan and followup plan were reviewed with the patient  Pt understands to contact clinic if symptoms worsen, and to call 911 or go to nearest ER for suicidal ideation, intent or plan.    Return: 2 months, CLINIC ONLY (not virtual), for medication management only    Interval History:     Patient did not display signs of nor endorse symptoms of overt psychosis or acute mood disorder requiring hospitalization during the encounter. Patient denied violent thoughts or suicidal or homicidal ideation, intent, or plan.    Pt is pleasant and cooperative on interview. This visit was done in person in the clinic.    Panic attack last week, only one since the fall. Mood is good. Anxiety is otherwise stable. Got braces off this morning.    Instruments:     GAD7 Interpretation: 1/21 (1/18/2023); NEGATIVE using 5-10-15 cutoffs. Compared to MILD 5/21 last time, GAD7 improved.    MDI Interpretation: 2/50 (1/18/2023); NEGATIVE using 21-26-31 cutoffs. Compared to NEGATIVE 14/50 last time, MDI is stable.    PHQ9 Interpretation: 1/27 (1/18/2023); NEGATIVE using 7-15-21 cutoffs. Compared to NEGATIVE 2/27 last time, PHQ9 is stable.    PSS10 Interpretation: 16/40 (1/18/2023); MODERATE using 14-27 cutoffs. (0/6 perceived helplessness) (3/4 self-efficacy deficits) Compared to MODERATE 14/40 last time, PSS10 is stable.     Review of Systems:     Mood and Associated Behaviors: No concerns.  Appetite: No concerns.  Anxiety: Anxiety is stable. Had a panic attack since last encounter.  Stress: Stress is stable.  Sleep: good overall and restorative.    Functioning in Relationships:  Spouse/partner: no concerns identified  Peers: no concerns identified  Employers: no concerns  "identified    Current Evaluation:     Constitutional  Vitals:     Vitals:    01/18/23 1506   BP: 114/77   Pulse: 88   Weight: 85.4 kg (188 lb 4.4 oz)   Height: 5' 4" (1.626 m)     General:  casual dress, obese (Class I, BMI 30.0 - 34.9)    Psychiatric / Mental Status Examination  Appearance: Dress is informal but appropriate.   Motor Activity: Normal.  Speech, Language Associations and Form of Thought: Clear, normal rate and volume. Associations intact. No tangentiality or thought blocking. Orderly thought process.   Mood and Affect: Euthymic mood with appropriate affect.  Content of Thought: Normal, no suicidality, no homicidality, delusions, or paranoia   Perceptions: No hallucinations.  Orientation: Grossly intact.  Memory: Intact for content of interview.  Attention Span & Concentration: Able to focus for interview.  Fund of Knowledge: Intact and appropriate to age and level of education.  Insight: Intact.  Judgment: Behavior is adequate to circumstances.    Neurological / Musculoskeletal / Osteopathic Structural  Gait, Station:  non-ataxic    Auto-populated chart data omitted from this note for brevity.    Billing Documentation:     Method of Encounter IN PERSON visit at the clinic   Type of Encounter Follow up visit with me   Counseling;  Psychotherapy Not applicable: Not done or UNDER 16 minutes   Counseling;  Tobacco and/or Nicotine Not applicable: Not done or UNDER 3 minutes   Additional Codes and Modifiers None   Time Remaining Chart/Pt 66611: FOLLOW UP VISIT 30 minutes   Total Mins  (1/18/2023) 030        Gerry Young DO  Department of Psychiatry    "

## 2023-01-18 NOTE — PATIENT INSTRUCTIONS
Continue PROZAC 20mg daily  Maintain good sleep hygiene  Keep followup appointments with other clinicians, including therapist

## 2023-03-15 ENCOUNTER — TELEPHONE (OUTPATIENT)
Dept: PSYCHIATRY | Facility: CLINIC | Age: 43
End: 2023-03-15
Payer: MEDICAID

## 2023-03-15 NOTE — TELEPHONE ENCOUNTER
Medication requested-fluoxetine 20 mg   Last RX-1/18/23   Qty-30  Refills-1  Last office visit-1/18/23  Next office visit-4/13/23

## 2023-03-16 ENCOUNTER — PATIENT MESSAGE (OUTPATIENT)
Dept: PSYCHIATRY | Facility: CLINIC | Age: 43
End: 2023-03-16
Payer: MEDICAID

## 2023-04-25 NOTE — PROGRESS NOTES
Outpatient Psychiatry Followup Visit (DO/MD/NP)    4/27/2023    Assessment - Plan:     Diagnostic Impression     ICD-10-CM ICD-9-CM   1. Moderate episode of recurrent major depressive disorder  F33.1 296.32   2. Generalized anxiety disorder with panic attacks  F41.1 300.02    F41.0 300.01   3. Insomnia, unspecified type  G47.00 780.52   4. BMI 31.0-31.9,adult  Z68.31 V85.31      4/27/2023 (4) Acute stress is exacerbating symptoms of illness.     Medication Management   Chart was reviewed. The risks and benefits of medication were discussed with pt. The treatment plan and followup plan were reviewed with pt. Pt understands to contact clinic if symptoms worsen. Pt understand to call 911 or go to nearest ER for suicidal ideation, intent or plan.   RX History ATARAX/VISTARIL, BUSPAR, LEXAPRO, PROZAC and SEROQUEL   Current RX Increase PROZAC  Titration:  [id number]: Increase to 40mg daily  [id number]: Increase to 20mg daily  [id number]: Start 10mg daily  Prior to evaluation pt had been taking LEXAPRO   Education & Counseling RX administration and adherence   Other Orders Referral to pulmonology for sleep apnea evaluation  Continue psychotherapy   Monitor VITAL SIGNS  sleep  Instruments: PROMIS (A&D), PSS4   RETURN 4 weeks/1 month  for medication management only     Interval History - Review of Systems:     Patient did not display signs of nor endorse symptoms of overt psychosis or acute mood disorder requiring hospitalization during the encounter. Patient denied violent thoughts or suicidal or homicidal ideation, intent, or plan.     Shellishey Iverson, a 43 y.o. female, presenting for followup visit. Pt is pleasant and cooperative on interview. This visit was done in person in the clinic.    Pt has been overdue for her MAR2023 follow up; she has had multiple cancellations since last visit. Noted last visit with therapist was in OCT2022.    Overwhelmed by work. Falling behind on work. Exhausted.    Sad and has crying spells.  "Depression worsened by stress. 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     Problems with sleep. Discussed possible sleep apnea last visit; agrees to referral for evaluation today.    Discussed increasing PROZAC to 40mg daily and to follow up with therapist.     Personal Functioning   Sleep Reporting NOT restorative.   Emotion NEGATIVE BIAS/RUMINATION: worse.  MOOD DYSREGULATION: crying spells noted.   Appetite Stable.   Stress High.   Anxiety RUMINATION: worrying worse.  THREAT DYSREGULATION: no panic attacks since last encounter.   Cognition Overall about the same.   Interpersonal Functioning   Home stress   Peers N/A   Work overwhelm, stress     Measurement-Based Care (MBC):     Routine Instruments   PROMIS-ANXIETY Interpretation: T-SCORE 70+; SEVERE using 55-60-70 cutoffs. Stratification of anxiety severity was done with GAD7 last time; compared to NEGATIVE last time, severity probably WORSENED. Changed instruments today; severity changes may be artifact. See "Interval History."   PROMIS-DEPRESSION Interpretation: T-SCORE 60; MODERATE using 55-60-70 cutoffs. Stratification of depression severity was done with MDI last time; compared to NEGATIVE last time, severity probably WORSENED. Changed instruments today; severity changes may be artifact. See "Interval History."   PSS4 Interpretation: 10/16; MODERATE using 6-11 cutoffs. 2 PH, 1 LSE. Stratification of stress severity was done with PSS10 last time; compared to MODERATE 16/40 last time, stress shows NO significant change.   Additional Instruments   N/A       Current Evaluation of Mental Status:     Constitutional       Vitals:    04/27/23 1604   BP: (!) 147/99   Pulse: 101   Weight: 83.4 kg (183 lb 13.8 oz)   Height: 5' 4" (1.626 m)     General:  casual dress, obese (Class I, BMI 30.0 - 34.9)    Psychiatric / Mental Status Examination  1. Appearance: Dress is informal but appropriate. Motor activity normal.  2. Discourse: Clear " "speech with normal rate and volume. Associations intact. Orderly.  3. Emotional Expression: Anxious and depressed mood. Affect is appropriate.  4. Perception and Thinking: No hallucinations. No suicidality, no homicidality, delusions, or paranoia.  5. Sensorium: Grossly intact. Able to focus for interview.  6. Memory and Fund of Knowledge: Intact for content of interview.  7. Insight and Judgment: Intact.    Neurological / Musculoskeletal / Osteopathic Structural  Gait, Station:  non-ataxic     Auto-populated chart data omitted from this note for brevity.    Billing Documentation:     Method of Encounter IN PERSON visit at the clinic   Type of Encounter Follow up visit with me   Counseling;  Psychotherapy Not applicable: Not done or UNDER 16 minutes   Counseling;  Tobacco and/or Nicotine Not applicable: Not done or UNDER 3 minutes   Additional Codes and Modifiers 78948: administered and scored more than one psychological or neuropsychological tests (see MBC above) (16+ mins)   Time Remaining Chart/Pt 16830: FOLLOW UP VISIT, Rx mgmt, "UNSTABLE chronic illness(es) which require(s) a change in treatment"   Total Mins  (4/27/2023) N/A - Not billing for time        Gerry Young DO  Department of Psychiatry  "

## 2023-04-27 ENCOUNTER — OFFICE VISIT (OUTPATIENT)
Dept: PSYCHIATRY | Facility: CLINIC | Age: 43
End: 2023-04-27
Payer: MEDICAID

## 2023-04-27 VITALS
HEART RATE: 101 BPM | BODY MASS INDEX: 31.39 KG/M2 | SYSTOLIC BLOOD PRESSURE: 147 MMHG | HEIGHT: 64 IN | DIASTOLIC BLOOD PRESSURE: 99 MMHG | WEIGHT: 183.88 LBS

## 2023-04-27 DIAGNOSIS — F41.1 GENERALIZED ANXIETY DISORDER WITH PANIC ATTACKS: ICD-10-CM

## 2023-04-27 DIAGNOSIS — F33.1 MODERATE EPISODE OF RECURRENT MAJOR DEPRESSIVE DISORDER: Primary | ICD-10-CM

## 2023-04-27 DIAGNOSIS — F41.0 GENERALIZED ANXIETY DISORDER WITH PANIC ATTACKS: ICD-10-CM

## 2023-04-27 DIAGNOSIS — G47.00 INSOMNIA, UNSPECIFIED TYPE: ICD-10-CM

## 2023-04-27 PROBLEM — F33.40 RECURRENT MAJOR DEPRESSIVE DISORDER, IN REMISSION: Status: RESOLVED | Noted: 2022-11-22 | Resolved: 2023-04-27

## 2023-04-27 PROCEDURE — 99999 PR PBB SHADOW E&M-EST. PATIENT-LVL III: ICD-10-PCS | Mod: PBBFAC,AF,HB, | Performed by: STUDENT IN AN ORGANIZED HEALTH CARE EDUCATION/TRAINING PROGRAM

## 2023-04-27 PROCEDURE — 3008F PR BODY MASS INDEX (BMI) DOCUMENTED: ICD-10-PCS | Mod: AF,HB,CPTII, | Performed by: STUDENT IN AN ORGANIZED HEALTH CARE EDUCATION/TRAINING PROGRAM

## 2023-04-27 PROCEDURE — 1159F PR MEDICATION LIST DOCUMENTED IN MEDICAL RECORD: ICD-10-PCS | Mod: AF,HB,CPTII, | Performed by: STUDENT IN AN ORGANIZED HEALTH CARE EDUCATION/TRAINING PROGRAM

## 2023-04-27 PROCEDURE — 1159F MED LIST DOCD IN RCRD: CPT | Mod: AF,HB,CPTII, | Performed by: STUDENT IN AN ORGANIZED HEALTH CARE EDUCATION/TRAINING PROGRAM

## 2023-04-27 PROCEDURE — 1160F PR REVIEW ALL MEDS BY PRESCRIBER/CLIN PHARMACIST DOCUMENTED: ICD-10-PCS | Mod: AF,HB,CPTII, | Performed by: STUDENT IN AN ORGANIZED HEALTH CARE EDUCATION/TRAINING PROGRAM

## 2023-04-27 PROCEDURE — 3080F PR MOST RECENT DIASTOLIC BLOOD PRESSURE >= 90 MM HG: ICD-10-PCS | Mod: AF,HB,CPTII, | Performed by: STUDENT IN AN ORGANIZED HEALTH CARE EDUCATION/TRAINING PROGRAM

## 2023-04-27 PROCEDURE — 99213 OFFICE O/P EST LOW 20 MIN: CPT | Mod: PBBFAC,PN | Performed by: STUDENT IN AN ORGANIZED HEALTH CARE EDUCATION/TRAINING PROGRAM

## 2023-04-27 PROCEDURE — 3077F PR MOST RECENT SYSTOLIC BLOOD PRESSURE >= 140 MM HG: ICD-10-PCS | Mod: AF,HB,CPTII, | Performed by: STUDENT IN AN ORGANIZED HEALTH CARE EDUCATION/TRAINING PROGRAM

## 2023-04-27 PROCEDURE — 3080F DIAST BP >= 90 MM HG: CPT | Mod: AF,HB,CPTII, | Performed by: STUDENT IN AN ORGANIZED HEALTH CARE EDUCATION/TRAINING PROGRAM

## 2023-04-27 PROCEDURE — 3077F SYST BP >= 140 MM HG: CPT | Mod: AF,HB,CPTII, | Performed by: STUDENT IN AN ORGANIZED HEALTH CARE EDUCATION/TRAINING PROGRAM

## 2023-04-27 PROCEDURE — 3008F BODY MASS INDEX DOCD: CPT | Mod: AF,HB,CPTII, | Performed by: STUDENT IN AN ORGANIZED HEALTH CARE EDUCATION/TRAINING PROGRAM

## 2023-04-27 PROCEDURE — 96136 PR PSYCH/NEUROPSYCH TEST ADMIN/SCORING, 2+ TESTS, 1ST 30 MIN: ICD-10-PCS | Mod: AF,HB,59, | Performed by: STUDENT IN AN ORGANIZED HEALTH CARE EDUCATION/TRAINING PROGRAM

## 2023-04-27 PROCEDURE — 96136 PSYCL/NRPSYC TST PHY/QHP 1ST: CPT | Mod: AF,HB,59, | Performed by: STUDENT IN AN ORGANIZED HEALTH CARE EDUCATION/TRAINING PROGRAM

## 2023-04-27 PROCEDURE — 99214 OFFICE O/P EST MOD 30 MIN: CPT | Mod: S$PBB,AF,HB, | Performed by: STUDENT IN AN ORGANIZED HEALTH CARE EDUCATION/TRAINING PROGRAM

## 2023-04-27 PROCEDURE — 99999 PR PBB SHADOW E&M-EST. PATIENT-LVL III: CPT | Mod: PBBFAC,AF,HB, | Performed by: STUDENT IN AN ORGANIZED HEALTH CARE EDUCATION/TRAINING PROGRAM

## 2023-04-27 PROCEDURE — 1160F RVW MEDS BY RX/DR IN RCRD: CPT | Mod: AF,HB,CPTII, | Performed by: STUDENT IN AN ORGANIZED HEALTH CARE EDUCATION/TRAINING PROGRAM

## 2023-04-27 PROCEDURE — 99214 PR OFFICE/OUTPT VISIT, EST, LEVL IV, 30-39 MIN: ICD-10-PCS | Mod: S$PBB,AF,HB, | Performed by: STUDENT IN AN ORGANIZED HEALTH CARE EDUCATION/TRAINING PROGRAM

## 2023-04-27 RX ORDER — FLUOXETINE HYDROCHLORIDE 40 MG/1
40 CAPSULE ORAL DAILY
Qty: 30 CAPSULE | Refills: 1 | Status: SHIPPED | OUTPATIENT
Start: 2023-04-27 | End: 2023-05-24 | Stop reason: SDUPTHER

## 2023-04-27 RX ORDER — PHENTERMINE HYDROCHLORIDE 37.5 MG/1
18.75 TABLET ORAL 2 TIMES DAILY
COMMUNITY
Start: 2023-03-21 | End: 2023-07-03

## 2023-04-28 ENCOUNTER — TELEPHONE (OUTPATIENT)
Dept: PSYCHIATRY | Facility: CLINIC | Age: 43
End: 2023-04-28
Payer: MEDICAID

## 2023-04-28 NOTE — TELEPHONE ENCOUNTER
Pulm referral: Deferred: insurance; Spoke with patient and portal message sent: Explained [facility] appointment not available for this referral due to insurance out of network and to contact insurance for in-network providers for call to schedule. If external referral needed to contact PCP. Voices understanding.

## 2023-05-23 DIAGNOSIS — E11.9 CONTROLLED TYPE 2 DIABETES MELLITUS WITHOUT COMPLICATION, WITHOUT LONG-TERM CURRENT USE OF INSULIN: ICD-10-CM

## 2023-05-23 RX ORDER — METFORMIN HYDROCHLORIDE 500 MG/1
500 TABLET, FILM COATED, EXTENDED RELEASE ORAL DAILY
Qty: 30 TABLET | Refills: 11 | Status: SHIPPED | OUTPATIENT
Start: 2023-05-23

## 2023-05-23 NOTE — PROGRESS NOTES
"    Outpatient Psychiatry Followup Visit (DO/MD/NP)    5/24/2023    Assessment - Plan:     Diagnostic Impression     ICD-10-CM ICD-9-CM   1. Moderate episode of recurrent major depressive disorder  F33.1 296.32   2. Generalized anxiety disorder with panic attacks  F41.1 300.02    F41.0 300.01   3. Insomnia, unspecified type  G47.00 780.52   4. BMI 31.0-31.9,adult  Z68.31 V85.31      5/24/2023 (3) Ongoing treatment of primary symptoms with some favorable progress.     Medication Management   Chart was reviewed. The risks and benefits of medication were discussed with pt. The treatment plan and followup plan were reviewed with pt. Pt understands to contact clinic if symptoms worsen. Pt understands to call 911 or go to nearest ER for suicidal ideation, intent or plan.   RX History ATARAX/VISTARIL, BUSPAR, LEXAPRO, PROZAC and SEROQUEL   Current RX 5/24/2023: DDAS 24141; RX "UNCOMMON OR MULTIPLE ANTIDEPRESSANTS", RE 32%  Considering adding PRISTIQ QAM  Considering adding TRAZODONE HS  Continue PROZAC  Adjustments:  [id number]: Increase to 40mg daily  [id number]: Increase to 20mg daily  [id number]: Start 10mg daily  Prior to evaluation pt had been taking LEXAPRO   Education & Counseling RX administration and adherence   Other Orders PENDING from past encounter (sleep apnea referral pending)  Continue psychotherapy   Monitor VITAL SIGNS  sleep  Instruments: PROMIS (A&D), PSS4   RETURN N: RETURN IN 6 WEEKS, and reassess frequency three visits from now  Indications for CLINIC ONLY include MEASUREMENT BASED CARE, VIRTUAL VISITS AT THIS TIME WOULD PROMOTE MALADAPTIVE BEHAVIOR, VITAL SIGNS NEEDED     Data-Driven Antidepressant Selection (DDAS)   RX "UNCOMMON OR MULTIPLE ANTIDEPRESSANTS"   RE 32%   Out of 16,700 possible treatments, this case was matched to one treatment option using data from over 10 million treatments. "RX" is the best antidepressant medication (or medications) for people with a similar history as the patient in " "this case. "RE" refers to the percentage of patients with a similar history to this patient who saw an improvement in at least 50% of their depression symptoms with this particular "RX." This "[facility] Data-Driven Antidepressant Selection" report is retrievable on hi.[facility]/ad/getID using report number [id number]     Interval History - Review of Systems:     Available documentation has been reviewed, and pertinent elements of the chart have been incorporated into this note where appropriate.    Pt's last Epic encounter with writer was on: 4/27/2023    Pt's last Epic encounter in the psychiatry department was on: 4/27/2023  Pt's first Epic encounter in the psychiatry department was on: 9/8/2022      Shelli Iverson, a 43 y.o. female, presenting for followup visit. Pt is pleasant and cooperative on interview. This visit was done in person in the clinic.    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 Wants a break.    Family stress. Grandmother not doing well and near death.    Meeting with therapist today.    Feels more calm since PROZAC increase. Would like to continue this dose for now.     Patient did not display signs of nor endorse symptoms of overt psychosis or acute mood disorder requiring hospitalization during the encounter. Patient denied violent thoughts or suicidal or homicidal ideation, intent, or plan.   Personal Functioning   Sleep About the same.  Reporting poor overall.   Emotion NEGATIVE BIAS/RUMINATION: improving.  MOOD DYSREGULATION: fewer crying spells.   Appetite Stable.   Stress Overwhelm.   Anxiety About the same.  RUMINATION: worrying about the same.  SALIENCE-ANXIOUS AVOIDANCE: about the same.  THREAT DYSREGULATION: no panic attacks since last encounter.   Cognition Overall about the same.   Interpersonal Functioning   Home overwhelm   Peers N/A   Work better     Measurement-Based Care (MBC):     Routine Instruments   PROMIS-ANXIETY Interpretation: T-SCORE 70+; SEVERE using 55-60-70 " "cutoffs. Compared to SEVERE (70+) last time, PROMIS-ANXIETY shows NO significant change.   PROMIS-DEPRESSION Interpretation: T-SCORE 60; MODERATE using 55-60-70 cutoffs. Compared to MODERATE (60) last time, PROMIS-DEPRESSION shows NO significant change.   PSS4 Interpretation: 8/16; MODERATE using 6-11 cutoffs. 0 PH, 0 LSE. Compared to MODERATE 10/16 last time, PSS4 shows NO significant change.   Additional Instruments   N/A     Current Evaluation of Mental Status:     Constitutional       Vitals:    05/24/23 1405   BP: (!) 139/96   Pulse: 80   Weight: 82.2 kg (181 lb 5.3 oz)   Height: 5' 4" (1.626 m)     General:  casual dress, obese (Class I, BMI 30.0 - 34.9)    Psychiatric / Mental Status Examination  1. Appearance: Dress is informal but appropriate. Motor activity normal.  2. Discourse: Clear speech with normal rate and volume. Associations intact. Orderly.  3. Emotional Expression: Anxious and depressed mood. Tearful.  4. Perception and Thinking: No hallucinations. No suicidality, no homicidality, delusions, or paranoia.  5. Sensorium: Grossly intact. Able to focus for interview.  6. Memory and Fund of Knowledge: Intact for content of interview.  7. Insight and Judgment: Intact.    Neurological / Musculoskeletal / Osteopathic Structural  Gait, Station:  non-ataxic     Auto-populated Chart Data:     Medications  Outpatient Encounter Medications as of 5/24/2023   Medication Sig Dispense Refill    acetaminophen (TYLENOL) 500 MG tablet Take 2 tablets (1,000 mg total) by mouth every 6 (six) hours as needed for Pain. 30 tablet 0    amLODIPine (NORVASC) 10 MG tablet Take 1 tablet (10 mg total) by mouth once daily. 90 tablet 3    atorvastatin (LIPITOR) 20 MG tablet TAKE 1 TABLET(20 MG) BY MOUTH EVERY DAY 30 tablet 3    hydroCHLOROthiazide (HYDRODIURIL) 12.5 MG Tab Take 1 tablet (12.5 mg total) by mouth once daily. 90 tablet 11    hydrocortisone 2.5 % cream Apply topically 2 (two) times daily. 28 g 6    losartan (COZAAR) " 50 MG tablet TAKE 2 TABLETS BY MOUTH DAILY(FOLLOW-UP WITH DOCTOR JULY 2020) 180 tablet 1    metFORMIN (GLUMETZA) 500 MG (MOD) 24hr tablet Take 1 tablet (500 mg total) by mouth once daily. 30 tablet 11    phentermine (ADIPEX-P) 37.5 mg tablet Take 18.75 mg by mouth 2 (two) times daily.      [DISCONTINUED] FLUoxetine 40 MG capsule Take 1 capsule (40 mg total) by mouth once daily. 30 capsule 1    FLUoxetine 40 MG capsule Take 1 capsule (40 mg total) by mouth once daily. 30 capsule 1    [DISCONTINUED] metFORMIN (GLUMETZA) 500 MG (MOD) 24hr tablet TAKE 1 TABLET(500 MG) BY MOUTH EVERY DAY 30 tablet 11     No facility-administered encounter medications on file as of 5/24/2023.      The chart was reviewed for recent diagnostic procedures and investigations, and pertinent results are noted below.    Wt Readings from Last 2 Encounters:   05/24/23 82.2 kg (181 lb 5.3 oz)   04/27/23 83.4 kg (183 lb 13.8 oz)     BP Readings from Last 1 Encounters:   05/24/23 (!) 139/96     Pulse Readings from Last 1 Encounters:   05/24/23 80        Blood Counts, Electrolytes & Glucose: (i.e. WBC, ANC, Hemoglobin, Hematocrit, MCV, Platelets)  Lab Results   Component Value Date    WBC 6.12 12/19/2022    GRAN 3.3 12/19/2022    GRAN 54.5 12/19/2022    HGB 13.2 12/19/2022    HCT 41.3 12/19/2022    MCV 86 12/19/2022     12/19/2022     (L) 12/19/2022    K 3.7 12/19/2022    CALCIUM 8.8 12/19/2022    MG 6.1 (HH) 06/27/2016    CO2 25 12/19/2022    ANIONGAP 7 (L) 12/19/2022     (H) 12/19/2022    HGBA1C 8.3 (H) 12/19/2022       Renal, Liver, Pancreas, Thyroid, Parathyroid, Prolactin, CPK, Lipids & Vitamin Levels: (i.e. Cr, BUN, Anion Gap, GFR, Urine Specific Gravity, Urine Protein, Microalburnin, AST, ALT, GGT, Alk Phos,Total Bili, Total Protein, Albumin, Ammonia, INR, Amylase, Lipase, TSH, Total T3, Total T4, Free T4 PTH, Prolactin, CPK, Cholesterol, Triglycerides, LDH, HDL, Vitamin B12, Folate, Vitamin D)  Lab Results   Component Value  Date    CREATININE 0.7 12/19/2022    BUN 10 12/19/2022    EGFRNORACEVR >60.0 12/19/2022    SPECGRAV 1.020 01/27/2022    PROTEINUA Negative 01/27/2022    AST 20 12/19/2022    ALT 23 12/19/2022    ALKPHOS 91 12/19/2022    BILITOT 0.9 12/19/2022    LABPROT 10.0 01/27/2022    ALBUMIN 4.1 12/19/2022    INR 0.9 01/27/2022    TSH 1.640 12/19/2022    FREET4 0.60 (L) 12/19/2022    CHOL 143 12/19/2022    TRIG 67 12/19/2022    LDLCALC 76.6 12/19/2022    HDL 53 12/19/2022    UNROOFHY86 266 12/19/2022    FSTHZMSF74AY 29 (L) 08/08/2012       Infection Diseases, Pregnancy Screenings & Drug Levels: (i.e. Hepatitis Panel, HIV, Syphilis, Urine & Blood Pregnancy Screens, beta hCG, Lithium, Valproic Acid, Carbamazepine, Lamotrigine, Phenytoin, Phenobarbital, Clozapine, Norclozapine, Clozapine + Norclozapine)   Lab Results   Component Value Date    HEPCAB Negative 12/11/2015    BPI25QPTM Negative 01/27/2022    RPR Non-reactive 06/26/2016    PREGTESTUR Negative 02/08/2013    HCGQUANT <1.2 01/27/2022       Addiction: (i.e. Urine Toxicology, Blood Alcohol, PETH, EtG, EtS, CDT, Buprenorphine, Norbuprenorphine)  No results found for: PCDSOALCOHOL, PCDSOBENZOD, BARBITURATES, PCDSCOMETHA, OPIATESCREEN, COCAINEMETAB, AMPHETAMINES, MARIJUANATHC, PCDSOPHENCYN, PCDSUBUPRE, PCDSUFENTANY, PCDSUOXYCOD, PCDSUTRAMA, [id number], PETH, ALCOHOLMEDIC, THEYLGLUCU, ETHYLSULF, CDT, BUPRENORPH, NORBUPRENOR    Results for orders placed or performed in visit on 01/27/22   IN OFFICE EKG 12-LEAD (to [place])    Collection Time: 01/27/22 10:42 AM    Narrative    Test Reason : Z01.818,    Vent. Rate : 072 BPM     Atrial Rate : 072 BPM     P-R Int : 146 ms          QRS Dur : 084 ms      QT Int : 392 ms       P-R-T Axes : 044 069 035 degrees     QTc Int : 429 ms    Normal sinus rhythm  Septal infarct ,age undetermined  Abnormal ECG  When compared with ECG of 04-JAN-2021 10:27,  No significant change was found  Confirmed by Jeff Mcdonald MD ([id number]) on 1/27/2022 3:24:56  "PM    Referred By: BARRY MINOR           Confirmed By:Jeff Mcdonald MD          Billing Documentation:     Method of Encounter IN PERSON visit at the clinic   Type of Encounter Follow up visit with me   Counseling;  Psychotherapy Not applicable: Not done or UNDER 16 minutes   Counseling;  Tobacco and/or Nicotine Not applicable: Not done or UNDER 3 minutes   Additional Codes and Modifiers 64890, with modifer 59: administered and scored more than one psychological or neuropsychological tests (see MBC above) (16+ mins)   Time Remaining Chart/Pt 95196: FOLLOW UP VISIT, Rx mgmt, "Multiple STABLE chronic illnesses"   Total Mins  (5/24/2023) N/A - Not billing for time        Gerry Young,   Department of Psychiatry        "

## 2023-05-24 ENCOUNTER — OFFICE VISIT (OUTPATIENT)
Dept: PSYCHIATRY | Facility: CLINIC | Age: 43
End: 2023-05-24
Payer: MEDICAID

## 2023-05-24 VITALS
HEART RATE: 80 BPM | BODY MASS INDEX: 30.95 KG/M2 | HEIGHT: 64 IN | WEIGHT: 181.31 LBS | DIASTOLIC BLOOD PRESSURE: 96 MMHG | SYSTOLIC BLOOD PRESSURE: 139 MMHG

## 2023-05-24 DIAGNOSIS — F41.1 GENERALIZED ANXIETY DISORDER WITH PANIC ATTACKS: ICD-10-CM

## 2023-05-24 DIAGNOSIS — F33.1 MODERATE EPISODE OF RECURRENT MAJOR DEPRESSIVE DISORDER: Primary | ICD-10-CM

## 2023-05-24 DIAGNOSIS — F41.0 GENERALIZED ANXIETY DISORDER WITH PANIC ATTACKS: ICD-10-CM

## 2023-05-24 DIAGNOSIS — G47.00 INSOMNIA, UNSPECIFIED TYPE: ICD-10-CM

## 2023-05-24 PROCEDURE — 1160F RVW MEDS BY RX/DR IN RCRD: CPT | Mod: AF,HB,CPTII, | Performed by: STUDENT IN AN ORGANIZED HEALTH CARE EDUCATION/TRAINING PROGRAM

## 2023-05-24 PROCEDURE — 99999 PR PBB SHADOW E&M-EST. PATIENT-LVL III: CPT | Mod: PBBFAC,AF,HB, | Performed by: STUDENT IN AN ORGANIZED HEALTH CARE EDUCATION/TRAINING PROGRAM

## 2023-05-24 PROCEDURE — 3008F BODY MASS INDEX DOCD: CPT | Mod: AF,HB,CPTII, | Performed by: STUDENT IN AN ORGANIZED HEALTH CARE EDUCATION/TRAINING PROGRAM

## 2023-05-24 PROCEDURE — 3075F SYST BP GE 130 - 139MM HG: CPT | Mod: AF,HB,CPTII, | Performed by: STUDENT IN AN ORGANIZED HEALTH CARE EDUCATION/TRAINING PROGRAM

## 2023-05-24 PROCEDURE — 99214 PR OFFICE/OUTPT VISIT, EST, LEVL IV, 30-39 MIN: ICD-10-PCS | Mod: S$PBB,AF,HB, | Performed by: STUDENT IN AN ORGANIZED HEALTH CARE EDUCATION/TRAINING PROGRAM

## 2023-05-24 PROCEDURE — 99213 OFFICE O/P EST LOW 20 MIN: CPT | Mod: PBBFAC,PN | Performed by: STUDENT IN AN ORGANIZED HEALTH CARE EDUCATION/TRAINING PROGRAM

## 2023-05-24 PROCEDURE — 3080F DIAST BP >= 90 MM HG: CPT | Mod: AF,HB,CPTII, | Performed by: STUDENT IN AN ORGANIZED HEALTH CARE EDUCATION/TRAINING PROGRAM

## 2023-05-24 PROCEDURE — 3075F PR MOST RECENT SYSTOLIC BLOOD PRESS GE 130-139MM HG: ICD-10-PCS | Mod: AF,HB,CPTII, | Performed by: STUDENT IN AN ORGANIZED HEALTH CARE EDUCATION/TRAINING PROGRAM

## 2023-05-24 PROCEDURE — 1160F PR REVIEW ALL MEDS BY PRESCRIBER/CLIN PHARMACIST DOCUMENTED: ICD-10-PCS | Mod: AF,HB,CPTII, | Performed by: STUDENT IN AN ORGANIZED HEALTH CARE EDUCATION/TRAINING PROGRAM

## 2023-05-24 PROCEDURE — 99999 PR PBB SHADOW E&M-EST. PATIENT-LVL III: ICD-10-PCS | Mod: PBBFAC,AF,HB, | Performed by: STUDENT IN AN ORGANIZED HEALTH CARE EDUCATION/TRAINING PROGRAM

## 2023-05-24 PROCEDURE — 3008F PR BODY MASS INDEX (BMI) DOCUMENTED: ICD-10-PCS | Mod: AF,HB,CPTII, | Performed by: STUDENT IN AN ORGANIZED HEALTH CARE EDUCATION/TRAINING PROGRAM

## 2023-05-24 PROCEDURE — 96136 PSYCL/NRPSYC TST PHY/QHP 1ST: CPT | Mod: 59,AF,HB, | Performed by: STUDENT IN AN ORGANIZED HEALTH CARE EDUCATION/TRAINING PROGRAM

## 2023-05-24 PROCEDURE — 1159F PR MEDICATION LIST DOCUMENTED IN MEDICAL RECORD: ICD-10-PCS | Mod: AF,HB,CPTII, | Performed by: STUDENT IN AN ORGANIZED HEALTH CARE EDUCATION/TRAINING PROGRAM

## 2023-05-24 PROCEDURE — 99214 OFFICE O/P EST MOD 30 MIN: CPT | Mod: S$PBB,AF,HB, | Performed by: STUDENT IN AN ORGANIZED HEALTH CARE EDUCATION/TRAINING PROGRAM

## 2023-05-24 PROCEDURE — 96136 PR PSYCH/NEUROPSYCH TEST ADMIN/SCORING, 2+ TESTS, 1ST 30 MIN: ICD-10-PCS | Mod: 59,AF,HB, | Performed by: STUDENT IN AN ORGANIZED HEALTH CARE EDUCATION/TRAINING PROGRAM

## 2023-05-24 PROCEDURE — 3080F PR MOST RECENT DIASTOLIC BLOOD PRESSURE >= 90 MM HG: ICD-10-PCS | Mod: AF,HB,CPTII, | Performed by: STUDENT IN AN ORGANIZED HEALTH CARE EDUCATION/TRAINING PROGRAM

## 2023-05-24 PROCEDURE — 1159F MED LIST DOCD IN RCRD: CPT | Mod: AF,HB,CPTII, | Performed by: STUDENT IN AN ORGANIZED HEALTH CARE EDUCATION/TRAINING PROGRAM

## 2023-05-24 RX ORDER — FLUOXETINE HYDROCHLORIDE 40 MG/1
40 CAPSULE ORAL DAILY
Qty: 30 CAPSULE | Refills: 1 | Status: SHIPPED | OUTPATIENT
Start: 2023-05-24 | End: 2023-07-03 | Stop reason: DRUGHIGH

## 2023-05-25 ENCOUNTER — OFFICE VISIT (OUTPATIENT)
Dept: PSYCHIATRY | Facility: CLINIC | Age: 43
End: 2023-05-25
Payer: MEDICAID

## 2023-05-25 ENCOUNTER — PATIENT MESSAGE (OUTPATIENT)
Dept: PSYCHIATRY | Facility: CLINIC | Age: 43
End: 2023-05-25
Payer: MEDICAID

## 2023-05-25 DIAGNOSIS — F41.0 GENERALIZED ANXIETY DISORDER WITH PANIC ATTACKS: Primary | ICD-10-CM

## 2023-05-25 DIAGNOSIS — G47.00 INSOMNIA, UNSPECIFIED TYPE: ICD-10-CM

## 2023-05-25 DIAGNOSIS — F33.1 MODERATE EPISODE OF RECURRENT MAJOR DEPRESSIVE DISORDER: ICD-10-CM

## 2023-05-25 DIAGNOSIS — F41.1 GENERALIZED ANXIETY DISORDER WITH PANIC ATTACKS: Primary | ICD-10-CM

## 2023-05-25 PROCEDURE — 90832 PSYTX W PT 30 MINUTES: CPT | Mod: AJ,HB,95, | Performed by: SOCIAL WORKER

## 2023-05-25 PROCEDURE — 90832 PR PSYCHOTHERAPY W/PATIENT, 30 MIN: ICD-10-PCS | Mod: AJ,HB,95, | Performed by: SOCIAL WORKER

## 2023-05-25 RX ORDER — LOSARTAN POTASSIUM 100 MG/1
100 TABLET ORAL DAILY
Qty: 90 TABLET | Refills: 3 | Status: SHIPPED | OUTPATIENT
Start: 2023-05-25 | End: 2024-05-24

## 2023-05-25 NOTE — PROGRESS NOTES
Individual Psychotherapy (PhD/LCSW)    5/25/2023    Site:  [facility]        Therapeutic Intervention: Met with patient.  This is a virtual visit.  Outpatient - Supportive psychotherapy 45 min - CPT Code 36347 and Outpatient - Interactive psychotherapy 45 min - CPT code 97074    Chief complaint/reason for encounter: depression and anxiety   Medical history:   Past Medical History:   Diagnosis Date    GERD (gastroesophageal reflux disease)     Hypertension     Recurrent major depressive disorder, in remission 11/22/2022    Sciatica        Medications:    Current Outpatient Medications:     acetaminophen (TYLENOL) 500 MG tablet, Take 2 tablets (1,000 mg total) by mouth every 6 (six) hours as needed for Pain., Disp: 30 tablet, Rfl: 0    amLODIPine (NORVASC) 10 MG tablet, Take 1 tablet (10 mg total) by mouth once daily., Disp: 90 tablet, Rfl: 3    atorvastatin (LIPITOR) 20 MG tablet, TAKE 1 TABLET(20 MG) BY MOUTH EVERY DAY, Disp: 30 tablet, Rfl: 3    FLUoxetine 40 MG capsule, Take 1 capsule (40 mg total) by mouth once daily., Disp: 30 capsule, Rfl: 1    hydroCHLOROthiazide (HYDRODIURIL) 12.5 MG Tab, Take 1 tablet (12.5 mg total) by mouth once daily., Disp: 90 tablet, Rfl: 11    hydrocortisone 2.5 % cream, Apply topically 2 (two) times daily., Disp: 28 g, Rfl: 6    losartan (COZAAR) 50 MG tablet, TAKE 2 TABLETS BY MOUTH DAILY(FOLLOW-UP WITH DOCTOR JULY 2020), Disp: 180 tablet, Rfl: 1    metFORMIN (GLUMETZA) 500 MG (MOD) 24hr tablet, Take 1 tablet (500 mg total) by mouth once daily., Disp: 30 tablet, Rfl: 11    phentermine (ADIPEX-P) 37.5 mg tablet, Take 18.75 mg by mouth 2 (two) times daily., Disp: , Rfl:     Family history of psychiatric illness:   Family History   Problem Relation Age of Onset    Heart disease Mother         CAD age 38    Hypertension Mother 38        MI    Diabetes Mother     Hypertension Father        Social history (marriage, employment, etc.):  Last Session in October 2022:  Speaks to tomorrow's  meeting addressing the concerns her principal has drawn up against her and accepts coaching in preparation for tomorrow including that a job does not define who she is, she defines who she is, and to rise above whatever transpires tomorrow.  Stay calm, collected, and breathe.  Is scheduled to see Dr. Young in November and will follow up with clinician as to what transpires tomorrow. Anxious mood much relieved by end of session, was tearful throughout as recognizes that it is time for her to find something more suited to her skills and comfort level.  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  Remind her of her passion.      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  Experiencing panic attacks, mother  at age 38 from heart attack; dad drowned post [place] at age 46, two siblings, older brother Irwin (47) and younger sister Brina (36) and is struggling emotionally, is tearful during the session.  Validation and support offered and will see her virtually as scheduled.     Social History     Tobacco Use    Smoking status: Never    Smokeless tobacco: Never   Substance Use Topics    Alcohol use: Not Currently     Comment: RARELY    Drug use: No     Comment: occasional       Interval history and content of current session: CT last seen by therapist late .  Approximately 2 sessions in .  Failed to follow through on follow ups.  Is seeing Dr. Young as prescribing  "clinician and presents today virtually for follow up appointment after a long absence.  Presents on time for today's session and speaks to current relationship with older brother.  Admits she does not like to revisit the past so there is some hx there that she is not willing to address at this time.  Encourage her to  "Bound[facility]" by Jaci Perdomo and to reach out to me when she is ready to engage.      Treatment plan:  Target symptoms: recurrent depression, anxiety , adjustment, work stress  Why chosen therapy is appropriate versus another modality: relevant to diagnosis, patient responds to this modality, evidence based practice  Outcome monitoring methods: self-report, observation  Therapeutic intervention type: insight oriented psychotherapy, behavior modifying psychotherapy, supportive psychotherapy    Risk parameters:  Patient reports no suicidal ideation  Patient reports no homicidal ideation  Patient reports no self-injurious behavior  Patient reports no violent behavior    Verbal deficits: None    Patient's response to intervention:  The patient's response to intervention is reluctant.    Progress toward goals and other mental status changes:  The patient's progress toward goals is limited.    Diagnosis:   1. Generalized anxiety disorder with panic attacks    2. Moderate episode of recurrent major depressive disorder    3. Insomnia, unspecified type        Plan:  individual psychotherapy    Return to clinic: as needed    Length of Service (minutes):  16    "

## 2023-06-20 DIAGNOSIS — E78.49 OTHER HYPERLIPIDEMIA: ICD-10-CM

## 2023-06-20 NOTE — TELEPHONE ENCOUNTER
Refill Routing Note   Medication(s) are not appropriate for processing by [facility] for the following reason(s):      Non-Participating Provider    [facility] action(s):  Route Care Due:  None Identified          Appointments  past 12m or future 3m with PCP    Date Provider   Last Visit   1/27/2022 [place] Salvador NP   Next Visit   7/21/2023 [place] Salvador NP   ED visits in past 90 days: 0        Note composed:1:19 PM 06/20/2023

## 2023-06-21 RX ORDER — ATORVASTATIN CALCIUM 20 MG/1
TABLET, FILM COATED ORAL
Qty: 30 TABLET | Refills: 3 | Status: SHIPPED | OUTPATIENT
Start: 2023-06-21

## 2023-07-03 ENCOUNTER — OFFICE VISIT (OUTPATIENT)
Dept: PSYCHIATRY | Facility: CLINIC | Age: 43
End: 2023-07-03
Payer: MEDICAID

## 2023-07-03 VITALS
HEART RATE: 95 BPM | DIASTOLIC BLOOD PRESSURE: 95 MMHG | HEIGHT: 64 IN | SYSTOLIC BLOOD PRESSURE: 133 MMHG | WEIGHT: 183.44 LBS | BODY MASS INDEX: 31.32 KG/M2

## 2023-07-03 DIAGNOSIS — F41.0 GENERALIZED ANXIETY DISORDER WITH PANIC ATTACKS: ICD-10-CM

## 2023-07-03 DIAGNOSIS — F33.1 MODERATE EPISODE OF RECURRENT MAJOR DEPRESSIVE DISORDER: Primary | ICD-10-CM

## 2023-07-03 DIAGNOSIS — F41.1 GENERALIZED ANXIETY DISORDER WITH PANIC ATTACKS: ICD-10-CM

## 2023-07-03 DIAGNOSIS — G47.00 INSOMNIA, UNSPECIFIED TYPE: ICD-10-CM

## 2023-07-03 PROCEDURE — 4010F ACE/ARB THERAPY RXD/TAKEN: CPT | Mod: AF,HB,CPTII, | Performed by: STUDENT IN AN ORGANIZED HEALTH CARE EDUCATION/TRAINING PROGRAM

## 2023-07-03 PROCEDURE — 3008F BODY MASS INDEX DOCD: CPT | Mod: AF,HB,CPTII, | Performed by: STUDENT IN AN ORGANIZED HEALTH CARE EDUCATION/TRAINING PROGRAM

## 2023-07-03 PROCEDURE — 1159F MED LIST DOCD IN RCRD: CPT | Mod: AF,HB,CPTII, | Performed by: STUDENT IN AN ORGANIZED HEALTH CARE EDUCATION/TRAINING PROGRAM

## 2023-07-03 PROCEDURE — 99214 PR OFFICE/OUTPT VISIT, EST, LEVL IV, 30-39 MIN: ICD-10-PCS | Mod: S$PBB,AF,HB, | Performed by: STUDENT IN AN ORGANIZED HEALTH CARE EDUCATION/TRAINING PROGRAM

## 2023-07-03 PROCEDURE — 96136 PR PSYCH/NEUROPSYCH TEST ADMIN/SCORING, 2+ TESTS, 1ST 30 MIN: ICD-10-PCS | Mod: 59,AF,HB, | Performed by: STUDENT IN AN ORGANIZED HEALTH CARE EDUCATION/TRAINING PROGRAM

## 2023-07-03 PROCEDURE — 3080F DIAST BP >= 90 MM HG: CPT | Mod: AF,HB,CPTII, | Performed by: STUDENT IN AN ORGANIZED HEALTH CARE EDUCATION/TRAINING PROGRAM

## 2023-07-03 PROCEDURE — 96136 PSYCL/NRPSYC TST PHY/QHP 1ST: CPT | Mod: 59,AF,HB, | Performed by: STUDENT IN AN ORGANIZED HEALTH CARE EDUCATION/TRAINING PROGRAM

## 2023-07-03 PROCEDURE — 3008F PR BODY MASS INDEX (BMI) DOCUMENTED: ICD-10-PCS | Mod: AF,HB,CPTII, | Performed by: STUDENT IN AN ORGANIZED HEALTH CARE EDUCATION/TRAINING PROGRAM

## 2023-07-03 PROCEDURE — 3080F PR MOST RECENT DIASTOLIC BLOOD PRESSURE >= 90 MM HG: ICD-10-PCS | Mod: AF,HB,CPTII, | Performed by: STUDENT IN AN ORGANIZED HEALTH CARE EDUCATION/TRAINING PROGRAM

## 2023-07-03 PROCEDURE — 99999 PR PBB SHADOW E&M-EST. PATIENT-LVL III: ICD-10-PCS | Mod: PBBFAC,AF,HB, | Performed by: STUDENT IN AN ORGANIZED HEALTH CARE EDUCATION/TRAINING PROGRAM

## 2023-07-03 PROCEDURE — 99213 OFFICE O/P EST LOW 20 MIN: CPT | Mod: PBBFAC,PN | Performed by: STUDENT IN AN ORGANIZED HEALTH CARE EDUCATION/TRAINING PROGRAM

## 2023-07-03 PROCEDURE — 99214 OFFICE O/P EST MOD 30 MIN: CPT | Mod: S$PBB,AF,HB, | Performed by: STUDENT IN AN ORGANIZED HEALTH CARE EDUCATION/TRAINING PROGRAM

## 2023-07-03 PROCEDURE — 1159F PR MEDICATION LIST DOCUMENTED IN MEDICAL RECORD: ICD-10-PCS | Mod: AF,HB,CPTII, | Performed by: STUDENT IN AN ORGANIZED HEALTH CARE EDUCATION/TRAINING PROGRAM

## 2023-07-03 PROCEDURE — 1160F RVW MEDS BY RX/DR IN RCRD: CPT | Mod: AF,HB,CPTII, | Performed by: STUDENT IN AN ORGANIZED HEALTH CARE EDUCATION/TRAINING PROGRAM

## 2023-07-03 PROCEDURE — 99999 PR PBB SHADOW E&M-EST. PATIENT-LVL III: CPT | Mod: PBBFAC,AF,HB, | Performed by: STUDENT IN AN ORGANIZED HEALTH CARE EDUCATION/TRAINING PROGRAM

## 2023-07-03 PROCEDURE — 3075F PR MOST RECENT SYSTOLIC BLOOD PRESS GE 130-139MM HG: ICD-10-PCS | Mod: AF,HB,CPTII, | Performed by: STUDENT IN AN ORGANIZED HEALTH CARE EDUCATION/TRAINING PROGRAM

## 2023-07-03 PROCEDURE — 3075F SYST BP GE 130 - 139MM HG: CPT | Mod: AF,HB,CPTII, | Performed by: STUDENT IN AN ORGANIZED HEALTH CARE EDUCATION/TRAINING PROGRAM

## 2023-07-03 PROCEDURE — 4010F PR ACE/ARB THEARPY RXD/TAKEN: ICD-10-PCS | Mod: AF,HB,CPTII, | Performed by: STUDENT IN AN ORGANIZED HEALTH CARE EDUCATION/TRAINING PROGRAM

## 2023-07-03 PROCEDURE — 1160F PR REVIEW ALL MEDS BY PRESCRIBER/CLIN PHARMACIST DOCUMENTED: ICD-10-PCS | Mod: AF,HB,CPTII, | Performed by: STUDENT IN AN ORGANIZED HEALTH CARE EDUCATION/TRAINING PROGRAM

## 2023-07-03 RX ORDER — FLUOXETINE HYDROCHLORIDE 20 MG/1
CAPSULE ORAL
Qty: 30 CAPSULE | Refills: 1 | Status: SHIPPED | OUTPATIENT
Start: 2023-07-03 | End: 2023-07-31 | Stop reason: SDUPTHER

## 2023-07-03 RX ORDER — FLUOXETINE HYDROCHLORIDE 40 MG/1
CAPSULE ORAL
Qty: 30 CAPSULE | Refills: 1 | Status: SHIPPED | OUTPATIENT
Start: 2023-07-03 | End: 2023-07-31 | Stop reason: SDUPTHER

## 2023-07-03 NOTE — PROGRESS NOTES
"    Outpatient Psychiatry Followup Visit (DO/MD/NP)    7/3/2023    Assessment - Plan:     Diagnostic Impression     ICD-10-CM ICD-9-CM   1. Moderate episode of recurrent major depressive disorder  F33.1 296.32   2. Generalized anxiety disorder with panic attacks  F41.1 300.02    F41.0 300.01   3. Insomnia, unspecified type  G47.00 780.52   4. BMI 31.0-31.9,adult  Z68.31 V85.31      7/3/2023 (3) Ongoing treatment of primary symptoms with some favorable progress.     Medication Management   Chart was reviewed. The risks and benefits of medication were discussed with pt. The treatment plan and followup plan were reviewed with pt. Pt understands to contact clinic if symptoms worsen. Pt understands to call 911 or go to nearest ER for suicidal ideation, intent or plan.   RX History ATARAX/VISTARIL, BUSPAR, LEXAPRO, PROZAC and SEROQUEL   Current RX 5/24/2023: DDAS 86787; RX "UNCOMMON OR MULTIPLE ANTIDEPRESSANTS", RE 32%  Considering adding PRISTIQ QAM  Considering adding TRAZODONE HS  Continue PROZAC  Adjustments:  [id number]: Increase to 60mg daily  45NHC1509: Increase to 40mg daily  59RZY9001: Increase to 20mg daily  33ZYQ2152: Start 10mg daily  Prior to evaluation pt had been taking LEXAPRO   Education & Counseling RX administration and adherence   Other Orders Continue individual psychotherapy  PENDING from past encounter (sleep apnea - pulmonology)   Monitor VITAL SIGNS  sleep  Instruments: PROMIS (A&D), PSS4   RETURN P: RETURN IN 6 WEEKS, and reassess frequency two visits from now  Continue medication management.     Interval History - Review of Systems:     Available documentation has been reviewed, and pertinent elements of the chart have been incorporated into this note where appropriate.   9/8/2022 : first Epic encounter with psychiatry department  5/25/2023 : last Epic encounter with psychiatry department  5/24/2023 : last Epic encounter with writer   Shelli Iverson, a 43 y.o. female, presenting for followup " "visit.      Met with therapist since last encounter. Going to school next week. Some stress. Has not yet heard from pulm. Discussed increasing PROZAC.     Vegetative Functions   Sleep [x] Y  [] N  About the same.   GI/ [] Y  [] N     Appetite [] Y  [] N     Emotion and Mood   Negative Bias-Rumination [x] Y  [] N  About the same.   Anhed./Context Insens. [] Y  [] N     Mood Dysregulation [] Y  [] N     Anxiety and Threat Perception   Rumination [x] Y  [] N  About the same.   Salience-Anxious Av. [] Y  [] N     Threat Dysregulation [] Y  [] N     Consciousness, Cognition and Reality Testing   Cognitive Dyscontrol [] Y  [] N     Inattention [] Y  [] N     Memory Problems [] Y  [] N     Perception Problems [] Y  [] N     Problems in Social Spheres   Home [] Y  [] N     Peers [] Y  [] N     Work [] Y  [] N     Stress [x] Y  [] N  About the same.     Pt did NOT display signs of nor endorse symptoms of overt psychosis or acute mood disorder requiring hospitalization during the encounter. Pt denied violent thoughts or suicidal or homicidal ideation, intent, or plan.       Measurement-Based Care (MBC):     Routine Instruments   PROMIS-ANXIETY Interpretation: T-SCORE 70+; SEVERE using 55-60-70 cutoffs. Compared to SEVERE (70+) last time, PROMIS-ANXIETY is relatively stable.   PROMIS-DEPRESSION Interpretation: T-SCORE 64; MODERATE using 55-60-70 cutoffs. Compared to MODERATE (60) last time, PROMIS-DEPRESSION is relatively stable.   PSS4 Interpretation: 5/16; LOW using 6-11 cutoffs. 0 PH, 0 LSE. Compared to MODERATE 8/16 last time, PSS4 IMPROVED.   Additional Instruments   N/A     Current Evaluation of Mental Status:     Constitutional / General       Vitals:    07/03/23 1404   BP: (!) 133/95   Pulse: 95   Weight: 83.2 kg (183 lb 6.8 oz)   Height: 5' 4" (1.626 m)         Psychiatric / Mental Status Examination  1. Appearance: Dress is informal but " appropriate. Motor activity normal.  2. Discourse: Clear speech with normal rate and volume. Associations intact. Orderly.  3. Emotional Expression: Somewhat anxious and depressed mood. Affect is appropriate.  4. Perception and Thinking: No hallucinations. No suicidality, no homicidality, delusions, or paranoia.  5. Sensorium: Grossly intact. Able to focus for interview.  6. Memory and Fund of Knowledge: Intact for content of interview.  7. Insight and Judgment: Intact.         Auto-populated Chart Data:     Medications  Outpatient Encounter Medications as of 7/3/2023   Medication Sig Dispense Refill    acetaminophen (TYLENOL) 500 MG tablet Take 2 tablets (1,000 mg total) by mouth every 6 (six) hours as needed for Pain. 30 tablet 0    amLODIPine (NORVASC) 10 MG tablet Take 1 tablet (10 mg total) by mouth once daily. 90 tablet 3    atorvastatin (LIPITOR) 20 MG tablet TAKE 1 TABLET(20 MG) BY MOUTH EVERY DAY 30 tablet 3    FLUoxetine 40 MG capsule Take a 40mg cap with a 20mg cap (60mg total) every morning. 30 capsule 1    hydroCHLOROthiazide (HYDRODIURIL) 12.5 MG Tab Take 1 tablet (12.5 mg total) by mouth once daily. 90 tablet 11    hydrocortisone 2.5 % cream Apply topically 2 (two) times daily. 28 g 6    losartan (COZAAR) 100 MG tablet Take 1 tablet (100 mg total) by mouth once daily. 90 tablet 3    losartan (COZAAR) 50 MG tablet TAKE 2 TABLETS BY MOUTH DAILY(FOLLOW-UP WITH DOCTOR JULY 2020) 180 tablet 1    metFORMIN (GLUMETZA) 500 MG (MOD) 24hr tablet Take 1 tablet (500 mg total) by mouth once daily. 30 tablet 11    [DISCONTINUED] FLUoxetine 40 MG capsule Take 1 capsule (40 mg total) by mouth once daily. 30 capsule 1    [DISCONTINUED] phentermine (ADIPEX-P) 37.5 mg tablet Take 18.75 mg by mouth 2 (two) times daily.      FLUoxetine 20 MG capsule Take a 40mg cap with a 20mg cap (60mg total) every morning. 30 capsule 1    [DISCONTINUED] atorvastatin (LIPITOR) 20 MG tablet TAKE 1 TABLET(20 MG) BY MOUTH EVERY DAY 30  tablet 3     No facility-administered encounter medications on file as of 7/3/2023.      The chart was reviewed for recent diagnostic procedures and investigations, and pertinent results are noted below.    Wt Readings from Last 2 Encounters:   07/03/23 83.2 kg (183 lb 6.8 oz)   05/24/23 82.2 kg (181 lb 5.3 oz)     BP Readings from Last 1 Encounters:   07/03/23 (!) 133/95     Pulse Readings from Last 1 Encounters:   07/03/23 95        Blood Counts, Electrolytes & Glucose: (i.e. WBC, ANC, Hemoglobin, Hematocrit, MCV, Platelets)  Lab Results   Component Value Date    WBC 6.12 12/19/2022    GRAN 3.3 12/19/2022    GRAN 54.5 12/19/2022    HGB 13.2 12/19/2022    HCT 41.3 12/19/2022    MCV 86 12/19/2022     12/19/2022     (L) 12/19/2022    K 3.7 12/19/2022    CALCIUM 8.8 12/19/2022    MG 6.1 (HH) 06/27/2016    CO2 25 12/19/2022    ANIONGAP 7 (L) 12/19/2022     (H) 12/19/2022    HGBA1C 8.3 (H) 12/19/2022       Renal, Liver, Pancreas, Thyroid, Parathyroid, Prolactin, CPK, Lipids & Vitamin Levels: (i.e. Cr, BUN, Anion Gap, GFR, Urine Specific Gravity, Urine Protein, Microalburnin, AST, ALT, GGT, Alk Phos,Total Bili, Total Protein, Albumin, Ammonia, INR, Amylase, Lipase, TSH, Total T3, Total T4, Free T4 PTH, Prolactin, CPK, Cholesterol, Triglycerides, LDH, HDL, Vitamin B12, Folate, Vitamin D)  Lab Results   Component Value Date    CREATININE 0.7 12/19/2022    BUN 10 12/19/2022    EGFRNORACEVR >60.0 12/19/2022    SPECGRAV 1.020 01/27/2022    PROTEINUA Negative 01/27/2022    AST 20 12/19/2022    ALT 23 12/19/2022    ALKPHOS 91 12/19/2022    BILITOT 0.9 12/19/2022    LABPROT 10.0 01/27/2022    ALBUMIN 4.1 12/19/2022    INR 0.9 01/27/2022    TSH 1.640 12/19/2022    FREET4 0.60 (L) 12/19/2022    CHOL 143 12/19/2022    TRIG 67 12/19/2022    LDLCALC 76.6 12/19/2022    HDL 53 12/19/2022    LYXNHRJL37 266 12/19/2022    EXCQCDQV09GF 29 (L) 08/08/2012       Infection Diseases, Pregnancy Screenings & Drug Levels: (i.e.  "Hepatitis Panel, HIV, Syphilis, Urine & Blood Pregnancy Screens, beta hCG, Lithium, Valproic Acid, Carbamazepine, Lamotrigine, Phenytoin, Phenobarbital, Clozapine, Norclozapine, Clozapine + Norclozapine)   Lab Results   Component Value Date    HEPCAB Negative 12/11/2015    YRL08HQHV Negative 01/27/2022    RPR Non-reactive 06/26/2016    PREGTESTUR Negative 02/08/2013    HCGQUANT <1.2 01/27/2022       Addiction: (i.e. Urine Toxicology, Blood Alcohol, PETH, EtG, EtS, CDT, Buprenorphine, Norbuprenorphine)  No results found for: PCDSOALCOHOL, PCDSOBENZOD, BARBITURATES, PCDSCOMETHA, OPIATESCREEN, COCAINEMETAB, AMPHETAMINES, MARIJUANATHC, PCDSOPHENCYN, PCDSUBUPRE, PCDSUFENTANY, PCDSUOXYCOD, PCDSUTRAMA, [id number], PETH, ALCOHOLMEDIC, THEYLGLUCU, ETHYLSULF, CDT, BUPRENORPH, NORBUPRENOR    Results for orders placed or performed in visit on 01/27/22   IN OFFICE EKG 12-LEAD (to [place])    Collection Time: 01/27/22 10:42 AM    Narrative    Test Reason : Z01.818,    Vent. Rate : 072 BPM     Atrial Rate : 072 BPM     P-R Int : 146 ms          QRS Dur : 084 ms      QT Int : 392 ms       P-R-T Axes : 044 069 035 degrees     QTc Int : 429 ms    Normal sinus rhythm  Septal infarct ,age undetermined  Abnormal ECG  When compared with ECG of 04-JAN-2021 10:27,  No significant change was found  Confirmed by Jeff Mcdonald MD ([id number]) on 1/27/2022 3:24:56 PM    Referred By: BARRY MINOR           Confirmed By:Jeff Mcdonald MD          Billing Documentation:     Method of Encounter IN PERSON visit at the clinic   Type of Encounter Follow up visit with me   Counseling;  Psychotherapy Not applicable: Not done or UNDER 16 minutes   Counseling;  Tobacco and/or Nicotine Not applicable: Not done or UNDER 3 minutes   Additional Codes and Modifiers 67830, with modifer 59: administered and scored more than one psychological or neuropsychological tests (see MBC above) (16+ mins)   Time Remaining Chart/Pt 90787: FOLLOW UP VISIT, Rx mgmt, "Multiple STABLE chronic " "illnesses"   Total Mins  (7/3/2023) N/A - Not billing for time        Gerry Young DO  Department of Psychiatry, [facility]        "

## 2023-07-03 NOTE — PATIENT INSTRUCTIONS
Increase PROZAC to 60mg daily in the morning  Keep therapy appointments  Keep referral for sleep apnea

## 2023-07-06 DIAGNOSIS — I10 ESSENTIAL (PRIMARY) HYPERTENSION: ICD-10-CM

## 2023-07-06 DIAGNOSIS — F41.0 GENERALIZED ANXIETY DISORDER WITH PANIC ATTACKS: ICD-10-CM

## 2023-07-06 DIAGNOSIS — F33.1 MODERATE EPISODE OF RECURRENT MAJOR DEPRESSIVE DISORDER: ICD-10-CM

## 2023-07-06 DIAGNOSIS — F41.1 GENERALIZED ANXIETY DISORDER WITH PANIC ATTACKS: ICD-10-CM

## 2023-07-06 RX ORDER — FLUOXETINE HYDROCHLORIDE 20 MG/1
CAPSULE ORAL
Qty: 30 CAPSULE | Refills: 1 | Status: CANCELLED | OUTPATIENT
Start: 2023-07-06

## 2023-07-06 RX ORDER — AMLODIPINE BESYLATE 10 MG/1
10 TABLET ORAL DAILY
Qty: 90 TABLET | Refills: 3 | Status: CANCELLED | OUTPATIENT
Start: 2023-07-06

## 2023-07-07 ENCOUNTER — TELEPHONE (OUTPATIENT)
Dept: FAMILY MEDICINE | Facility: CLINIC | Age: 43
End: 2023-07-07
Payer: MEDICAID

## 2023-07-07 RX ORDER — AMLODIPINE BESYLATE 10 MG/1
10 TABLET ORAL DAILY
Qty: 90 TABLET | Refills: 3 | Status: SHIPPED | OUTPATIENT
Start: 2023-07-07

## 2023-07-07 NOTE — TELEPHONE ENCOUNTER
Received fax from [employer] advising that amlodipine is not covered.   Preferred alternatives a nifedipine ER  and felodipine ER.   Please advise.

## 2023-07-13 DIAGNOSIS — Z12.31 OTHER SCREENING MAMMOGRAM: ICD-10-CM

## 2023-07-14 ENCOUNTER — TELEPHONE (OUTPATIENT)
Dept: PSYCHIATRY | Facility: CLINIC | Age: 43
End: 2023-07-14

## 2023-07-14 DIAGNOSIS — G47.00 INSOMNIA, UNSPECIFIED TYPE: ICD-10-CM

## 2023-07-14 NOTE — PROGRESS NOTES
Interim Note    7/14/2023    Source:     Staff     Content:     Referral problem.   Pulmonology referral placed in APR2023 was closed due to insufficient insurance coverage.     Response:     New referral placed with [facility] Pulm c/o Dr. Marks, per staff recommendations.     Billing Documentation:     Will not bill.       Gerry Young, DO  Department of Psychiatry, [facility]

## 2023-07-14 NOTE — TELEPHONE ENCOUNTER
Patient called clinic to follow up on Pulm referral stating she has not been able to obtain an appointment. After further investigation, I learned patients referral was closed at the clinic is not able to accept her insurance. Will send information to provider to request new referral.

## 2023-07-21 ENCOUNTER — OFFICE VISIT (OUTPATIENT)
Dept: FAMILY MEDICINE | Facility: CLINIC | Age: 43
End: 2023-07-21
Payer: MEDICAID

## 2023-07-21 VITALS
WEIGHT: 189.63 LBS | TEMPERATURE: 98 F | BODY MASS INDEX: 32.37 KG/M2 | DIASTOLIC BLOOD PRESSURE: 62 MMHG | OXYGEN SATURATION: 98 % | SYSTOLIC BLOOD PRESSURE: 118 MMHG | HEART RATE: 74 BPM | HEIGHT: 64 IN

## 2023-07-21 DIAGNOSIS — E11.9 CONTROLLED TYPE 2 DIABETES MELLITUS WITHOUT COMPLICATION, WITHOUT LONG-TERM CURRENT USE OF INSULIN: ICD-10-CM

## 2023-07-21 DIAGNOSIS — Z00.00 ROUTINE MEDICAL EXAM: Primary | ICD-10-CM

## 2023-07-21 DIAGNOSIS — F41.1 GENERALIZED ANXIETY DISORDER WITH PANIC ATTACKS: ICD-10-CM

## 2023-07-21 DIAGNOSIS — F41.0 GENERALIZED ANXIETY DISORDER WITH PANIC ATTACKS: ICD-10-CM

## 2023-07-21 DIAGNOSIS — I10 ESSENTIAL (PRIMARY) HYPERTENSION: ICD-10-CM

## 2023-07-21 PROCEDURE — 3074F SYST BP LT 130 MM HG: CPT | Mod: CPTII,,, | Performed by: NURSE PRACTITIONER

## 2023-07-21 PROCEDURE — 99214 PR OFFICE/OUTPT VISIT, EST, LEVL IV, 30-39 MIN: ICD-10-PCS | Mod: S$PBB,,, | Performed by: NURSE PRACTITIONER

## 2023-07-21 PROCEDURE — 3078F DIAST BP <80 MM HG: CPT | Mod: CPTII,,, | Performed by: NURSE PRACTITIONER

## 2023-07-21 PROCEDURE — 1159F PR MEDICATION LIST DOCUMENTED IN MEDICAL RECORD: ICD-10-PCS | Mod: CPTII,,, | Performed by: NURSE PRACTITIONER

## 2023-07-21 PROCEDURE — 3008F BODY MASS INDEX DOCD: CPT | Mod: CPTII,,, | Performed by: NURSE PRACTITIONER

## 2023-07-21 PROCEDURE — 4010F ACE/ARB THERAPY RXD/TAKEN: CPT | Mod: CPTII,,, | Performed by: NURSE PRACTITIONER

## 2023-07-21 PROCEDURE — 4010F PR ACE/ARB THEARPY RXD/TAKEN: ICD-10-PCS | Mod: CPTII,,, | Performed by: NURSE PRACTITIONER

## 2023-07-21 PROCEDURE — 3074F PR MOST RECENT SYSTOLIC BLOOD PRESSURE < 130 MM HG: ICD-10-PCS | Mod: CPTII,,, | Performed by: NURSE PRACTITIONER

## 2023-07-21 PROCEDURE — 1159F MED LIST DOCD IN RCRD: CPT | Mod: CPTII,,, | Performed by: NURSE PRACTITIONER

## 2023-07-21 PROCEDURE — 99213 OFFICE O/P EST LOW 20 MIN: CPT | Mod: PBBFAC,PO | Performed by: NURSE PRACTITIONER

## 2023-07-21 PROCEDURE — 99214 OFFICE O/P EST MOD 30 MIN: CPT | Mod: S$PBB,,, | Performed by: NURSE PRACTITIONER

## 2023-07-21 PROCEDURE — 99999 PR PBB SHADOW E&M-EST. PATIENT-LVL III: CPT | Mod: PBBFAC,,, | Performed by: NURSE PRACTITIONER

## 2023-07-21 PROCEDURE — 3008F PR BODY MASS INDEX (BMI) DOCUMENTED: ICD-10-PCS | Mod: CPTII,,, | Performed by: NURSE PRACTITIONER

## 2023-07-21 PROCEDURE — 99999 PR PBB SHADOW E&M-EST. PATIENT-LVL III: ICD-10-PCS | Mod: PBBFAC,,, | Performed by: NURSE PRACTITIONER

## 2023-07-21 PROCEDURE — 3078F PR MOST RECENT DIASTOLIC BLOOD PRESSURE < 80 MM HG: ICD-10-PCS | Mod: CPTII,,, | Performed by: NURSE PRACTITIONER

## 2023-07-21 RX ORDER — ATORVASTATIN CALCIUM 10 MG/1
10 TABLET, FILM COATED ORAL DAILY
Qty: 90 TABLET | Refills: 3 | Status: SHIPPED | OUTPATIENT
Start: 2023-07-21 | End: 2024-07-20

## 2023-07-21 NOTE — PROGRESS NOTES
This dictation has been generated using Modal Fluency Dictation some phonetic errors may occur. Please contact author for clarification if needed.     Problem List Items Addressed This Visit       Controlled type 2 diabetes mellitus without complication, without long-term current use of insulin    Relevant Orders    Comprehensive Metabolic Panel    Hemoglobin A1C    Creatinine, Random Urine    Lipid Panel    Generalized anxiety disorder with panic attacks     Other Visit Diagnoses       Routine medical exam    -  Primary    Essential (primary) hypertension                Orders Placed This Encounter    Comprehensive Metabolic Panel    Hemoglobin A1C    Creatinine, Random Urine    Lipid Panel    atorvastatin (LIPITOR) 10 MG tablet     Diabetes due for reassessment of labs.    Hypertension stable at visit.  Assess renal function and cholesterol.    Last cholesterol was remarkably well controlled plan to decrease Lipitor to 10 mg pending today's lab results.  I have sent the medicine in in anticipation of this decrease in dosage.  Discussed diet exercise and weight loss.    No follow-ups on file.    ________________________________________________________________  ________________________________________________________________      No chief complaint on file.    History of present illness  This 43 y.o. presents today for complaint of diabetes hypertension anxiety.  Needs update of labs.  No complaints.  Complete review of systems otherwise negative      Past Medical History:   Diagnosis Date    GERD (gastroesophageal reflux disease)     Hypertension     Recurrent major depressive disorder, in remission 2022    Sciatica        Past Surgical History:   Procedure Laterality Date     SECTION      x 1    REVISION OF ABDOMINAL SCAR      planned 21    tummy tuck         Family History   Problem Relation Age of Onset    Heart disease Mother         CAD age 38    Hypertension Mother 38        MI    Diabetes  Mother     Hypertension Father        Social History     Socioeconomic History    Marital status: Single   Tobacco Use    Smoking status: Never    Smokeless tobacco: Never   Substance and Sexual Activity    Alcohol use: Not Currently     Comment: RARELY    Drug use: No     Comment: occasional    Sexual activity: Yes     Birth control/protection: I.U.D.     Social Determinants of Health     Financial Resource Strain: Unknown    Difficulty of Paying Living Expenses: Patient refused   Food Insecurity: Unknown    Worried About Running Out of Food in the Last Year: Patient refused    Ran Out of Food in the Last Year: Patient refused   Transportation Needs: Unknown    Lack of Transportation (Medical): Patient refused    Lack of Transportation (Non-Medical): Patient refused   Physical Activity: Insufficiently Active    Days of Exercise per Week: 1 day    Minutes of Exercise per Session: 40 min   Stress: No Stress Concern Present    Feeling of Stress : Only a little   Social Connections: Unknown    Frequency of Communication with Friends and Family: Twice a week    Frequency of Social Gatherings with Friends and Family: Once a week    Active Member of Clubs or Organizations: No    Attends Club or Organization Meetings: 1 to 4 times per year    Marital Status: Never    Housing Stability: Unknown    Unable to Pay for Housing in the Last Year: Patient refused    Unstable Housing in the Last Year: Patient refused       Current Outpatient Medications   Medication Sig Dispense Refill    acetaminophen (TYLENOL) 500 MG tablet Take 2 tablets (1,000 mg total) by mouth every 6 (six) hours as needed for Pain. 30 tablet 0    amLODIPine (NORVASC) 10 MG tablet Take 1 tablet (10 mg total) by mouth once daily. 90 tablet 3    atorvastatin (LIPITOR) 20 MG tablet TAKE 1 TABLET(20 MG) BY MOUTH EVERY DAY 30 tablet 3    FLUoxetine 20 MG capsule Take a 40mg cap with a 20mg cap (60mg total) every morning. 30 capsule 1    FLUoxetine 40 MG  capsule Take a 40mg cap with a 20mg cap (60mg total) every morning. 30 capsule 1    hydroCHLOROthiazide (HYDRODIURIL) 12.5 MG Tab Take 1 tablet (12.5 mg total) by mouth once daily. 90 tablet 11    hydrocortisone 2.5 % cream Apply topically 2 (two) times daily. 28 g 6    losartan (COZAAR) 100 MG tablet Take 1 tablet (100 mg total) by mouth once daily. 90 tablet 3    losartan (COZAAR) 50 MG tablet TAKE 2 TABLETS BY MOUTH DAILY(FOLLOW-UP WITH DOCTOR JULY 2020) 180 tablet 1    metFORMIN (GLUMETZA) 500 MG (MOD) 24hr tablet Take 1 tablet (500 mg total) by mouth once daily. 30 tablet 11    atorvastatin (LIPITOR) 10 MG tablet Take 1 tablet (10 mg total) by mouth once daily. 90 tablet 3     No current facility-administered medications for this visit.       Review of patient's allergies indicates:   Allergen Reactions    Penicillins Rash       Physical examination  Vitals Reviewed\  Vitals:    07/21/23 0956   BP: 118/62   Pulse: 74   Temp: 98.1 °F (36.7 °C)     Body mass index is 32.54 kg/m².   Gen. Well-dressed well-nourished   Skin warm dry and intact.  No rashes noted.  Chest.  Respirations are even unlabored.  Lungs are clear to auscultation.  Cardiac regular rate and rhythm.  No chest wall adenopathy noted.  Neuro. Awake alert oriented x4.  Normal judgment and cognition noted.  Extremities no clubbing cyanosis or edema noted.     Call or return to clinic prn if these symptoms worsen or fail to improve as anticipated.

## 2023-07-26 ENCOUNTER — PATIENT MESSAGE (OUTPATIENT)
Dept: FAMILY MEDICINE | Facility: CLINIC | Age: 43
End: 2023-07-26
Payer: MEDICAID

## 2023-07-28 RX ORDER — FLUCONAZOLE 150 MG/1
150 TABLET ORAL ONCE
Qty: 1 TABLET | Refills: 0 | Status: SHIPPED | OUTPATIENT
Start: 2023-07-28 | End: 2023-07-28

## 2023-07-31 ENCOUNTER — OFFICE VISIT (OUTPATIENT)
Dept: PSYCHIATRY | Facility: CLINIC | Age: 43
End: 2023-07-31
Payer: MEDICAID

## 2023-07-31 VITALS
SYSTOLIC BLOOD PRESSURE: 156 MMHG | HEART RATE: 77 BPM | WEIGHT: 191.13 LBS | HEIGHT: 64 IN | DIASTOLIC BLOOD PRESSURE: 111 MMHG | BODY MASS INDEX: 32.63 KG/M2

## 2023-07-31 DIAGNOSIS — F33.0 MILD EPISODE OF RECURRENT MAJOR DEPRESSIVE DISORDER: Primary | ICD-10-CM

## 2023-07-31 DIAGNOSIS — G47.00 INSOMNIA, UNSPECIFIED TYPE: ICD-10-CM

## 2023-07-31 DIAGNOSIS — F41.0 GENERALIZED ANXIETY DISORDER WITH PANIC ATTACKS: ICD-10-CM

## 2023-07-31 DIAGNOSIS — F40.298 SPECIFIC PHOBIA: ICD-10-CM

## 2023-07-31 DIAGNOSIS — F41.1 GENERALIZED ANXIETY DISORDER WITH PANIC ATTACKS: ICD-10-CM

## 2023-07-31 PROCEDURE — 1160F PR REVIEW ALL MEDS BY PRESCRIBER/CLIN PHARMACIST DOCUMENTED: ICD-10-PCS | Mod: AF,HB,CPTII, | Performed by: STUDENT IN AN ORGANIZED HEALTH CARE EDUCATION/TRAINING PROGRAM

## 2023-07-31 PROCEDURE — 3008F PR BODY MASS INDEX (BMI) DOCUMENTED: ICD-10-PCS | Mod: AF,HB,CPTII, | Performed by: STUDENT IN AN ORGANIZED HEALTH CARE EDUCATION/TRAINING PROGRAM

## 2023-07-31 PROCEDURE — 3080F PR MOST RECENT DIASTOLIC BLOOD PRESSURE >= 90 MM HG: ICD-10-PCS | Mod: AF,HB,CPTII, | Performed by: STUDENT IN AN ORGANIZED HEALTH CARE EDUCATION/TRAINING PROGRAM

## 2023-07-31 PROCEDURE — 99213 OFFICE O/P EST LOW 20 MIN: CPT | Mod: PBBFAC,PN | Performed by: STUDENT IN AN ORGANIZED HEALTH CARE EDUCATION/TRAINING PROGRAM

## 2023-07-31 PROCEDURE — 4010F PR ACE/ARB THEARPY RXD/TAKEN: ICD-10-PCS | Mod: AF,HB,CPTII, | Performed by: STUDENT IN AN ORGANIZED HEALTH CARE EDUCATION/TRAINING PROGRAM

## 2023-07-31 PROCEDURE — 3077F SYST BP >= 140 MM HG: CPT | Mod: AF,HB,CPTII, | Performed by: STUDENT IN AN ORGANIZED HEALTH CARE EDUCATION/TRAINING PROGRAM

## 2023-07-31 PROCEDURE — 3008F BODY MASS INDEX DOCD: CPT | Mod: AF,HB,CPTII, | Performed by: STUDENT IN AN ORGANIZED HEALTH CARE EDUCATION/TRAINING PROGRAM

## 2023-07-31 PROCEDURE — 1159F MED LIST DOCD IN RCRD: CPT | Mod: AF,HB,CPTII, | Performed by: STUDENT IN AN ORGANIZED HEALTH CARE EDUCATION/TRAINING PROGRAM

## 2023-07-31 PROCEDURE — 3080F DIAST BP >= 90 MM HG: CPT | Mod: AF,HB,CPTII, | Performed by: STUDENT IN AN ORGANIZED HEALTH CARE EDUCATION/TRAINING PROGRAM

## 2023-07-31 PROCEDURE — 3052F HG A1C>EQUAL 8.0%<EQUAL 9.0%: CPT | Mod: AF,HB,CPTII, | Performed by: STUDENT IN AN ORGANIZED HEALTH CARE EDUCATION/TRAINING PROGRAM

## 2023-07-31 PROCEDURE — 99214 PR OFFICE/OUTPT VISIT, EST, LEVL IV, 30-39 MIN: ICD-10-PCS | Mod: S$PBB,AF,HB, | Performed by: STUDENT IN AN ORGANIZED HEALTH CARE EDUCATION/TRAINING PROGRAM

## 2023-07-31 PROCEDURE — 3077F PR MOST RECENT SYSTOLIC BLOOD PRESSURE >= 140 MM HG: ICD-10-PCS | Mod: AF,HB,CPTII, | Performed by: STUDENT IN AN ORGANIZED HEALTH CARE EDUCATION/TRAINING PROGRAM

## 2023-07-31 PROCEDURE — 99214 OFFICE O/P EST MOD 30 MIN: CPT | Mod: S$PBB,AF,HB, | Performed by: STUDENT IN AN ORGANIZED HEALTH CARE EDUCATION/TRAINING PROGRAM

## 2023-07-31 PROCEDURE — 3052F PR MOST RECENT HEMOGLOBIN A1C LEVEL 8.0 - < 9.0%: ICD-10-PCS | Mod: AF,HB,CPTII, | Performed by: STUDENT IN AN ORGANIZED HEALTH CARE EDUCATION/TRAINING PROGRAM

## 2023-07-31 PROCEDURE — 1160F RVW MEDS BY RX/DR IN RCRD: CPT | Mod: AF,HB,CPTII, | Performed by: STUDENT IN AN ORGANIZED HEALTH CARE EDUCATION/TRAINING PROGRAM

## 2023-07-31 PROCEDURE — 1159F PR MEDICATION LIST DOCUMENTED IN MEDICAL RECORD: ICD-10-PCS | Mod: AF,HB,CPTII, | Performed by: STUDENT IN AN ORGANIZED HEALTH CARE EDUCATION/TRAINING PROGRAM

## 2023-07-31 PROCEDURE — 99999 PR PBB SHADOW E&M-EST. PATIENT-LVL III: ICD-10-PCS | Mod: PBBFAC,AF,HB, | Performed by: STUDENT IN AN ORGANIZED HEALTH CARE EDUCATION/TRAINING PROGRAM

## 2023-07-31 PROCEDURE — 96136 PR PSYCH/NEUROPSYCH TEST ADMIN/SCORING, 2+ TESTS, 1ST 30 MIN: ICD-10-PCS | Mod: 59,AF,HB, | Performed by: STUDENT IN AN ORGANIZED HEALTH CARE EDUCATION/TRAINING PROGRAM

## 2023-07-31 PROCEDURE — 4010F ACE/ARB THERAPY RXD/TAKEN: CPT | Mod: AF,HB,CPTII, | Performed by: STUDENT IN AN ORGANIZED HEALTH CARE EDUCATION/TRAINING PROGRAM

## 2023-07-31 PROCEDURE — 96136 PSYCL/NRPSYC TST PHY/QHP 1ST: CPT | Mod: 59,AF,HB, | Performed by: STUDENT IN AN ORGANIZED HEALTH CARE EDUCATION/TRAINING PROGRAM

## 2023-07-31 PROCEDURE — 99999 PR PBB SHADOW E&M-EST. PATIENT-LVL III: CPT | Mod: PBBFAC,AF,HB, | Performed by: STUDENT IN AN ORGANIZED HEALTH CARE EDUCATION/TRAINING PROGRAM

## 2023-07-31 RX ORDER — FLUOXETINE HYDROCHLORIDE 40 MG/1
CAPSULE ORAL
Qty: 30 CAPSULE | Refills: 1 | Status: SHIPPED | OUTPATIENT
Start: 2023-07-31

## 2023-07-31 RX ORDER — FLUOXETINE HYDROCHLORIDE 20 MG/1
CAPSULE ORAL
Qty: 30 CAPSULE | Refills: 1 | Status: SHIPPED | OUTPATIENT
Start: 2023-07-31

## 2023-07-31 NOTE — PATIENT INSTRUCTIONS
Keep therapy appointments   Continue medications as prescribed     Referral placed to Dr. Odell for ImTT therapy for your phobia

## 2023-07-31 NOTE — PROGRESS NOTES
"    Outpatient Psychiatry Followup Visit (DO/MD/NP)    7/31/2023    Assessment - Plan:     Impression   7/31/2023 (2) Ongoing treatment of residual symptoms with some favorable progress.     ICD-10-CM ICD-9-CM   1. Mild episode of recurrent major depressive disorder  F33.0 296.31   2. Generalized anxiety disorder with panic attacks  F41.1 300.02    F41.0 300.01   3. Insomnia, unspecified type  G47.00 780.52   4. Specific phobia  F40.298 300.29   5. BMI 32.0-32.9,adult  Z68.32 V85.32        Plan of Care & Medication Management    Chart was reviewed. The risks and benefits of medication were discussed with pt. The treatment plan and followup plan were reviewed with pt. Pt understands to contact clinic if symptoms worsen. Pt understands to call 911 or go to nearest ER for suicidal ideation, intent or plan.   RX History ATARAX/VISTARIL, BUSPAR, LEXAPRO, PROZAC and SEROQUEL   Current RX 5/24/2023: DDAS 10389; RX "UNCOMMON OR MULTIPLE ANTIDEPRESSANTS", RE 32%  Considering adding PRISTIQ QAM  Considering adding TRAZODONE HS  Continue PROZAC  Adjustments:  [id number]: Increase to 60mg daily  35BVQ3421: Increase to 40mg daily  02XWF6848: Increase to 20mg daily  93XUD2156: Start 10mg daily  Prior to evaluation pt had been taking LEXAPRO   Education & Counseling RX administration and adherence   Other Orders Continue individual psychotherapy  Referral to Dr. Odell for ImTT for specific phobia (snakes)  PENDING from past encounter (sleep apnea - pulmonology)   Monitor VITAL SIGNS  sleep  Instruments: PROMIS (A&D), PSS4   RETURN Q: RETURN IN 6 WEEKS, and reassess frequency next visit  Continue medication management.     Interval History - Review of Systems (ROS):     Available documentation has been reviewed, and pertinent elements of the chart have been incorporated into this note where appropriate.   9/8/2022 : first Epic encounter with psychiatry department  7/3/2023 : last Epic encounter with psychiatry department  7/3/2023 " : last Epic encounter with writer   Shellikeanu Iverson, a 43 y.o. female, presenting for followup visit.      Has not yet met for sleep apnea referral, looking for new insurance to cover.    Stressed, appetite is up, gaining weight.    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    Discussed phobia of snakes. Quite significant. Discussed gradated exposure therapy with PROPRANOLOL. Discussed trying ImTT first.     Vegetative Functions   Sleep [] Y  [x] N  Better.   GI/ [] Y  [] N     Appetite [] Y  [] N     Emotion and Mood   Negative Bias [] Y  [x] N  Better.   Hedonic Capacity [] Y  [x] N  Overall no concerns, or concerns are minimal.  Anticipatory pleasure INTACT.  Consummatory pleasure INTACT.   Mood Dysregulation [] Y  [] N     Anxiety and Threat Perception   Rumination [] Y  [x] N  Overall better.   Salience-Anxious Av. [] Y  [x] N  Overall better.   Threat Dysregulation [] Y  [x] N  No panic attacks since last encounter.   Consciousness, Cognition and Reality Testing   Cognitive Dyscontrol [] Y  [] N     Inattention [] Y  [] N     Memory Problems [] Y  [] N     Perception Problems [] Y  [] N     Problems in Social Spheres   Home [] Y  [] N     Peers [] Y  [] N     Work [] Y  [] N     Stress [x] Y  [] N       Pt did NOT display signs of nor endorse symptoms of overt psychosis or acute mood disorder requiring hospitalization during the encounter. Pt denied violent thoughts or suicidal or homicidal ideation, intent, or plan.       Measurement-Based Care (MBC):     Routine Instruments   PROMIS-ANXIETY Interpretation: T-SCORE 60; MODERATE using 55-60-70 cutoffs. Compared to SEVERE (70+) last time, PROMIS-ANXIETY IMPROVED.   PROMIS-DEPRESSION Interpretation: T-SCORE 56; MILD using 55-60-70 cutoffs. Compared to MODERATE (64) last time, PROMIS-DEPRESSION IMPROVED.   PSS4 Interpretation: 10/16; MODERATE using 6-11 cutoffs. 1 PH, 1 LSE. Compared to LOW 5/16 last  "time, PSS4 WORSENED.   Additional Instruments   N/A     Current Evaluation of Mental Status:     Constitutional / General       Vitals:    07/31/23 1304   BP: (!) 156/111   Pulse: 77   Weight: 86.7 kg (191 lb 2.2 oz)   Height: 5' 4" (1.626 m)       Psychiatric / Mental Status Examination  1. Appearance: Dress is informal but appropriate. Motor activity normal.  2. Discourse: Clear speech with normal rate and volume. Associations intact. Orderly.  3. Emotional Expression: Somewhat anxious and depressed mood. Affect is appropriate.  4. Perception and Thinking: No hallucinations. No suicidality, no homicidality, delusions, or paranoia.  5. Sensorium: Grossly intact. Able to focus for interview.  6. Memory and Fund of Knowledge: Intact for content of interview.  7. Insight and Judgment: Intact.         Auto-populated chart data omitted from this note for brevity.    Billing Documentation:     Method of Encounter IN PERSON visit at the clinic   Type of Encounter Follow up visit with me   Counseling;  Psychotherapy    Counseling;  Tobacco and/or Nicotine    Additional Codes and Modifiers 94168, with modifer 59: administered and scored more than one psychological or neuropsychological tests (see MBC above) (16+ mins)   Time Remaining Chart/Pt 68445: FOLLOW UP VISIT, Rx mgmt, "Multiple STABLE chronic illnesses"   Total Mins  (7/31/2023) N/A - Not billing for time        Gerry Young DO  Department of Psychiatry, [facility]        "

## 2023-08-23 NOTE — TELEPHONE ENCOUNTER
Patient advised that someone contacted her after the second referral was placed however, she decided to hold off on scheduling because she is not sure what is going to happen with her insurance as she believes she will be losing her Medicaid.

## 2023-10-19 ENCOUNTER — TELEPHONE (OUTPATIENT)
Dept: PSYCHIATRY | Facility: CLINIC | Age: 43
End: 2023-10-19

## 2023-10-19 NOTE — TELEPHONE ENCOUNTER
Spoke to patient regarding the referral to Dr. Odell. Patient states she has lost her insurance and is unable to schedule at this time. Provided patient with Financial assistance information. Patient will call back when she is able to schedule

## 2023-12-18 ENCOUNTER — PATIENT MESSAGE (OUTPATIENT)
Dept: ADMINISTRATIVE | Facility: HOSPITAL | Age: 43
End: 2023-12-18

## 2024-01-19 ENCOUNTER — PATIENT MESSAGE (OUTPATIENT)
Dept: ADMINISTRATIVE | Facility: HOSPITAL | Age: 44
End: 2024-01-19

## 2024-02-07 ENCOUNTER — PATIENT MESSAGE (OUTPATIENT)
Dept: PSYCHIATRY | Facility: CLINIC | Age: 44
End: 2024-02-07

## 2024-03-06 ENCOUNTER — TELEPHONE (OUTPATIENT)
Dept: PSYCHIATRY | Facility: CLINIC | Age: 44
End: 2024-03-06

## 2024-03-06 NOTE — TELEPHONE ENCOUNTER
Attempted to contact pt to schedule follow up with Dr. Young. Patient did not answer so I LVM for return call. Will send [employer] message as well.

## 2024-03-11 ENCOUNTER — PATIENT MESSAGE (OUTPATIENT)
Dept: FAMILY MEDICINE | Facility: CLINIC | Age: 44
End: 2024-03-11

## 2024-04-03 ENCOUNTER — DOCUMENTATION ONLY (OUTPATIENT)
Dept: PSYCHIATRY | Facility: CLINIC | Age: 44
End: 2024-04-03

## 2024-07-30 ENCOUNTER — NURSE TRIAGE (OUTPATIENT)
Dept: ADMINISTRATIVE | Facility: CLINIC | Age: 44
End: 2024-07-30
Payer: MEDICAID

## 2024-07-30 ENCOUNTER — HOSPITAL ENCOUNTER (EMERGENCY)
Facility: HOSPITAL | Age: 44
Discharge: HOME | End: 2024-07-31
Attending: EMERGENCY MEDICINE
Payer: MEDICAID

## 2024-07-30 ENCOUNTER — OFFICE VISIT (OUTPATIENT)
Dept: FAMILY MEDICINE | Facility: CLINIC | Age: 44
End: 2024-07-30
Payer: MEDICAID

## 2024-07-30 ENCOUNTER — LAB VISIT (OUTPATIENT)
Dept: LAB | Facility: HOSPITAL | Age: 44
End: 2024-07-30
Payer: MEDICAID

## 2024-07-30 VITALS
HEIGHT: 64 IN | OXYGEN SATURATION: 98 % | HEART RATE: 86 BPM | DIASTOLIC BLOOD PRESSURE: 80 MMHG | WEIGHT: 183.44 LBS | BODY MASS INDEX: 31.32 KG/M2 | SYSTOLIC BLOOD PRESSURE: 128 MMHG | RESPIRATION RATE: 16 BRPM

## 2024-07-30 DIAGNOSIS — R35.89 POLYURIA: ICD-10-CM

## 2024-07-30 DIAGNOSIS — I15.2 HYPERTENSION ASSOCIATED WITH DIABETES: ICD-10-CM

## 2024-07-30 DIAGNOSIS — N92.6 IRREGULAR MENSES: ICD-10-CM

## 2024-07-30 DIAGNOSIS — E11.59 HYPERTENSION ASSOCIATED WITH DIABETES: ICD-10-CM

## 2024-07-30 DIAGNOSIS — R23.2 HOT FLASHES: ICD-10-CM

## 2024-07-30 DIAGNOSIS — R73.9 HYPERGLYCEMIA: ICD-10-CM

## 2024-07-30 DIAGNOSIS — E11.9 CONTROLLED TYPE 2 DIABETES MELLITUS WITHOUT COMPLICATION, WITHOUT LONG-TERM CURRENT USE OF INSULIN: ICD-10-CM

## 2024-07-30 DIAGNOSIS — E11.65 UNCONTROLLED TYPE 2 DIABETES MELLITUS WITH HYPERGLYCEMIA: ICD-10-CM

## 2024-07-30 DIAGNOSIS — N39.46 URINARY INCONTINENCE, MIXED: ICD-10-CM

## 2024-07-30 DIAGNOSIS — E11.65 UNCONTROLLED TYPE 2 DIABETES MELLITUS WITH HYPERGLYCEMIA: Primary | ICD-10-CM

## 2024-07-30 LAB
ALBUMIN SERPL BCP-MCNC: 3.9 G/DL (ref 3.5–5.2)
ALP SERPL-CCNC: 117 U/L (ref 55–135)
ALT SERPL W/O P-5'-P-CCNC: 33 U/L (ref 10–44)
ANION GAP SERPL CALC-SCNC: 11 MMOL/L (ref 8–16)
AST SERPL-CCNC: 23 U/L (ref 10–40)
BASOPHILS # BLD AUTO: 0.03 K/UL (ref 0–0.2)
BASOPHILS NFR BLD: 0.5 % (ref 0–1.9)
BILIRUB SERPL-MCNC: 0.4 MG/DL (ref 0.1–1)
BILIRUBIN, UA POC OHS: NEGATIVE
BLOOD, UA POC OHS: ABNORMAL
BUN SERPL-MCNC: 11 MG/DL (ref 6–20)
CALCIUM SERPL-MCNC: 8.9 MG/DL (ref 8.7–10.5)
CHLORIDE SERPL-SCNC: 103 MMOL/L (ref 95–110)
CHOLEST SERPL-MCNC: 202 MG/DL (ref 120–199)
CHOLEST/HDLC SERPL: 4.4 {RATIO} (ref 2–5)
CLARITY, UA POC OHS: CLEAR
CO2 SERPL-SCNC: 20 MMOL/L (ref 23–29)
COLOR, UA POC OHS: YELLOW
CREAT SERPL-MCNC: 0.9 MG/DL (ref 0.5–1.4)
DIFFERENTIAL METHOD BLD: ABNORMAL
EOSINOPHIL # BLD AUTO: 0.1 K/UL (ref 0–0.5)
EOSINOPHIL NFR BLD: 1.3 % (ref 0–8)
ERYTHROCYTE [DISTWIDTH] IN BLOOD BY AUTOMATED COUNT: 13.2 % (ref 11.5–14.5)
EST. GFR  (NO RACE VARIABLE): >60 ML/MIN/1.73 M^2
ESTIMATED AVG GLUCOSE: 272 MG/DL (ref 68–131)
FSH SERPL-ACNC: 9.55 MIU/ML
GLUCOSE SERPL-MCNC: 243 MG/DL (ref 70–110)
GLUCOSE SERPL-MCNC: 372 MG/DL (ref 70–110)
GLUCOSE, UA POC OHS: 500
HBA1C MFR BLD: 11.1 % (ref 4–5.6)
HCT VFR BLD AUTO: 39.9 % (ref 37–48.5)
HDLC SERPL-MCNC: 46 MG/DL (ref 40–75)
HDLC SERPL: 22.8 % (ref 20–50)
HGB BLD-MCNC: 12.2 G/DL (ref 12–16)
IMM GRANULOCYTES # BLD AUTO: 0.03 K/UL (ref 0–0.04)
IMM GRANULOCYTES NFR BLD AUTO: 0.5 % (ref 0–0.5)
KETONES, UA POC OHS: NEGATIVE
LDLC SERPL CALC-MCNC: 117.4 MG/DL (ref 63–159)
LEUKOCYTES, UA POC OHS: NEGATIVE
LH SERPL-ACNC: 7.7 MIU/ML
LYMPHOCYTES # BLD AUTO: 2.2 K/UL (ref 1–4.8)
LYMPHOCYTES NFR BLD: 35.5 % (ref 18–48)
MCH RBC QN AUTO: 27 PG (ref 27–31)
MCHC RBC AUTO-ENTMCNC: 30.6 G/DL (ref 32–36)
MCV RBC AUTO: 88 FL (ref 82–98)
MONOCYTES # BLD AUTO: 0.6 K/UL (ref 0.3–1)
MONOCYTES NFR BLD: 10 % (ref 4–15)
NEUTROPHILS # BLD AUTO: 3.3 K/UL (ref 1.8–7.7)
NEUTROPHILS NFR BLD: 52.2 % (ref 38–73)
NITRITE, UA POC OHS: NEGATIVE
NONHDLC SERPL-MCNC: 156 MG/DL
NRBC BLD-RTO: 0 /100 WBC
PH, UA POC OHS: 6
PLATELET # BLD AUTO: 340 K/UL (ref 150–450)
PMV BLD AUTO: 10 FL (ref 9.2–12.9)
POTASSIUM SERPL-SCNC: 4 MMOL/L (ref 3.5–5.1)
PROGEST SERPL-MCNC: 3.7 NG/ML
PROT SERPL-MCNC: 7.2 G/DL (ref 6–8.4)
PROTEIN, UA POC OHS: NEGATIVE
RBC # BLD AUTO: 4.52 M/UL (ref 4–5.4)
SODIUM SERPL-SCNC: 134 MMOL/L (ref 136–145)
SPECIFIC GRAVITY, UA POC OHS: 1.01
TRIGL SERPL-MCNC: 193 MG/DL (ref 30–150)
TSH SERPL DL<=0.005 MIU/L-ACNC: 1.76 UIU/ML (ref 0.4–4)
UROBILINOGEN, UA POC OHS: 0.2
WBC # BLD AUTO: 6.31 K/UL (ref 3.9–12.7)

## 2024-07-30 PROCEDURE — 81025 URINE PREGNANCY TEST: CPT | Performed by: EMERGENCY MEDICINE

## 2024-07-30 PROCEDURE — 80053 COMPREHEN METABOLIC PANEL: CPT

## 2024-07-30 PROCEDURE — 81003 URINALYSIS AUTO W/O SCOPE: CPT | Mod: PBBFAC,PO

## 2024-07-30 PROCEDURE — 82671 ASSAY OF ESTROGENS: CPT

## 2024-07-30 PROCEDURE — 84144 ASSAY OF PROGESTERONE: CPT

## 2024-07-30 PROCEDURE — 3074F SYST BP LT 130 MM HG: CPT | Mod: CPTII,,,

## 2024-07-30 PROCEDURE — 82962 GLUCOSE BLOOD TEST: CPT

## 2024-07-30 PROCEDURE — 83002 ASSAY OF GONADOTROPIN (LH): CPT

## 2024-07-30 PROCEDURE — 1160F RVW MEDS BY RX/DR IN RCRD: CPT | Mod: CPTII,,,

## 2024-07-30 PROCEDURE — 99999PBSHW POCT URINALYSIS(INSTRUMENT): Mod: PBBFAC,,,

## 2024-07-30 PROCEDURE — 3079F DIAST BP 80-89 MM HG: CPT | Mod: CPTII,,,

## 2024-07-30 PROCEDURE — 80061 LIPID PANEL: CPT

## 2024-07-30 PROCEDURE — 3008F BODY MASS INDEX DOCD: CPT | Mod: CPTII,,,

## 2024-07-30 PROCEDURE — 99215 OFFICE O/P EST HI 40 MIN: CPT | Mod: PBBFAC,PO

## 2024-07-30 PROCEDURE — 83036 HEMOGLOBIN GLYCOSYLATED A1C: CPT

## 2024-07-30 PROCEDURE — 99999 PR PBB SHADOW E&M-EST. PATIENT-LVL V: CPT | Mod: PBBFAC,,,

## 2024-07-30 PROCEDURE — 4010F ACE/ARB THERAPY RXD/TAKEN: CPT | Mod: CPTII,,,

## 2024-07-30 PROCEDURE — 83001 ASSAY OF GONADOTROPIN (FSH): CPT

## 2024-07-30 PROCEDURE — 84443 ASSAY THYROID STIM HORMONE: CPT

## 2024-07-30 PROCEDURE — 85025 COMPLETE CBC W/AUTO DIFF WBC: CPT

## 2024-07-30 PROCEDURE — 1159F MED LIST DOCD IN RCRD: CPT | Mod: CPTII,,,

## 2024-07-30 PROCEDURE — 99284 EMERGENCY DEPT VISIT MOD MDM: CPT | Mod: 25

## 2024-07-30 PROCEDURE — 99214 OFFICE O/P EST MOD 30 MIN: CPT | Mod: S$PBB,,,

## 2024-07-30 NOTE — PATIENT INSTRUCTIONS
Grayson Marques,     If you are due for any health screening(s) below please notify me so we can arrange them to be ordered and scheduled to maintain your health. Most healthy patients complete it. Don't lose out on improving your health.     Tests to Keep You Healthy    Mammogram: ORDERED BUT NOT SCHEDULED  Eye Exam: DUE  Colon Cancer Screening: DUE  Cervical Cancer Screening: DUE  Last Blood Pressure <= 139/89 (7/30/2024): NO  Last HbA1c < 8 (07/21/2023): NO      Breast Cancer Screening    Breast cancer is the second most common cancer in women after skin cancer, and the second leading cause of death from cancer after lung cancer. Mammograms can detect breast cancer early, which significantly increases the chances of curing the cancer.      A screening mammogram is an x-ray image of the breasts used for early breast cancer detection. It can help reduce the number of deaths from breast cancer among women. To get a clear image, the breast is placed between two plastic plates to make it flat. How often a mammogram is needed depends on your age and your breast cancer risk.    Colon Cancer Screening    Of cancers affecting both men and women, colorectal cancer is the third leading cancer killer in the [place]. But it doesnt have to be. Screening can prevent colorectal cancer or find it at an early stage when treatment often leads to a cure.    A colonoscopy is the preferred test for detecting colon cancer. It is needed only once every 10 years if results are negative. While sedated, a flexible, lighted tube with a tiny camera is inserted into the rectum and advanced through the colon to look for cancers. An alternative screening test that is used at home and returned to the lab may also be used. It detects hidden blood in bowel movements which could indicate cancer in the colon. If results are positive, you will need a colonoscopy to determine if the blood is a sign of cancer. This type of follow up (diagnostic)  colonoscopy usually requires additional copays as required by your insurance provider. Please contact your PCP if you have any questions.      Dear Ms. Iverson:    Your [facility] is dedicated to helping you stay healthy with regularly scheduled recommended screenings.  Scheduling routine screenings is important to maintaining good health. Our records indicate that you may be overdue for your screening pap smear. A pap smear screening can help identify patients at risk for developing cervical cancer at an early stage, when it is most likely to be successfully treated.    We encourage you to schedule your appointment with your [facility] provider or some primary care providers also perform this screening.    If you have completed or scheduled your pap smear screening outside of [facility], please notify your primary care team so we can update your health record.      If you have questions or would like to schedule your screening, please contact your primary care clinic.    Sincerely,    Your [facility]    Diabetic Retinal Eye Exam    Diabetes is the #1 cause of blindness in the [place] - early detection before signs or symptoms develop can prevent debilitating blindness.    Once-a-year screening is recommended for all diabetic patients. This exam can prevent and treat diabetes complications in the eye before developing symptoms. This can be done with a special camera is used to take photographs of the back of your eye without having to dilate them, or you can see an eye doctor for a full dilated exam.    Diabetic A1c Testing    Your chart identifies you as having diabetes. It is recommended that all diabetes patients have an A1c test done at least once a year, but [facility] recommends twice a year for most patients. This helps your doctor to better help you manage your diabetes. This is a non-fasting lab test which can be completed at any time. Your result will be sent to your  Primary Care Provider for review and that office will contact you with the results.  A1c every 3-6 months, lipid panel every year, microalbumin (urine test) once per year, comprehensive foot exam once per year, dilated eye exam at least once per year, dental exam every 6 months, flu vaccination every year, and pneumonia vaccination(s) as recommended

## 2024-07-30 NOTE — PROGRESS NOTES
Subjective:       Patient ID: Shelli Iverson is a 44 y.o. female.    Chief Complaint: Urinary Frequency    Urinary Frequency   This is a chronic problem. The current episode started more than 1 month ago. The problem occurs every urination. The problem has been gradually worsening. The patient is experiencing no pain. There has been no fever. Associated symptoms include frequency, urgency and constipation. Pertinent negatives include no hesitancy. She has tried increased fluids for the symptoms. Her past medical history is significant for diabetes mellitus.       Past Medical History:   Diagnosis Date    GERD (gastroesophageal reflux disease)     Hypertension     Recurrent major depressive disorder, in remission 11/22/2022    Sciatica        Review of patient's allergies indicates:   Allergen Reactions    Penicillins Rash         Current Outpatient Medications:     acetaminophen (TYLENOL) 500 MG tablet, Take 2 tablets (1,000 mg total) by mouth every 6 (six) hours as needed for Pain., Disp: 30 tablet, Rfl: 0    amLODIPine (NORVASC) 10 MG tablet, Take 1 tablet (10 mg total) by mouth once daily., Disp: 90 tablet, Rfl: 3    atorvastatin (LIPITOR) 20 MG tablet, TAKE 1 TABLET(20 MG) BY MOUTH EVERY DAY, Disp: 30 tablet, Rfl: 3    FLUoxetine 20 MG capsule, Take a 40mg cap with a 20mg cap (60mg total) every morning., Disp: 30 capsule, Rfl: 1    FLUoxetine 40 MG capsule, Take a 40mg cap with a 20mg cap (60mg total) every morning., Disp: 30 capsule, Rfl: 1    hydroCHLOROthiazide (HYDRODIURIL) 12.5 MG Tab, Take 1 tablet (12.5 mg total) by mouth once daily., Disp: 90 tablet, Rfl: 11    hydrocortisone 2.5 % cream, Apply topically 2 (two) times daily., Disp: 28 g, Rfl: 6    losartan (COZAAR) 50 MG tablet, TAKE 2 TABLETS BY MOUTH DAILY(FOLLOW-UP WITH DOCTOR JULY 2020), Disp: 180 tablet, Rfl: 1    metFORMIN (GLUMETZA) 500 MG (MOD) 24hr tablet, Take 1 tablet (500 mg total) by mouth once daily., Disp: 30 tablet, Rfl: 11    Review of  "Systems   Gastrointestinal:  Positive for constipation. Negative for abdominal pain.   Endocrine: Positive for heat intolerance, polydipsia and polyuria.   Genitourinary:  Positive for frequency, menstrual problem and urgency. Negative for hesitancy.   Psychiatric/Behavioral:  Positive for agitation.        Objective:      /80 (BP Location: Right arm, Patient Position: Sitting, BP Method: Large (Manual))   Pulse 86   Resp 16   Ht 5' 4" (1.626 m)   Wt 83.2 kg (183 lb 6.8 oz)   LMP 07/09/2024 (Approximate)   SpO2 98%   BMI 31.48 kg/m²   Physical Exam  Vitals reviewed.   Constitutional:       General: She is not in acute distress.     Appearance: Normal appearance. She is obese. She is not ill-appearing, toxic-appearing or diaphoretic.   HENT:      Head: Normocephalic.      Right Ear: External ear normal.      Left Ear: External ear normal.      Nose: Nose normal. No congestion or rhinorrhea.      Mouth/Throat:      Mouth: Mucous membranes are moist.      Pharynx: Oropharynx is clear.   Eyes:      General: No scleral icterus.        Right eye: No discharge.         Left eye: No discharge.      Extraocular Movements: Extraocular movements intact.      Conjunctiva/sclera: Conjunctivae normal.   Cardiovascular:      Rate and Rhythm: Normal rate and regular rhythm.      Pulses: Normal pulses.      Heart sounds: Normal heart sounds. No murmur heard.     No friction rub. No gallop.   Pulmonary:      Effort: Pulmonary effort is normal. No respiratory distress.      Breath sounds: Normal breath sounds. No wheezing, rhonchi or rales.   Chest:      Chest wall: No tenderness.   Musculoskeletal:         General: No swelling, tenderness or deformity. Normal range of motion.      Cervical back: Normal range of motion.      Right lower leg: No edema.      Left lower leg: No edema.   Skin:     General: Skin is warm and dry.      Capillary Refill: Capillary refill takes less than 2 seconds.      Coloration: Skin is not " jaundiced.      Findings: No bruising, erythema, lesion or rash.   Neurological:      Mental Status: She is alert and oriented to person, place, and time.      Gait: Gait normal.   Psychiatric:         Mood and Affect: Mood normal.         Behavior: Behavior normal.         Thought Content: Thought content normal.         Judgment: Judgment normal.         Assessment:       1. Uncontrolled type 2 diabetes mellitus with hyperglycemia    2. Hypertension associated with diabetes    3. Polyuria    4. Urinary incontinence, mixed    5. Hot flashes    6. Irregular menses        Plan:       Uncontrolled type 2 diabetes mellitus with hyperglycemia  -     Comprehensive Metabolic Panel; Future; Expected date: 07/30/2024  -     Hemoglobin A1C; Future; Expected date: 07/30/2024  -     Lipid Panel; Future; Expected date: 07/30/2024  -     CBC Auto Differential; Future; Expected date: 07/30/2024  -     TSH; Future; Expected date: 07/30/2024  -     Microalbumin/Creatinine Ratio, Urine; Future; Expected date: 07/30/2024         -     Likely causing her symptoms. Workup as above. Med adjustments to follow. Continue meds as prescribed.     Hypertension associated with diabetes  -     Comprehensive Metabolic Panel; Future; Expected date: 07/30/2024  -     CBC Auto Differential; Future; Expected date: 07/30/2024        -    Stable. Continue meds. D/c HCTZ. She hasn't been taking and likely to worsen symptoms. Will continue to monitor.     Polyuria        -     Likely caused by DM    Urinary incontinence, mixed  -     Ambulatory referral/consult to Physical/Occupational Therapy; Future; Expected date: 08/06/2024  -     POCT Urinalysis(Instrument)    Hot flashes  -     ESTROGENS, TOTAL; Future; Expected date: 07/30/2024  -     PROGESTERONE; Future; Expected date: 07/30/2024  -     FOLLICLE STIMULATING HORMONE; Future; Expected date: 07/30/2024  -     LUTEINIZING HORMONE; Future; Expected date: 07/30/2024  -     Ambulatory referral/consult  to Gynecology; Future; Expected date: 08/06/2024    Irregular menses  -     ESTROGENS, TOTAL; Future; Expected date: 07/30/2024  -     PROGESTERONE; Future; Expected date: 07/30/2024  -     FOLLICLE STIMULATING HORMONE; Future; Expected date: 07/30/2024  -     LUTEINIZING HORMONE; Future; Expected date: 07/30/2024  -     Ambulatory referral/consult to Gynecology; Future; Expected date: 08/06/2024                 Ha Baum PA-C  Family Medicine Physician Assistant              I spent a total of 20 minutes on the day of the visit.This includes face to face time and non-face to face time preparing to see the patient (eg, review of tests), obtaining and/or reviewing separately obtained history, documenting clinical information in the electronic or other health record, independently interpreting results and communicating results to the patient/family/caregiver, or care coordinator.      We have addressed [4] Moderate: 1 or more chronic illnesses with exacerbation, progression, or side effects of treatment / 2 or more stable chronic illnesses / 1 undiagnosed new problem with uncertain prognosis / 1 acute illness with systemic symptoms / 1 acute complicated injury  The complexity of the data reviewed and analyzed for this visit was [3] Limited (Reviewed prior external note, ordered unique testing or reviewed the results of each unique test)   The risk of complications and/or morbidity or mortality are [4] Moderate risk (I.e. prescription drug management / decision regarding minor surgery with identified pt or procedure risk factors / decision regarding elective major surgery without identified pt or procedure risk factors / diagnosis or treatment significantly limited by social determinants of health)   The level of Medical Decision Making for this visit is [4] Moderate

## 2024-07-31 VITALS
BODY MASS INDEX: 31.41 KG/M2 | SYSTOLIC BLOOD PRESSURE: 169 MMHG | DIASTOLIC BLOOD PRESSURE: 96 MMHG | TEMPERATURE: 98 F | WEIGHT: 183 LBS | HEART RATE: 75 BPM | OXYGEN SATURATION: 100 % | RESPIRATION RATE: 19 BRPM

## 2024-07-31 DIAGNOSIS — E11.65 UNCONTROLLED TYPE 2 DIABETES MELLITUS WITH HYPERGLYCEMIA: Primary | ICD-10-CM

## 2024-07-31 LAB
ALBUMIN SERPL BCP-MCNC: 4 G/DL (ref 3.5–5.2)
ALLENS TEST: NORMAL
ALP SERPL-CCNC: 114 U/L (ref 55–135)
ALT SERPL W/O P-5'-P-CCNC: 27 U/L (ref 10–44)
ANION GAP SERPL CALC-SCNC: 9 MMOL/L (ref 8–16)
AST SERPL-CCNC: 19 U/L (ref 10–40)
B-HCG UR QL: NEGATIVE
B-OH-BUTYR BLD STRIP-SCNC: 0.1 MMOL/L (ref 0–0.5)
BACTERIA #/AREA URNS HPF: ABNORMAL /HPF
BASOPHILS # BLD AUTO: 0.02 K/UL (ref 0–0.2)
BASOPHILS NFR BLD: 0.3 % (ref 0–1.9)
BILIRUB SERPL-MCNC: 0.3 MG/DL (ref 0.1–1)
BILIRUB UR QL STRIP: NEGATIVE
BUN SERPL-MCNC: 18 MG/DL (ref 6–20)
CALCIUM SERPL-MCNC: 8.5 MG/DL (ref 8.7–10.5)
CHLORIDE SERPL-SCNC: 102 MMOL/L (ref 95–110)
CLARITY UR: CLEAR
CO2 SERPL-SCNC: 24 MMOL/L (ref 23–29)
COLOR UR: YELLOW
CREAT SERPL-MCNC: 1 MG/DL (ref 0.5–1.4)
CTP QC/QA: YES
DELSYS: NORMAL
DIFFERENTIAL METHOD BLD: ABNORMAL
EOSINOPHIL # BLD AUTO: 0.1 K/UL (ref 0–0.5)
EOSINOPHIL NFR BLD: 1 % (ref 0–8)
ERYTHROCYTE [DISTWIDTH] IN BLOOD BY AUTOMATED COUNT: 13.2 % (ref 11.5–14.5)
EST. GFR  (NO RACE VARIABLE): >60 ML/MIN/1.73 M^2
GLUCOSE SERPL-MCNC: 212 MG/DL (ref 70–110)
GLUCOSE SERPL-MCNC: 287 MG/DL (ref 70–110)
GLUCOSE SERPL-MCNC: 295 MG/DL (ref 70–110)
GLUCOSE UR QL STRIP: ABNORMAL
HCO3 UR-SCNC: 24.6 MMOL/L (ref 24–28)
HCT VFR BLD AUTO: 38 % (ref 37–48.5)
HGB BLD-MCNC: 11.7 G/DL (ref 12–16)
HGB UR QL STRIP: ABNORMAL
IMM GRANULOCYTES # BLD AUTO: 0.02 K/UL (ref 0–0.04)
IMM GRANULOCYTES NFR BLD AUTO: 0.3 % (ref 0–0.5)
KETONES UR QL STRIP: NEGATIVE
LEUKOCYTE ESTERASE UR QL STRIP: NEGATIVE
LYMPHOCYTES # BLD AUTO: 2.9 K/UL (ref 1–4.8)
LYMPHOCYTES NFR BLD: 37.8 % (ref 18–48)
MCH RBC QN AUTO: 27.5 PG (ref 27–31)
MCHC RBC AUTO-ENTMCNC: 30.8 G/DL (ref 32–36)
MCV RBC AUTO: 89 FL (ref 82–98)
MICROSCOPIC COMMENT: ABNORMAL
MONOCYTES # BLD AUTO: 0.7 K/UL (ref 0.3–1)
MONOCYTES NFR BLD: 8.5 % (ref 4–15)
NEUTROPHILS # BLD AUTO: 4 K/UL (ref 1.8–7.7)
NEUTROPHILS NFR BLD: 52.1 % (ref 38–73)
NITRITE UR QL STRIP: NEGATIVE
NRBC BLD-RTO: 0 /100 WBC
OHS QRS DURATION: 88 MS
OHS QTC CALCULATION: 467 MS
PCO2 BLDA: 43.4 MMHG (ref 35–45)
PH SMN: 7.36 [PH] (ref 7.35–7.45)
PH UR STRIP: 6 [PH] (ref 5–8)
PLATELET # BLD AUTO: 293 K/UL (ref 150–450)
PMV BLD AUTO: 9.7 FL (ref 9.2–12.9)
PO2 BLDA: 43 MMHG (ref 40–60)
POC BE: -1 MMOL/L
POC SATURATED O2: 77 % (ref 95–100)
POC TCO2: 26 MMOL/L (ref 24–29)
POTASSIUM SERPL-SCNC: 3.9 MMOL/L (ref 3.5–5.1)
PROT SERPL-MCNC: 6.6 G/DL (ref 6–8.4)
PROT UR QL STRIP: NEGATIVE
RBC # BLD AUTO: 4.25 M/UL (ref 4–5.4)
SAMPLE: NORMAL
SITE: NORMAL
SODIUM SERPL-SCNC: 135 MMOL/L (ref 136–145)
SP GR UR STRIP: 1.03 (ref 1–1.03)
URN SPEC COLLECT METH UR: ABNORMAL
UROBILINOGEN UR STRIP-ACNC: NEGATIVE EU/DL
WBC # BLD AUTO: 7.64 K/UL (ref 3.9–12.7)
YEAST URNS QL MICRO: ABNORMAL

## 2024-07-31 PROCEDURE — 96360 HYDRATION IV INFUSION INIT: CPT

## 2024-07-31 PROCEDURE — 85025 COMPLETE CBC W/AUTO DIFF WBC: CPT | Performed by: EMERGENCY MEDICINE

## 2024-07-31 PROCEDURE — 96361 HYDRATE IV INFUSION ADD-ON: CPT

## 2024-07-31 PROCEDURE — 82803 BLOOD GASES ANY COMBINATION: CPT

## 2024-07-31 PROCEDURE — 82010 KETONE BODYS QUAN: CPT | Performed by: EMERGENCY MEDICINE

## 2024-07-31 PROCEDURE — 80053 COMPREHEN METABOLIC PANEL: CPT | Performed by: EMERGENCY MEDICINE

## 2024-07-31 PROCEDURE — 99900035 HC TECH TIME PER 15 MIN (STAT)

## 2024-07-31 PROCEDURE — 81001 URINALYSIS AUTO W/SCOPE: CPT | Performed by: EMERGENCY MEDICINE

## 2024-07-31 PROCEDURE — 25000003 PHARM REV CODE 250: Performed by: EMERGENCY MEDICINE

## 2024-07-31 PROCEDURE — 93005 ELECTROCARDIOGRAM TRACING: CPT | Performed by: INTERNAL MEDICINE

## 2024-07-31 PROCEDURE — 93010 ELECTROCARDIOGRAM REPORT: CPT | Mod: ,,, | Performed by: INTERNAL MEDICINE

## 2024-07-31 PROCEDURE — 82962 GLUCOSE BLOOD TEST: CPT

## 2024-07-31 RX ORDER — SEMAGLUTIDE 0.68 MG/ML
0.25 INJECTION, SOLUTION SUBCUTANEOUS
Qty: 3 ML | Refills: 0 | Status: SHIPPED | OUTPATIENT
Start: 2024-07-31 | End: 2024-08-30

## 2024-07-31 RX ORDER — METFORMIN HYDROCHLORIDE 1000 MG/1
1000 TABLET ORAL 2 TIMES DAILY WITH MEALS
Qty: 180 TABLET | Refills: 3 | Status: SHIPPED | OUTPATIENT
Start: 2024-07-31 | End: 2025-07-31

## 2024-07-31 RX ADMIN — SODIUM CHLORIDE 1000 ML: 9 INJECTION, SOLUTION INTRAVENOUS at 01:07

## 2024-07-31 RX ADMIN — SODIUM CHLORIDE 1000 ML: 0.9 INJECTION, SOLUTION INTRAVENOUS at 02:07

## 2024-07-31 NOTE — ED PROVIDER NOTES
Encounter Date: 2024       History     Chief Complaint   Patient presents with    Fatigue    Urinary Frequency     Admits had blood work done today and glucose was 500, her glucometer read error and came for eval.      44-year-old female presented emergency department with hyperglycemia and generalized malaise and fatigue.  Patient was seen by primary care provider today and blood sugar was high and patient was having generalized malaise and weakness and fatigue so was sent here because of hyperglycemia.  Patient denies fever or chills or nausea vomiting or chest pain or shortness of breath or dysuria or hematuria.  Patient feels weak and dehydrated.      Review of patient's allergies indicates:   Allergen Reactions    Penicillins Rash     Past Medical History:   Diagnosis Date    GERD (gastroesophageal reflux disease)     Hypertension     Recurrent major depressive disorder, in remission 2022    Sciatica      Past Surgical History:   Procedure Laterality Date     SECTION      x 1    REVISION OF ABDOMINAL SCAR      planned 21    tummy tuck       Family History   Problem Relation Name Age of Onset    Heart disease Mother          CAD age 38    Hypertension Mother  38        MI    Diabetes Mother      Hypertension Father       Social History     Tobacco Use    Smoking status: Never    Smokeless tobacco: Never   Substance Use Topics    Alcohol use: Not Currently     Comment: RARELY    Drug use: No     Comment: occasional     Review of Systems   Constitutional: Negative.    HENT: Negative.     Eyes: Negative.    Respiratory: Negative.     Cardiovascular: Negative.  Negative for chest pain.   Gastrointestinal: Negative.    Endocrine: Positive for polydipsia, polyphagia and polyuria.   Genitourinary: Negative.    Musculoskeletal: Negative.    Skin: Negative.    Allergic/Immunologic: Negative.    Neurological: Negative.    Hematological: Negative.    Psychiatric/Behavioral: Negative.     All other  systems reviewed and are negative.      Physical Exam     Initial Vitals [07/30/24 2222]   BP Pulse Resp Temp SpO2   (!) 176/112 75 16 98.3 °F (36.8 °C) 100 %      MAP       --         Physical Exam    Nursing note and vitals reviewed.  Constitutional: She appears well-developed and well-nourished.   HENT:   Head: Normocephalic and atraumatic.   Nose: Nose normal.   Mouth/Throat: Oropharynx is clear and moist.   Eyes: Conjunctivae and EOM are normal. Pupils are equal, round, and reactive to light.   Neck: Neck supple. No thyromegaly present. No tracheal deviation present. No JVD present.   Normal range of motion.  Cardiovascular:  Normal rate, regular rhythm, normal heart sounds and intact distal pulses.           No murmur heard.  Pulmonary/Chest: Breath sounds normal. No stridor. No respiratory distress. She has no wheezes. She has no rales.   Abdominal: Abdomen is soft. Bowel sounds are normal.   Musculoskeletal:         General: No edema. Normal range of motion.      Cervical back: Normal range of motion and neck supple.     Neurological: She is alert and oriented to person, place, and time.   Skin: Skin is warm and dry. Capillary refill takes less than 2 seconds.   Psychiatric: She has a normal mood and affect. Thought content normal.         ED Course   Procedures  Labs Reviewed   CBC W/ AUTO DIFFERENTIAL - Abnormal       Result Value    WBC 7.64      RBC 4.25      Hemoglobin 11.7 (*)     Hematocrit 38.0      MCV 89      MCH 27.5      MCHC 30.8 (*)     RDW 13.2      Platelets 293      MPV 9.7      Immature Granulocytes 0.3      Gran # (ANC) 4.0      Immature Grans (Abs) 0.02      Lymph # 2.9      Mono # 0.7      Eos # 0.1      Baso # 0.02      nRBC 0      Gran % 52.1      Lymph % 37.8      Mono % 8.5      Eosinophil % 1.0      Basophil % 0.3      Differential Method Automated     COMPREHENSIVE METABOLIC PANEL - Abnormal    Sodium 135 (*)     Potassium 3.9      Chloride 102      CO2 24      Glucose 295 (*)      BUN 18      Creatinine 1.0      Calcium 8.5 (*)     Total Protein 6.6      Albumin 4.0      Total Bilirubin 0.3      Alkaline Phosphatase 114      AST 19      ALT 27      eGFR >60.0      Anion Gap 9     URINALYSIS, REFLEX TO URINE CULTURE - Abnormal    Specimen UA Urine, Clean Catch      Color, UA Yellow      Appearance, UA Clear      pH, UA 6.0      Specific Gravity, UA 1.030      Protein, UA Negative      Glucose, UA 4+ (*)     Ketones, UA Negative      Bilirubin (UA) Negative      Occult Blood UA TRACE      Nitrite, UA Negative      Urobilinogen, UA Negative      Leukocytes, UA Negative      Narrative:     Specimen Source->Urine   URINALYSIS MICROSCOPIC - Abnormal    Bacteria Few (*)     Yeast, UA None      Microscopic Comment SEE COMMENT      Narrative:     Specimen Source->Urine   POCT GLUCOSE - Abnormal    POC Glucose 372 (*)    POCT GLUCOSE - Abnormal    POC Glucose 287 (*)    POCT GLUCOSE - Abnormal    POC Glucose 212 (*)    BETA - HYDROXYBUTYRATE, SERUM    Beta-Hydroxybutyrate 0.1     POCT URINE PREGNANCY    POC Preg Test, Ur Negative       Acceptable Yes     ISTAT PROCEDURE    POC PH 7.361      POC PCO2 43.4      POC PO2 43      POC HCO3 24.6      POC BE -1      POC SATURATED O2 77      POC TCO2 26      Sample VENOUS      Site Other      Allens Test N/A      DelSys Room Air     POCT GLUCOSE MONITORING CONTINUOUS          Imaging Results    None          Medications   insulin regular injection 5 Units 0.05 mL (has no administration in time range)   sodium chloride 0.9% bolus 1,000 mL 1,000 mL (0 mLs Intravenous Stopped 7/31/24 0208)   sodium chloride 0.9% bolus 1,000 mL 1,000 mL (1,000 mLs Intravenous New Bag 7/31/24 0208)     Medical Decision Making  44-year-old female presented emergency department with hyperglycemia.  Patient also had generalized malaise and fatigue.  Hyperglycemia improved with hydration and with treatment.  Patient felt better after IV fluids.  Screening labs and workup  reviewed and as blood sugar improved and patient felt better discharged with return precautions and instructions and follow-up.  Increase the dosage of her daily metformin.  Return precautions given and advised to follow-up with primary care provider    Amount and/or Complexity of Data Reviewed  Labs: ordered. Decision-making details documented in ED Course.    Risk  OTC drugs.  Prescription drug management.               ED Course as of 07/31/24 0322 Wed Jul 31, 2024 0319 Microscopic Comment: SEE COMMENT [UM]      ED Course User Index  [UM] Jenae Armijo MD                           Clinical Impression:  Final diagnoses:  [R73.9] Hyperglycemia          ED Disposition Condition    Discharge Stable          ED Prescriptions       Medication Sig Dispense Start Date End Date Auth. Provider    metFORMIN (GLUCOPHAGE) 1000 MG tablet Take 1 tablet (1,000 mg total) by mouth 2 (two) times daily with meals. 180 tablet 7/31/2024 7/31/2025 Jenae Armijo MD          Follow-up Information       Follow up With Specialties Details Why Contact Info    Ha Baum PA-C Family Medicine In 2 days  [address] [facility] [address]  [phone]               Jenae Armijo MD  07/31/24 0322

## 2024-07-31 NOTE — TELEPHONE ENCOUNTER
Went to MD office today and left with elevated blood glucose. Had labs done and caller has noted blood glucose was 500 mg/dl at the clinic. Caller states that the glucose was non-fasting at 1 pm. Caller has no way of checking her CBG at this time. Advised to ED now.  Reason for Disposition   Blood glucose > 500 mg/dL (27.8 mmol/L)    Additional Information   Negative: Unconscious or difficult to awaken   Negative: Acting confused (e.g., disoriented, slurred speech)   Negative: Very weak (e.g., can't stand)   Negative: Sounds like a life-threatening emergency to the triager    Protocols used: Diabetes - High Blood Sugar-A-

## 2024-07-31 NOTE — DISCHARGE INSTRUCTIONS
Drink lots of fluids.  Rest.  Return to emergency department for worsening symptoms or any problems.  Follow-up with your primary care provider for further evaluation.  Diabetic diet.  Changing the dose of your metformin and start new dosage and follow-up with your primary care provider

## 2024-07-31 NOTE — ED NOTES
Bed: 21  Expected date:   Expected time:   Means of arrival:   Comments:  Isidro Liu MD  07/31/24 0127

## 2024-07-31 NOTE — TELEPHONE ENCOUNTER
Pt calling, asking if this was the same number she just called and spoke with someone and received advice. Notified that I do see she just spoke with one of the other OOC nurses. Pt v/u and declined any further need for assistance.       Reason for Disposition   Caller has already spoken with another triager and has no further questions.    Protocols used: No Contact or Duplicate Contact Call-A-AH

## 2024-08-03 ENCOUNTER — ON-DEMAND VIRTUAL (OUTPATIENT)
Dept: URGENT CARE | Facility: CLINIC | Age: 44
End: 2024-08-03
Payer: MEDICAID

## 2024-08-03 DIAGNOSIS — R11.0 NAUSEA: Primary | ICD-10-CM

## 2024-08-03 PROCEDURE — 99213 OFFICE O/P EST LOW 20 MIN: CPT | Mod: 95,,, | Performed by: NURSE PRACTITIONER

## 2024-08-03 RX ORDER — ONDANSETRON 4 MG/1
8 TABLET, ORALLY DISINTEGRATING ORAL 2 TIMES DAILY
Qty: 20 TABLET | Refills: 0 | Status: SHIPPED | OUTPATIENT
Start: 2024-08-03

## 2024-08-03 NOTE — PROGRESS NOTES
Subjective:      Patient ID: Shelli Iverson is a 44 y.o. female.    Vitals:  vitals were not taken for this visit.     Chief Complaint: Nausea      Visit Type: TELE AUDIOVISUAL    Present with the patient at the time of consultation: TELEMED PRESENT WITH PATIENT: None        Past Medical History:   Diagnosis Date    GERD (gastroesophageal reflux disease)     Hypertension     Recurrent major depressive disorder, in remission 2022    Sciatica      Past Surgical History:   Procedure Laterality Date     SECTION      x 1    REVISION OF ABDOMINAL SCAR      planned 22    tummy tuck       Review of patient's allergies indicates:   Allergen Reactions    Penicillins Rash     Current Outpatient Medications on File Prior to Visit   Medication Sig Dispense Refill    acetaminophen (TYLENOL) 500 MG tablet Take 2 tablets (1,000 mg total) by mouth every 6 (six) hours as needed for Pain. 30 tablet 0    amLODIPine (NORVASC) 10 MG tablet Take 1 tablet (10 mg total) by mouth once daily. 90 tablet 3    atorvastatin (LIPITOR) 20 MG tablet TAKE 1 TABLET(20 MG) BY MOUTH EVERY DAY 30 tablet 3    FLUoxetine 20 MG capsule Take a 40mg cap with a 20mg cap (60mg total) every morning. 30 capsule 1    FLUoxetine 40 MG capsule Take a 40mg cap with a 20mg cap (60mg total) every morning. 30 capsule 1    hydrocortisone 2.5 % cream Apply topically 2 (two) times daily. 28 g 6    losartan (COZAAR) 50 MG tablet TAKE 2 TABLETS BY MOUTH DAILY(FOLLOW-UP WITH DOCTOR 2020) 180 tablet 1    metFORMIN (GLUCOPHAGE) 1000 MG tablet Take 1 tablet (1,000 mg total) by mouth 2 (two) times daily with meals. 180 tablet 3    semaglutide (OZEMPIC) 0.25 mg or 0.5 mg (2 mg/3 mL) pen injector Inject 0.25 mg into the skin every 7 days. 3 mL 0     No current facility-administered medications on file prior to visit.     Family History   Problem Relation Name Age of Onset    Heart disease Mother          CAD age 38    Hypertension Mother  38        MI     Diabetes Mother      Hypertension Father         Medications Ordered                [id number] - [place], [place] - [address]   [address] [place] [address]    Telephone: [phone]   Fax: [phone]   Hours: Open 24 hours                         E-Prescribed (1 of 1)              ondansetron (ZOFRAN-ODT) 4 MG TbDL    Sig: Take 2 tablets (8 mg total) by mouth 2 (two) times daily.       Start: 8/3/24     Quantity: 20 tablet Refills: 0                           Ohs Peq Odvv Intake    8/3/2024  3:17 PM CDT - Filed by Patient   What is your current physical address in the event of a medical emergency?    Are you able to take your vital signs? No   Please attach any relevant images or files          45 yo female with c/o nausea. She states she started ozembic several days ago and started with symptoms. Denies fever. She states positive diarrhea. She denies abdominal pain. She denies concern for viral or bacterial gastroenteritis.         Constitution: Negative.   HENT: Negative.     Cardiovascular: Negative.    Respiratory: Negative.     Gastrointestinal:  Positive for nausea and diarrhea.   Endocrine: negative.   Genitourinary: Negative.  Negative for frequency and urgency.   Musculoskeletal: Negative.    Skin: Negative.    Allergic/Immunologic: Negative.    Neurological: Negative.    Hematologic/Lymphatic: Negative.    Psychiatric/Behavioral: Negative.          Objective:   The physical exam was conducted virtually.  LOCATION OF PATIENT home  Physical Exam   Constitutional: She is oriented to person, place, and time. She appears well-developed.   HENT:   Head: Normocephalic and atraumatic.   Ears:   Right Ear: Hearing, tympanic membrane and external ear normal.   Left Ear: Hearing, tympanic membrane and external ear normal.   Nose: Nose normal.   Mouth/Throat: Uvula is midline, oropharynx is clear and moist and mucous membranes are normal.   Eyes: Conjunctivae  and EOM are normal. Pupils are equal, round, and reactive to light.   Neck: Neck supple.   Cardiovascular: Normal rate.   Pulmonary/Chest: Effort normal and breath sounds normal.   Musculoskeletal: Normal range of motion.         General: Normal range of motion.   Neurological: She is alert and oriented to person, place, and time.   Skin: Skin is warm.   Psychiatric: Her behavior is normal. Thought content normal.   Nursing note and vitals reviewed.      Assessment:     1. Nausea        Plan:   Follow up with your primary care provider if symptoms persist.  Go to the Emergency room for worsening of symptoms.       Nausea  -     ondansetron (ZOFRAN-ODT) 4 MG TbDL; Take 2 tablets (8 mg total) by mouth 2 (two) times daily.  Dispense: 20 tablet; Refill: 0

## 2024-08-16 ENCOUNTER — PATIENT MESSAGE (OUTPATIENT)
Dept: FAMILY MEDICINE | Facility: CLINIC | Age: 44
End: 2024-08-16
Payer: MEDICAID

## 2024-08-16 DIAGNOSIS — E11.65 UNCONTROLLED TYPE 2 DIABETES MELLITUS WITH HYPERGLYCEMIA: Primary | ICD-10-CM

## 2024-08-21 RX ORDER — INSULIN PUMP SYRINGE, 3 ML
EACH MISCELLANEOUS
Qty: 1 EACH | Refills: 0 | Status: SHIPPED | OUTPATIENT
Start: 2024-08-21 | End: 2025-08-21

## 2024-08-21 RX ORDER — LANCETS
EACH MISCELLANEOUS
Qty: 100 EACH | Refills: 2 | Status: SHIPPED | OUTPATIENT
Start: 2024-08-21

## 2024-08-28 ENCOUNTER — PATIENT MESSAGE (OUTPATIENT)
Dept: GASTROENTEROLOGY | Facility: CLINIC | Age: 44
End: 2024-08-28
Payer: MEDICAID

## 2024-08-28 ENCOUNTER — OFFICE VISIT (OUTPATIENT)
Dept: OBSTETRICS AND GYNECOLOGY | Facility: CLINIC | Age: 44
End: 2024-08-28
Payer: MEDICAID

## 2024-08-28 ENCOUNTER — HOSPITAL ENCOUNTER (OUTPATIENT)
Dept: RADIOLOGY | Facility: HOSPITAL | Age: 44
Discharge: HOME | End: 2024-08-28
Attending: STUDENT IN AN ORGANIZED HEALTH CARE EDUCATION/TRAINING PROGRAM
Payer: MEDICAID

## 2024-08-28 ENCOUNTER — OFFICE VISIT (OUTPATIENT)
Dept: FAMILY MEDICINE | Facility: CLINIC | Age: 44
End: 2024-08-28
Payer: MEDICAID

## 2024-08-28 VITALS
WEIGHT: 177 LBS | BODY MASS INDEX: 30.22 KG/M2 | OXYGEN SATURATION: 97 % | HEIGHT: 64 IN | RESPIRATION RATE: 18 BRPM | HEART RATE: 105 BPM | DIASTOLIC BLOOD PRESSURE: 82 MMHG | SYSTOLIC BLOOD PRESSURE: 132 MMHG

## 2024-08-28 VITALS
HEART RATE: 106 BPM | BODY MASS INDEX: 30.38 KG/M2 | SYSTOLIC BLOOD PRESSURE: 137 MMHG | HEIGHT: 64 IN | WEIGHT: 177.94 LBS | DIASTOLIC BLOOD PRESSURE: 80 MMHG

## 2024-08-28 DIAGNOSIS — Z12.4 SCREENING FOR MALIGNANT NEOPLASM OF THE CERVIX: ICD-10-CM

## 2024-08-28 DIAGNOSIS — R23.2 HOT FLASHES: ICD-10-CM

## 2024-08-28 DIAGNOSIS — Z30.432 ENCOUNTER FOR IUD REMOVAL: ICD-10-CM

## 2024-08-28 DIAGNOSIS — E11.59 HYPERTENSION ASSOCIATED WITH DIABETES: Primary | ICD-10-CM

## 2024-08-28 DIAGNOSIS — I15.2 HYPERTENSION ASSOCIATED WITH DIABETES: Primary | ICD-10-CM

## 2024-08-28 DIAGNOSIS — E66.9 OBESITY (BMI 30.0-34.9): ICD-10-CM

## 2024-08-28 DIAGNOSIS — E11.65 UNCONTROLLED TYPE 2 DIABETES MELLITUS WITH HYPERGLYCEMIA: ICD-10-CM

## 2024-08-28 DIAGNOSIS — Z12.11 SCREENING FOR MALIGNANT NEOPLASM OF COLON: ICD-10-CM

## 2024-08-28 DIAGNOSIS — Z12.31 ENCOUNTER FOR SCREENING MAMMOGRAM FOR BREAST CANCER: ICD-10-CM

## 2024-08-28 DIAGNOSIS — N92.6 IRREGULAR MENSES: Primary | ICD-10-CM

## 2024-08-28 PROCEDURE — 4010F ACE/ARB THERAPY RXD/TAKEN: CPT | Mod: CPTII,,,

## 2024-08-28 PROCEDURE — 58301 REMOVE INTRAUTERINE DEVICE: CPT | Mod: PBBFAC,PO | Performed by: STUDENT IN AN ORGANIZED HEALTH CARE EDUCATION/TRAINING PROGRAM

## 2024-08-28 PROCEDURE — 77063 BREAST TOMOSYNTHESIS BI: CPT | Mod: 26,,, | Performed by: RADIOLOGY

## 2024-08-28 PROCEDURE — 99214 OFFICE O/P EST MOD 30 MIN: CPT | Mod: PBBFAC,PO | Performed by: STUDENT IN AN ORGANIZED HEALTH CARE EDUCATION/TRAINING PROGRAM

## 2024-08-28 PROCEDURE — 3075F SYST BP GE 130 - 139MM HG: CPT | Mod: CPTII,,,

## 2024-08-28 PROCEDURE — 87624 HPV HI-RISK TYP POOLED RSLT: CPT | Performed by: STUDENT IN AN ORGANIZED HEALTH CARE EDUCATION/TRAINING PROGRAM

## 2024-08-28 PROCEDURE — 1160F RVW MEDS BY RX/DR IN RCRD: CPT | Mod: CPTII,,,

## 2024-08-28 PROCEDURE — 1159F MED LIST DOCD IN RCRD: CPT | Mod: CPTII,,,

## 2024-08-28 PROCEDURE — 3008F BODY MASS INDEX DOCD: CPT | Mod: CPTII,,,

## 2024-08-28 PROCEDURE — 99999 PR PBB SHADOW E&M-EST. PATIENT-LVL IV: CPT | Mod: PBBFAC,,, | Performed by: STUDENT IN AN ORGANIZED HEALTH CARE EDUCATION/TRAINING PROGRAM

## 2024-08-28 PROCEDURE — 99214 OFFICE O/P EST MOD 30 MIN: CPT | Mod: S$PBB,,,

## 2024-08-28 PROCEDURE — 77067 SCR MAMMO BI INCL CAD: CPT | Mod: 26,,, | Performed by: RADIOLOGY

## 2024-08-28 PROCEDURE — 3066F NEPHROPATHY DOC TX: CPT | Mod: CPTII,,,

## 2024-08-28 PROCEDURE — 3060F POS MICROALBUMINURIA REV: CPT | Mod: CPTII,,,

## 2024-08-28 PROCEDURE — 88175 CYTOPATH C/V AUTO FLUID REDO: CPT | Performed by: STUDENT IN AN ORGANIZED HEALTH CARE EDUCATION/TRAINING PROGRAM

## 2024-08-28 PROCEDURE — 99999 PR PBB SHADOW E&M-EST. PATIENT-LVL IV: CPT | Mod: PBBFAC,,,

## 2024-08-28 PROCEDURE — 99214 OFFICE O/P EST MOD 30 MIN: CPT | Mod: PBBFAC,27,PO

## 2024-08-28 PROCEDURE — 77063 BREAST TOMOSYNTHESIS BI: CPT | Mod: TC

## 2024-08-28 PROCEDURE — 3046F HEMOGLOBIN A1C LEVEL >9.0%: CPT | Mod: CPTII,,,

## 2024-08-28 PROCEDURE — 3079F DIAST BP 80-89 MM HG: CPT | Mod: CPTII,,,

## 2024-08-28 RX ORDER — SEMAGLUTIDE 0.68 MG/ML
0.5 INJECTION, SOLUTION SUBCUTANEOUS
Qty: 3 ML | Refills: 2 | Status: SHIPPED | OUTPATIENT
Start: 2024-08-28 | End: 2024-09-27

## 2024-08-28 NOTE — PATIENT INSTRUCTIONS
Call me when the [employer] kicks in           [facility],     If you are due for any health screening(s) below please notify me so we can arrange them to be ordered and scheduled to maintain your health. Most healthy patients complete it. Don't lose out on improving your health.     Tests to Keep You Healthy    Mammogram: SCHEDULED  Eye Exam: DUE  Colon Cancer Screening: ORDERED BUT NOT SCHEDULED  Cervical Cancer Screening: ORDERED  Last Blood Pressure <= 139/89 (8/28/2024): Yes  Last HbA1c < 8 (07/30/2024): NO      Breast Cancer Screening    Breast cancer is the second most common cancer in women after skin cancer, and the second leading cause of death from cancer after lung cancer. Mammograms can detect breast cancer early, which significantly increases the chances of curing the cancer.      A screening mammogram is an x-ray image of the breasts used for early breast cancer detection. It can help reduce the number of deaths from breast cancer among women. To get a clear image, the breast is placed between two plastic plates to make it flat. How often a mammogram is needed depends on your age and your breast cancer risk.    Colon Cancer Screening    Of cancers affecting both men and women, colorectal cancer is the third leading cancer killer in the [place]. But it doesnt have to be. Screening can prevent colorectal cancer or find it at an early stage when treatment often leads to a cure.    A colonoscopy is the preferred test for detecting colon cancer. It is needed only once every 10 years if results are negative. While sedated, a flexible, lighted tube with a tiny camera is inserted into the rectum and advanced through the colon to look for cancers. An alternative screening test that is used at home and returned to the lab may also be used. It detects hidden blood in bowel movements which could indicate cancer in the colon. If results are positive, you will need a colonoscopy to determine if the blood is  a sign of cancer. This type of follow up (diagnostic) colonoscopy usually requires additional copays as required by your insurance provider. Please contact your PCP if you have any questions.      Dear Ms. Iverson:    Your [facility] Team is dedicated to helping you stay healthy with regularly scheduled recommended screenings.  Scheduling routine screenings is important to maintaining good health. Our records indicate that you may be overdue for your screening pap smear. A pap smear screening can help identify patients at risk for developing cervical cancer at an early stage, when it is most likely to be successfully treated.    We encourage you to schedule your appointment with your [facility] provider or some primary care providers also perform this screening.    If you have completed or scheduled your pap smear screening outside of [facility], please notify your primary care team so we can update your health record.      If you have questions or would like to schedule your screening, please contact your primary care clinic.    Sincerely,    Your [facility] Team    Diabetic Retinal Eye Exam    Diabetes is the #1 cause of blindness in the [place] - early detection before signs or symptoms develop can prevent debilitating blindness.    Once-a-year screening is recommended for all diabetic patients. This exam can prevent and treat diabetes complications in the eye before developing symptoms. This can be done with a special camera is used to take photographs of the back of your eye without having to dilate them, or you can see an eye doctor for a full dilated exam.      A1c every 3-6 months, lipid panel every year, microalbumin (urine test) once per year, comprehensive foot exam once per year, dilated eye exam at least once per year, dental exam every 6 months, flu vaccination every year, and pneumonia vaccination(s) as recommended

## 2024-08-28 NOTE — PROGRESS NOTES
Chief Complaint   Patient presents with    Menstrual Problem     Irregular menses and menopause symptoms       History of Present Illness   Shelli Iverson is 44 y.o. BF  patient who presents today irregular cycles and hot flashes.  Patient is using a Mirena for contraception (exp ) and has been having irregular cycles since placement.  She reports cycles can last 4-8 days, heavy most days - they are can occ twice a month.  She reports shooting pain in the gluteus associated with her menstrual cycle.  She describes hot flashes occurring all the time as well as hair loss.  Her hormones were checked on 24 and were all within normal ranges.  Her A1c worsened (8.2 --> 11.1).      She is not sexually active at this time.    History  PMH: T2DM, HTN, Class I obesity, HLD, Depression/Anxiety  Psx: c/s x 2, Abdominoplasty (complicated by wound infection, c/s scar revision  All: NKDA  OB:   GYN: Denies any STIs or abnl Pap  FH: Colon cancer (MGF, age 80s); Denies any female cancer or MI prior to 49yo  SH: Denies COURTNEY  Meds:  Current Outpatient Medications   Medication Instructions    acetaminophen (TYLENOL) 1,000 mg, Oral, Every 6 hours PRN    amLODIPine (NORVASC) 10 mg, Oral, Daily    atorvastatin (LIPITOR) 20 MG tablet TAKE 1 TABLET(20 MG) BY MOUTH EVERY DAY    blood sugar diagnostic Strp To check BG 2 times daily, to use with insurance preferred meter    blood-glucose meter kit To check BG 2  times daily, to use with insurance preferred meter    FLUoxetine 20 MG capsule Take a 40mg cap with a 20mg cap (60mg total) every morning.    FLUoxetine 40 MG capsule Take a 40mg cap with a 20mg cap (60mg total) every morning.    hydrocortisone 2.5 % cream Topical (Top), 2 times daily    lancets Misc To check BG 2 times daily, to use with insurance preferred meter    losartan (COZAAR) 50 MG tablet TAKE 2 TABLETS BY MOUTH DAILY(FOLLOW-UP WITH DOCTOR 2020)    metFORMIN (GLUCOPHAGE) 1,000 mg, Oral, 2 times  "daily with meals    ondansetron (ZOFRAN-ODT) 8 mg, Oral, 2 times daily    OZEMPIC 0.25 mg, Subcutaneous, Every 7 days       Review of Systems   Constitutional:  Negative for chills and fever.   Eyes:  Negative for visual disturbance.   Respiratory:  Negative for cough and shortness of breath.    Cardiovascular:  Negative for chest pain and palpitations.   Gastrointestinal:  Negative for abdominal pain, nausea and vomiting.   Genitourinary:  Positive for dysmenorrhea, hot flashes and menstrual problem. Negative for vaginal discharge, vaginal pain and vaginal odor.   Neurological:  Negative for headaches.   Psychiatric/Behavioral:  Negative for depression and sleep disturbance. The patient is not nervous/anxious.    All other systems reviewed and are negative.  Breast: Negative for lump and mastodynia        Physical Examination:  Vitals:    08/28/24 0809   BP: 137/80   BP Location: Left arm   Patient Position: Sitting   BP Method: Medium (Automatic)   Pulse: 106   Weight: 80.7 kg (177 lb 14.6 oz)   Height: 5' 4" (1.626 m)        Physical Exam  Vitals reviewed. Exam conducted with a chaperone present.   Constitutional:       Appearance: Normal appearance.   HENT:      Head: Normocephalic and atraumatic.   Eyes:      Conjunctiva/sclera: Conjunctivae normal.   Cardiovascular:      Rate and Rhythm: Normal rate and regular rhythm.   Pulmonary:      Effort: Pulmonary effort is normal.      Breath sounds: Normal breath sounds.   Chest:   Breasts:     Right: Normal. No swelling, bleeding, inverted nipple, mass, nipple discharge, skin change or tenderness.      Left: Normal. No swelling, bleeding, inverted nipple, mass, nipple discharge, skin change or tenderness.   Abdominal:      General: Abdomen is flat.      Palpations: Abdomen is soft.   Genitourinary:     General: Normal vulva.      Exam position: Lithotomy position.      Labia:         Right: No tenderness or lesion.         Left: No tenderness or lesion.       " Urethra: No prolapse.      Vagina: Normal. No vaginal discharge, bleeding or lesions.      Cervix: Normal. No cervical motion tenderness, friability, lesion, erythema or cervical bleeding.      Uterus: Normal.       Adnexa:         Right: No tenderness or fullness.          Left: No tenderness or fullness.        Comments: IUD removal Procedure -  The patient was placed in lithotomy position.  A sterile speculum was placed and the cervix visualized.  Unable to see Mirena strings protruding from the os.  A spoolie was used to look into the endocervical canal while pushing the endocervical mucus away.  Strings seen on right side of endocervical canal.  A Exelonix hemostats were used to grasp the strings.  The IUD was removed intact.  Patient tolerated the procedure well.        Musculoskeletal:         General: Normal range of motion.      Cervical back: Normal range of motion.   Lymphadenopathy:      Upper Body:      Right upper body: No axillary adenopathy.      Left upper body: No axillary adenopathy.      Lower Body: No right inguinal adenopathy. No left inguinal adenopathy.   Skin:     General: Skin is warm and dry.   Neurological:      General: No focal deficit present.      Mental Status: She is alert and oriented to person, place, and time.   Psychiatric:         Mood and Affect: Mood normal.         Behavior: Behavior normal.         Thought Content: Thought content normal.         Judgment: Judgment normal.          Assessment:    1. Irregular menses  Ambulatory referral/consult to Gynecology      2. Hot flashes  Ambulatory referral/consult to Gynecology      3. Screening for malignant neoplasm of the cervix  Liquid-Based Pap Smear, Screening    HPV High Risk Genotypes, PCR      4. Encounter for screening mammogram for breast cancer  Mammo Digital Screening Bilat w/ Francisco Javier      5. Encounter for IUD removal        6. Screening for malignant neoplasm of colon  Case Request Endoscopy: COLONOSCOPY           Plan:  Counseled on IUD is the main cause of irregular menses and possible hair loss.  Discussed removal and hormone holiday to reassess bleeding profile after removal.    Pap obtained  MMG scheduled  C-scope order placed  Counseled on the importance of well-controlled DM.  RV in 3 mths.  Sooner PRN to discussed hormonal vs surgical options for cycle control.    Total time spent including review of record prior to face-to-face visit is 45 minutes. Greater than 80% of the time was spent in counseling and coordination of care. The above assessment and plan have been discussed at length. Referring provider's note reviewed. Labs and procedure reviewed. Problem List and History updated.

## 2024-08-28 NOTE — PROGRESS NOTES
Subjective:       Patient ID: Shelli Iverson is a 44 y.o. female.    Chief Complaint: Follow-up    Follow up.    DM. Started ozempic. Nausea for a few hours after taking. Relieved with zofran use. Doing well overall. Sugars range 130-230. Denies issues with her feet. We discussed establishing with eye doctor once her new insurance kicks in.         Past Medical History:   Diagnosis Date    LAKE (generalized anxiety disorder)     GERD (gastroesophageal reflux disease)     HLD (hyperlipidemia)     Hypertension     Recurrent major depressive disorder, in remission 11/22/2022    Sciatica     T2DM (type 2 diabetes mellitus)        Review of patient's allergies indicates:   Allergen Reactions    Penicillins Rash         Current Outpatient Medications:     acetaminophen (TYLENOL) 500 MG tablet, Take 2 tablets (1,000 mg total) by mouth every 6 (six) hours as needed for Pain., Disp: 30 tablet, Rfl: 0    amLODIPine (NORVASC) 10 MG tablet, Take 1 tablet (10 mg total) by mouth once daily., Disp: 90 tablet, Rfl: 3    atorvastatin (LIPITOR) 20 MG tablet, TAKE 1 TABLET(20 MG) BY MOUTH EVERY DAY, Disp: 30 tablet, Rfl: 3    blood sugar diagnostic Strp, To check BG 2 times daily, to use with insurance preferred meter, Disp: 100 each, Rfl: 2    blood-glucose meter kit, To check BG 2  times daily, to use with insurance preferred meter, Disp: 1 each, Rfl: 0    FLUoxetine 20 MG capsule, Take a 40mg cap with a 20mg cap (60mg total) every morning., Disp: 30 capsule, Rfl: 1    FLUoxetine 40 MG capsule, Take a 40mg cap with a 20mg cap (60mg total) every morning., Disp: 30 capsule, Rfl: 1    hydrocortisone 2.5 % cream, Apply topically 2 (two) times daily., Disp: 28 g, Rfl: 6    lancets Misc, To check BG 2 times daily, to use with insurance preferred meter, Disp: 100 each, Rfl: 2    losartan (COZAAR) 50 MG tablet, TAKE 2 TABLETS BY MOUTH DAILY(FOLLOW-UP WITH DOCTOR JULY 2020), Disp: 180 tablet, Rfl: 1    metFORMIN (GLUCOPHAGE) 1000 MG tablet,  "Take 1 tablet (1,000 mg total) by mouth 2 (two) times daily with meals., Disp: 180 tablet, Rfl: 3    ondansetron (ZOFRAN-ODT) 4 MG TbDL, Take 2 tablets (8 mg total) by mouth 2 (two) times daily., Disp: 20 tablet, Rfl: 0    semaglutide (OZEMPIC) 0.25 mg or 0.5 mg (2 mg/3 mL) pen injector, Inject 0.5 mg into the skin every 7 days., Disp: 3 mL, Rfl: 2    Review of Systems   Constitutional:  Negative for activity change, appetite change, chills, diaphoresis, fatigue, fever and unexpected weight change.   HENT:  Negative for congestion, ear pain, postnasal drip, rhinorrhea, sinus pressure, sneezing, sore throat and trouble swallowing.    Eyes:  Negative for pain, itching and visual disturbance.   Respiratory:  Negative for cough, chest tightness, shortness of breath and wheezing.    Cardiovascular:  Negative for chest pain, palpitations and leg swelling.   Gastrointestinal:  Positive for nausea. Negative for abdominal distention, abdominal pain, blood in stool, constipation, diarrhea and vomiting.   Endocrine: Negative for cold intolerance and heat intolerance.   Genitourinary:  Negative for difficulty urinating, dysuria, frequency, hematuria and urgency.   Musculoskeletal:  Negative for arthralgias, back pain, myalgias and neck pain.   Skin:  Negative for color change, pallor, rash and wound.   Neurological:  Negative for dizziness, syncope, weakness, numbness and headaches.   Hematological:  Negative for adenopathy.   Psychiatric/Behavioral:  Negative for behavioral problems. The patient is not nervous/anxious.        Objective:      /82 (BP Location: Right arm, Patient Position: Sitting, BP Method: Large (Manual))   Pulse 105   Resp 18   Ht 5' 4" (1.626 m)   Wt 80.3 kg (177 lb 0.5 oz)   LMP 08/19/2024 (Exact Date)   SpO2 97%   BMI 30.39 kg/m²   Physical Exam  Vitals reviewed.   Constitutional:       General: She is not in acute distress.     Appearance: Normal appearance. She is not ill-appearing, " toxic-appearing or diaphoretic.   HENT:      Head: Normocephalic.      Right Ear: External ear normal.      Left Ear: External ear normal.      Nose: Nose normal. No congestion or rhinorrhea.      Mouth/Throat:      Mouth: Mucous membranes are moist.      Pharynx: Oropharynx is clear.   Eyes:      General: No scleral icterus.        Right eye: No discharge.         Left eye: No discharge.      Extraocular Movements: Extraocular movements intact.      Conjunctiva/sclera: Conjunctivae normal.   Cardiovascular:      Rate and Rhythm: Normal rate and regular rhythm.      Pulses: Normal pulses.      Heart sounds: Normal heart sounds. No murmur heard.     No friction rub. No gallop.   Pulmonary:      Effort: Pulmonary effort is normal. No respiratory distress.      Breath sounds: Normal breath sounds. No wheezing, rhonchi or rales.   Chest:      Chest wall: No tenderness.   Musculoskeletal:         General: No swelling, tenderness or deformity. Normal range of motion.      Cervical back: Normal range of motion.      Right lower leg: No edema.      Left lower leg: No edema.   Skin:     General: Skin is warm and dry.      Capillary Refill: Capillary refill takes less than 2 seconds.      Coloration: Skin is not jaundiced.      Findings: No bruising, erythema, lesion or rash.   Neurological:      Mental Status: She is alert and oriented to person, place, and time.      Gait: Gait normal.   Psychiatric:         Mood and Affect: Mood normal.         Behavior: Behavior normal.         Thought Content: Thought content normal.         Judgment: Judgment normal.         Protective Sensation (w/ 10 gram monofilament):  Right: Intact  Left: Intact    Visual Inspection:  Normal -  Bilateral    Pedal Pulses:   Right: Present  Left: Present    Posterior Tibialis Pulses:   Right:Present  Left: Present  Assessment:       1. Hypertension associated with diabetes    2. Uncontrolled type 2 diabetes mellitus with hyperglycemia        Plan:        Hypertension associated with diabetes        -    Stable. Continue meds. Will continue to monitor.     Uncontrolled type 2 diabetes mellitus with hyperglycemia  -     semaglutide (OZEMPIC) 0.25 mg or 0.5 mg (2 mg/3 mL) pen injector; Inject 0.5 mg into the skin every 7 days.  Dispense: 3 mL; Refill: 2        -     Increase as tolerated. Continue to monitor sugars. Consider switching to mounjaro if nausea becomes too much for her to tolerate. Place referral to optometry once her new insurance with [facility] kicks in.   Obesity (BMI 30.0-34.9)                 Ha Baum PA-C  Family Medicine Physician Assistant       Future Appointments       Date Provider Specialty Appt Notes    10/10/2024 Shayna Rock MD Family Medicine [facility]               I spent a total of 20 minutes on the day of the visit.This includes face to face time and non-face to face time preparing to see the patient (eg, review of tests), obtaining and/or reviewing separately obtained history, documenting clinical information in the electronic or other health record, independently interpreting results and communicating results to the patient/family/caregiver, or care coordinator.      We have addressed [4] Moderate: 1 or more chronic illnesses with exacerbation, progression, or side effects of treatment / 2 or more stable chronic illnesses / 1 undiagnosed new problem with uncertain prognosis / 1 acute illness with systemic symptoms / 1 acute complicated injury  The complexity of the data reviewed and analyzed for this visit was [2] Minimal or None  The risk of complications and/or morbidity or mortality are [4] Moderate risk (I.e. prescription drug management / decision regarding minor surgery with identified pt or procedure risk factors / decision regarding elective major surgery without identified pt or procedure risk factors / diagnosis or treatment significantly limited by social determinants of health)   The level of Medical Decision Making for  this visit is [4] Moderate

## 2024-08-29 LAB
CLINICAL INFO: NORMAL
DATE OF PREVIOUS PAP: NORMAL
DATE PREVIOUS BX: NO
LMP START DATE: NORMAL
SPECIMEN SOURCE CVX/VAG CYTO: NORMAL

## 2024-09-04 ENCOUNTER — PATIENT MESSAGE (OUTPATIENT)
Dept: OBSTETRICS AND GYNECOLOGY | Facility: CLINIC | Age: 44
End: 2024-09-04
Payer: MEDICAID

## 2024-09-14 ENCOUNTER — PATIENT MESSAGE (OUTPATIENT)
Dept: FAMILY MEDICINE | Facility: CLINIC | Age: 44
End: 2024-09-14
Payer: MEDICAID

## 2024-09-14 DIAGNOSIS — R11.0 NAUSEA: ICD-10-CM

## 2024-09-14 DIAGNOSIS — E11.65 UNCONTROLLED TYPE 2 DIABETES MELLITUS WITH HYPERGLYCEMIA: Primary | ICD-10-CM

## 2024-09-16 RX ORDER — ONDANSETRON 4 MG/1
8 TABLET, ORALLY DISINTEGRATING ORAL EVERY 8 HOURS PRN
Qty: 60 TABLET | Refills: 2 | Status: SHIPPED | OUTPATIENT
Start: 2024-09-16

## 2024-09-16 RX ORDER — TIRZEPATIDE 2.5 MG/.5ML
2.5 INJECTION, SOLUTION SUBCUTANEOUS
Qty: 4 PEN | Refills: 1 | Status: SHIPPED | OUTPATIENT
Start: 2024-09-16

## 2024-09-16 NOTE — TELEPHONE ENCOUNTER
Switch to mounjaro. Come see me in two months to reassess and potentially increase if she is tolerating it better. I also refilled her zofran

## 2024-09-24 ENCOUNTER — ON-DEMAND VIRTUAL (OUTPATIENT)
Dept: URGENT CARE | Facility: CLINIC | Age: 44
End: 2024-09-24
Payer: MEDICAID

## 2024-09-24 DIAGNOSIS — R21 RASH: Primary | ICD-10-CM

## 2024-09-24 PROCEDURE — 99213 OFFICE O/P EST LOW 20 MIN: CPT | Mod: 95,,, | Performed by: NURSE PRACTITIONER

## 2024-09-24 RX ORDER — NYSTATIN 100000 U/G
CREAM TOPICAL 2 TIMES DAILY
Qty: 30 G | Refills: 0 | Status: SHIPPED | OUTPATIENT
Start: 2024-09-24

## 2024-09-24 RX ORDER — MUPIROCIN 20 MG/G
OINTMENT TOPICAL 3 TIMES DAILY
Qty: 22 G | Refills: 0 | Status: SHIPPED | OUTPATIENT
Start: 2024-09-24

## 2024-09-24 NOTE — PROGRESS NOTES
Subjective:      Patient ID: Shelli Iverson is a 44 y.o. female.    Vitals:  vitals were not taken for this visit.     Chief Complaint: Rash      Visit Type: TELE AUDIOVISUAL - This visit was conducted virtually based on  subjective information and limited objective exam    Present with the patient at the time of consultation: TELEMED PRESENT WITH PATIENT: None        Past Medical History:   Diagnosis Date    LAKE (generalized anxiety disorder)     GERD (gastroesophageal reflux disease)     HLD (hyperlipidemia)     Hypertension     Recurrent major depressive disorder, in remission 2022    Sciatica     T2DM (type 2 diabetes mellitus)      Past Surgical History:   Procedure Laterality Date    ABDOMINOPLASTY       SECTION      x 1    REVISION OF ABDOMINAL SCAR      planned 22     Review of patient's allergies indicates:   Allergen Reactions    Penicillins Rash     Current Outpatient Medications on File Prior to Visit   Medication Sig Dispense Refill    acetaminophen (TYLENOL) 500 MG tablet Take 2 tablets (1,000 mg total) by mouth every 6 (six) hours as needed for Pain. 30 tablet 0    amLODIPine (NORVASC) 10 MG tablet Take 1 tablet (10 mg total) by mouth once daily. 90 tablet 3    atorvastatin (LIPITOR) 20 MG tablet TAKE 1 TABLET(20 MG) BY MOUTH EVERY DAY 30 tablet 3    blood sugar diagnostic Strp To check BG 2 times daily, to use with insurance preferred meter 100 each 2    blood-glucose meter kit To check BG 2  times daily, to use with insurance preferred meter 1 each 0    FLUoxetine 20 MG capsule Take a 40mg cap with a 20mg cap (60mg total) every morning. 30 capsule 1    FLUoxetine 40 MG capsule Take a 40mg cap with a 20mg cap (60mg total) every morning. 30 capsule 1    hydrocortisone 2.5 % cream Apply topically 2 (two) times daily. 28 g 6    lancets Misc To check BG 2 times daily, to use with insurance preferred meter 100 each 2    losartan (COZAAR) 50 MG tablet TAKE 2 TABLETS BY MOUTH  DAILY(FOLLOW-UP WITH DOCTOR JULY 2020) 180 tablet 1    metFORMIN (GLUCOPHAGE) 1000 MG tablet Take 1 tablet (1,000 mg total) by mouth 2 (two) times daily with meals. 180 tablet 3    ondansetron (ZOFRAN-ODT) 4 MG TbDL Take 2 tablets (8 mg total) by mouth every 8 (eight) hours as needed (nausea). 60 tablet 2    tirzepatide (MOUNJARO) 2.5 mg/0.5 mL PnIj Inject 2.5 mg into the skin every 7 days. 4 Pen 1     No current facility-administered medications on file prior to visit.     Family History   Problem Relation Name Age of Onset    Heart disease Mother          CAD age 38    Hypertension Mother  38        MI    Diabetes Mother      Hypertension Father      Colon cancer Paternal Grandfather  80       Medications Ordered                [id number] - [address] AT [facility] & [address] [address]    Telephone: [phone]   Fax: [phone]   Hours: Open 24 hours                         E-Prescribed (2 of 2)              mupirocin (BACTROBAN) 2 % ointment    Sig: Apply topically 3 (three) times daily.       Start: 9/24/24     Quantity: 22 g Refills: 0                         nystatin (MYCOSTATIN) cream    Sig: Apply topically 2 (two) times daily.       Start: 9/24/24     Quantity: 30 g Refills: 0                           Ohs Peq Odvv Intake    9/24/2024  6:03 PM CDT - Filed by Patient   What is your current physical address in the event of a medical emergency? [address] [place] [address] dr   Are you able to take your vital signs? No   Please attach any relevant images or files          44 female with c/o rash of cluster of bumps on buttock. She states very itchy and painful. She states she is in a relationship with someone. She states she denies hx of herpes. She she had it previously in summer and and it went away on its own.         Constitution: Negative.   HENT: Negative.     Cardiovascular: Negative.    Respiratory: Negative.     Gastrointestinal:  Negative.    Endocrine: negative.   Genitourinary: Negative.  Negative for frequency and urgency.   Musculoskeletal: Negative.    Skin:  Positive for rash.   Allergic/Immunologic: Negative.    Neurological: Negative.    Hematologic/Lymphatic: Negative.    Psychiatric/Behavioral: Negative.          Objective:   The physical exam was conducted virtually.  LOCATION OF PATIENT [facility] x 3 ; no acute distress noted; appearance normal; mood and behavior normal; thought process normal  Head- normocephalic  Nose- appears normal, no discharge or erythema  Eyes- pupils appear normal in size, no drainage, no erythema  Ears- normal appearing; no discharge, no erythema  Mouth- appears normal  Oropharynx- no erythema, lesions  Lungs- breathing at a normal rate, no acute distress noted  Heart- no reports of tachycardia, palpitations, chest pain  Abdomen- non distended, non tender reported by patient  Skin- warm and dry, no erythema or edema noted by patient or visualized  Psych- as above; no si/hi      Assessment:     1. Rash        Plan:     Concern for herpetic lesions- shingles or herpes  However will treat for bacterial or yeast.     Thank you for choosing [facility]!    Our goal in the [facility] is to always provide outstanding medical care. You may receive a survey by mail or e-mail in the next week regarding your experience today. We would greatly appreciate you completing and returning the survey. Your feedback provides us with a way to recognize our staff who provide very good care, and it helps us learn how to improve when your experience was below our aspiration of excellence.         We appreciate you trusting us with your medical care. We hope you feel better soon. We will be happy to take care of you for all of your future medical needs.    You must understand that you've received an Urgent Care treatment only and that you may be released before all your medical  problems are known or treated. You, the patient, will arrange for follow up care as instructed.    Follow up with your PCP or specialty clinic as directed in the next 1-2 weeks if not improved or as needed.  You can call [phone] to schedule an appointment with the appropriate provider.    If your condition worsens we recommend that you receive another evaluation in person, with your primary care provider, urgent care or at the emergency room immediately or contact your primary medical clinics after hours call service to discuss your concerns.         Rash  -     mupirocin (BACTROBAN) 2 % ointment; Apply topically 3 (three) times daily.  Dispense: 22 g; Refill: 0  -     nystatin (MYCOSTATIN) cream; Apply topically 2 (two) times daily.  Dispense: 30 g; Refill: 0

## 2024-10-02 ENCOUNTER — DOCUMENTATION ONLY (OUTPATIENT)
Dept: PSYCHIATRY | Facility: CLINIC | Age: 44
End: 2024-10-02
Payer: MEDICAID

## 2024-10-02 NOTE — PROGRESS NOTES
Outreach Note    10/2/2024    Chart Review:     LAST VISIT [id number]   Cancellations: [id number], [id number], [id number], [id number]   NO-SHOWS: [id number]   Outreach Attempts: [id number], [id number], [id number], [id number]     Future Scheduling:       If pt would like to re-engage in treatment within 3 years from the LAST VISIT (see above), then the next appointment should be in person at the clinic and should also be an extended visit (an hour).            Gerry Young DO  Department of Psychiatry, [facility]

## 2024-10-10 ENCOUNTER — OFFICE VISIT (OUTPATIENT)
Dept: FAMILY MEDICINE | Facility: CLINIC | Age: 44
End: 2024-10-10
Payer: MEDICAID

## 2024-10-10 ENCOUNTER — LAB VISIT (OUTPATIENT)
Dept: LAB | Facility: HOSPITAL | Age: 44
End: 2024-10-10
Attending: STUDENT IN AN ORGANIZED HEALTH CARE EDUCATION/TRAINING PROGRAM
Payer: COMMERCIAL

## 2024-10-10 ENCOUNTER — HOSPITAL ENCOUNTER (OUTPATIENT)
Dept: RADIOLOGY | Facility: HOSPITAL | Age: 44
Discharge: HOME | End: 2024-10-10
Attending: STUDENT IN AN ORGANIZED HEALTH CARE EDUCATION/TRAINING PROGRAM
Payer: MEDICAID

## 2024-10-10 ENCOUNTER — PATIENT MESSAGE (OUTPATIENT)
Dept: FAMILY MEDICINE | Facility: CLINIC | Age: 44
End: 2024-10-10

## 2024-10-10 VITALS
WEIGHT: 172.19 LBS | OXYGEN SATURATION: 98 % | HEART RATE: 88 BPM | BODY MASS INDEX: 29.4 KG/M2 | SYSTOLIC BLOOD PRESSURE: 110 MMHG | DIASTOLIC BLOOD PRESSURE: 76 MMHG | RESPIRATION RATE: 18 BRPM | HEIGHT: 64 IN

## 2024-10-10 DIAGNOSIS — F41.1 GENERALIZED ANXIETY DISORDER WITH PANIC ATTACKS: ICD-10-CM

## 2024-10-10 DIAGNOSIS — R92.8 ABNORMAL MAMMOGRAM: ICD-10-CM

## 2024-10-10 DIAGNOSIS — E11.65 UNCONTROLLED TYPE 2 DIABETES MELLITUS WITH HYPERGLYCEMIA: ICD-10-CM

## 2024-10-10 DIAGNOSIS — E11.69 HYPERLIPIDEMIA DUE TO TYPE 2 DIABETES MELLITUS: ICD-10-CM

## 2024-10-10 DIAGNOSIS — Z12.11 ENCOUNTER FOR SCREENING FOR MALIGNANT NEOPLASM OF COLON: ICD-10-CM

## 2024-10-10 DIAGNOSIS — Z00.00 ROUTINE GENERAL MEDICAL EXAMINATION AT A HEALTH CARE FACILITY: Primary | ICD-10-CM

## 2024-10-10 DIAGNOSIS — Z71.85 VACCINE COUNSELING: ICD-10-CM

## 2024-10-10 DIAGNOSIS — F41.0 GENERALIZED ANXIETY DISORDER WITH PANIC ATTACKS: ICD-10-CM

## 2024-10-10 DIAGNOSIS — E78.5 HYPERLIPIDEMIA DUE TO TYPE 2 DIABETES MELLITUS: ICD-10-CM

## 2024-10-10 DIAGNOSIS — I15.2 HYPERTENSION ASSOCIATED WITH DIABETES: ICD-10-CM

## 2024-10-10 DIAGNOSIS — Z00.00 ROUTINE GENERAL MEDICAL EXAMINATION AT A HEALTH CARE FACILITY: ICD-10-CM

## 2024-10-10 DIAGNOSIS — E11.59 HYPERTENSION ASSOCIATED WITH DIABETES: ICD-10-CM

## 2024-10-10 LAB
ALBUMIN SERPL BCP-MCNC: 4 G/DL (ref 3.5–5.2)
ALP SERPL-CCNC: 93 U/L (ref 55–135)
ALT SERPL W/O P-5'-P-CCNC: 18 U/L (ref 10–44)
ANION GAP SERPL CALC-SCNC: 10 MMOL/L (ref 8–16)
AST SERPL-CCNC: 16 U/L (ref 10–40)
BASOPHILS # BLD AUTO: 0.02 K/UL (ref 0–0.2)
BASOPHILS NFR BLD: 0.3 % (ref 0–1.9)
BILIRUB SERPL-MCNC: 0.6 MG/DL (ref 0.1–1)
BUN SERPL-MCNC: 13 MG/DL (ref 6–20)
CALCIUM SERPL-MCNC: 9.4 MG/DL (ref 8.7–10.5)
CHLORIDE SERPL-SCNC: 108 MMOL/L (ref 95–110)
CO2 SERPL-SCNC: 19 MMOL/L (ref 23–29)
CREAT SERPL-MCNC: 0.7 MG/DL (ref 0.5–1.4)
DIFFERENTIAL METHOD BLD: ABNORMAL
EOSINOPHIL # BLD AUTO: 0.1 K/UL (ref 0–0.5)
EOSINOPHIL NFR BLD: 0.9 % (ref 0–8)
ERYTHROCYTE [DISTWIDTH] IN BLOOD BY AUTOMATED COUNT: 13.1 % (ref 11.5–14.5)
EST. GFR  (NO RACE VARIABLE): >60 ML/MIN/1.73 M^2
ESTIMATED AVG GLUCOSE: 177 MG/DL (ref 68–131)
GLUCOSE SERPL-MCNC: 99 MG/DL (ref 70–110)
HBA1C MFR BLD: 7.8 % (ref 4–5.6)
HCT VFR BLD AUTO: 38.1 % (ref 37–48.5)
HGB BLD-MCNC: 11.9 G/DL (ref 12–16)
IMM GRANULOCYTES # BLD AUTO: 0.03 K/UL (ref 0–0.04)
IMM GRANULOCYTES NFR BLD AUTO: 0.4 % (ref 0–0.5)
LYMPHOCYTES # BLD AUTO: 2.1 K/UL (ref 1–4.8)
LYMPHOCYTES NFR BLD: 28.5 % (ref 18–48)
MCH RBC QN AUTO: 26.6 PG (ref 27–31)
MCHC RBC AUTO-ENTMCNC: 31.2 G/DL (ref 32–36)
MCV RBC AUTO: 85 FL (ref 82–98)
MONOCYTES # BLD AUTO: 0.6 K/UL (ref 0.3–1)
MONOCYTES NFR BLD: 7.4 % (ref 4–15)
NEUTROPHILS # BLD AUTO: 4.7 K/UL (ref 1.8–7.7)
NEUTROPHILS NFR BLD: 62.5 % (ref 38–73)
NRBC BLD-RTO: 0 /100 WBC
PLATELET # BLD AUTO: 438 K/UL (ref 150–450)
PMV BLD AUTO: 9.5 FL (ref 9.2–12.9)
POTASSIUM SERPL-SCNC: 4 MMOL/L (ref 3.5–5.1)
PROT SERPL-MCNC: 7.3 G/DL (ref 6–8.4)
RBC # BLD AUTO: 4.47 M/UL (ref 4–5.4)
SODIUM SERPL-SCNC: 137 MMOL/L (ref 136–145)
TSH SERPL DL<=0.005 MIU/L-ACNC: 1.78 UIU/ML (ref 0.4–4)
WBC # BLD AUTO: 7.52 K/UL (ref 3.9–12.7)

## 2024-10-10 PROCEDURE — 85025 COMPLETE CBC W/AUTO DIFF WBC: CPT | Performed by: STUDENT IN AN ORGANIZED HEALTH CARE EDUCATION/TRAINING PROGRAM

## 2024-10-10 PROCEDURE — 83036 HEMOGLOBIN GLYCOSYLATED A1C: CPT | Performed by: STUDENT IN AN ORGANIZED HEALTH CARE EDUCATION/TRAINING PROGRAM

## 2024-10-10 PROCEDURE — 90472 IMMUNIZATION ADMIN EACH ADD: CPT | Mod: PBBFAC,PO

## 2024-10-10 PROCEDURE — 90656 IIV3 VACC NO PRSV 0.5 ML IM: CPT | Mod: PBBFAC,PO

## 2024-10-10 PROCEDURE — 84443 ASSAY THYROID STIM HORMONE: CPT | Performed by: STUDENT IN AN ORGANIZED HEALTH CARE EDUCATION/TRAINING PROGRAM

## 2024-10-10 PROCEDURE — 90471 IMMUNIZATION ADMIN: CPT | Mod: PBBFAC,PO

## 2024-10-10 PROCEDURE — 80053 COMPREHEN METABOLIC PANEL: CPT | Performed by: STUDENT IN AN ORGANIZED HEALTH CARE EDUCATION/TRAINING PROGRAM

## 2024-10-10 PROCEDURE — 77065 DX MAMMO INCL CAD UNI: CPT | Mod: 26,LT,, | Performed by: RADIOLOGY

## 2024-10-10 PROCEDURE — 99215 OFFICE O/P EST HI 40 MIN: CPT | Mod: PBBFAC,PO,25 | Performed by: STUDENT IN AN ORGANIZED HEALTH CARE EDUCATION/TRAINING PROGRAM

## 2024-10-10 PROCEDURE — 77061 BREAST TOMOSYNTHESIS UNI: CPT | Mod: TC,LT

## 2024-10-10 PROCEDURE — 99999 PR PBB SHADOW E&M-EST. PATIENT-LVL V: CPT | Mod: PBBFAC,,, | Performed by: STUDENT IN AN ORGANIZED HEALTH CARE EDUCATION/TRAINING PROGRAM

## 2024-10-10 PROCEDURE — 36415 COLL VENOUS BLD VENIPUNCTURE: CPT | Mod: PO | Performed by: STUDENT IN AN ORGANIZED HEALTH CARE EDUCATION/TRAINING PROGRAM

## 2024-10-10 PROCEDURE — 99999PBSHW PR PBB SHADOW TECHNICAL ONLY FILED TO HB: Mod: PBBFAC,,,

## 2024-10-10 PROCEDURE — 77061 BREAST TOMOSYNTHESIS UNI: CPT | Mod: 26,LT,, | Performed by: RADIOLOGY

## 2024-10-10 PROCEDURE — 90677 PCV20 VACCINE IM: CPT | Mod: PBBFAC,PO

## 2024-10-10 RX ORDER — LOSARTAN POTASSIUM 100 MG/1
100 TABLET ORAL
COMMUNITY
Start: 2024-09-03

## 2024-10-10 RX ORDER — LANCETS 33 GAUGE
EACH MISCELLANEOUS 2 TIMES DAILY
COMMUNITY
Start: 2024-08-21

## 2024-10-10 RX ORDER — HYDROCHLOROTHIAZIDE 12.5 MG/1
12.5 CAPSULE ORAL
COMMUNITY

## 2024-10-10 RX ORDER — BLOOD-GLUCOSE METER
EACH MISCELLANEOUS
COMMUNITY
Start: 2024-08-21

## 2024-10-10 RX ORDER — HYDROXYZINE HYDROCHLORIDE 50 MG/1
TABLET, FILM COATED ORAL
COMMUNITY

## 2024-10-10 RX ORDER — SERTRALINE HYDROCHLORIDE 50 MG/1
50 TABLET, FILM COATED ORAL
COMMUNITY
End: 2024-10-10

## 2024-10-10 RX ADMIN — INFLUENZA VIRUS VACCINE 0.5 ML: 15; 15; 15 SUSPENSION INTRAMUSCULAR at 11:10

## 2024-10-10 RX ADMIN — PNEUMOCOCCAL 20-VALENT CONJUGATE VACCINE 0.5 ML
2.2; 2.2; 2.2; 2.2; 2.2; 2.2; 2.2; 2.2; 2.2; 2.2; 2.2; 2.2; 2.2; 2.2; 2.2; 2.2; 4.4; 2.2; 2.2; 2.2 INJECTION, SUSPENSION INTRAMUSCULAR at 11:10

## 2024-10-10 NOTE — PROGRESS NOTES
[facility]  Office Visit Note     SUBJECTIVE:   HPI: Shelli Iverson  is a 44 y.o. female  here to establish care     Medical conditions notable for LAKE, eczema, hypertension, hyperlipidemia, type 2 diabetes, elevated BMI     Medications include Mounjaro 2.5 mg, Zofran, nystatin cream, metformin 1000 mg b.I.d., losartan 100 mg, fluoxetine 40 mg daily,  Lipitor 20 mg, amlodipine 10 mg      Working as a   Social history: reports doing marijuana gummies, denies any other substance. Currently not sexually active  LMP: 2024 still on it. She reports having heavy menstrual cycle     The 10-year ASCVD risk score (Nilsa MELLO, et al., 2019) is: 3.8%    Values used to calculate the score:      Age: 44 years      Sex: Female      Is Non- : Yes      Diabetic: Yes      Tobacco smoker: No      Systolic Blood Pressure: 110 mmHg      Is BP treated: Yes      HDL Cholesterol: 46 mg/dL      Total Cholesterol: 202 mg/dL      Office Visit on 2024   Component Date Value Ref Range Status    Cytology ThinPrep Pap Source 2024 Cervix   Final    Cytology ThinPrep Pap Report Status 2024 DNR   Final    Cytology Thinprep PAP Clinical His* 2024 Routine exam   Corrected    Cytology ThinPrep Pap LMP 2024   Final    Cytology ThinPrep Previous PAP 2024 Unknown   Final    Cytology ThinPrep Previous Biopsy 2024 No   Final    Cytology ThinPrep PAP Adequacy 2024 SEE BELOW   Final    Cytology ThinPrep PAP General Ailyn* 2024 DNR   Final    Cytology ThinPrep PAP Interpretati* 2024 SEE BELOW   Final    Cytology ThinPrep PAP Comment 2024 SEE BELOW   Final    Cytotechnologist 2024 SEE BELOW   Final    Review Cytotechnologist 2024 DNR   Final    Pathologist 2024 DNR   Final    Cytology ThinPrep PAP Infection 2024 DNR   Final    Cytology Thin Prep Pap Explanation 2024 SEE BELOW    Final    HPV DNA High Risk 08/28/2024 Not Detected  NOT DETECTED Final   Admission on 07/30/2024, Discharged on 07/31/2024   Component Date Value Ref Range Status    POC Glucose 07/30/2024 372 (H)  70 - 110 Final    WBC 07/31/2024 7.64  3.90 - 12.70 K/uL Final    RBC 07/31/2024 4.25  4.00 - 5.40 M/uL Final    Hemoglobin 07/31/2024 11.7 (L)  12.0 - 16.0 g/dL Final    Hematocrit 07/31/2024 38.0  37.0 - 48.5 % Final    MCV 07/31/2024 89  82 - 98 fL Final    MCH 07/31/2024 27.5  27.0 - 31.0 pg Final    MCHC 07/31/2024 30.8 (L)  32.0 - 36.0 g/dL Final    RDW 07/31/2024 13.2  11.5 - 14.5 % Final    Platelets 07/31/2024 293  150 - 450 K/uL Final    MPV 07/31/2024 9.7  9.2 - 12.9 fL Final    Immature Granulocytes 07/31/2024 0.3  0.0 - 0.5 % Final    Gran # (ANC) 07/31/2024 4.0  1.8 - 7.7 K/uL Final    Immature Grans (Abs) 07/31/2024 0.02  0.00 - 0.04 K/uL Final    Lymph # 07/31/2024 2.9  1.0 - 4.8 K/uL Final    Mono # 07/31/2024 0.7  0.3 - 1.0 K/uL Final    Eos # 07/31/2024 0.1  0.0 - 0.5 K/uL Final    Baso # 07/31/2024 0.02  0.00 - 0.20 K/uL Final    nRBC 07/31/2024 0  0 /100 WBC Final    Gran % 07/31/2024 52.1  38.0 - 73.0 % Final    Lymph % 07/31/2024 37.8  18.0 - 48.0 % Final    Mono % 07/31/2024 8.5  4.0 - 15.0 % Final    Eosinophil % 07/31/2024 1.0  0.0 - 8.0 % Final    Basophil % 07/31/2024 0.3  0.0 - 1.9 % Final    Differential Method 07/31/2024 Automated   Final    Sodium 07/31/2024 135 (L)  136 - 145 mmol/L Final    Potassium 07/31/2024 3.9  3.5 - 5.1 mmol/L Final    Chloride 07/31/2024 102  95 - 110 mmol/L Final    CO2 07/31/2024 24  23 - 29 mmol/L Final    Glucose 07/31/2024 295 (H)  70 - 110 mg/dL Final    BUN 07/31/2024 18  6 - 20 mg/dL Final    Creatinine 07/31/2024 1.0  0.5 - 1.4 mg/dL Final    Calcium 07/31/2024 8.5 (L)  8.7 - 10.5 mg/dL Final    Total Protein 07/31/2024 6.6  6.0 - 8.4 g/dL Final    Albumin 07/31/2024 4.0  3.5 - 5.2 g/dL Final    Total Bilirubin 07/31/2024 0.3  0.1 - 1.0 mg/dL Final     Alkaline Phosphatase 07/31/2024 114  55 - 135 U/L Final    AST 07/31/2024 19  10 - 40 U/L Final    ALT 07/31/2024 27  10 - 44 U/L Final    eGFR 07/31/2024 >60.0  >60 mL/min/1.73 m^2 Final    Anion Gap 07/31/2024 9  8 - 16 mmol/L Final    Beta-Hydroxybutyrate 07/31/2024 0.1  0.0 - 0.5 mmol/L Final    Specimen UA 07/31/2024 Urine, Clean Catch   Final    Color, UA 07/31/2024 Yellow  Yellow, Straw, [place] Final    Appearance, UA 07/31/2024 Clear  Clear Final    pH, UA 07/31/2024 6.0  5.0 - 8.0 Final    Specific Gravity, UA 07/31/2024 1.030  1.005 - 1.030 Final    Protein, UA 07/31/2024 Negative  Negative Final    Glucose, UA 07/31/2024 4+ (A)  Negative Final    Ketones, UA 07/31/2024 Negative  Negative Final    Bilirubin (UA) 07/31/2024 Negative  Negative Final    Occult Blood UA 07/31/2024 TRACE  Negative Final    Nitrite, UA 07/31/2024 Negative  Negative Final    Urobilinogen, UA 07/31/2024 Negative  Negative EU/dL Final    Leukocytes, UA 07/31/2024 Negative  Negative Final    QRS Duration 07/31/2024 88  ms Final    [facility] QTC Calculation 07/31/2024 467  ms Final    POC Preg Test, Ur 07/31/2024 Negative  Negative Final     Acceptable 07/31/2024 Yes   Final    POC PH 07/31/2024 7.361  7.35 - 7.45 Final    POC PCO2 07/31/2024 43.4  35 - 45 mmHg Final    POC PO2 07/31/2024 43  40 - 60 mmHg Final    POC HCO3 07/31/2024 24.6  24 - 28 mmol/L Final    POC BE 07/31/2024 -1  -2 to 2 mmol/L Final    POC SATURATED O2 07/31/2024 77  95 - 100 % Final    POC TCO2 07/31/2024 26  24 - 29 mmol/L Final    Sample 07/31/2024 VENOUS   Final    Site 07/31/2024 Other   Final    Allens Test 07/31/2024 N/A   Final    DelSys 07/31/2024 Room Air   Final    POC Glucose 07/31/2024 287 (H)  70 - 110 Final    POC Glucose 07/31/2024 212 (H)  70 - 110 Final    Bacteria 07/31/2024 Few (A)  None-Occ /hpf Final    Yeast, UA 07/31/2024 None  None Final    Microscopic Comment 07/31/2024 SEE COMMENT   Final   Lab Visit on 07/30/2024   Component  Date Value Ref Range Status    Microalbumin, Urine 07/30/2024 149.0  ug/mL Final    Creatinine, Urine 07/30/2024 98.0  15.0 - 325.0 mg/dL Final    Microalb/Creat Ratio 07/30/2024 152.0 (H)  0.0 - 30.0 ug/mg Final   Lab Visit on 07/30/2024   Component Date Value Ref Range Status    Sodium 07/30/2024 134 (L)  136 - 145 mmol/L Final    Potassium 07/30/2024 4.0  3.5 - 5.1 mmol/L Final    Chloride 07/30/2024 103  95 - 110 mmol/L Final    CO2 07/30/2024 20 (L)  23 - 29 mmol/L Final    Glucose 07/30/2024 243 (H)  70 - 110 mg/dL Final    BUN 07/30/2024 11  6 - 20 mg/dL Final    Creatinine 07/30/2024 0.9  0.5 - 1.4 mg/dL Final    Calcium 07/30/2024 8.9  8.7 - 10.5 mg/dL Final    Total Protein 07/30/2024 7.2  6.0 - 8.4 g/dL Final    Albumin 07/30/2024 3.9  3.5 - 5.2 g/dL Final    Total Bilirubin 07/30/2024 0.4  0.1 - 1.0 mg/dL Final    Alkaline Phosphatase 07/30/2024 117  55 - 135 U/L Final    AST 07/30/2024 23  10 - 40 U/L Final    ALT 07/30/2024 33  10 - 44 U/L Final    eGFR 07/30/2024 >60.0  >60 mL/min/1.73 m^2 Final    Anion Gap 07/30/2024 11  8 - 16 mmol/L Final    Hemoglobin A1C 07/30/2024 11.1 (H)  4.0 - 5.6 % Final    Estimated Avg Glucose 07/30/2024 272 (H)  68 - 131 mg/dL Final    Cholesterol 07/30/2024 202 (H)  120 - 199 mg/dL Final    Triglycerides 07/30/2024 193 (H)  30 - 150 mg/dL Final    HDL 07/30/2024 46  40 - 75 mg/dL Final    LDL Cholesterol 07/30/2024 117.4  63.0 - 159.0 mg/dL Final    HDL/Cholesterol Ratio 07/30/2024 22.8  20.0 - 50.0 % Final    Total Cholesterol/HDL Ratio 07/30/2024 4.4  2.0 - 5.0 Final    Non-HDL Cholesterol 07/30/2024 156  mg/dL Final    WBC 07/30/2024 6.31  3.90 - 12.70 K/uL Final    RBC 07/30/2024 4.52  4.00 - 5.40 M/uL Final    Hemoglobin 07/30/2024 12.2  12.0 - 16.0 g/dL Final    Hematocrit 07/30/2024 39.9  37.0 - 48.5 % Final    MCV 07/30/2024 88  82 - 98 fL Final    MCH 07/30/2024 27.0  27.0 - 31.0 pg Final    MCHC 07/30/2024 30.6 (L)  32.0 - 36.0 g/dL Final    RDW 07/30/2024  13.2  11.5 - 14.5 % Final    Platelets 07/30/2024 340  150 - 450 K/uL Final    MPV 07/30/2024 10.0  9.2 - 12.9 fL Final    Immature Granulocytes 07/30/2024 0.5  0.0 - 0.5 % Final    Gran # (ANC) 07/30/2024 3.3  1.8 - 7.7 K/uL Final    Immature Grans (Abs) 07/30/2024 0.03  0.00 - 0.04 K/uL Final    Lymph # 07/30/2024 2.2  1.0 - 4.8 K/uL Final    Mono # 07/30/2024 0.6  0.3 - 1.0 K/uL Final    Eos # 07/30/2024 0.1  0.0 - 0.5 K/uL Final    Baso # 07/30/2024 0.03  0.00 - 0.20 K/uL Final    nRBC 07/30/2024 0  0 /100 WBC Final    Gran % 07/30/2024 52.2  38.0 - 73.0 % Final    Lymph % 07/30/2024 35.5  18.0 - 48.0 % Final    Mono % 07/30/2024 10.0  4.0 - 15.0 % Final    Eosinophil % 07/30/2024 1.3  0.0 - 8.0 % Final    Basophil % 07/30/2024 0.5  0.0 - 1.9 % Final    Differential Method 07/30/2024 Automated   Final    TSH 07/30/2024 1.762  0.400 - 4.000 uIU/mL Final    Estrone (Esoterix) 07/30/2024 113  pg/mL Final    Estradiol 07/30/2024 71  pg/mL Final    Estrogen 07/30/2024 184  pg/mL Final    Progesterone 07/30/2024 3.7  See Text ng/mL Final    Follicle Stimulating Hormone 07/30/2024 9.55  See Text mIU/mL Final    LH 07/30/2024 7.7  See Text mIU/mL Final   Office Visit on 07/30/2024   Component Date Value Ref Range Status    Color, POC UA 07/30/2024 Yellow  Yellow, Straw, Colorless Final    Clarity, POC UA 07/30/2024 Clear  Clear Final    Glucose, POC UA 07/30/2024 500 (A)  Negative Final    Bilirubin, POC UA 07/30/2024 Negative  Negative Final    Ketones, POC UA 07/30/2024 Negative  Negative Final    Spec Grav POC UA 07/30/2024 1.015  1.005 - 1.030 Final    Blood, POC UA 07/30/2024 Trace-lysed (A)  Negative Final    pH, POC UA 07/30/2024 6.0  5.0 - 8.0 Final    Protein, POC UA 07/30/2024 Negative  Negative Final    Urobilinogen, POC UA 07/30/2024 0.2  <=1.0 Final    Nitrite, POC UA 07/30/2024 Negative  Negative Final    WBC, POC UA 07/30/2024 Negative  Negative Final         Current Outpatient Medications on File Prior  to Visit   Medication Sig Dispense Refill    acetaminophen (TYLENOL) 500 MG tablet Take 2 tablets (1,000 mg total) by mouth every 6 (six) hours as needed for Pain. 30 tablet 0    amLODIPine (NORVASC) 10 MG tablet Take 1 tablet (10 mg total) by mouth once daily. 90 tablet 3    atorvastatin (LIPITOR) 20 MG tablet TAKE 1 TABLET(20 MG) BY MOUTH EVERY DAY 30 tablet 3    blood sugar diagnostic Strp To check BG 2 times daily, to use with insurance preferred meter 100 each 2    FLUoxetine 40 MG capsule Take a 40mg cap with a 20mg cap (60mg total) every morning. 30 capsule 1    hydroCHLOROthiazide (MICROZIDE) 12.5 mg capsule Take 12.5 mg by mouth.      hydrocortisone 2.5 % cream Apply topically 2 (two) times daily. 28 g 6    hydrOXYzine (ATARAX) 50 MG tablet TAKE 1 TABLET BY MOUTH AT BEDTIME AS NEEDED INSOMNIA      losartan (COZAAR) 100 MG tablet Take 100 mg by mouth.      metFORMIN (GLUCOPHAGE) 1000 MG tablet Take 1 tablet (1,000 mg total) by mouth 2 (two) times daily with meals. 180 tablet 3    mupirocin (BACTROBAN) 2 % ointment Apply topically 3 (three) times daily. 22 g 0    nystatin (MYCOSTATIN) cream Apply topically 2 (two) times daily. 30 g 0    ondansetron (ZOFRAN-ODT) 4 MG TbDL Take 2 tablets (8 mg total) by mouth every 8 (eight) hours as needed (nausea). 60 tablet 2    sertraline (ZOLOFT) 50 MG tablet Take 50 mg by mouth.      tirzepatide (MOUNJARO) 2.5 mg/0.5 mL PnIj Inject 2.5 mg into the skin every 7 days. 4 Pen 1    TRUE METRIX GLUCOSE METER Misc TAKE AS DIRECTED TWICE DAILY      TRUEPLUS LANCETS 33 gauge Misc 2 (two) times daily.      lancets Misc To check BG 2 times daily, to use with insurance preferred meter (Patient not taking: Reported on 10/10/2024) 100 each 2    [DISCONTINUED] blood-glucose meter kit To check BG 2  times daily, to use with insurance preferred meter (Patient not taking: Reported on 10/10/2024) 1 each 0    [DISCONTINUED] FLUoxetine 20 MG capsule Take a 40mg cap with a 20mg cap (60mg  total) every morning. (Patient not taking: Reported on 10/10/2024) 30 capsule 1    [DISCONTINUED] losartan (COZAAR) 50 MG tablet TAKE 2 TABLETS BY MOUTH DAILY(FOLLOW-UP WITH DOCTOR 2020) (Patient not taking: Reported on 10/10/2024) 180 tablet 1     No current facility-administered medications on file prior to visit.     Past Medical History:   Diagnosis Date    LAKE (generalized anxiety disorder)     GERD (gastroesophageal reflux disease)     HLD (hyperlipidemia)     Hypertension     Recurrent major depressive disorder, in remission 2022    Sciatica     T2DM (type 2 diabetes mellitus)      Past Surgical History:   Procedure Laterality Date    ABDOMINOPLASTY       SECTION      x 2    REVISION OF ABDOMINAL SCAR      planned 22     Past Surgical History:   Procedure Laterality Date    ABDOMINOPLASTY       SECTION      x 2    REVISION OF ABDOMINAL SCAR      planned 22     Family History   Problem Relation Name Age of Onset    Heart disease Mother          CAD age 38    Hypertension Mother  38        MI    Diabetes Mother      Hypertension Father      Colon cancer Paternal Grandfather  80       Marital Status: Significant Other  Alcohol History:  reports current alcohol use.  Tobacco History:  reports that she has never smoked. She has never been exposed to tobacco smoke. She has never used smokeless tobacco.  Drug History:  reports current drug use. Drug: Marijuana.    Health Maintenance Topics with due status: Not Due       Topic Last Completion Date    TETANUS VACCINE 2016    Diabetes Urine Screening 2024    Lipid Panel 2024    Cervical Cancer Screening 2024    Foot Exam 2024    Mammogram 10/10/2024    RSV Vaccine (Age 60+ and Pregnant patients) Not Due     Immunization History   Administered Date(s) Administered    COVID-19, MRNA, LN-S, PF (MODERNA FULL 0.5 ML DOSE) 2021, 2021    Influenza - Trivalent - Fluarix, Flulaval, Fluzone, Afluria -  PF 10/10/2024    Pneumococcal Conjugate - 20 Valent 10/10/2024    Pneumococcal Polysaccharide - 23 Valent 01/07/2020    Tdap 06/29/2016       Review of patient's allergies indicates:   Allergen Reactions    Penicillins Rash       Current Outpatient Medications:     acetaminophen (TYLENOL) 500 MG tablet, Take 2 tablets (1,000 mg total) by mouth every 6 (six) hours as needed for Pain., Disp: 30 tablet, Rfl: 0    amLODIPine (NORVASC) 10 MG tablet, Take 1 tablet (10 mg total) by mouth once daily., Disp: 90 tablet, Rfl: 3    atorvastatin (LIPITOR) 20 MG tablet, TAKE 1 TABLET(20 MG) BY MOUTH EVERY DAY, Disp: 30 tablet, Rfl: 3    blood sugar diagnostic Strp, To check BG 2 times daily, to use with insurance preferred meter, Disp: 100 each, Rfl: 2    FLUoxetine 40 MG capsule, Take a 40mg cap with a 20mg cap (60mg total) every morning., Disp: 30 capsule, Rfl: 1    hydroCHLOROthiazide (MICROZIDE) 12.5 mg capsule, Take 12.5 mg by mouth., Disp: , Rfl:     hydrocortisone 2.5 % cream, Apply topically 2 (two) times daily., Disp: 28 g, Rfl: 6    hydrOXYzine (ATARAX) 50 MG tablet, TAKE 1 TABLET BY MOUTH AT BEDTIME AS NEEDED INSOMNIA, Disp: , Rfl:     losartan (COZAAR) 100 MG tablet, Take 100 mg by mouth., Disp: , Rfl:     metFORMIN (GLUCOPHAGE) 1000 MG tablet, Take 1 tablet (1,000 mg total) by mouth 2 (two) times daily with meals., Disp: 180 tablet, Rfl: 3    mupirocin (BACTROBAN) 2 % ointment, Apply topically 3 (three) times daily., Disp: 22 g, Rfl: 0    nystatin (MYCOSTATIN) cream, Apply topically 2 (two) times daily., Disp: 30 g, Rfl: 0    ondansetron (ZOFRAN-ODT) 4 MG TbDL, Take 2 tablets (8 mg total) by mouth every 8 (eight) hours as needed (nausea)., Disp: 60 tablet, Rfl: 2    sertraline (ZOLOFT) 50 MG tablet, Take 50 mg by mouth., Disp: , Rfl:     tirzepatide (MOUNJARO) 2.5 mg/0.5 mL PnIj, Inject 2.5 mg into the skin every 7 days., Disp: 4 Pen, Rfl: 1    TRUE METRIX GLUCOSE METER Misc, TAKE AS DIRECTED TWICE DAILY, Disp: ,  "Rfl:     TRUEPLUS LANCETS 33 gauge Misc, 2 (two) times daily., Disp: , Rfl:     lancets Misc, To check BG 2 times daily, to use with insurance preferred meter (Patient not taking: Reported on 10/10/2024), Disp: 100 each, Rfl: 2  No current facility-administered medications for this visit.    Review of Systems   Constitutional:  Negative for chills and fever.   Respiratory:  Negative for shortness of breath.    Cardiovascular:  Negative for chest pain.   Gastrointestinal:  Positive for change in bowel habit. Negative for abdominal pain, blood in stool, constipation, diarrhea, nausea and vomiting.       OBJECTIVE:      Vitals:    10/10/24 1044   BP: 110/76   Pulse: 88   Resp: 18   SpO2: 98%   Weight: 78.1 kg (172 lb 2.9 oz)   Height: 5' 4" (1.626 m)     Physical Exam  Constitutional:       General: She is not in acute distress.     Appearance: She is not ill-appearing or toxic-appearing.   HENT:      Head: Normocephalic and atraumatic.      Mouth/Throat:      Mouth: Mucous membranes are moist.      Pharynx: Uvula midline. No pharyngeal swelling.   Cardiovascular:      Rate and Rhythm: Normal rate and regular rhythm.   Pulmonary:      Effort: Pulmonary effort is normal. No tachypnea, bradypnea, accessory muscle usage, prolonged expiration or respiratory distress.      Breath sounds: Normal breath sounds. No stridor. No wheezing, rhonchi or rales.   Abdominal:      General: Abdomen is flat. Bowel sounds are normal. There is no distension.      Palpations: Abdomen is soft.   Neurological:      General: No focal deficit present.      Mental Status: She is alert.   Psychiatric:         Mood and Affect: Mood normal.         Behavior: Behavior normal.        Assessment:       1. Routine general medical examination at a health care facility    2. Uncontrolled type 2 diabetes mellitus with hyperglycemia    3. Hypertension associated with diabetes    4. Hyperlipidemia due to type 2 diabetes mellitus    5. Vaccine counseling  "   6. Encounter for screening for malignant neoplasm of colon    7. Generalized anxiety disorder with panic attacks        Plan:       Routine general medical examination at a health care facility  -     TSH; Future; Expected date: 10/10/2024    Uncontrolled type 2 diabetes mellitus with hyperglycemia  -     HEMOGLOBIN A1C; Future; Expected date: 10/10/2024  -     CBC Auto Differential; Future; Expected date: 10/10/2024  -     Comprehensive Metabolic Panel; Future; Expected date: 10/10/2024  -     Microalbumin/Creatinine Ratio, Urine; Future; Expected date: 10/10/2024  -     Ambulatory referral/consult to Optometry; Future; Expected date: 10/17/2024  Current regimen: metformin 1000 mg BID and mounjaro 2.5 mg weekly   Denies any nausea, vomiting, abdominal pain. However, she believes her BM frequency went down -- still able to go every 6-7 days and have easy BM     Hypertension associated with diabetes  BP controlled continue with amlodipine and losartan     Hyperlipidemia due to type 2 diabetes mellitus  Continue with atorvastatin 20 mg daily   Stable     Vaccine counseling  -     (VFC) PCV20 (Prevnar 20) IM vaccine (>/= 6 wks)  -     (VFC) influenza (Flulaval, Fluzone, Fluarix) 45 mcg/0.5 mL IM vaccine (> or = 6 mo) 0.5 mL    Encounter for screening for malignant neoplasm of colon  Family history of colon cancer in maternal grandfather in late 70's  Patient has colonoscopy scheduled for late November 2024    Generalized anxiety disorder with panic attacks  Continue with fluoxetine 40 mg daily     Counseled on age and gender appropriate medical preventative services, including cancer screenings, immunizations, overall nutritional health, need for a consistent exercise regimen and an overall push towards maintaining a vigorous and active lifestyle.      Follow up with PA in 6 months and with me in 1 year  Sooner appointment if indicated/needed       Shayna Rock M.D.  10/10/2024    This note was created using [employer]EL[employer]  voice recognition software that occasionally misinterprets phrases or words

## 2024-10-16 ENCOUNTER — PATIENT MESSAGE (OUTPATIENT)
Dept: FAMILY MEDICINE | Facility: CLINIC | Age: 44
End: 2024-10-16
Payer: COMMERCIAL

## 2024-10-17 DIAGNOSIS — E11.65 UNCONTROLLED TYPE 2 DIABETES MELLITUS WITH HYPERGLYCEMIA: ICD-10-CM

## 2024-10-17 RX ORDER — TIRZEPATIDE 5 MG/.5ML
5 INJECTION, SOLUTION SUBCUTANEOUS
Qty: 4 PEN | Refills: 6 | Status: SHIPPED | OUTPATIENT
Start: 2024-10-17

## 2024-11-01 ENCOUNTER — PATIENT MESSAGE (OUTPATIENT)
Dept: FAMILY MEDICINE | Facility: CLINIC | Age: 44
End: 2024-11-01
Payer: COMMERCIAL

## 2024-11-02 ENCOUNTER — ON-DEMAND VIRTUAL (OUTPATIENT)
Dept: URGENT CARE | Facility: CLINIC | Age: 44
End: 2024-11-02
Payer: COMMERCIAL

## 2024-11-02 DIAGNOSIS — Z76.0 MEDICATION REFILL: Primary | ICD-10-CM

## 2024-11-02 PROCEDURE — 99499 UNLISTED E&M SERVICE: CPT | Mod: 95,,, | Performed by: NURSE PRACTITIONER

## 2024-11-04 NOTE — TELEPHONE ENCOUNTER
Spoke to Insurance and completed PA.   Spoke to pt informed and verbalizes understanding.  Spoke to pharmacy who advised med is in stock and waiting for paid claim from insurance. If they do not received paid claim they will contact PCP office to inform and they will inform pt once ready for

## 2024-11-06 ENCOUNTER — PATIENT MESSAGE (OUTPATIENT)
Dept: OBSTETRICS AND GYNECOLOGY | Facility: CLINIC | Age: 44
End: 2024-11-06
Payer: COMMERCIAL

## 2024-11-08 ENCOUNTER — PATIENT MESSAGE (OUTPATIENT)
Dept: FAMILY MEDICINE | Facility: CLINIC | Age: 44
End: 2024-11-08
Payer: COMMERCIAL

## 2024-11-08 DIAGNOSIS — F41.0 GENERALIZED ANXIETY DISORDER WITH PANIC ATTACKS: ICD-10-CM

## 2024-11-08 DIAGNOSIS — F33.0 MILD EPISODE OF RECURRENT MAJOR DEPRESSIVE DISORDER: ICD-10-CM

## 2024-11-08 DIAGNOSIS — F41.1 GENERALIZED ANXIETY DISORDER WITH PANIC ATTACKS: ICD-10-CM

## 2024-11-08 DIAGNOSIS — I10 ESSENTIAL (PRIMARY) HYPERTENSION: ICD-10-CM

## 2024-11-08 DIAGNOSIS — E78.49 OTHER HYPERLIPIDEMIA: ICD-10-CM

## 2024-11-08 RX ORDER — AMLODIPINE BESYLATE 10 MG/1
10 TABLET ORAL DAILY
Qty: 90 TABLET | Refills: 3 | Status: SHIPPED | OUTPATIENT
Start: 2024-11-08

## 2024-11-08 RX ORDER — LOSARTAN POTASSIUM 100 MG/1
100 TABLET ORAL DAILY
Qty: 90 TABLET | Refills: 3 | Status: SHIPPED | OUTPATIENT
Start: 2024-11-08

## 2024-11-08 RX ORDER — ATORVASTATIN CALCIUM 20 MG/1
20 TABLET, FILM COATED ORAL DAILY
Qty: 90 TABLET | Refills: 3 | Status: SHIPPED | OUTPATIENT
Start: 2024-11-08

## 2024-11-08 RX ORDER — FLUOXETINE HYDROCHLORIDE 40 MG/1
CAPSULE ORAL
Qty: 90 CAPSULE | Refills: 1 | Status: SHIPPED | OUTPATIENT
Start: 2024-11-08

## 2024-11-11 DIAGNOSIS — L30.8 OTHER ECZEMA: ICD-10-CM

## 2024-11-12 ENCOUNTER — PATIENT MESSAGE (OUTPATIENT)
Dept: OBSTETRICS AND GYNECOLOGY | Facility: CLINIC | Age: 44
End: 2024-11-12
Payer: COMMERCIAL

## 2024-11-13 RX ORDER — HYDROCORTISONE 25 MG/G
CREAM TOPICAL 2 TIMES DAILY
Qty: 28 G | Refills: 1 | Status: SHIPPED | OUTPATIENT
Start: 2024-11-13

## 2024-11-13 NOTE — TELEPHONE ENCOUNTER
Refill Routing Note   Medication(s) are not appropriate for processing by [facility] for the following reason(s):        Non-participating provider: provider not contracted with Refill Center   No active prescription written by provider    [facility] action(s):  Route             Appointments  past 12m or future 3m with PCP    Date Provider   Last Visit   10/10/2024 Shayna Rock MD   Next Visit   Visit date not found Shayna Rock MD   ED visits in past 90 days: 0        Note composed:3:48 PM 11/13/2024

## 2024-11-25 ENCOUNTER — ANESTHESIA EVENT (OUTPATIENT)
Dept: ENDOSCOPY | Facility: HOSPITAL | Age: 44
End: 2024-11-25
Payer: COMMERCIAL

## 2024-11-25 ENCOUNTER — ANESTHESIA (OUTPATIENT)
Dept: ENDOSCOPY | Facility: HOSPITAL | Age: 44
End: 2024-11-25
Payer: COMMERCIAL

## 2024-11-25 ENCOUNTER — HOSPITAL ENCOUNTER (OUTPATIENT)
Facility: HOSPITAL | Age: 44
Discharge: HOME | End: 2024-11-25
Attending: STUDENT IN AN ORGANIZED HEALTH CARE EDUCATION/TRAINING PROGRAM | Admitting: STUDENT IN AN ORGANIZED HEALTH CARE EDUCATION/TRAINING PROGRAM
Payer: COMMERCIAL

## 2024-11-25 VITALS
DIASTOLIC BLOOD PRESSURE: 64 MMHG | HEART RATE: 76 BPM | BODY MASS INDEX: 28 KG/M2 | HEIGHT: 64 IN | WEIGHT: 164 LBS | RESPIRATION RATE: 13 BRPM | SYSTOLIC BLOOD PRESSURE: 106 MMHG | TEMPERATURE: 98 F | OXYGEN SATURATION: 100 %

## 2024-11-25 DIAGNOSIS — Z12.11 SCREEN FOR COLON CANCER: ICD-10-CM

## 2024-11-25 LAB
B-HCG UR QL: NEGATIVE
CTP QC/QA: YES

## 2024-11-25 PROCEDURE — D9220A PRA ANESTHESIA: Mod: ANES,,, | Performed by: ANESTHESIOLOGY

## 2024-11-25 PROCEDURE — 45385 COLONOSCOPY W/LESION REMOVAL: CPT | Mod: 33,,, | Performed by: STUDENT IN AN ORGANIZED HEALTH CARE EDUCATION/TRAINING PROGRAM

## 2024-11-25 PROCEDURE — 27201089 HC SNARE, DISP (ANY): Performed by: STUDENT IN AN ORGANIZED HEALTH CARE EDUCATION/TRAINING PROGRAM

## 2024-11-25 PROCEDURE — 37000009 HC ANESTHESIA EA ADD 15 MINS: Performed by: STUDENT IN AN ORGANIZED HEALTH CARE EDUCATION/TRAINING PROGRAM

## 2024-11-25 PROCEDURE — 25000003 PHARM REV CODE 250: Performed by: STUDENT IN AN ORGANIZED HEALTH CARE EDUCATION/TRAINING PROGRAM

## 2024-11-25 PROCEDURE — 81025 URINE PREGNANCY TEST: CPT | Performed by: STUDENT IN AN ORGANIZED HEALTH CARE EDUCATION/TRAINING PROGRAM

## 2024-11-25 PROCEDURE — D9220A PRA ANESTHESIA: Mod: CRNA,,, | Performed by: NURSE ANESTHETIST, CERTIFIED REGISTERED

## 2024-11-25 PROCEDURE — 37000008 HC ANESTHESIA 1ST 15 MINUTES: Performed by: STUDENT IN AN ORGANIZED HEALTH CARE EDUCATION/TRAINING PROGRAM

## 2024-11-25 PROCEDURE — 63600175 PHARM REV CODE 636 W HCPCS: Performed by: NURSE ANESTHETIST, CERTIFIED REGISTERED

## 2024-11-25 PROCEDURE — 45385 COLONOSCOPY W/LESION REMOVAL: CPT | Mod: 33 | Performed by: STUDENT IN AN ORGANIZED HEALTH CARE EDUCATION/TRAINING PROGRAM

## 2024-11-25 RX ORDER — PROPOFOL 10 MG/ML
VIAL (ML) INTRAVENOUS
Status: DISCONTINUED | OUTPATIENT
Start: 2024-11-25 | End: 2024-11-25

## 2024-11-25 RX ORDER — SODIUM CHLORIDE 9 MG/ML
INJECTION, SOLUTION INTRAVENOUS CONTINUOUS
Status: DISCONTINUED | OUTPATIENT
Start: 2024-11-25 | End: 2024-11-25 | Stop reason: HOSPADM

## 2024-11-25 RX ORDER — LIDOCAINE HYDROCHLORIDE 20 MG/ML
INJECTION INTRAVENOUS
Status: DISCONTINUED | OUTPATIENT
Start: 2024-11-25 | End: 2024-11-25

## 2024-11-25 RX ADMIN — LIDOCAINE HYDROCHLORIDE 50 MG: 20 INJECTION, SOLUTION INTRAVENOUS at 11:11

## 2024-11-25 RX ADMIN — PROPOFOL 100 MG: 10 INJECTION, EMULSION INTRAVENOUS at 11:11

## 2024-11-25 RX ADMIN — PROPOFOL 40 MG: 10 INJECTION, EMULSION INTRAVENOUS at 11:11

## 2024-11-25 RX ADMIN — SODIUM CHLORIDE: 9 INJECTION, SOLUTION INTRAVENOUS at 10:11

## 2024-11-25 NOTE — ANESTHESIA PREPROCEDURE EVALUATION
11/25/2024  Shelli Iverson is a 44 y.o., female.      Pre-op Assessment          Review of Systems  Cardiovascular:     Hypertension                                    Hypertension         Hepatic/GI:     GERD         Gerd          Neurological:    Neuromuscular Disease,                                 Neuromuscular Disease   Endocrine:  Diabetes    Diabetes                      Psych:  Psychiatric History                  Physical Exam  General: Well nourished        Anesthesia Plan  Type of Anesthesia, risks & benefits discussed:    Anesthesia Type: Gen Natural Airway  Intra-op Monitoring Plan: Standard ASA Monitors  Induction:  IV  Informed Consent: Informed consent signed with the Patient and all parties understand the risks and agree with anesthesia plan.  All questions answered.   ASA Score: 2  Day of Surgery Review of History & Physical: H&P Update referred to the surgeon/provider.    Ready For Surgery From Anesthesia Perspective.     .

## 2024-11-25 NOTE — TRANSFER OF CARE
"Anesthesia Transfer of Care Note    Patient: Shelli Iverson    Procedure(s) Performed: Procedure(s) (LRB):  COLONOSCOPY (N/A)    Patient location: PACU    Anesthesia Type: general    Transport from OR: Transported from OR on room air with adequate spontaneous ventilation    Post pain: adequate analgesia    Post assessment: no apparent anesthetic complications and tolerated procedure well    Post vital signs: stable    Level of consciousness: awake    Nausea/Vomiting: no nausea/vomiting    Complications: none    Transfer of care protocol was followed    Last vitals: Visit Vitals  /77 (BP Location: Left arm, Patient Position: Lying)   Pulse 88   Temp 37.1 °C (98.8 °F) (Skin)   Resp 16   Ht 5' 4" (1.626 m)   Wt 74.4 kg (164 lb)   SpO2 100%   Breastfeeding No   BMI 28.15 kg/m²     "

## 2024-11-25 NOTE — PROVATION PATIENT INSTRUCTIONS
Discharge Summary/Instructions after an Endoscopic Procedure  Patient Name: Shelli Iverson  Patient MRN: [id number]  Patient YOB: 1980 Monday, November 25, 2024  Kleber Tesfaye DO  Dear patient,  As a result of recent federal legislation (The Federal Cures Act), you may   receive lab or pathology results from your procedure in your [facility]   account before your physician is able to contact you. Your physician or   their representative will relay the results to you with their   recommendations at their soonest availability.  Thank you,  RESTRICTIONS:  During your procedure today, you received medications for sedation.  These   medications may affect your judgment, balance and coordination.  Therefore,   for 24 hours, you have the following restrictions:   - DO NOT drive a car, operate machinery, make legal/financial decisions,   sign important papers or drink alcohol.    ACTIVITY:  Today: no heavy lifting, straining or running due to procedural   sedation/anesthesia.  The following day: return to full activity including work.  DIET:  Eat and drink normally unless instructed otherwise.     TREATMENT FOR COMMON SIDE EFFECTS:  - Mild abdominal pain, nausea, belching, bloating or excessive gas:  rest,   eat lightly and use a heating pad.  - Sore Throat: treat with throat lozenges and/or gargle with warm salt   water.  - Because air was used during the procedure, expelling large amounts of air   from your rectum or belching is normal.  - If a bowel prep was taken, you may not have a bowel movement for 1-3 days.    This is normal.  SYMPTOMS TO WATCH FOR AND REPORT TO YOUR PHYSICIAN:  1. Abdominal pain or bloating, other than gas cramps.  2. Chest pain.  3. Back pain.  4. Signs of infection such as: chills or fever occurring within 24 hours   after the procedure.  5. Rectal bleeding, which would show as bright red, maroon, or black stools.   (A tablespoon of blood from the rectum is not serious, especially  if   hemorrhoids are present.)  6. Vomiting.  7. Weakness or dizziness.  GO DIRECTLY TO THE NEAREST EMERGENCY ROOM IF YOU HAVE ANY OF THE FOLLOWING:      Difficulty breathing              Chills and/or fever over 101 F   Persistent vomiting and/or vomiting blood   Severe abdominal pain   Severe chest pain   Black, tarry stools   Bleeding- more than one tablespoon   Any other symptom or condition that you feel may need urgent attention  Your doctor recommends these additional instructions:  If any biopsies were taken, your doctors clinic will contact you in 1 to 2   weeks with any results.  - Patient has a contact number available for emergencies.  The signs and   symptoms of potential delayed complications were discussed with the   patient.  Return to normal activities tomorrow.  Written discharge   instructions were provided to the patient.   - High fiber diet.   - Continue present medications.   - Await pathology results.   - Repeat colonoscopy in 7-10 years for surveillance based on pathology   results.   - Return to GI clinic PRN.   - Discharge patient to home (ambulatory).  For questions, problems or results please call your physician - Kleber Tesfaye, DO at Work:  [phone].  [facility], EMERGENCY ROOM PHONE NUMBER: [phone]  IF A COMPLICATION OR EMERGENCY SITUATION ARISES AND YOU ARE UNABLE TO REACH   YOUR PHYSICIAN - GO DIRECTLY TO THE EMERGENCY ROOM.  Kleber Tesfaye DO  11/25/2024 11:50:41 AM  This report has been verified and signed electronically.  Dear patient,  As a result of recent federal legislation (The Federal Cures Act), you may   receive lab or pathology results from your procedure in your [facility]   account before your physician is able to contact you. Your physician or   their representative will relay the results to you with their   recommendations at their soonest availability.  Thank you,  PROVATION

## 2024-11-25 NOTE — H&P
History & Physical - Short Stay  Gastroenterology      SUBJECTIVE:     Procedure: Colonoscopy    Chief Complaint/Indication for Procedure: Screening    PTA Medications   Medication Sig    amLODIPine (NORVASC) 10 MG tablet Take 1 tablet (10 mg total) by mouth once daily.    atorvastatin (LIPITOR) 20 MG tablet Take 1 tablet (20 mg total) by mouth once daily.    FLUoxetine 40 MG capsule Take one tablet by mouth daily    losartan (COZAAR) 100 MG tablet Take 1 tablet (100 mg total) by mouth once daily.    metFORMIN (GLUCOPHAGE) 1000 MG tablet Take 1 tablet (1,000 mg total) by mouth 2 (two) times daily with meals.    acetaminophen (TYLENOL) 500 MG tablet Take 2 tablets (1,000 mg total) by mouth every 6 (six) hours as needed for Pain.    blood sugar diagnostic Strp To check BG 2 times daily, to use with insurance preferred meter    hydroCHLOROthiazide (MICROZIDE) 12.5 mg capsule Take 12.5 mg by mouth.    hydrocortisone 2.5 % cream Apply topically 2 (two) times daily.    hydrOXYzine (ATARAX) 50 MG tablet TAKE 1 TABLET BY MOUTH AT BEDTIME AS NEEDED INSOMNIA    mupirocin (BACTROBAN) 2 % ointment Apply topically 3 (three) times daily.    nystatin (MYCOSTATIN) cream Apply topically 2 (two) times daily.    ondansetron (ZOFRAN-ODT) 4 MG TbDL Take 2 tablets (8 mg total) by mouth every 8 (eight) hours as needed (nausea).    tirzepatide (MOUNJARO) 5 mg/0.5 mL PnIj Inject 5 mg into the skin every 7 days.    TRUE METRIX GLUCOSE METER Misc TAKE AS DIRECTED TWICE DAILY    TRUEPLUS LANCETS 33 gauge Misc 2 (two) times daily.       Review of patient's allergies indicates:   Allergen Reactions    Penicillins Rash        Past Medical History:   Diagnosis Date    LAKE (generalized anxiety disorder)     GERD (gastroesophageal reflux disease)     HLD (hyperlipidemia)     Hypertension     Recurrent major depressive disorder, in remission 11/22/2022    Sciatica     T2DM (type 2 diabetes mellitus)      Past Surgical History:   Procedure Laterality  Date    ABDOMINOPLASTY       SECTION      x 2    REVISION OF ABDOMINAL SCAR      planned 22     Family History   Problem Relation Name Age of Onset    Heart disease Mother          CAD age 38    Hypertension Mother  38        MI    Diabetes Mother      Hypertension Father      Colon cancer Paternal Grandfather  80     Social History     Tobacco Use    Smoking status: Never     Passive exposure: Never    Smokeless tobacco: Never   Substance Use Topics    Alcohol use: Yes     Comment: occasionally    Drug use: Yes     Types: Marijuana     Comment: occasional gummies         OBJECTIVE:     Vital Signs (Most Recent)  Temp: 98.8 °F (37.1 °C) (24 0955)  Pulse: 88 (24 1000)  Resp: 16 (24 0955)  BP: 112/77 (24 0955)  SpO2: 100 % (24 0955)    Physical Exam:                                                       GENERAL:  Comfortable, in no acute distress.                                 HEENT EXAM:  Nonicteric.  No adenopathy.  Oropharynx is clear.               NECK:  Supple.                                                               LUNGS:  Clear.                                                               CARDIAC:  Regular rate and rhythm.  S1, S2.  No murmur.                      ABDOMEN:  Soft, positive bowel sounds, nontender.  No hepatosplenomegaly or masses.  No rebound or guarding.                                             EXTREMITIES:  No edema.     MENTAL STATUS:  Normal, alert and oriented.      ASSESSMENT/PLAN:     Assessment: Screening    Plan: Colonoscopy    Anesthesia Plan: MAC    ASA Grade: ASA 2 - Patient with mild systemic disease with no functional limitations    MALLAMPATI SCORE:  I (soft palate, uvula, fauces, and tonsillar pillars visible)

## 2024-11-25 NOTE — PROGRESS NOTES
Patient and sister given discharge instructions. Educated on post-anesthesia precautions and when to notify MD. Verbalized understanding. Peripheral IV removed with catheter intact. All belongings given back to patient. Patient transferred to car via wheelchair  and left with sister to home.

## 2024-11-25 NOTE — ANESTHESIA POSTPROCEDURE EVALUATION
Anesthesia Post Evaluation    Patient: Shelli Iverson    Procedure(s) Performed: Procedure(s) (LRB):  COLONOSCOPY (N/A)    Final Anesthesia Type: general      Patient location during evaluation: PACU  Patient participation: Yes- Able to Participate  Level of consciousness: awake and alert  Post-procedure vital signs: reviewed and stable  Pain management: adequate  Airway patency: patent    PONV status at discharge: No PONV  Anesthetic complications: no      Cardiovascular status: blood pressure returned to baseline  Respiratory status: unassisted and room air  Hydration status: euvolemic  Follow-up not needed.              Vitals Value Taken Time   BP 95/53 11/25/24 1203   Temp 36.4 °C (97.5 °F) 11/25/24 1151   Pulse 78 11/25/24 1203   Resp 18 11/25/24 1203   SpO2 100 % 11/25/24 1203   Vitals shown include unfiled device data.      No case tracking events are documented in the log.      Pain/Eros Score: Eros Score: 10 (11/25/2024 11:58 AM)

## 2025-01-06 ENCOUNTER — PATIENT MESSAGE (OUTPATIENT)
Dept: OBSTETRICS AND GYNECOLOGY | Facility: CLINIC | Age: 45
End: 2025-01-06
Payer: COMMERCIAL

## 2025-01-13 ENCOUNTER — OFFICE VISIT (OUTPATIENT)
Dept: URGENT CARE | Facility: CLINIC | Age: 45
End: 2025-01-13
Payer: COMMERCIAL

## 2025-01-13 VITALS
OXYGEN SATURATION: 100 % | RESPIRATION RATE: 16 BRPM | HEART RATE: 84 BPM | SYSTOLIC BLOOD PRESSURE: 155 MMHG | DIASTOLIC BLOOD PRESSURE: 102 MMHG | BODY MASS INDEX: 28 KG/M2 | TEMPERATURE: 98 F | WEIGHT: 164 LBS | HEIGHT: 64 IN

## 2025-01-13 DIAGNOSIS — S69.91XA INJURY OF RIGHT WRIST, INITIAL ENCOUNTER: ICD-10-CM

## 2025-01-13 DIAGNOSIS — M79.641 RIGHT HAND PAIN: ICD-10-CM
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
## 2025-01-13 DIAGNOSIS — Z86.79 HISTORY OF HYPERTENSION: Primary | ICD-10-CM

## 2025-01-13 DIAGNOSIS — Z86.39 HISTORY OF TYPE 2 DIABETES MELLITUS: ICD-10-CM

## 2025-01-13 PROCEDURE — 99214 OFFICE O/P EST MOD 30 MIN: CPT | Mod: S$GLB,,, | Performed by: NURSE PRACTITIONER

## 2025-01-13 NOTE — PROGRESS NOTES
"Subjective:      Patient ID: Shelli Iverson is a 44 y.o. female.    Vitals:  height is 5' 4" (1.626 m) and weight is 74.4 kg (164 lb). Her oral temperature is 98.3 °F (36.8 °C). Her blood pressure is 155/102 (abnormal) and her pulse is 84. Her respiration is 16 and oxygen saturation is 100%.     Chief Complaint: Wrist Injury    Fall 2 nights ago injury to right wrist and right hand    Wrist Injury   Incident onset: x 3 days. The injury mechanism was a fall. The pain is present in the right hand. Pertinent negatives include no numbness.       Musculoskeletal:  Positive for trauma, joint pain (Right wrist) and abnormal ROM of joint.   Skin:  Negative for wound, puncture wound, erythema and bruising.   Neurological:  Negative for numbness and tingling.      Objective:     Physical Exam   Constitutional: She is oriented to person, place, and time.  Non-toxic appearance. She does not appear ill. No distress.   HENT:   Head: Normocephalic and atraumatic.   Nose: Nose normal.   Mouth/Throat: Mucous membranes are moist.   Eyes: Conjunctivae are normal.   Cardiovascular: Normal rate.   Pulmonary/Chest: Effort normal. No respiratory distress.   Abdominal: Normal appearance.   Musculoskeletal:         General: Tenderness and signs of injury present. No deformity.      Right wrist: She exhibits decreased range of motion, tenderness and bony tenderness. She exhibits no deformity.      Right forearm: Normal.        Arms:       Right hand: She exhibits tenderness and bony tenderness. She exhibits normal capillary refill and no swelling. Normal sensation noted.      Comments: Blue arrow  points to the area of point tenderness on exam consistent with suspicious area on wrist x-ray concerning for avulsion fracture   Neurological: no focal deficit. She is alert and oriented to person, place, and time.   Skin: Skin is warm, dry, not diaphoretic and no rash. Capillary refill takes 2 to 3 seconds. No bruising, No erythema and No lesion "   Psychiatric: Her behavior is normal. Mood normal.   Nursing note and vitals reviewed.      Assessment:     1. History of hypertension    2. History of type 2 diabetes mellitus    3. Fall, initial encounter    4. Injury of right wrist, initial encounter    5. Right hand pain      Right hand x-ray:    Pending radiology reading    Right wrist x-ray:    Pending radiology read    Suspicious area possible avulsion fracture of the ulnar stylus.  Placed patient in a ulnar gutter Orthoglass splint and provided with a sling with referral out to orthopedics.  Will contact in a.m. with radiology reading    Right ulnar gutter splint placed in clinic. Splint note: There is no neurovascular compromise after splint/immobilizer application.  The splint/immobilizer is in good alignment and the patient has good sensation and capillary refill at the time of discharge.    Plan:       History of hypertension    History of type 2 diabetes mellitus    Fall, initial encounter  -     XR HAND COMPLETE 3 VIEW RIGHT; Future; Expected date: 01/13/2025  -     XR WRIST COMPLETE 3 VIEWS RIGHT; Future; Expected date: 01/13/2025  -     Ambulatory referral/consult to Orthopedics    Injury of right wrist, initial encounter  -     XR WRIST COMPLETE 3 VIEWS RIGHT; Future; Expected date: 01/13/2025  -     HME - OTHER  -     HME - OTHER - PLASTER SPLINTS  -     Ambulatory referral/consult to Orthopedics    Right hand pain  -     XR HAND COMPLETE 3 VIEW RIGHT; Future; Expected date: 01/13/2025

## 2025-01-13 NOTE — LETTER
January 13, 2025      [facility]  [address] [address]       Patient: Shelli Iverson   YOB: 1980  Date of Visit: 01/13/2025    To Whom It May Concern:    Heidy Iverson  was at [facility] on 01/13/2025. The patient may return to work/school on 01/15/2025. If you have any questions or concerns, or if I can be of further assistance, please do not hesitate to contact me.    Sincerely,    Kayla Faust, NP

## 2025-01-14 NOTE — PATIENT INSTRUCTIONS
Ibuprofen or Alleve over-the-counter as directed for pain.    Keep your arm in the sling when up and ambulatory to prevent it from becoming dependent as this will cause swelling and throbbing.    Ice to the affected area for 15 20 minutes every 3-4 hours until symptoms are significantly improved then just as needed.    Keep the splint on at all times do not remove and do not allowed to become wet when showering/bathing cover with a waterproof covering you can purchase such things at any local drugstore    Someone will be contacting you tomorrow with the official radiology reading of the x-rays    Referral was placed to Orthopedics, someone should be calling you to schedule an appointment

## 2025-01-15 ENCOUNTER — OFFICE VISIT (OUTPATIENT)
Dept: ORTHOPEDICS | Facility: CLINIC | Age: 45
End: 2025-01-15
Payer: COMMERCIAL

## 2025-01-15 VITALS — WEIGHT: 164 LBS | BODY MASS INDEX: 28 KG/M2 | HEIGHT: 64 IN

## 2025-01-15 DIAGNOSIS — S63.501A RIGHT WRIST SPRAIN, INITIAL ENCOUNTER: Primary | ICD-10-CM

## 2025-01-15 PROCEDURE — 3008F BODY MASS INDEX DOCD: CPT | Mod: CPTII,S$GLB,, | Performed by: ORTHOPAEDIC SURGERY

## 2025-01-15 PROCEDURE — 99204 OFFICE O/P NEW MOD 45 MIN: CPT | Mod: S$GLB,,, | Performed by: ORTHOPAEDIC SURGERY

## 2025-01-15 PROCEDURE — 99999 PR PBB SHADOW E&M-EST. PATIENT-LVL III: CPT | Mod: PBBFAC,,, | Performed by: ORTHOPAEDIC SURGERY

## 2025-01-15 PROCEDURE — 1159F MED LIST DOCD IN RCRD: CPT | Mod: CPTII,S$GLB,, | Performed by: ORTHOPAEDIC SURGERY

## 2025-01-15 PROCEDURE — 1160F RVW MEDS BY RX/DR IN RCRD: CPT | Mod: CPTII,S$GLB,, | Performed by: ORTHOPAEDIC SURGERY

## 2025-01-15 NOTE — PROGRESS NOTES
Subjective:      Patient ID: Shelli Iverson is a 44 y.o. female.    Chief Complaint: Injury of the Right Wrist (Pt had a fall and broke her fall with her wrist.)    HPI  44-year-old female several day history of right wrist pain.  She sustained accidental blunt trauma during a fall was seen in urgent care splinted and referred for further evaluation.  Denies any other complaints or prior problems with the wrist.  Pain has improved with relative rest.  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  Intact skin, sensation, brisk capillary refill of the digits  No gross instability intact flexor and extensor tendon function intact median ulnar and radial nerve function  Neurological:  No focal defect; cranial nerves 2-12 grossly intact  Psychiatric/behavioral:  Mood and behavior normal      XR WRIST COMPLETE 3 VIEWS RIGHT  Narrative: EXAMINATION:  XR WRIST COMPLETE 3 VIEWS RIGHT    CLINICAL HISTORY:  Unspecified fall, initial encounter    TECHNIQUE:  PA, lateral, and oblique views of the right wrist were performed.    COMPARISON:  Hand x-ray of 13 January 2023    FINDINGS:  A fracture of the radius, ulna, carpals or metacarpals is not seen.  Subluxation or dislocation of the carpals is not noted.  Soft tissue swelling is not seen.  Impression: Negative x-rays of the right wrist.    Electronically signed by: Paco Bowling MD  Date:    01/14/2025  Time:    08:44  XR HAND COMPLETE 3 VIEW RIGHT  Narrative: EXAMINATION:  XR HAND COMPLETE 3 VIEW RIGHT    CLINICAL HISTORY:  Unspecified  fall, initial encounter    TECHNIQUE:  PA, lateral, and oblique views of the right hand were performed.    COMPARISON:  None    FINDINGS:  A fracture of the bones of the wrist or hand is not seen.  Soft tissue swelling or foreign bodies are not noted.  Juxta-articular bone erosion or Osteoporosis is not seen.  Subluxation or dislocation of the carpals is not demonstrated.  Impression: Negative radiographs of the right hand.    Electronically signed by: Paco Bowling MD  Date:    2025  Time:    08:44       My Radiographs Findings:    I have personally reviewed radiographs and concur with above findings    Assessment:       Encounter Diagnosis   Name Primary?    Right wrist sprain, initial encounter Yes         Plan:       I have discussed medical condition treatment options with her at length have discussed generalized activity restrictions we provided a removable prefabricated brace we have discussed NSAIDs ice elevation compressive wrapping and I have provided some rehab exercises for her.  Follow up in 3 weeks for any residual symptoms otherwise she can advance activities as tolerated and follow up as needed.        Past Medical History:   Diagnosis Date    LAKE (generalized anxiety disorder)     GERD (gastroesophageal reflux disease)     HLD (hyperlipidemia)     Hypertension     Recurrent major depressive disorder, in remission 2022    Sciatica     T2DM (type 2 diabetes mellitus)      Past Surgical History:   Procedure Laterality Date    ABDOMINOPLASTY       SECTION      x 2    COLONOSCOPY N/A 2024    Procedure: COLONOSCOPY;  Surgeon: Kleber Tesfaye DO;  Location: [facility];  Service: Endoscopy;  Laterality: N/A;  [facility]    REVISION OF ABDOMINAL SCAR      planned 22         Current Outpatient Medications:     acetaminophen (TYLENOL) 500 MG tablet, Take 2 tablets (1,000 mg total) by mouth every 6 (six) hours as needed for Pain., Disp: 30 tablet, Rfl: 0    amLODIPine (NORVASC)  10 MG tablet, Take 1 tablet (10 mg total) by mouth once daily., Disp: 90 tablet, Rfl: 3    atorvastatin (LIPITOR) 20 MG tablet, Take 1 tablet (20 mg total) by mouth once daily., Disp: 90 tablet, Rfl: 3    blood sugar diagnostic Strp, To check BG 2 times daily, to use with insurance preferred meter, Disp: 100 each, Rfl: 2    FLUoxetine 40 MG capsule, Take one tablet by mouth daily, Disp: 90 capsule, Rfl: 1    hydroCHLOROthiazide (MICROZIDE) 12.5 mg capsule, Take 12.5 mg by mouth., Disp: , Rfl:     hydrocortisone 2.5 % cream, Apply topically 2 (two) times daily., Disp: 28 g, Rfl: 1    hydrOXYzine (ATARAX) 50 MG tablet, TAKE 1 TABLET BY MOUTH AT BEDTIME AS NEEDED INSOMNIA, Disp: , Rfl:     losartan (COZAAR) 100 MG tablet, Take 1 tablet (100 mg total) by mouth once daily., Disp: 90 tablet, Rfl: 3    metFORMIN (GLUCOPHAGE) 1000 MG tablet, Take 1 tablet (1,000 mg total) by mouth 2 (two) times daily with meals., Disp: 180 tablet, Rfl: 3    mupirocin (BACTROBAN) 2 % ointment, Apply topically 3 (three) times daily., Disp: 22 g, Rfl: 0    nystatin (MYCOSTATIN) cream, Apply topically 2 (two) times daily., Disp: 30 g, Rfl: 0    ondansetron (ZOFRAN-ODT) 4 MG TbDL, Take 2 tablets (8 mg total) by mouth every 8 (eight) hours as needed (nausea)., Disp: 60 tablet, Rfl: 2    tirzepatide (MOUNJARO) 5 mg/0.5 mL PnIj, Inject 5 mg into the skin every 7 days., Disp: 4 Pen, Rfl: 6    TRUE METRIX GLUCOSE METER Misc, TAKE AS DIRECTED TWICE DAILY, Disp: , Rfl:     TRUEPLUS LANCETS 33 gauge Misc, 2 (two) times daily., Disp: , Rfl:     Review of patient's allergies indicates:   Allergen Reactions    Penicillins Rash       Family History   Problem Relation Name Age of Onset    Heart disease Mother          CAD age 38    Hypertension Mother  38        MI    Diabetes Mother      Hypertension Father      Colon cancer Paternal Grandfather  80     Social History     Occupational History    Occupation: Teacher    Occupation: Student - Doctorate of  Education   Tobacco Use    Smoking status: Never     Passive exposure: Never    Smokeless tobacco: Never   Substance and Sexual Activity    Alcohol use: Yes     Comment: occasionally    Drug use: Yes     Types: Marijuana     Comment: occasional gummies    Sexual activity: Not Currently     Birth control/protection: I.U.D.

## 2025-01-20 ENCOUNTER — PATIENT MESSAGE (OUTPATIENT)
Dept: FAMILY MEDICINE | Facility: CLINIC | Age: 45
End: 2025-01-20
Payer: COMMERCIAL

## 2025-01-21 ENCOUNTER — OFFICE VISIT (OUTPATIENT)
Dept: FAMILY MEDICINE | Facility: CLINIC | Age: 45
End: 2025-01-21
Payer: COMMERCIAL

## 2025-01-21 VITALS — SYSTOLIC BLOOD PRESSURE: 134 MMHG | DIASTOLIC BLOOD PRESSURE: 80 MMHG

## 2025-01-21 DIAGNOSIS — E11.69 HYPERLIPIDEMIA DUE TO TYPE 2 DIABETES MELLITUS: ICD-10-CM

## 2025-01-21 DIAGNOSIS — E11.59 HYPERTENSION ASSOCIATED WITH DIABETES: Primary | ICD-10-CM

## 2025-01-21 DIAGNOSIS — F41.0 GENERALIZED ANXIETY DISORDER WITH PANIC ATTACKS: ICD-10-CM

## 2025-01-21 DIAGNOSIS — F41.1 GENERALIZED ANXIETY DISORDER WITH PANIC ATTACKS: ICD-10-CM

## 2025-01-21 DIAGNOSIS — E78.5 HYPERLIPIDEMIA DUE TO TYPE 2 DIABETES MELLITUS: ICD-10-CM

## 2025-01-21 DIAGNOSIS — E11.65 UNCONTROLLED TYPE 2 DIABETES MELLITUS WITH HYPERGLYCEMIA: ICD-10-CM

## 2025-01-21 DIAGNOSIS — S63.501A SPRAIN OF RIGHT WRIST, INITIAL ENCOUNTER: ICD-10-CM

## 2025-01-21 DIAGNOSIS — I15.2 HYPERTENSION ASSOCIATED WITH DIABETES: Primary | ICD-10-CM

## 2025-01-21 NOTE — PROGRESS NOTES
[facility] [place]   OFFICE VISIT NOTE    Patient Name: Shelli Iverson  YOB: 1980    PRESENTING HISTORY     History of Present Illness:  Ms. Shelli Iverson is a 44 y.o. female here for medication dosage    The patient location is:  [place]  The chief complaint leading to consultation is: Checkup  Visit type: Virtual visit with synchronous audio and video  Total time spent with patient:  Less than 30 minutes  Each patient to whom he or she provides medical services by telemedicine is:  (1) informed of the relationship between the physician and patient and the respective role of any other health care provider with respect to management of the patient; and (2) notified that he or she may decline to receive medical services by telemedicine and may withdraw from such care at any time. Vital signs recorded were provided by the patient.    Patient states that she is tolerating Tirzepatide 5 mg weekly well and would like to go up on the dosing     Review of Systems   Constitutional:  Negative for chills and fever.   Gastrointestinal:  Negative for abdominal pain, constipation, nausea and vomiting.   Musculoskeletal:         Right wrist pain          OBJECTIVE:   Vital Signs:  Vitals:    01/16/25 1351   BP: 134/80           Physical Exam  Constitutional:       General: She is awake.      Appearance: Normal appearance.   Pulmonary:      Effort: Pulmonary effort is normal.   Musculoskeletal:      Cervical back: Normal range of motion.   Neurological:      General: No focal deficit present.      Mental Status: She is alert.   Psychiatric:         Mood and Affect: Mood normal.         Behavior: Behavior is cooperative.         ASSESSMENT & PLAN:     Hypertension associated with diabetes  BP was elevated on 1/13 during UC visit due to right wrist pain   Patient states that her BP readings have been stable at home  1/16 home BP reading at 134/80  Will continue the current regimen of losartan 100  mg, HCTZ 12.5 mg, amlodipine 10 mg daily     Uncontrolled type 2 diabetes mellitus with hyperglycemia  -     tirzepatide 7.5 mg/0.5 mL PnIj; Inject 7.5 mg into the skin every 7 days.  Dispense: 4 Pen; Refill: 6  Patient tolerates Tirzepatide 5 mg weekly without any GI side effects including nausea, vomiting, constipation, or abdominal pain   Will increase the dose to 7.5 mg weekly     Generalized anxiety disorder with panic attacks  Stable  Continue with fluoxetine 40 mg daily     Hyperlipidemia due to type 2 diabetes mellitus  Stable  Continue with atorvastatin 20 mg daily     Sprain of right wrist, initial encounter  Patient seen by ortho  Continue with wrist brace    Follow up with PA in April and with me in October   Advised her to adjust the appt dates if needed   Advised her to reach out with questions/concerns before if needed       Shayna Rock MD  Family Medicine  [facility]     This note was created using [employer] voice recognition software that occasionally misinterprets phrases or words

## 2025-02-11 ENCOUNTER — HOSPITAL ENCOUNTER (EMERGENCY)
Facility: HOSPITAL | Age: 45
Discharge: HOME | End: 2025-02-11
Attending: STUDENT IN AN ORGANIZED HEALTH CARE EDUCATION/TRAINING PROGRAM
Payer: COMMERCIAL

## 2025-02-11 ENCOUNTER — PATIENT MESSAGE (OUTPATIENT)
Dept: OBSTETRICS AND GYNECOLOGY | Facility: CLINIC | Age: 45
End: 2025-02-11
Payer: COMMERCIAL

## 2025-02-11 VITALS
DIASTOLIC BLOOD PRESSURE: 94 MMHG | OXYGEN SATURATION: 97 % | HEART RATE: 86 BPM | SYSTOLIC BLOOD PRESSURE: 159 MMHG | BODY MASS INDEX: 26.46 KG/M2 | TEMPERATURE: 98 F | HEIGHT: 64 IN | RESPIRATION RATE: 16 BRPM | WEIGHT: 155 LBS

## 2025-02-11 DIAGNOSIS — N93.9 VAGINAL BLEEDING: ICD-10-CM

## 2025-02-11 DIAGNOSIS — D25.9 UTERINE LEIOMYOMA, UNSPECIFIED LOCATION: Primary | ICD-10-CM

## 2025-02-11 LAB
ABO + RH BLD: NORMAL
ALBUMIN SERPL BCP-MCNC: 4.2 G/DL (ref 3.5–5.2)
ALP SERPL-CCNC: 78 U/L (ref 55–135)
ALT SERPL W/O P-5'-P-CCNC: 13 U/L (ref 10–44)
ANION GAP SERPL CALC-SCNC: 8 MMOL/L (ref 8–16)
AST SERPL-CCNC: 13 U/L (ref 10–40)
B-HCG UR QL: NEGATIVE
BACTERIA #/AREA URNS HPF: NORMAL /HPF
BASOPHILS # BLD AUTO: 0.03 K/UL (ref 0–0.2)
BASOPHILS NFR BLD: 0.5 % (ref 0–1.9)
BILIRUB SERPL-MCNC: 0.4 MG/DL (ref 0.1–1)
BILIRUB UR QL STRIP: NEGATIVE
BUN SERPL-MCNC: 11 MG/DL (ref 6–20)
CALCIUM SERPL-MCNC: 8.6 MG/DL (ref 8.7–10.5)
CHLORIDE SERPL-SCNC: 105 MMOL/L (ref 95–110)
CLARITY UR: CLEAR
CO2 SERPL-SCNC: 24 MMOL/L (ref 23–29)
COLOR UR: YELLOW
CREAT SERPL-MCNC: 0.8 MG/DL (ref 0.5–1.4)
CTP QC/QA: YES
DIFFERENTIAL METHOD BLD: ABNORMAL
EOSINOPHIL # BLD AUTO: 0.1 K/UL (ref 0–0.5)
EOSINOPHIL NFR BLD: 1.7 % (ref 0–8)
ERYTHROCYTE [DISTWIDTH] IN BLOOD BY AUTOMATED COUNT: 13.3 % (ref 11.5–14.5)
EST. GFR  (NO RACE VARIABLE): >60 ML/MIN/1.73 M^2
GLUCOSE SERPL-MCNC: 87 MG/DL (ref 70–110)
GLUCOSE UR QL STRIP: NEGATIVE
HCG INTACT+B SERPL-ACNC: <0.6 MIU/ML
HCT VFR BLD AUTO: 32.7 % (ref 37–48.5)
HGB BLD-MCNC: 10.4 G/DL (ref 12–16)
HGB UR QL STRIP: ABNORMAL
IMM GRANULOCYTES # BLD AUTO: 0.01 K/UL (ref 0–0.04)
IMM GRANULOCYTES NFR BLD AUTO: 0.2 % (ref 0–0.5)
KETONES UR QL STRIP: NEGATIVE
LEUKOCYTE ESTERASE UR QL STRIP: NEGATIVE
LYMPHOCYTES # BLD AUTO: 2 K/UL (ref 1–4.8)
LYMPHOCYTES NFR BLD: 33.8 % (ref 18–48)
MCH RBC QN AUTO: 26.7 PG (ref 27–31)
MCHC RBC AUTO-ENTMCNC: 31.8 G/DL (ref 32–36)
MCV RBC AUTO: 84 FL (ref 82–98)
MICROSCOPIC COMMENT: NORMAL
MONOCYTES # BLD AUTO: 0.6 K/UL (ref 0.3–1)
MONOCYTES NFR BLD: 10.5 % (ref 4–15)
NEUTROPHILS # BLD AUTO: 3.1 K/UL (ref 1.8–7.7)
NEUTROPHILS NFR BLD: 53.3 % (ref 38–73)
NITRITE UR QL STRIP: NEGATIVE
NRBC BLD-RTO: 0 /100 WBC
PH UR STRIP: 6 [PH] (ref 5–8)
PLATELET # BLD AUTO: 317 K/UL (ref 150–450)
PMV BLD AUTO: 9.5 FL (ref 9.2–12.9)
POTASSIUM SERPL-SCNC: 3.1 MMOL/L (ref 3.5–5.1)
PROT SERPL-MCNC: 6.8 G/DL (ref 6–8.4)
PROT UR QL STRIP: ABNORMAL
RBC # BLD AUTO: 3.9 M/UL (ref 4–5.4)
RBC #/AREA URNS HPF: 2 /HPF (ref 0–4)
SODIUM SERPL-SCNC: 137 MMOL/L (ref 136–145)
SP GR UR STRIP: 1.02 (ref 1–1.03)
SQUAMOUS #/AREA URNS HPF: 3 /HPF
URN SPEC COLLECT METH UR: ABNORMAL
UROBILINOGEN UR STRIP-ACNC: NEGATIVE EU/DL
WBC # BLD AUTO: 5.82 K/UL (ref 3.9–12.7)
WBC #/AREA URNS HPF: 2 /HPF (ref 0–5)

## 2025-02-11 PROCEDURE — 81001 URINALYSIS AUTO W/SCOPE: CPT

## 2025-02-11 PROCEDURE — 84702 CHORIONIC GONADOTROPIN TEST: CPT

## 2025-02-11 PROCEDURE — 85025 COMPLETE CBC W/AUTO DIFF WBC: CPT

## 2025-02-11 PROCEDURE — 80053 COMPREHEN METABOLIC PANEL: CPT

## 2025-02-11 PROCEDURE — 25000003 PHARM REV CODE 250

## 2025-02-11 PROCEDURE — 87389 HIV-1 AG W/HIV-1&-2 AB AG IA: CPT | Performed by: EMERGENCY MEDICINE

## 2025-02-11 PROCEDURE — 81025 URINE PREGNANCY TEST: CPT

## 2025-02-11 PROCEDURE — 86803 HEPATITIS C AB TEST: CPT | Performed by: EMERGENCY MEDICINE

## 2025-02-11 PROCEDURE — 86901 BLOOD TYPING SEROLOGIC RH(D): CPT

## 2025-02-11 PROCEDURE — 99284 EMERGENCY DEPT VISIT MOD MDM: CPT | Mod: 25

## 2025-02-11 PROCEDURE — 96360 HYDRATION IV INFUSION INIT: CPT

## 2025-02-11 RX ORDER — POTASSIUM CHLORIDE 20 MEQ/1
40 TABLET, EXTENDED RELEASE ORAL ONCE
Status: COMPLETED | OUTPATIENT
Start: 2025-02-11 | End: 2025-02-11

## 2025-02-11 RX ADMIN — SODIUM CHLORIDE 1000 ML: 9 INJECTION, SOLUTION INTRAVENOUS at 07:02

## 2025-02-11 RX ADMIN — POTASSIUM CHLORIDE 40 MEQ: 1500 TABLET, EXTENDED RELEASE ORAL at 08:02

## 2025-02-12 ENCOUNTER — OFFICE VISIT (OUTPATIENT)
Dept: OBSTETRICS AND GYNECOLOGY | Facility: CLINIC | Age: 45
End: 2025-02-12
Payer: COMMERCIAL

## 2025-02-12 VITALS — SYSTOLIC BLOOD PRESSURE: 181 MMHG | HEART RATE: 81 BPM | DIASTOLIC BLOOD PRESSURE: 106 MMHG

## 2025-02-12 DIAGNOSIS — N92.1 MENORRHAGIA WITH IRREGULAR CYCLE: Primary | ICD-10-CM

## 2025-02-12 LAB
B-HCG UR QL: NEGATIVE
CTP QC/QA: YES
HCV AB SERPL QL IA: NEGATIVE
HIV 1+2 AB+HIV1 P24 AG SERPL QL IA: NEGATIVE

## 2025-02-12 PROCEDURE — 3080F DIAST BP >= 90 MM HG: CPT | Mod: CPTII,S$GLB,, | Performed by: STUDENT IN AN ORGANIZED HEALTH CARE EDUCATION/TRAINING PROGRAM

## 2025-02-12 PROCEDURE — 81025 URINE PREGNANCY TEST: CPT | Mod: S$GLB,,, | Performed by: STUDENT IN AN ORGANIZED HEALTH CARE EDUCATION/TRAINING PROGRAM

## 2025-02-12 PROCEDURE — 3077F SYST BP >= 140 MM HG: CPT | Mod: CPTII,S$GLB,, | Performed by: STUDENT IN AN ORGANIZED HEALTH CARE EDUCATION/TRAINING PROGRAM

## 2025-02-12 PROCEDURE — 88305 TISSUE EXAM BY PATHOLOGIST: CPT | Performed by: PATHOLOGY

## 2025-02-12 PROCEDURE — 4010F ACE/ARB THERAPY RXD/TAKEN: CPT | Mod: CPTII,S$GLB,, | Performed by: STUDENT IN AN ORGANIZED HEALTH CARE EDUCATION/TRAINING PROGRAM

## 2025-02-12 PROCEDURE — 1160F RVW MEDS BY RX/DR IN RCRD: CPT | Mod: CPTII,S$GLB,, | Performed by: STUDENT IN AN ORGANIZED HEALTH CARE EDUCATION/TRAINING PROGRAM

## 2025-02-12 PROCEDURE — 99999 PR PBB SHADOW E&M-EST. PATIENT-LVL IV: CPT | Mod: PBBFAC,,, | Performed by: STUDENT IN AN ORGANIZED HEALTH CARE EDUCATION/TRAINING PROGRAM

## 2025-02-12 PROCEDURE — 1159F MED LIST DOCD IN RCRD: CPT | Mod: CPTII,S$GLB,, | Performed by: STUDENT IN AN ORGANIZED HEALTH CARE EDUCATION/TRAINING PROGRAM

## 2025-02-12 PROCEDURE — 99215 OFFICE O/P EST HI 40 MIN: CPT | Mod: S$GLB,,, | Performed by: STUDENT IN AN ORGANIZED HEALTH CARE EDUCATION/TRAINING PROGRAM

## 2025-02-12 RX ORDER — SODIUM CHLORIDE 9 MG/ML
INJECTION, SOLUTION INTRAVENOUS CONTINUOUS
OUTPATIENT
Start: 2025-02-12

## 2025-02-12 RX ORDER — ACETAMINOPHEN 500 MG
1000 TABLET ORAL
OUTPATIENT
Start: 2025-02-12

## 2025-02-12 RX ORDER — FAMOTIDINE 20 MG/1
20 TABLET, FILM COATED ORAL
OUTPATIENT
Start: 2025-02-12

## 2025-02-12 RX ORDER — IBUPROFEN 800 MG/1
800 TABLET ORAL 3 TIMES DAILY PRN
Qty: 30 TABLET | Refills: 0 | Status: SHIPPED | OUTPATIENT
Start: 2025-02-12

## 2025-02-12 RX ORDER — DOXYCYCLINE HYCLATE 100 MG
100 TABLET ORAL 2 TIMES DAILY
OUTPATIENT
Start: 2025-02-12 | End: 2025-02-13

## 2025-02-12 RX ORDER — HYDROCODONE BITARTRATE AND ACETAMINOPHEN 5; 325 MG/1; MG/1
1 TABLET ORAL EVERY 6 HOURS PRN
Qty: 15 TABLET | Refills: 0 | Status: SHIPPED | OUTPATIENT
Start: 2025-02-12

## 2025-02-12 RX ORDER — MUPIROCIN 20 MG/G
OINTMENT TOPICAL
OUTPATIENT
Start: 2025-02-12

## 2025-02-12 NOTE — DISCHARGE INSTRUCTIONS
Please follow up with your gynecologist as discussed during our visit.  If you continue to feel worsening lightheadedness, dizziness, vaginal bleeding and may return to the emergency department.

## 2025-02-12 NOTE — ED PROVIDER NOTES
"Encounter Date: 2025       History     Chief Complaint   Patient presents with    Vaginal Bleeding     Vaginal bleeding - heavy with clots for 4 wks.  Diarrhea x3 wks.  Pt denies abdominal pain.  Pt states "I just feel myself getting weaker"      44-year-old female with past medical history of hyperlipidemia, hypertension, type 2 diabetes presents to the emergency department for vaginal bleeding and fatigue x4 weeks..  Patient states that her periods are typically regular however her period started this past month and has continued on for the last 4 weeks.  States that in the last 2 weeks she has continued to bleed through 2 pads and occasionally through her underwear and also passing quarter-sized clots approximately 2 to 3 times a day.  She is denying abdominal pain, back pain, current sexual activity, fever, double or blurry vision, dizziness, lightheadedness.  She does have a gynecologist which she intends to follow up with.  She is at the [facility] because she is worried about the bleeding and wants to make sure she is not anemic.  She does not take birth control.        Review of patient's allergies indicates:   Allergen Reactions    Penicillins Rash     Past Medical History:   Diagnosis Date    LAKE (generalized anxiety disorder)     GERD (gastroesophageal reflux disease)     HLD (hyperlipidemia)     Hypertension     Recurrent major depressive disorder, in remission 2022    Sciatica     T2DM (type 2 diabetes mellitus)      Past Surgical History:   Procedure Laterality Date    ABDOMINOPLASTY       SECTION      x 2    COLONOSCOPY N/A 2024    Procedure: COLONOSCOPY;  Surgeon: Kleber Tesfaye DO;  Location: [facility];  Service: Endoscopy;  Laterality: N/A;  ppm    REVISION OF ABDOMINAL SCAR      planned 21     Family History   Problem Relation Name Age of Onset    Heart disease Mother          CAD age 38    Hypertension Mother  38        MI    Diabetes Mother      " Hypertension Father      Colon cancer Paternal Grandfather  80     Social History     Tobacco Use    Smoking status: Never     Passive exposure: Never    Smokeless tobacco: Never   Substance Use Topics    Alcohol use: Yes     Comment: occasionally    Drug use: Yes     Types: Marijuana     Comment: occasional gummies     Review of Systems   Constitutional:  Positive for fatigue.   Respiratory: Negative.     Cardiovascular: Negative.    Gastrointestinal: Negative.    Genitourinary:  Positive for vaginal bleeding.   Musculoskeletal: Negative.    Skin: Negative.    Neurological: Negative.        Physical Exam     Initial Vitals [02/11/25 1719]   BP Pulse Resp Temp SpO2   (!) 168/116 67 18 98.1 °F (36.7 °C) 99 %      MAP       --         Physical Exam    Vitals reviewed.  Constitutional: She appears well-developed and well-nourished. She is not diaphoretic. No distress.   HENT:   Head: Atraumatic.   Right Ear: External ear normal.   Left Ear: External ear normal. Mouth/Throat: Oropharynx is clear and moist.   Eyes: Conjunctivae and EOM are normal. Pupils are equal, round, and reactive to light. Right eye exhibits no discharge. Left eye exhibits no discharge.   Neck:   Normal range of motion.  Cardiovascular:  Normal rate, regular rhythm, normal heart sounds and intact distal pulses.           Pulmonary/Chest: Breath sounds normal. No respiratory distress.   Abdominal: Abdomen is soft. She exhibits no distension. There is no abdominal tenderness. There is no rebound.   Musculoskeletal:         General: Normal range of motion.      Cervical back: Normal range of motion.     Neurological: She is alert and oriented to person, place, and time. She has normal strength. GCS score is 15. GCS eye subscore is 4. GCS verbal subscore is 5. GCS motor subscore is 6.   Skin: Skin is warm. Capillary refill takes less than 2 seconds.         ED Course   Procedures  Labs Reviewed   CBC W/ AUTO DIFFERENTIAL - Abnormal       Result Value     WBC 5.82      RBC 3.90 (*)     Hemoglobin 10.4 (*)     Hematocrit 32.7 (*)     MCV 84      MCH 26.7 (*)     MCHC 31.8 (*)     RDW 13.3      Platelets 317      MPV 9.5      Immature Granulocytes 0.2      Gran # (ANC) 3.1      Immature Grans (Abs) 0.01      Lymph # 2.0      Mono # 0.6      Eos # 0.1      Baso # 0.03      nRBC 0      Gran % 53.3      Lymph % 33.8      Mono % 10.5      Eosinophil % 1.7      Basophil % 0.5      Differential Method Automated      Narrative:     Release to patient->Immediate   COMPREHENSIVE METABOLIC PANEL - Abnormal    Sodium 137      Potassium 3.1 (*)     Chloride 105      CO2 24      Glucose 87      BUN 11      Creatinine 0.8      Calcium 8.6 (*)     Total Protein 6.8      Albumin 4.2      Total Bilirubin 0.4      Alkaline Phosphatase 78      AST 13      ALT 13      eGFR >60.0      Anion Gap 8      Narrative:     Release to patient->Immediate   URINALYSIS, REFLEX TO URINE CULTURE - Abnormal    Specimen UA Urine, Clean Catch      Color, UA Yellow      Appearance, UA Clear      pH, UA 6.0      Specific Gravity, UA 1.020      Protein, UA Trace (*)     Glucose, UA Negative      Ketones, UA Negative      Bilirubin (UA) Negative      Occult Blood UA 2+ (*)     Nitrite, UA Negative      Urobilinogen, UA Negative      Leukocytes, UA Negative      Narrative:     Specimen Source->Urine   HCG, QUANTITATIVE    HCG Quant <0.60      Narrative:     Release to patient->Immediate   URINALYSIS MICROSCOPIC    RBC, UA 2      WBC, UA 2      Bacteria Rare      Squam Epithel, UA 3      Microscopic Comment SEE COMMENT      Narrative:     Specimen Source->Urine   HEPATITIS C ANTIBODY   HIV 1 / 2 ANTIBODY   POCT URINE PREGNANCY    POC Preg Test, Ur Negative       Acceptable Yes     GROUP & RH    Group & Rh B POS            Imaging Results              US Transvaginal Non OB (Final result)  Result time 02/11/25 22:42:49      Final result by Angelica Wang MD (02/11/25 22:42:49)                    Impression:      Small volume complex fluid in the lower uterine segment.      Electronically signed by: Angelica Wang  Date:    02/11/2025  Time:    22:42               Narrative:    EXAMINATION:  US TRANSVAGINAL NON OB    CLINICAL HISTORY:  Abnormal uterine and vaginal bleeding, unspecified    TECHNIQUE:  Transvaginal and transabdominal ultrasound of the female pelvic organs.    COMPARISON:  None    FINDINGS:  Uterus measures 8.4 x 4.3 x 5.4 cm.  Multiple uterine fibroids measuring up to 2.2 cm.  Endometrial thickness is 3 mm.  Small volume complex fluid in the lower uterine segment.    Unremarkable ovaries.  No torsion.                                       Medications   sodium chloride 0.9% bolus 1,000 mL 1,000 mL (0 mLs Intravenous Stopped 2/11/25 1953)   potassium chloride SA CR tablet 40 mEq (40 mEq Oral Given 2/11/25 2040)     Medical Decision Making  44-year-old female presents to the emergency department for vaginal bleeding and fatigue x4 weeks    Considerations include but not limited to fibroids, neoplasm, ruptured hemorrhagic cyst, PID, polyps, adenomyosis, hormonal changes, medications, trauma, ectopic pregnancy    Patient is hypertensive at 168/116 on arrival.  Patient states that she has high blood pressure and did take all of her medications she is just very nervous about the bleeding.  On discharge blood pressure has decreased to 159/94 which patient states is usually around her normal.  Benign abdominal exam without distention, tenderness, rebound, guarding.  Vesicular breath sounds.  Conjunctiva is normal.  Normal cap refill.  No neurological deficits.  CBC shows hemoglobin at 10.4 down from 11.94 months ago.  CMP with mild hypokalemia which was repleted.  Urine unremarkable.  Patient is not pregnant.  Transvaginal ultrasound shows small volume complex fluid in the lower uterine segment with multiple uterine fibroids measuring up to 2.2 cm and endometrial thickening of 3 mm.  I  discussed these results with the patient.  She informed me she does have a follow up appointment with her gynecologist tomorrow.  She given strict return precautions regarding her symptoms and her bleeding.  She verbalized her understanding of the plan and agreed.  Plan also discussed with my attending and all questions were answered at the bedside.    Amount and/or Complexity of Data Reviewed  Labs: ordered.  Radiology: ordered.    Risk  Prescription drug management.                                      Clinical Impression:  Final diagnoses:  [N93.9] Vaginal bleeding  [D25.9] Uterine leiomyoma, unspecified location (Primary)          ED Disposition Condition    Discharge Stable          ED Prescriptions    None       Follow-up Information       Follow up With Specialties Details Why Contact Info    Shayna Rock MD Family Medicine Call   [address] [facility] [id number] [phone]               Susannah Hunter PA-C  02/12/25 0002       Susannah Hunter PA-C  02/12/25 0003

## 2025-02-12 NOTE — H&P
Chief Complaint   Patient presents with    Follow-up     fibroids       History of Present Illness   Shelli Iverson is 44 y.o. BF  patient who presents today c/o intermittent heavy bleeding x 4 weeks.  She had her IUD removed in .  Patient went to ER yesterday for it.  Lab work showed mild anemia.      US: 8.4 x 4.3 x 5.4 cm, multi-fibroid.  Normal adnexa.    Patient is interested in the surgical management in the form of endometrial ablation.  UPT negative.    History  PMH: T2DM, HTN, Class I obesity, HLD, Depression/Anxiety  Psx: c/s x 2, Abdominoplasty (complicated by wound infection, c/s scar revision), BTL  All: NKDA  OB:   GYN: Denies any STIs or abnl Pap  FH: Colon cancer (MGF, age 80s); Denies any female cancer or MI prior to 51yo  SH: Denies COURTNEY  Meds:  Current Outpatient Medications   Medication Instructions    acetaminophen (TYLENOL) 1,000 mg, Oral, Every 6 hours PRN    amLODIPine (NORVASC) 10 mg, Oral, Daily    atorvastatin (LIPITOR) 20 mg, Oral, Daily    blood sugar diagnostic Strp To check BG 2 times daily, to use with insurance preferred meter    FLUoxetine 40 MG capsule Take one tablet by mouth daily    hydroCHLOROthiazide (MICROZIDE) 12.5 mg    hydrocortisone 2.5 % cream Topical (Top), 2 times daily    hydrOXYzine (ATARAX) 50 MG tablet TAKE 1 TABLET BY MOUTH AT BEDTIME AS NEEDED INSOMNIA    losartan (COZAAR) 100 mg, Oral, Daily    metFORMIN (GLUCOPHAGE) 1,000 mg, Oral, 2 times daily with meals    mupirocin (BACTROBAN) 2 % ointment Topical (Top), 3 times daily    nystatin (MYCOSTATIN) cream Topical (Top), 2 times daily    ondansetron (ZOFRAN-ODT) 8 mg, Oral, Every 8 hours PRN    tirzepatide 7.5 mg, Subcutaneous, Every 7 days    TRUE METRIX GLUCOSE METER Misc TAKE AS DIRECTED TWICE DAILY    TRUEPLUS LANCETS 33 gauge Misc 2 times daily       Review of Systems   Constitutional:  Negative for chills and fever.   Eyes:  Negative for visual disturbance.   Respiratory:  Negative for  cough and shortness of breath.    Cardiovascular:  Negative for chest pain and palpitations.   Gastrointestinal:  Negative for abdominal pain, nausea and vomiting.   Genitourinary:  Positive for menorrhagia and menstrual problem. Negative for dysmenorrhea, vaginal discharge, vaginal pain and vaginal odor.   Neurological:  Negative for headaches.   Psychiatric/Behavioral:  Negative for depression and sleep disturbance. The patient is not nervous/anxious.    All other systems reviewed and are negative.  Breast: Negative for lump and mastodynia        Physical Examination:  Vitals:    02/12/25 1323   BP: (!) 181/106   BP Location: Right arm   Patient Position: Sitting   Pulse: 81        Physical Exam  Vitals reviewed.   Constitutional:       Appearance: Normal appearance.   HENT:      Head: Normocephalic and atraumatic.   Eyes:      Conjunctiva/sclera: Conjunctivae normal.   Cardiovascular:      Rate and Rhythm: Normal rate and regular rhythm.   Pulmonary:      Effort: Pulmonary effort is normal.      Breath sounds: Normal breath sounds. No wheezing.   Abdominal:      General: Abdomen is flat.      Palpations: Abdomen is soft.      Tenderness: There is no abdominal tenderness.   Musculoskeletal:         General: Normal range of motion.      Cervical back: Normal range of motion.   Skin:     General: Skin is warm and dry.   Neurological:      General: No focal deficit present.      Mental Status: She is alert and oriented to person, place, and time.   Psychiatric:         Mood and Affect: Mood normal.         Behavior: Behavior normal.         Thought Content: Thought content normal.         Judgment: Judgment normal.        Endometrial Biopsy Procedure Note    Urine pregnancy test was negative.  The risks (including infection, bleeding, pain, and uterine perforation) and benefits of the procedure were explained to the patient and writtent informed consent was obtained.  Antibiotic prophylaxis against endocarditis was  not indicated.     The patient was placed in the dorsal lithotomy position.  A speculum inserted in the vagina, and Hurricaine applied to the cervix.  The cervix prepped with povidone iodine x2.  Endocervical curettage with a Kevorkian curette was not performed.     Benzocaine was applied to the cervix and a sharp tenaculum was applied to the anterior lip of the cervix for stabilization.  A sterile uterine sound was used to sound the uterus to a depth of 8cm.  A Pipelle endometrial aspirator was used to sample the endometrium.  Sample was sent for pathologic examination.    Condition:  Stable    Complications:  None      Assessment:    1. Menorrhagia with irregular cycle  POCT Urine Pregnancy    Case Request Operating Room: ABLATION, ENDOMETRIUM, THERMAL, HYSTEROSCOPIC    Specimen to Pathology, Ob/Gyn          Plan:  Today, risks and benefits of surgery were discussed with the patient.  Risks of pain, infection, bleeding, damage to surrounding organs, and need for further procedure discussed with the patient.  Uterine perforation, damage to bowel with need for colostomy which may or may not be reversible, damage to bladder with resultant prolonged or intermittent catheterization, need for blood transfusions, formation of adhesion or Asherman's syndrome, need for laparoscopic and/or open procedure, and DVT/PE discussed with pt.  Discussed decrease menstrual flow but does not guarantee amenorrhea.  Counseled on failure of procedure and post-ablation syndrome.  Pt understands these risks and desires to proceed with procedure.  Consents signed today  Preop appt next monday    I spent a total of 40 minutes on the day of the visit.  This includes face to face time and non-face to face time preparing to see the patient (eg, review of tests), obtaining and/or reviewing separately obtained history, documenting clinical information in the electronic or other health record, independently interpreting results and communicating  results to the patient/family/caregiver, or care coordinator.

## 2025-02-14 LAB
FINAL PATHOLOGIC DIAGNOSIS: NORMAL
GROSS: NORMAL
Lab: NORMAL

## 2025-02-17 ENCOUNTER — HOSPITAL ENCOUNTER (OUTPATIENT)
Dept: PREADMISSION TESTING | Facility: HOSPITAL | Age: 45
Discharge: HOME | End: 2025-02-17
Payer: COMMERCIAL

## 2025-02-17 ENCOUNTER — TELEPHONE (OUTPATIENT)
Dept: FAMILY MEDICINE | Facility: CLINIC | Age: 45
End: 2025-02-17
Payer: COMMERCIAL

## 2025-02-17 ENCOUNTER — HOSPITAL ENCOUNTER (EMERGENCY)
Facility: HOSPITAL | Age: 45
Discharge: HOME | End: 2025-02-17
Attending: EMERGENCY MEDICINE
Payer: COMMERCIAL

## 2025-02-17 VITALS
BODY MASS INDEX: 26.63 KG/M2 | TEMPERATURE: 99 F | OXYGEN SATURATION: 100 % | HEIGHT: 64 IN | DIASTOLIC BLOOD PRESSURE: 91 MMHG | SYSTOLIC BLOOD PRESSURE: 172 MMHG | HEART RATE: 75 BPM | WEIGHT: 156 LBS | RESPIRATION RATE: 20 BRPM

## 2025-02-17 DIAGNOSIS — E87.6 HYPOKALEMIA: Primary | ICD-10-CM

## 2025-02-17 DIAGNOSIS — R55 SYNCOPE: ICD-10-CM

## 2025-02-17 LAB
ABO + RH BLD: NORMAL
ALBUMIN SERPL BCP-MCNC: 3.9 G/DL (ref 3.5–5.2)
ALP SERPL-CCNC: 71 U/L (ref 40–150)
ALT SERPL W/O P-5'-P-CCNC: 18 U/L (ref 10–44)
ANION GAP SERPL CALC-SCNC: 14 MMOL/L (ref 8–16)
AST SERPL-CCNC: 13 U/L (ref 10–40)
BASOPHILS # BLD AUTO: 0.03 K/UL (ref 0–0.2)
BASOPHILS NFR BLD: 0.7 % (ref 0–1.9)
BILIRUB SERPL-MCNC: 0.7 MG/DL (ref 0.1–1)
BLD GP AB SCN CELLS X3 SERPL QL: NORMAL
BUN SERPL-MCNC: 8 MG/DL (ref 6–20)
CALCIUM SERPL-MCNC: 8.7 MG/DL (ref 8.7–10.5)
CHLORIDE SERPL-SCNC: 107 MMOL/L (ref 95–110)
CO2 SERPL-SCNC: 19 MMOL/L (ref 23–29)
CREAT SERPL-MCNC: 0.8 MG/DL (ref 0.5–1.4)
DIFFERENTIAL METHOD BLD: ABNORMAL
EOSINOPHIL # BLD AUTO: 0.1 K/UL (ref 0–0.5)
EOSINOPHIL NFR BLD: 1.2 % (ref 0–8)
ERYTHROCYTE [DISTWIDTH] IN BLOOD BY AUTOMATED COUNT: 13.1 % (ref 11.5–14.5)
EST. GFR  (NO RACE VARIABLE): >60 ML/MIN/1.73 M^2
GLUCOSE SERPL-MCNC: 94 MG/DL (ref 70–110)
HCT VFR BLD AUTO: 33.5 % (ref 37–48.5)
HGB BLD-MCNC: 10.4 G/DL (ref 12–16)
IMM GRANULOCYTES # BLD AUTO: 0.01 K/UL (ref 0–0.04)
IMM GRANULOCYTES NFR BLD AUTO: 0.2 % (ref 0–0.5)
LYMPHOCYTES # BLD AUTO: 1.2 K/UL (ref 1–4.8)
LYMPHOCYTES NFR BLD: 27.9 % (ref 18–48)
MCH RBC QN AUTO: 26.5 PG (ref 27–31)
MCHC RBC AUTO-ENTMCNC: 31 G/DL (ref 32–36)
MCV RBC AUTO: 86 FL (ref 82–98)
MONOCYTES # BLD AUTO: 0.4 K/UL (ref 0.3–1)
MONOCYTES NFR BLD: 9.5 % (ref 4–15)
NEUTROPHILS # BLD AUTO: 2.6 K/UL (ref 1.8–7.7)
NEUTROPHILS NFR BLD: 60.5 % (ref 38–73)
NRBC BLD-RTO: 0 /100 WBC
PLATELET # BLD AUTO: 346 K/UL (ref 150–450)
PMV BLD AUTO: 9.2 FL (ref 9.2–12.9)
POTASSIUM SERPL-SCNC: 2.8 MMOL/L (ref 3.5–5.1)
PROT SERPL-MCNC: 6.9 G/DL (ref 6–8.4)
RBC # BLD AUTO: 3.92 M/UL (ref 4–5.4)
SODIUM SERPL-SCNC: 140 MMOL/L (ref 136–145)
SPECIMEN OUTDATE: NORMAL
WBC # BLD AUTO: 4.33 K/UL (ref 3.9–12.7)

## 2025-02-17 PROCEDURE — 85025 COMPLETE CBC W/AUTO DIFF WBC: CPT | Performed by: EMERGENCY MEDICINE

## 2025-02-17 PROCEDURE — 99900035 HC TECH TIME PER 15 MIN (STAT)

## 2025-02-17 PROCEDURE — 63600175 PHARM REV CODE 636 W HCPCS: Performed by: EMERGENCY MEDICINE

## 2025-02-17 PROCEDURE — 25000003 PHARM REV CODE 250: Performed by: EMERGENCY MEDICINE

## 2025-02-17 PROCEDURE — 96365 THER/PROPH/DIAG IV INF INIT: CPT

## 2025-02-17 PROCEDURE — 80053 COMPREHEN METABOLIC PANEL: CPT | Performed by: EMERGENCY MEDICINE

## 2025-02-17 PROCEDURE — 93005 ELECTROCARDIOGRAM TRACING: CPT

## 2025-02-17 PROCEDURE — 36415 COLL VENOUS BLD VENIPUNCTURE: CPT | Performed by: EMERGENCY MEDICINE

## 2025-02-17 PROCEDURE — 94760 N-INVAS EAR/PLS OXIMETRY 1: CPT

## 2025-02-17 PROCEDURE — 86900 BLOOD TYPING SEROLOGIC ABO: CPT | Performed by: EMERGENCY MEDICINE

## 2025-02-17 PROCEDURE — 99284 EMERGENCY DEPT VISIT MOD MDM: CPT | Mod: 25

## 2025-02-17 PROCEDURE — 82962 GLUCOSE BLOOD TEST: CPT

## 2025-02-17 RX ORDER — POTASSIUM CHLORIDE 7.45 MG/ML
10 INJECTION INTRAVENOUS
Status: COMPLETED | OUTPATIENT
Start: 2025-02-17 | End: 2025-02-17

## 2025-02-17 RX ORDER — AMLODIPINE BESYLATE 5 MG/1
10 TABLET ORAL ONCE
Status: COMPLETED | OUTPATIENT
Start: 2025-02-17 | End: 2025-02-17

## 2025-02-17 RX ORDER — LOSARTAN POTASSIUM 25 MG/1
100 TABLET ORAL ONCE
Status: COMPLETED | OUTPATIENT
Start: 2025-02-17 | End: 2025-02-17

## 2025-02-17 RX ADMIN — POTASSIUM BICARBONATE 60 MEQ: 391 TABLET, EFFERVESCENT ORAL at 09:02

## 2025-02-17 RX ADMIN — POTASSIUM CHLORIDE 10 MEQ: 7.46 INJECTION, SOLUTION INTRAVENOUS at 09:02

## 2025-02-17 RX ADMIN — LOSARTAN POTASSIUM 100 MG: 25 TABLET, FILM COATED ORAL at 09:02

## 2025-02-17 RX ADMIN — AMLODIPINE BESYLATE 10 MG: 5 TABLET ORAL at 09:02

## 2025-02-17 NOTE — CARE UPDATE
02/17/25 0733   Patient Assessment/Suction   Level of Consciousness (AVPU) alert   PRE-TX-O2   Device (Oxygen Therapy) room air   Significant Events This Shift   Significant Events Rapid Response Team call   Code Blue/Rapid Response   Respiratory Therapist Present MEHRAN Braswell   Respiratory Therapist Present SOREN Larose RRT   Member Arrival Time 0734   Member Departure Time 0737   $ Pt. Transferred to ED

## 2025-02-17 NOTE — TELEPHONE ENCOUNTER
Spoke with patient scheduled ED f/u 02/06/25 at 4:00 pm with Mrs. Wilder. Verbalized understanding

## 2025-02-17 NOTE — TELEPHONE ENCOUNTER
----- Message from Shayna Rock MD sent at 2/17/2025 12:46 PM CST -----  Let's make sure that she has a f/u appt for ED visit with Rhoda or me  ----- Message -----  From: Refugio Zuniga MD  Sent: 2/17/2025  10:40 AM CST  To: Shayna Rock MD    Hello,Needs follow up for ER visit for hypokalemia.  Potassium was 2.8 today, this was replaced.  Thank you.

## 2025-02-17 NOTE — TELEPHONE ENCOUNTER
Caller: David Comer Sr.    Relationship: Self    Best call back number: [phone]     What medication are you requesting: PAIN MEDICATION    What are your current symptoms: STATES HE JUST WAS IN HOSPITAL HAD TO GET STITCHES IN HIS HAND AND THEY GAVE PAIN MEDS THAT HAVE ANTIINFLAMMATORIES IN IT AND WITH HIS HEART CONDITION HE CAN NOT TAKE THAT. THEY ADVISED TO CALL PCP TO GET PRESCRIPTION FOR SOMETHING THAT HE CAN TAKE FOR THE PAIN    If a prescription is needed, what is your preferred pharmacy and phone number:      [address] [phone] [facility] [phone]      Additional notes:           Unable to contact patient. Left voicemail for pt to call the office back in regards to scheduling appointment

## 2025-02-17 NOTE — DISCHARGE INSTRUCTIONS
To confirm, Your doctor has instructed you that surgery is scheduled for: 2/21/25 with DR. SEVILLA    Please [facility] to [facility], Registration the morning of surgery. You must check-in and receive a wristband before going to your procedure.  [address] [place] [address] [place] [address]    Pre-Op will call the afternoon prior to surgery between 1:00 and 6:00 PM with the final arrival time.  Phone number: [phone]    PLEASE NOTE:  The surgery schedule has many variables which may affect the time of your surgery case.  Family members should be available if your surgery time changes.  Plan to be here the day of your procedure between 4-6 hours.    MEDICATIONS:  TAKE ONLY THESE MEDICATIONS WITH A SMALL SIP OF WATER THE MORNING OF YOUR PROCEDURE:  SEE MED LIST      DO NOT TAKE THESE MEDICATIONS 5-7 DAYS PRIOR to your procedure or per your surgeon's request:   ASPIRIN, ALEVE, ADVIL, IBUPROFEN, FISH OIL VITAMIN E, HERBALS  (May take Tylenol)    ONLY if you are prescribed any types of blood thinners such as:  Aspirin, Coumadin, Plavix, Pradaxa, Xarelto, Aggrenox, Effient, Eliquis, Savasya, Brilinta, or any other, ask your surgeon whether you should stop taking them and how long before surgery you should stop.  You may also need to verify with the prescribing physician if it is ok to stop your medication.      INSTRUCTIONS IMPORTANT!!  Do not eat or drink anything between midnight and the time of your procedure- this includes gum, mints, and candy.  EXCEPT: you may have clear liquids such as:  WATER, BLACK COFFEE, UNSWEET TEA, OR GATORADE (NO RED OR PURPLE) UP TO 2 HOURS PRIOR TO YOUR ARRIVAL TIME.  Do not smoke or drink alcoholic beverages 24 hours prior to your procedure.  Shower the night before AND the morning of your procedure with a Chlorhexidine wash such as Hibiclens or Dial antibacterial soap from the neck down.  Do not get it on your face or in your eyes.  You may use your own shampoo and face wash.  This helps your skin to be as bacteria free as possible.    If you wear contact lenses, dentures, hearing aids or glasses, bring a container to put them in during surgery and give to a family member for safe keeping.  Please leave all jewelry, piercing's and valuables at home. You must remove your false eyelashes prior to surgery.    DO NOT remove hair from the surgery site.  Do not shave the incision site unless you are given specific instructions to do so.    ONLY if you have been diagnosed with sleep apnea please bring your C-PAP machine.  ONLY if you wear home oxygen please bring your portable oxygen tank the day of your procedure.  ONLY if you have a history of OPEN HEART SURGERY you will need a clearance from your Cardiologist per Anesthesia.      ONLY for patients requiring bowel prep, written instructions will be given by your doctor's office.  ONLY if you have a neuro stimulator, please bring the controller with you the morning of surgery  ONLY if a type and screen test is needed before surgery, please return:  If your doctor has scheduled you for an overnight stay, bring a small overnight bag with any personal items you need.  Make arrangements in advance for transportation home by a responsible adult. You can not go home in an uber or a cab per hospital policy.  It is not safe to drive a vehicle during the 24 hours after anesthesia.          All  facilities and properties are tobacco free.  Smoking is NOT allowed.   If you have any questions about these instructions, call Pre-Op Admit  Nursing at [phone] or the Pre-Op Day Surgery Unit at [phone].

## 2025-02-17 NOTE — ED PROVIDER NOTES
Encounter Date: 2025       History   No chief complaint on file.    44-year-old female with a history of anxiety, type 2 diabetes hypertension uterine fibroids presents to the hospital after an episode of syncope in registration today.  She was being registered for a uterine ablation.  She felt very dizzy and weak and may have passed out.  No seizure-like activity.  At this time she just feels weak.  No chest pain or shortness of breath.  Patient reports continued vaginal bleeding.    The history is provided by the patient.     Review of patient's allergies indicates:   Allergen Reactions    Penicillins Rash     Past Medical History:   Diagnosis Date    LAKE (generalized anxiety disorder)     GERD (gastroesophageal reflux disease)     HLD (hyperlipidemia)     Hypertension     Recurrent major depressive disorder, in remission 2022    Sciatica     T2DM (type 2 diabetes mellitus)      Past Surgical History:   Procedure Laterality Date    ABDOMINOPLASTY       SECTION      x 2    COLONOSCOPY N/A 2024    Procedure: COLONOSCOPY;  Surgeon: Kleber Tsefaye DO;  Location: [facility];  Service: Endoscopy;  Laterality: N/A;  ppm    REVISION OF ABDOMINAL SCAR      planned 21     Family History   Problem Relation Name Age of Onset    Heart disease Mother          CAD age 38    Hypertension Mother  38        MI    Diabetes Mother      Hypertension Father      Colon cancer Paternal Grandfather  80     Social History[1]  Review of Systems   Respiratory: Negative.     Cardiovascular: Negative.    Genitourinary:  Positive for vaginal bleeding.   Neurological:  Positive for syncope.       Physical Exam     Initial Vitals   BP Pulse Resp Temp SpO2   -- -- -- -- --      MAP       --         Physical Exam    Nursing note and vitals reviewed.  Constitutional: She appears well-developed and well-nourished. She is not diaphoretic. No distress.   HENT:   Head: Normocephalic and atraumatic.   Eyes: EOM are  normal.   Neck: Neck supple.   Normal range of motion.  Cardiovascular:  Normal rate, regular rhythm and normal heart sounds.     Exam reveals no gallop and no friction rub.       No murmur heard.  Pulmonary/Chest: Breath sounds normal. No respiratory distress. She has no wheezes. She has no rhonchi. She has no rales.   Musculoskeletal:         General: Normal range of motion.      Cervical back: Normal range of motion and neck supple.     Neurological: She is alert and oriented to person, place, and time.   Skin: Skin is warm and dry.   Psychiatric: Her behavior is normal. Judgment and thought content normal.    tearful         ED Course   Procedures  Labs Reviewed   CBC W/ AUTO DIFFERENTIAL   COMPREHENSIVE METABOLIC PANEL   TYPE & SCREEN          Imaging Results    None          Medications - No data to display  Medical Decision Making  1037 Patient presents after near-syncope today at outpatient registration.  She has recovered completely with no residual symptoms at this time.  Potassium is low this was replaced.  She is on Norvasc and losartan.  No longer on hydrochlorothiazide according to the patient.  Advised her she will need her potassium rechecked later this week by primary care. [EF]      Amount and/or Complexity of Data Reviewed  External Data Reviewed: notes.  Labs: ordered. Decision-making details documented in ED Course.  ECG/medicine tests: ordered and independent interpretation performed. Decision-making details documented in ED Course.    Risk  Prescription drug management.               ED Course as of 02/17/25 1616   Mon Feb 17, 2025   0749 Sinus rhythm 85 beats per minute normal axis no ST elevation or depression independently interpreted [EF]   0756 BP(!): 193/121 [EF]   0756 Temp: 98.6 °F (37 °C) [EF]   0756 Temp Source: Oral [EF]   0756 Pulse: 77 [EF]   0756 Resp: 20 [EF]   0756 SpO2: 100 % [EF]   0838 Hemoglobin(!): 10.4 [EF]   0932 Potassium(!!): 2.8 [EF]       1040 I sent a message to her  primary care provider requesting assistance with follow-up [EF]      ED Course User Index  [EF] Refugio Zuniga MD                           Clinical Impression:  Final diagnoses:  [R55] Syncope                   [1]   Social History  Tobacco Use    Smoking status: Never     Passive exposure: Never    Smokeless tobacco: Never   Substance Use Topics    Alcohol use: Yes     Comment: occasionally    Drug use: Yes     Types: Marijuana     Comment: occasional gummies        Refugio Zuniga MD  02/17/25 4048

## 2025-02-18 ENCOUNTER — PATIENT MESSAGE (OUTPATIENT)
Dept: OBSTETRICS AND GYNECOLOGY | Facility: CLINIC | Age: 45
End: 2025-02-18
Payer: COMMERCIAL

## 2025-02-18 LAB — POCT GLUCOSE: 90 MG/DL (ref 70–110)

## 2025-02-19 ENCOUNTER — PATIENT MESSAGE (OUTPATIENT)
Dept: FAMILY MEDICINE | Facility: CLINIC | Age: 45
End: 2025-02-19
Payer: COMMERCIAL

## 2025-02-19 DIAGNOSIS — I15.2 HYPERTENSION ASSOCIATED WITH DIABETES: Primary | ICD-10-CM

## 2025-02-19 DIAGNOSIS — E11.59 HYPERTENSION ASSOCIATED WITH DIABETES: Primary | ICD-10-CM

## 2025-02-19 RX ORDER — HYDRALAZINE HYDROCHLORIDE 10 MG/1
10 TABLET, FILM COATED ORAL 3 TIMES DAILY
Qty: 90 TABLET | Refills: 11 | Status: SHIPPED | OUTPATIENT
Start: 2025-02-19 | End: 2026-02-19

## 2025-02-20 ENCOUNTER — NURSE TRIAGE (OUTPATIENT)
Dept: ADMINISTRATIVE | Facility: CLINIC | Age: 45
End: 2025-02-20
Payer: COMMERCIAL

## 2025-02-20 ENCOUNTER — TELEPHONE (OUTPATIENT)
Dept: OBSTETRICS AND GYNECOLOGY | Facility: CLINIC | Age: 45
End: 2025-02-20
Payer: COMMERCIAL

## 2025-02-20 NOTE — TELEPHONE ENCOUNTER
Patient stated that she is not interested in being a patient any more because she feels like she should be able to get a hysterectomy instead of getting the ablation, however she came in and signed consents for an ablation, when she went to the pre-admission appointment she passed out and went to the ED. I messaged her in the portal letting her know that she would have to have another appointment to sign consents for a hysterectomy, and Dr Carrizales messaged her as well. Patient stated she didn't receive the messages.

## 2025-02-20 NOTE — TELEPHONE ENCOUNTER
Pt calling and c/o vaginal bleeding and she said that she had been followed by Dr Carrizales and she was supposed to have an ablation on tomorrow and it was cancelled due to her b/p being too high. Pt has had b/p issues since 18 and she said that she is very frustrated with the provider and there is some indifference in care and she would like to switch. Pt is asking to make appt with Dr Leon and I will route a message to her to see when is the next available appt. Pt said that she has been bleeding very heavy lately and the smell of the blood is making her sick. Please reach out to pt directly. Pt said that she has bigger than quarter size clots. Pt said that she is changing pads every hour or so. Please advise.

## 2025-02-22 DIAGNOSIS — L30.8 OTHER ECZEMA: ICD-10-CM

## 2025-02-24 RX ORDER — HYDROCORTISONE 25 MG/G
CREAM TOPICAL 2 TIMES DAILY
Qty: 28 G | Refills: 0 | Status: SHIPPED | OUTPATIENT
Start: 2025-02-24

## 2025-02-26 ENCOUNTER — OFFICE VISIT (OUTPATIENT)
Dept: FAMILY MEDICINE | Facility: CLINIC | Age: 45
End: 2025-02-26
Payer: COMMERCIAL

## 2025-02-26 VITALS
HEART RATE: 81 BPM | SYSTOLIC BLOOD PRESSURE: 150 MMHG | OXYGEN SATURATION: 95 % | RESPIRATION RATE: 12 BRPM | HEIGHT: 64 IN | TEMPERATURE: 99 F | DIASTOLIC BLOOD PRESSURE: 100 MMHG | WEIGHT: 157.63 LBS | BODY MASS INDEX: 26.91 KG/M2

## 2025-02-26 DIAGNOSIS — E11.59 HYPERTENSION ASSOCIATED WITH DIABETES: Primary | ICD-10-CM

## 2025-02-26 DIAGNOSIS — N93.9 ABNORMAL UTERINE BLEEDING: ICD-10-CM

## 2025-02-26 DIAGNOSIS — I15.2 HYPERTENSION ASSOCIATED WITH DIABETES: Primary | ICD-10-CM

## 2025-02-26 DIAGNOSIS — E11.65 UNCONTROLLED TYPE 2 DIABETES MELLITUS WITH HYPERGLYCEMIA: ICD-10-CM

## 2025-02-26 DIAGNOSIS — R55 SYNCOPE, UNSPECIFIED SYNCOPE TYPE: ICD-10-CM

## 2025-02-26 LAB
RIGHT ARM BP: NORMAL MMHG

## 2025-02-26 PROCEDURE — G2211 COMPLEX E/M VISIT ADD ON: HCPCS | Mod: S$GLB,,,

## 2025-02-26 PROCEDURE — 99214 OFFICE O/P EST MOD 30 MIN: CPT | Mod: S$GLB,,,

## 2025-02-26 PROCEDURE — 99999 PR PBB SHADOW E&M-EST. PATIENT-LVL V: CPT | Mod: PBBFAC,,,

## 2025-02-26 PROCEDURE — 3080F DIAST BP >= 90 MM HG: CPT | Mod: CPTII,S$GLB,,

## 2025-02-26 PROCEDURE — 3008F BODY MASS INDEX DOCD: CPT | Mod: CPTII,S$GLB,,

## 2025-02-26 PROCEDURE — 1159F MED LIST DOCD IN RCRD: CPT | Mod: CPTII,S$GLB,,

## 2025-02-26 PROCEDURE — 4010F ACE/ARB THERAPY RXD/TAKEN: CPT | Mod: CPTII,S$GLB,,

## 2025-02-26 PROCEDURE — 1160F RVW MEDS BY RX/DR IN RCRD: CPT | Mod: CPTII,S$GLB,,

## 2025-02-26 PROCEDURE — 3077F SYST BP >= 140 MM HG: CPT | Mod: CPTII,S$GLB,,

## 2025-02-26 RX ORDER — SPIRONOLACTONE 25 MG/1
25 TABLET ORAL DAILY
Qty: 30 TABLET | Refills: 5 | Status: SHIPPED | OUTPATIENT
Start: 2025-02-26 | End: 2025-08-25

## 2025-02-26 NOTE — PROGRESS NOTES
Subjective:       Patient ID:  [id number]     Chief Complaint: Follow-up      History of Present Illness    Ms. Iverson presents today for heavy uterine bleeding for the past five weeks. She reports constant heavy uterine bleeding that started before Mary, described as severe and requiring extensive protection, especially when standing. She has fibroids which are believed to be contributing to the bleeding. She denies receiving any hormonal treatments to help stop the bleeding. She reports elevated home blood pressure reading of 116/107, noting that blood pressure has only been elevated since the onset of bleeding. She has a history of hypertension since age 18. She currently takes hydralazine 3 times daily, amlodipine 10 mg, and Losartan 100 mg. She reports experiencing dizziness and syncope, with two episodes of complete loss of consciousness. She experiences orthostatic dizziness particularly at work, accompanied by tachycardia and anxiety-like symptoms. Most episodes involve severe dizziness without complete loss of consciousness.   No other concerns today.         Past Medical History:   Diagnosis Date    LAKE (generalized anxiety disorder)     GERD (gastroesophageal reflux disease)     HLD (hyperlipidemia)     Hypertension     Recurrent major depressive disorder, in remission 11/22/2022    Sciatica     T2DM (type 2 diabetes mellitus)       Active Problem List with Overview Notes    Diagnosis Date Noted    Specific phobia 07/31/2023     OPHIDIOPHOBIA      Mild episode of recurrent major depressive disorder 04/27/2023    Insomnia 04/27/2023    Generalized anxiety disorder with panic attacks 11/22/2022    Post-operative infection 01/27/2021    Iron deficiency anemia 09/08/2020    Eczema 01/07/2020    Chronic knee pain 01/07/2020    Controlled type 2 diabetes mellitus without complication, without long-term current use of insulin 04/22/2019    BMI 32.0-32.9,adult 04/22/2019    HLD (hyperlipidemia) 04/22/2019    HTN  (hypertension) 01/18/2016     Aldomet 250 tid        Review of patient's allergies indicates:   Allergen Reactions    Penicillins Rash    Black dye        Current Medications[1]    Lab Results   Component Value Date    WBC 4.33 02/17/2025    HGB 10.4 (L) 02/17/2025    HCT 33.5 (L) 02/17/2025     02/17/2025    CHOL 202 (H) 07/30/2024    TRIG 193 (H) 07/30/2024    HDL 46 07/30/2024    ALT 18 02/17/2025    AST 13 02/17/2025     02/17/2025    K 2.8 (LL) 02/17/2025     02/17/2025    CREATININE 0.8 02/17/2025    BUN 8 02/17/2025    CO2 19 (L) 02/17/2025    TSH 1.784 10/10/2024    INR 0.9 01/27/2022    HGBA1C 7.8 (H) 10/10/2024       Review of Systems   Constitutional:  Positive for fatigue. Negative for fever.   HENT: Negative.  Negative for congestion, sneezing and sore throat.    Eyes: Negative.    Respiratory:  Negative for cough, shortness of breath and wheezing.    Cardiovascular:  Negative for chest pain, palpitations and leg swelling.   Gastrointestinal:  Negative for abdominal pain, nausea and vomiting.   Genitourinary:  Positive for vaginal bleeding.   Musculoskeletal: Negative.  Negative for arthralgias.   Skin: Negative.  Negative for rash.   Neurological:  Positive for dizziness. Negative for weakness, light-headedness, numbness and headaches.   Hematological: Negative.    Psychiatric/Behavioral: Negative.         Objective:      Physical Exam  Constitutional:       Appearance: Normal appearance.   HENT:      Head: Normocephalic and atraumatic.      Nose: Nose normal.   Eyes:      Extraocular Movements: Extraocular movements intact.   Cardiovascular:      Rate and Rhythm: Normal rate and regular rhythm.   Pulmonary:      Effort: Pulmonary effort is normal.      Breath sounds: Normal breath sounds.   Musculoskeletal:         General: Normal range of motion.      Cervical back: Normal range of motion.   Skin:     General: Skin is warm and dry.   Neurological:      General: No focal deficit  present.      Mental Status: She is alert and oriented to person, place, and time.   Psychiatric:         Mood and Affect: Mood normal.         Assessment:       1. Hypertension associated with diabetes    2. Uncontrolled type 2 diabetes mellitus with hyperglycemia    3. Abnormal uterine bleeding    4. Syncope, unspecified syncope type        Plan:       Shelli was seen today for follow-up.    Diagnoses and all orders for this visit:    Hypertension associated with diabetes  -     spironolactone (ALDACTONE) 25 MG tablet; Take 1 tablet (25 mg total) by mouth once daily.  - Explained necessity of controlling blood pressure before addressing uterine issues caused by fibroids.  - Medication changes: Started spironolactone to replace hydrochlorothiazide as a potassium-sparing diuretic.  - Continued amlodipine 10 mg daily, Losartan 100 mg daily, and hydralazine 3 times daily.    Uncontrolled type 2 diabetes mellitus with hyperglycemia  -     Hemoglobin A1C; Future  - continue current regimen  - discussed medication compliance   - yearly eye exam recommended  - LDL goal <70  - discussed importance of frequent foot exam     Abnormal uterine bleeding  -     Comprehensive Metabolic Panel; Future  -     CBC Auto Differential; Future  - Ms. Iverson reports heavy, constant uterine bleeding for 5 weeks, requiring diaper use and impacting quality of life.  - Ultrasound confirmed fibroids, likely contributing to the bleeding.  - Hemoglobin not critically low despite severity.  - Suggested discussing hormone therapy options with the specialist during upcoming appointment.    Syncope, unspecified syncope type  -     Cardiac Monitor - 3-15 Day Adult (Cupid Only); Future  -     Orthostatic vital signs  - Reviewed EKG from emergency room visit, showing no concerning findings.  - Considered multiple potential causes including anemia, orthostatic hypotension, and possible cardiac arrhythmias.  - orthostatic vitals benign.  - increased  fluids  - ED precautions    FOLLOW UP:  - Ms. Iverson to contact the office if experiencing shortness of breath, chest pain, or syncope.        Future Appointments       Date Provider Specialty Appt Notes    2/27/2025 Isaiah Shafer MD Obstetrics and Gynecology follow up (per JUNAID and Dr Leon)    2/28/2025  Lab .    3/3/2025 Tammi Corbin, ANNE-MARIE Obstetrics and Gynecology Bleeding for a very long time    3/12/2025  Family Medicine BP Check    4/11/2025 Rhoda Wilder PA-C Family Medicine 6 m    10/16/2025 Shayna Rock MD Family Medicine annual               Portions of this note were dictated using voice recognition software and may contain dictation related errors in spelling / grammar / syntax not discovered on text review.     Rhoda Wilder PA-C         [1]   Current Outpatient Medications:     acetaminophen (TYLENOL) 500 MG tablet, Take 2 tablets (1,000 mg total) by mouth every 6 (six) hours as needed for Pain., Disp: 30 tablet, Rfl: 0    amLODIPine (NORVASC) 10 MG tablet, Take 1 tablet (10 mg total) by mouth once daily., Disp: 90 tablet, Rfl: 3    atorvastatin (LIPITOR) 20 MG tablet, Take 1 tablet (20 mg total) by mouth once daily., Disp: 90 tablet, Rfl: 3    blood sugar diagnostic Strp, To check BG 2 times daily, to use with insurance preferred meter, Disp: 100 each, Rfl: 2    FLUoxetine 40 MG capsule, Take one tablet by mouth daily, Disp: 90 capsule, Rfl: 1    hydrALAZINE (APRESOLINE) 10 MG tablet, Take 1 tablet (10 mg total) by mouth 3 (three) times daily. Take one tablet by mouth if blood pressure is higher than 160 for the systolic blood pressure and/or higher than 100 for the diastolic blood pressure., Disp: 90 tablet, Rfl: 11    HYDROcodone-acetaminophen (NORCO) 5-325 mg per tablet, Take 1 tablet by mouth every 6 (six) hours as needed (severe pain)., Disp: 15 tablet, Rfl: 0    hydrocortisone 2.5 % cream, Apply topically 2 (two) times daily., Disp: 28 g, Rfl: 0    hydrOXYzine (ATARAX)  50 MG tablet, TAKE 1 TABLET BY MOUTH AT BEDTIME AS NEEDED INSOMNIA, Disp: , Rfl:     ibuprofen (ADVIL,MOTRIN) 800 MG tablet, Take 1 tablet (800 mg total) by mouth 3 (three) times daily as needed (for mild to moderate pain)., Disp: 30 tablet, Rfl: 0    losartan (COZAAR) 100 MG tablet, Take 1 tablet (100 mg total) by mouth once daily., Disp: 90 tablet, Rfl: 3    metFORMIN (GLUCOPHAGE) 1000 MG tablet, Take 1 tablet (1,000 mg total) by mouth 2 (two) times daily with meals., Disp: 180 tablet, Rfl: 3    mupirocin (BACTROBAN) 2 % ointment, Apply topically 3 (three) times daily., Disp: 22 g, Rfl: 0    nystatin (MYCOSTATIN) cream, Apply topically 2 (two) times daily., Disp: 30 g, Rfl: 0    ondansetron (ZOFRAN-ODT) 4 MG TbDL, Take 2 tablets (8 mg total) by mouth every 8 (eight) hours as needed (nausea)., Disp: 60 tablet, Rfl: 2    tirzepatide 7.5 mg/0.5 mL PnIj, Inject 7.5 mg into the skin every 7 days., Disp: 4 Pen, Rfl: 6    TRUE METRIX GLUCOSE METER Misc, TAKE AS DIRECTED TWICE DAILY, Disp: , Rfl:     TRUEPLUS LANCETS 33 gauge Misc, 2 (two) times daily., Disp: , Rfl:     spironolactone (ALDACTONE) 25 MG tablet, Take 1 tablet (25 mg total) by mouth once daily., Disp: 30 tablet, Rfl: 5

## 2025-02-27 ENCOUNTER — OFFICE VISIT (OUTPATIENT)
Dept: OBSTETRICS AND GYNECOLOGY | Facility: CLINIC | Age: 45
End: 2025-02-27
Payer: COMMERCIAL

## 2025-02-27 ENCOUNTER — LAB VISIT (OUTPATIENT)
Dept: LAB | Facility: HOSPITAL | Age: 45
End: 2025-02-27
Payer: COMMERCIAL

## 2025-02-27 VITALS
WEIGHT: 157.63 LBS | DIASTOLIC BLOOD PRESSURE: 80 MMHG | BODY MASS INDEX: 27.06 KG/M2 | SYSTOLIC BLOOD PRESSURE: 152 MMHG

## 2025-02-27 DIAGNOSIS — N93.9 ABNORMAL UTERINE BLEEDING: ICD-10-CM

## 2025-02-27 DIAGNOSIS — E11.65 UNCONTROLLED TYPE 2 DIABETES MELLITUS WITH HYPERGLYCEMIA: ICD-10-CM

## 2025-02-27 DIAGNOSIS — N93.9 ABNORMAL UTERINE BLEEDING (AUB): Primary | ICD-10-CM

## 2025-02-27 LAB
ALBUMIN SERPL BCP-MCNC: 3.8 G/DL (ref 3.5–5.2)
ALP SERPL-CCNC: 73 U/L (ref 40–150)
ALT SERPL W/O P-5'-P-CCNC: 21 U/L (ref 10–44)
ANION GAP SERPL CALC-SCNC: 8 MMOL/L (ref 8–16)
AST SERPL-CCNC: 19 U/L (ref 10–40)
BASOPHILS # BLD AUTO: 0.02 K/UL (ref 0–0.2)
BASOPHILS NFR BLD: 0.4 % (ref 0–1.9)
BILIRUB SERPL-MCNC: 0.6 MG/DL (ref 0.1–1)
BUN SERPL-MCNC: 11 MG/DL (ref 6–20)
CALCIUM SERPL-MCNC: 8.5 MG/DL (ref 8.7–10.5)
CHLORIDE SERPL-SCNC: 108 MMOL/L (ref 95–110)
CO2 SERPL-SCNC: 24 MMOL/L (ref 23–29)
CREAT SERPL-MCNC: 0.8 MG/DL (ref 0.5–1.4)
DIFFERENTIAL METHOD BLD: ABNORMAL
EOSINOPHIL # BLD AUTO: 0.1 K/UL (ref 0–0.5)
EOSINOPHIL NFR BLD: 0.9 % (ref 0–8)
ERYTHROCYTE [DISTWIDTH] IN BLOOD BY AUTOMATED COUNT: 13.2 % (ref 11.5–14.5)
EST. GFR  (NO RACE VARIABLE): >60 ML/MIN/1.73 M^2
GLUCOSE SERPL-MCNC: 112 MG/DL (ref 70–110)
HCT VFR BLD AUTO: 31.3 % (ref 37–48.5)
HGB BLD-MCNC: 9.8 G/DL (ref 12–16)
IMM GRANULOCYTES # BLD AUTO: 0.02 K/UL (ref 0–0.04)
IMM GRANULOCYTES NFR BLD AUTO: 0.4 % (ref 0–0.5)
LYMPHOCYTES # BLD AUTO: 1.6 K/UL (ref 1–4.8)
LYMPHOCYTES NFR BLD: 29.4 % (ref 18–48)
MCH RBC QN AUTO: 26.3 PG (ref 27–31)
MCHC RBC AUTO-ENTMCNC: 31.3 G/DL (ref 32–36)
MCV RBC AUTO: 84 FL (ref 82–98)
MONOCYTES # BLD AUTO: 0.5 K/UL (ref 0.3–1)
MONOCYTES NFR BLD: 8.8 % (ref 4–15)
NEUTROPHILS # BLD AUTO: 3.3 K/UL (ref 1.8–7.7)
NEUTROPHILS NFR BLD: 60.1 % (ref 38–73)
NRBC BLD-RTO: 0 /100 WBC
PLATELET # BLD AUTO: 342 K/UL (ref 150–450)
PMV BLD AUTO: 9.3 FL (ref 9.2–12.9)
POTASSIUM SERPL-SCNC: 3.6 MMOL/L (ref 3.5–5.1)
PROT SERPL-MCNC: 6.7 G/DL (ref 6–8.4)
RBC # BLD AUTO: 3.73 M/UL (ref 4–5.4)
SODIUM SERPL-SCNC: 140 MMOL/L (ref 136–145)
WBC # BLD AUTO: 5.55 K/UL (ref 3.9–12.7)

## 2025-02-27 PROCEDURE — 36415 COLL VENOUS BLD VENIPUNCTURE: CPT

## 2025-02-27 PROCEDURE — 83036 HEMOGLOBIN GLYCOSYLATED A1C: CPT

## 2025-02-27 PROCEDURE — 80053 COMPREHEN METABOLIC PANEL: CPT

## 2025-02-27 PROCEDURE — 85025 COMPLETE CBC W/AUTO DIFF WBC: CPT

## 2025-02-27 PROCEDURE — 99999 PR PBB SHADOW E&M-EST. PATIENT-LVL IV: CPT | Mod: PBBFAC,,, | Performed by: OBSTETRICS & GYNECOLOGY

## 2025-02-27 RX ORDER — TRANEXAMIC ACID 650 MG/1
1300 TABLET ORAL 3 TIMES DAILY
Qty: 30 TABLET | Refills: 0 | Status: SHIPPED | OUTPATIENT
Start: 2025-02-27 | End: 2025-03-04

## 2025-02-28 ENCOUNTER — RESULTS FOLLOW-UP (OUTPATIENT)
Dept: FAMILY MEDICINE | Facility: CLINIC | Age: 45
End: 2025-02-28

## 2025-02-28 DIAGNOSIS — N93.9 ABNORMAL UTERINE BLEEDING: Primary | ICD-10-CM

## 2025-02-28 LAB
ESTIMATED AVG GLUCOSE: 123 MG/DL (ref 68–131)
HBA1C MFR BLD: 5.9 % (ref 4–5.6)

## 2025-02-28 RX ORDER — FERROUS SULFATE 325(65) MG
325 TABLET ORAL
Qty: 30 TABLET | Refills: 0 | Status: SHIPPED | OUTPATIENT
Start: 2025-02-28 | End: 2025-03-07 | Stop reason: CLARIF

## 2025-03-07 ENCOUNTER — PATIENT MESSAGE (OUTPATIENT)
Dept: FAMILY MEDICINE | Facility: CLINIC | Age: 45
End: 2025-03-07
Payer: COMMERCIAL

## 2025-03-07 ENCOUNTER — HOSPITAL ENCOUNTER (OUTPATIENT)
Dept: PREADMISSION TESTING | Facility: HOSPITAL | Age: 45
Discharge: HOME | End: 2025-03-07
Attending: OBSTETRICS & GYNECOLOGY
Payer: COMMERCIAL

## 2025-03-07 ENCOUNTER — TELEPHONE (OUTPATIENT)
Dept: FAMILY MEDICINE | Facility: CLINIC | Age: 45
End: 2025-03-07
Payer: COMMERCIAL

## 2025-03-07 VITALS
WEIGHT: 155 LBS | HEART RATE: 81 BPM | TEMPERATURE: 99 F | BODY MASS INDEX: 26.46 KG/M2 | DIASTOLIC BLOOD PRESSURE: 100 MMHG | OXYGEN SATURATION: 100 % | SYSTOLIC BLOOD PRESSURE: 148 MMHG | HEIGHT: 64 IN | RESPIRATION RATE: 18 BRPM

## 2025-03-07 DIAGNOSIS — Z01.818 PREOPERATIVE TESTING: Primary | ICD-10-CM

## 2025-03-07 LAB
ABO + RH BLD: NORMAL
BLD GP AB SCN CELLS X3 SERPL QL: NORMAL

## 2025-03-07 PROCEDURE — 86901 BLOOD TYPING SEROLOGIC RH(D): CPT | Performed by: OBSTETRICS & GYNECOLOGY

## 2025-03-07 RX ORDER — TRANEXAMIC ACID 650 MG/1
1300 TABLET ORAL 3 TIMES DAILY
COMMUNITY

## 2025-03-07 NOTE — TELEPHONE ENCOUNTER
----- Message from Davecraig sent at 3/7/2025  2:46 PM CST -----  Type: Needs Testing OrdersWho Called:  Katalina Call Back Number: [phone] Information: pt is calling the office to have a new order placed asap for her  Cardiac Monitor - 3-15 Day Adult (Cupid Only). Please call back asap to advise and schedule. Thanks!

## 2025-03-07 NOTE — TELEPHONE ENCOUNTER
Spoke to pt who states this has already been completed. No additional assistance needed at this time.

## 2025-03-07 NOTE — DISCHARGE INSTRUCTIONS
YOUR PROCEDURE WILL BE AT [facility] at [address] [place] [address]                 Enter through the Main Entrance facing [address].                 Report to the Same Day Surgery Registration Desk in the hallway.(Just beside the Same Day Surgery Unit)      Your procedure  is scheduled for __3/13/2025________.    Call [phone] between 2pm and 5pm on __3/12/2025_____to find out your arrival time for the day of surgery.    You may have two visitors.  No children under 12 years old.     You will be going to the Same Day Surgery Unit on the 2nd floor of the hospital.    Important instructions:  Do not eat anything after midnight.  You may have plain water, non carbonated.  You may also have Gatorade or Powerade after midnight.    Stop all fluids 2 hours before your surgery.    It is okay to brush your teeth.  Do not have gum, candy or mints.    SEE MEDICATION SHEET.   TAKE MEDICATIONS AS DIRECTED.      Do not take any diabetic medication on the morning of surgery unless instructed to do so by your doctor or pre op nurse.      All GLP-1 weekly diabetic/weight loss medications must not be taken for one week before your surgery, or your surgery could be canceled.      STOP taking for 7 days before surgery:    Aspirin           Voltaren (Diclofenac)  Ibuprofen  (Advil, Motrin)                Indomethocin  Mobic (meloxicam, celebrex)      Etodolac   Aleve (naproxen)          Toradol (ketoralac)  Fish oil, Krill oil and Vitamin E  Headache Powders (BC Powder, Goody's Powder, Stanback)                           You may take Tylenol if needed which is not a blood thinner.    Please shower the night before and the morning of your surgery.      Use Chlorhexidine soap as instructed by your pre op nurse.   Please place clean linens on your bed the night before surgery. Please wear fresh clean clothing after each shower.    No shaving of procedural area at least 4-5 days before  surgery due to increased risk of skin irritation and/or possible infection.    Female patients may be asked for a urine specimen on the morning of the surgery.  Please check with your nurse before using the restroom.    Contact lenses and removable denture work may not be worn during your procedure.    You may wear deodorant only. If you are having breast surgery, do not wear deodorant on the operative side.    Do not wear powder, body lotion, perfume/cologne or make-up.    Do not wear any jewelry or have any metal on your body.    You will be asked to remove any dentures or partials for the procedure.    If you are going home on the same day of surgery, you must arrange for a family member or a friend to drive you home.  Public transportation is prohibited.  You will not be able to drive home if you were given anesthesia or sedation.    Patients who want to have their Post-op prescriptions filled from our in-house [facility], bring a Credit/Debit Card or cash with you. A co-pay may be required.  The pharmacy closes at 5:30 pm.    Wear loose fitting clothes allowing for bandages.    Please leave money and valuables home.      You may bring your cell phone.    Call the doctor if fever or illness should occur before your surgery.    Call [phone] to contact us here if needed.                            CLOTHES ON DAY OF SURGERY    SHOULDER surgery:  you must have a very oversized shirt.  Very, Very large.  You will probably have a large sling on with your arm strapped to your chest.  You will not be able to put the arm of the operated shoulder into a sleeve.  You can put the arm of the un-operated shoulder into the sleeve, but the shirt will need to be draped over the operated shoulder.       ARM or HAND surgery:  make sure that your sleeves are large and loose enough to pass over large dressings or cast.      BREAST or UNDERARM surgery:  wear a loose, button down shirt so that you can dress without raising your  arms over your head.    ABDOMINAL surgery:  wear loose, comfortable clothing.  Nothing tight around the abdomen.  NO JEANS    PENIS or SCROTAL surgery:  loose comfortable clothing.  Large sweat pants, pajama pants or a robe.  ABSOLUTELY NO JEANS      LEG or FOOT surgery:  wear large loose pants that are able to pass over any large dressings or casts.  You could also wear loose shorts or a skirt.

## 2025-03-10 ENCOUNTER — PATIENT MESSAGE (OUTPATIENT)
Dept: FAMILY MEDICINE | Facility: CLINIC | Age: 45
End: 2025-03-10
Payer: COMMERCIAL

## 2025-03-10 ENCOUNTER — HOSPITAL ENCOUNTER (OUTPATIENT)
Dept: CARDIOLOGY | Facility: CLINIC | Age: 45
Discharge: HOME | End: 2025-03-10
Payer: COMMERCIAL

## 2025-03-10 DIAGNOSIS — R55 SYNCOPE, UNSPECIFIED SYNCOPE TYPE: Primary | ICD-10-CM

## 2025-03-10 DIAGNOSIS — R55 SYNCOPE, UNSPECIFIED SYNCOPE TYPE: ICD-10-CM

## 2025-03-10 DIAGNOSIS — Z01.818 PRE-OP EVALUATION: ICD-10-CM

## 2025-03-10 DIAGNOSIS — I10 UNCONTROLLED HYPERTENSION: ICD-10-CM

## 2025-03-10 NOTE — TELEPHONE ENCOUNTER
Patient is asking to speak with you. states she got holter monitor today and they told her she have to keep it on for 5 days instead of 3, Her results may not be back until next week. States she really need to have surgery Thursday because she is still bleeding. Please advise

## 2025-03-12 ENCOUNTER — PATIENT MESSAGE (OUTPATIENT)
Dept: OBSTETRICS AND GYNECOLOGY | Facility: CLINIC | Age: 45
End: 2025-03-12
Payer: COMMERCIAL

## 2025-03-12 DIAGNOSIS — R55 SYNCOPE, UNSPECIFIED SYNCOPE TYPE: Primary | ICD-10-CM

## 2025-03-17 DIAGNOSIS — N93.9 ABNORMAL UTERINE BLEEDING (AUB): Primary | ICD-10-CM

## 2025-03-17 RX ORDER — TRANEXAMIC ACID 650 MG/1
1300 TABLET ORAL 3 TIMES DAILY
Qty: 30 TABLET | Refills: 0 | Status: SHIPPED | OUTPATIENT
Start: 2025-03-17 | End: 2025-03-22

## 2025-03-19 ENCOUNTER — PATIENT MESSAGE (OUTPATIENT)
Dept: ADMINISTRATIVE | Facility: HOSPITAL | Age: 45
End: 2025-03-19
Payer: COMMERCIAL

## 2025-03-21 DIAGNOSIS — N93.9 ABNORMAL UTERINE BLEEDING (AUB): ICD-10-CM

## 2025-03-23 NOTE — PROGRESS NOTES
History & Physical  Gynecology Problem Visit      SUBJECTIVE:     Chief Complaint: Follow-up       History of Present Illness:  Ms. Perry a 43 y/o female who presents to clinic for AUB. Patient reports that she was scheduled for laparoscopic hysterectomy but cancelled day of due to her uncontrolled blood pressure. She reports that her bleeding is worsening, and she would like to proceed with hysterectomy. She reports that she has been bleeding constantly for months (Mary). She reports that she has been feeling       She currently takes hydralazine 3 times daily, amlodipine 10 mg, and Losartan 100 mg. She reports experiencing dizziness and syncope, with two episodes of complete loss of consciousness. She experiences orthostatic dizziness particularly at work, accompanied by tachycardia.      TVUS 2025  FINDINGS:  Uterus measures 8.4 x 4.3 x 5.4 cm.  Multiple uterine fibroids measuring up to 2.2 cm.  Endometrial thickness is 3 mm.  Small volume complex fluid in the lower uterine segment.     Unremarkable ovaries.  No torsion.     Impression:     Small volume complex fluid in the lower uterine segment.    Review of patient's allergies indicates:   Allergen Reactions    Penicillins Rash    Black dye        Past Medical History:   Diagnosis Date    LAKE (generalized anxiety disorder)     GERD (gastroesophageal reflux disease)     HLD (hyperlipidemia)     Hypertension     Recurrent major depressive disorder, in remission 2022    Sciatica     T2DM (type 2 diabetes mellitus)      Past Surgical History:   Procedure Laterality Date    ABDOMINOPLASTY       SECTION      x 2    COLONOSCOPY N/A 2024    Procedure: COLONOSCOPY;  Surgeon: Kleber Tesfaye DO;  Location: [facility];  Service: Endoscopy;  Laterality: N/A;  ppm    REVISION OF ABDOMINAL SCAR      planned 22     OB History          3    Para   3    Term   0       3    AB   0    Living   3         SAB   0    IAB   0     Ectopic   0    Multiple   0    Live Births   3           Obstetric Comments   H/o PTL x 2, pt was on [place] with her second pregnancy             Family History   Problem Relation Name Age of Onset    Heart disease Mother          CAD age 38    Hypertension Mother  38        MI    Diabetes Mother      Hypertension Father      Colon cancer Paternal Grandfather  80     Social History[1]    Current Outpatient Medications   Medication Sig    amLODIPine (NORVASC) 10 MG tablet Take 1 tablet (10 mg total) by mouth once daily.    atorvastatin (LIPITOR) 20 MG tablet Take 1 tablet (20 mg total) by mouth once daily.    blood sugar diagnostic Strp To check BG 2 times daily, to use with insurance preferred meter    FLUoxetine 40 MG capsule Take one tablet by mouth daily    hydrALAZINE (APRESOLINE) 10 MG tablet Take 1 tablet (10 mg total) by mouth 3 (three) times daily. Take one tablet by mouth if blood pressure is higher than 160 for the systolic blood pressure and/or higher than 100 for the diastolic blood pressure.    hydrocortisone 2.5 % cream Apply topically 2 (two) times daily.    hydrOXYzine (ATARAX) 50 MG tablet TAKE 1 TABLET BY MOUTH AT BEDTIME AS NEEDED INSOMNIA    losartan (COZAAR) 100 MG tablet Take 1 tablet (100 mg total) by mouth once daily.    metFORMIN (GLUCOPHAGE) 1000 MG tablet Take 1 tablet (1,000 mg total) by mouth 2 (two) times daily with meals.    ondansetron (ZOFRAN-ODT) 4 MG TbDL Take 2 tablets (8 mg total) by mouth every 8 (eight) hours as needed (nausea).    spironolactone (ALDACTONE) 25 MG tablet Take 1 tablet (25 mg total) by mouth once daily.    tirzepatide 7.5 mg/0.5 mL PnIj Inject 7.5 mg into the skin every 7 days.    TRUE METRIX GLUCOSE METER Misc TAKE AS DIRECTED TWICE DAILY    TRUEPLUS LANCETS 33 gauge Misc 2 (two) times daily.     No current facility-administered medications for this visit.         Review of Systems:  Review of Systems   Constitutional:  Negative for activity change, chills  and fatigue.   HENT:  Negative for congestion, sneezing and sore throat.    Respiratory:  Negative for chest tightness and shortness of breath.    Cardiovascular:  Negative for chest pain.   Gastrointestinal:  Negative for abdominal pain, nausea and vomiting.   Genitourinary:  Positive for menstrual problem and pelvic pain. Negative for flank pain, frequency, vaginal bleeding and vaginal discharge.   Musculoskeletal:  Negative for back pain.   Skin:  Negative for rash.   Neurological:  Negative for dizziness, light-headedness and headaches.   Psychiatric/Behavioral:  Negative for confusion and sleep disturbance. The patient is not nervous/anxious.         OBJECTIVE:   Vitals:     Vitals:    02/27/25 1525   BP: (!) 152/80   Weight: 71.5 kg (157 lb 10.1 oz)        Physical Exam:  Physical Exam  Vitals and nursing note reviewed.   Constitutional:       Appearance: Normal appearance. She is well-developed.   Cardiovascular:      Rate and Rhythm: Normal rate.   Pulmonary:      Effort: Pulmonary effort is normal. No respiratory distress.   Abdominal:      General: There is no distension.      Palpations: Abdomen is soft.      Tenderness: There is no abdominal tenderness.   Genitourinary:     Exam position: Supine.   Skin:     General: Skin is warm and dry.   Neurological:      Mental Status: She is oriented to person, place, and time.           ASSESSMENT:       ICD-10-CM ICD-9-CM    1. Abnormal uterine bleeding (AUB)  N93.9 626.9 tranexamic acid (LYSTEDA) 650 mg tablet      CANCELED: Case Request Operating Room: HYSTERECTOMY,TOTAL,LAPAROSCOPIC,WITH SALPINGECTOMY        Plan:      Shelli was seen today for follow-up.    Diagnoses and all orders for this visit:    Abnormal uterine bleeding (AUB)  -     tranexamic acid (LYSTEDA) 650 mg tablet; Take 2 tablets (1,300 mg total) by mouth 3 (three) times daily. for 5 days  -     Cancel: Case Request Operating Room: HYSTERECTOMY,TOTAL,LAPAROSCOPIC,WITH SALPINGECTOMY  - Patient  was counseled today on the causes of AUB: fibroids, polyps, adenomyosis, anovulation resulting in endometrial hyperplasia, endometritis, cervicitis. We discussed the need for a proper evaluation including TVUS or tissue diagnosis. We discussed the various treatment options: 1) No treatment  2) Medical treatment (NSAIDS, OCPs, Provera, Mirena IUD, Lysteda)  3) Surgical tretament:  D&C, hysteroscopy, endometrial ablation, hysterectomy. The patient expressed understanding of her treatment options and all questions were answered. After informed counseling, she has decided to proceed with TLH/BS. During the consultation session all of her questions were answered.            No orders of the defined types were placed in this encounter.      Follow up in about 7 weeks (around 4/17/2025) for Postpartum Visit.           [1]   Social History  Tobacco Use    Smoking status: Never     Passive exposure: Never    Smokeless tobacco: Never   Substance Use Topics    Alcohol use: Yes     Comment: occasionally    Drug use: Yes     Types: Marijuana     Comment: occasional gummies

## 2025-03-24 RX ORDER — TRANEXAMIC ACID 650 MG/1
TABLET ORAL
Qty: 30 TABLET | Refills: 0 | Status: SHIPPED | OUTPATIENT
Start: 2025-03-24

## 2025-03-24 NOTE — TELEPHONE ENCOUNTER
Refill Routing Note   Medication(s) are not appropriate for processing by [facility] for the following reason(s):        Outside of protocol    [facility] action(s):  Route               Appointments  past 12m or future 3m with PCP    Date Provider   Last Visit   2/27/2025 Isaiah Shafer MD   Next Visit   Visit date not found Isaiah Shafer MD   ED visits in past 90 days: 2        Note composed:8:42 AM 03/24/2025

## 2025-03-25 ENCOUNTER — PATIENT MESSAGE (OUTPATIENT)
Dept: FAMILY MEDICINE | Facility: CLINIC | Age: 45
End: 2025-03-25
Payer: COMMERCIAL

## 2025-03-25 NOTE — TELEPHONE ENCOUNTER
Offered patient an cardiology appointment for April 7 first available in [place]. she states she have been bleeding too long and she will just go to [facility].

## 2025-03-26 ENCOUNTER — PATIENT MESSAGE (OUTPATIENT)
Dept: OBSTETRICS AND GYNECOLOGY | Facility: CLINIC | Age: 45
End: 2025-03-26
Payer: COMMERCIAL

## 2025-03-28 ENCOUNTER — PATIENT MESSAGE (OUTPATIENT)
Dept: OBSTETRICS AND GYNECOLOGY | Facility: CLINIC | Age: 45
End: 2025-03-28
Payer: COMMERCIAL

## 2025-03-28 ENCOUNTER — OFFICE VISIT (OUTPATIENT)
Dept: CARDIOLOGY | Facility: CLINIC | Age: 45
End: 2025-03-28
Payer: COMMERCIAL

## 2025-03-28 VITALS
BODY MASS INDEX: 26.05 KG/M2 | SYSTOLIC BLOOD PRESSURE: 164 MMHG | HEART RATE: 84 BPM | DIASTOLIC BLOOD PRESSURE: 116 MMHG | WEIGHT: 152.56 LBS | HEIGHT: 64 IN

## 2025-03-28 DIAGNOSIS — E78.5 HYPERLIPIDEMIA, UNSPECIFIED HYPERLIPIDEMIA TYPE: Primary | ICD-10-CM

## 2025-03-28 DIAGNOSIS — I10 HYPERTENSION, UNSPECIFIED TYPE: ICD-10-CM

## 2025-03-28 PROCEDURE — 99999 PR PBB SHADOW E&M-EST. PATIENT-LVL IV: CPT | Mod: PBBFAC,,, | Performed by: INTERNAL MEDICINE

## 2025-03-28 NOTE — PROGRESS NOTES
Subjective:    Patient ID:  Shelli Iverson is a 44 y.o. female patient here for evaluation clearance      History of Present Illness:  Patient cardiac evaluation.  Difficult to control blood pressure on multidrug regime.  Longstanding history of hypertension.  Recent onset diabetes mellitus.  Preop clearance for hysterectomy, metromenorrhagia, anemia.    Recent Holter monitor with benign ectopy.  Stress echo was performed in , negative for ischemic or structural heart issues.    Patient is asymptomatic.  Last hemoglobin A1c less than 6.  Issues with hypokalemia in the past.  Recently taken off of thiazide diuretic.  Remains on spironolactone 25 mg daily.  Other medications include amlodipine 10, losartan 100.  Patient recently started on hydralazine 10 t.i.d..    Patient chronically anemic, by history she feels stress of ongoing issues second-degree to gyn problems have cause blood pressure ago uncontrolled.  Has been controlled in the past.       Baseline EKG with normal sinus rhythm.  Poor R-wave progression across the precordial leads otherwise normal      Review of patient's allergies indicates:   Allergen Reactions    Penicillins Rash    Black dye        Past Medical History:   Diagnosis Date    LAKE (generalized anxiety disorder)     GERD (gastroesophageal reflux disease)     HLD (hyperlipidemia)     Hypertension     Recurrent major depressive disorder, in remission 2022    Sciatica     T2DM (type 2 diabetes mellitus)      Past Surgical History:   Procedure Laterality Date    ABDOMINOPLASTY       SECTION      x 2    COLONOSCOPY N/A 2024    Procedure: COLONOSCOPY;  Surgeon: Kleber Tesfaye DO;  Location: [facility];  Service: Endoscopy;  Laterality: N/A;  ppm    REVISION OF ABDOMINAL SCAR      planned 22     Social History[1]     Review of Systems:    As noted in HPI in addition         REVIEW OF SYSTEMS  Review of Systems   Constitutional: Negative for decreased appetite,  diaphoresis, night sweats, weight gain and weight loss.   HENT:  Negative for nosebleeds and odynophagia.    Eyes:  Negative for double vision and photophobia.   Cardiovascular:  Negative for chest pain, claudication, cyanosis, dyspnea on exertion, irregular heartbeat, leg swelling, near-syncope, orthopnea, palpitations, paroxysmal nocturnal dyspnea and syncope.   Respiratory:  Negative for cough, hemoptysis, shortness of breath and wheezing.    Hematologic/Lymphatic: Negative for adenopathy.   Skin:  Negative for flushing, skin cancer and suspicious lesions.   Musculoskeletal:  Negative for gout, myalgias and neck pain.   Gastrointestinal:  Negative for abdominal pain, heartburn, hematemesis and hematochezia.   Genitourinary:  Negative for bladder incontinence, hesitancy and nocturia.   Neurological:  Negative for focal weakness, headaches, light-headedness and paresthesias.   Psychiatric/Behavioral:  Negative for memory loss and substance abuse.        Objective:        Vitals:    03/28/25 0913   BP: (!) 164/116   Pulse: 84       Lab Results   Component Value Date    WBC 5.55 02/27/2025    HGB 9.8 (L) 02/27/2025    HCT 31.3 (L) 02/27/2025     02/27/2025    CHOL 202 (H) 07/30/2024    TRIG 193 (H) 07/30/2024    HDL 46 07/30/2024    ALT 21 02/27/2025    AST 19 02/27/2025     02/27/2025    K 3.6 02/27/2025     02/27/2025    CREATININE 0.8 02/27/2025    BUN 11 02/27/2025    CO2 24 02/27/2025    TSH 1.784 10/10/2024    INR 0.9 01/27/2022    HGBA1C 5.9 (H) 02/27/2025      CARDIOGRAM RESULTS  Results for orders placed during the hospital encounter of 01/04/21    Stress Echo Which stress agent will be used? Treadmill Exercise    Interpretation Summary  · The left ventricle is normal in size with normal systolic function. The estimated ejection fraction is 60%  · Normal right ventricular size with normal right ventricular systolic function.  · The estimated PA systolic pressure is 33 mmHg.  · The stress  echo portion of this study is negative for myocardial ischemia.  · The ECG portion of this study is abnormal but not diagnostic for ischemia.    No results found for this or any previous visit.          CURRENT/PREVIOUS VISIT EKG  Results for orders placed or performed during the hospital encounter of 02/17/25   EKG 12-lead    Collection Time: 02/17/25  7:43 AM   Result Value Ref Range    QRS Duration 86 ms    [facility] QTC Calculation 452 ms    Narrative    Test Reason : R55,    Vent. Rate :  85 BPM     Atrial Rate :  85 BPM     P-R Int : 152 ms          QRS Dur :  86 ms      QT Int : 380 ms       P-R-T Axes :  54  33  47 degrees    QTcB Int : 452 ms    Normal sinus rhythm  Poor R-wave progression ; consider septal infarct, lead placement, or  normal variant  Abnormal ECG  When compared with ECG of 31-Jul-2024 02:01,  No significant change was found  Confirmed by Casa Taylor ([id number]) on 2/24/2025 9:50:39 PM    Referred By: AAAREFERRAL SELF           Confirmed By: Casa Taylor     No valid procedures specified.   No results found for this or any previous visit.    No valid procedures specified.        PHYSICAL EXAM  GENERAL: well built, well nourished, well-developed in no apparent distress alert and oriented.   HEENT: Normocephalic. Pupils normal and conjunctivae normal.  Mucous membranes normal, no cyanosis or icterus, trachea central,no pallor or icterus is noted..   NECK: No JVD. No bruit..   THYROID: Thyroid not enlarged. No nodules present..   CARDIAC:  Normal S1-S2.  No murmur rub click or gallop.  PMI nondisplaced.   LUNGS: Clear to auscultation. No wheezing or rhonchi..   ABDOMEN: Soft no masses or organomegaly.  No abdomen pulsations or bruits.  Normal bowel sounds. No pulsations and no masses felt, No guarding or rebound.   URINARY: No sidhu catheter   EXTREMITIES: No cyanosis, clubbing or edema noted at this time., no calf tenderness bilaterally.   PERIPHERAL VASCULAR SYSTEM: Good palpable distal pulses.  2+  femoral, popliteal and pedal pulses.  No bruits    CENTRAL NERVOUS SYSTEM: No focal motor or sensory deficits noted.   SKIN: Skin without lesions, moist, well perfused.   MUSCLE STRENGTH & TONE: No noteable weakness, atrophy or abnormal movement.     I HAVE REVIEWED :    The vital signs, nurses notes, and all the pertinent radiology and labs.         Current Outpatient Medications   Medication Instructions    amLODIPine (NORVASC) 10 mg, Oral, Daily    atorvastatin (LIPITOR) 20 mg, Oral, Daily    blood sugar diagnostic Strp To check BG 2 times daily, to use with insurance preferred meter    FLUoxetine 40 MG capsule Take one tablet by mouth daily    hydrALAZINE (APRESOLINE) 10 mg, Oral, 3 times daily, Take one tablet by mouth if blood pressure is higher than 160 for the systolic blood pressure and/or higher than 100 for the diastolic blood pressure.    hydrocortisone 2.5 % cream Topical (Top), 2 times daily    hydrOXYzine (ATARAX) 50 MG tablet TAKE 1 TABLET BY MOUTH AT BEDTIME AS NEEDED INSOMNIA    losartan (COZAAR) 100 mg, Oral, Daily    metFORMIN (GLUCOPHAGE) 1,000 mg, Oral, 2 times daily with meals    ondansetron (ZOFRAN-ODT) 8 mg, Oral, Every 8 hours PRN    spironolactone (ALDACTONE) 25 mg, Oral, Daily    tirzepatide 7.5 mg, Subcutaneous, Every 7 days    tranexamic acid (LYSTEDA) 650 mg tablet TAKE 2 TABLETS(1300 MG) BY MOUTH THREE TIMES DAILY FOR 5 DAYS    TRUE METRIX GLUCOSE METER Misc TAKE AS DIRECTED TWICE DAILY    TRUEPLUS LANCETS 33 gauge Misc 2 times daily          Assessment:   Hypertensive cardiovascular disease.  Difficult to hold blood pressure multidrug regime.    Preop clearance for hysterectomy due to anemia due to metromenorrhagia.            Plan:   Low risk cleared for upcoming surgery.  Increase spironolactone to 50 mg daily.  See me again after surgery, consider change in calcium channel blocker from amlodipine to verapamil or diltiazem.    Echo.  Call  Results.  Renal artery ultrasound.   Consider follow-up abdominopelvic CT, review of records reveals unremarkable abdominal ultrasound as well as abdominopelvic CT in the past, 2021.        No follow-ups on file.            [1]   Social History  Tobacco Use    Smoking status: Never     Passive exposure: Never    Smokeless tobacco: Never   Substance Use Topics    Alcohol use: Yes     Comment: occasionally    Drug use: Yes     Types: Marijuana     Comment: occasional gummies

## 2025-04-01 DIAGNOSIS — F41.0 GENERALIZED ANXIETY DISORDER WITH PANIC ATTACKS: ICD-10-CM

## 2025-04-01 DIAGNOSIS — F41.1 GENERALIZED ANXIETY DISORDER WITH PANIC ATTACKS: ICD-10-CM

## 2025-04-01 DIAGNOSIS — F33.0 MILD EPISODE OF RECURRENT MAJOR DEPRESSIVE DISORDER: ICD-10-CM

## 2025-04-01 RX ORDER — FLUOXETINE HYDROCHLORIDE 40 MG/1
CAPSULE ORAL
Qty: 90 CAPSULE | Refills: 1 | Status: SHIPPED | OUTPATIENT
Start: 2025-04-01

## 2025-04-08 ENCOUNTER — HOSPITAL ENCOUNTER (OUTPATIENT)
Dept: CARDIOLOGY | Facility: HOSPITAL | Age: 45
Discharge: HOME | End: 2025-04-08
Attending: INTERNAL MEDICINE
Payer: COMMERCIAL

## 2025-04-08 DIAGNOSIS — I10 HYPERTENSION, UNSPECIFIED TYPE: ICD-10-CM

## 2025-04-08 DIAGNOSIS — E78.5 HYPERLIPIDEMIA, UNSPECIFIED HYPERLIPIDEMIA TYPE: ICD-10-CM

## 2025-04-08 LAB
ABDOMINAL AORTA PROX EDV: 19 CM/S
ABDOMINAL AORTA PROX PSV: 110 CM/S
LEFT RENAL DIST DIAS: 32 CM/S
LEFT RENAL DIST SYS: 88 CM/S
LEFT RENAL MID DIAS: 29 CM/S
LEFT RENAL MID SYS: 83 CM/S
LEFT RENAL ORIGIN DIAS: 22 CM/S
LEFT RENAL ORIGIN SYS: 79 CM/S
LEFT RENAL PROX DIAS: 28 CM/S
LEFT RENAL PROX RAR: 0.7
LEFT RENAL PROX SYS: 77 CM/S
LEFT RENAL ULTRASOUND ACCELERATION TIME MEASUREMENT 1: 88 MS
LEFT RENAL ULTRASOUND ACCELERATION TIME MEASUREMENT 2: 94 MS
LEFT RENAL ULTRASOUND ACCELERATION TIME MEASUREMENT 3: 94 MS
LEFT RENAL ULTRASOUND ACCELERATION TIME MEASUREMENT AVERAGE: 94 MS
LEFT RENAL ULTRASOUND KIDNEY SIZE MEASUREMENT 1: 10.33 CM
LEFT RENAL ULTRASOUND KIDNEY SIZE MEASUREMENT 2: 10.09 CM
LEFT RENAL ULTRASOUND KIDNEY SIZE MEASUREMENT 3: 10.94 CM
LEFT RENAL ULTRASOUND KIDNEY SIZE MEASUREMENT AVERAGE: 10.94 CM
LEFT RENAL ULTRASOUND RESISTIVE INDEX MEASUREMENT 1: 0.57
LEFT RENAL ULTRASOUND RESISTIVE INDEX MEASUREMENT 2: 0.49
LEFT RENAL ULTRASOUND RESISTIVE INDEX MEASUREMENT 3: 0.57
LEFT RENAL ULTRASOUND RESISTIVE INDEX MEASUREMENT AVERAGE: 0.57
OHS CV LEFT RENAL RAR: 0.8
OHS CV RIGHT RENAL RAR: 1.16
OHS CV US LEFT RENAL HIGHEST EDV: 32
OHS CV US LEFT RENAL HIGHEST PSV: 88
OHS CV US RIGHT RENAL HIGHEST EDV: 58
OHS CV US RIGHT RENAL HIGHEST PSV: 128
PROX AORTIC AP: 1.78 CM
PROX AORTIC TRANS: 1.78 CM
RIGHT RENAL DIST DIAS: 58 CM/S
RIGHT RENAL DIST SYS: 128 CM/S
RIGHT RENAL MID DIAS: 42 CM/S
RIGHT RENAL MID SYS: 115 CM/S
RIGHT RENAL ORIGIN DIAS: 44 CM/S
RIGHT RENAL ORIGIN SYS: 107 CM/S
RIGHT RENAL PROX DIAS: 44 CM/S
RIGHT RENAL PROX RAR: 0.87
RIGHT RENAL PROX SYS: 96 CM/S
RIGHT RENAL ULTRASOUND ACCELERATION TIME MEASUREMENT 1: 71 MS
RIGHT RENAL ULTRASOUND ACCELERATION TIME MEASUREMENT 2: 88 MS
RIGHT RENAL ULTRASOUND ACCELERATION TIME MEASUREMENT 3: 100 MS
RIGHT RENAL ULTRASOUND ACCELERATION TIME MEASUREMENT AVERAGE: 100 MS
RIGHT RENAL ULTRASOUND KIDNEY SIZE MEASUREMENT 1: 11.17 CM
RIGHT RENAL ULTRASOUND KIDNEY SIZE MEASUREMENT 2: 11.01 CM
RIGHT RENAL ULTRASOUND KIDNEY SIZE MEASUREMENT 3: 10.62 CM
RIGHT RENAL ULTRASOUND KIDNEY SIZE MEASUREMENT AVERAGE: 11.17 CM
RIGHT RENAL ULTRASOUND RESISTIVE INDEX MEASUREMENT 1: 0.65
RIGHT RENAL ULTRASOUND RESISTIVE INDEX MEASUREMENT 2: 0.56
RIGHT RENAL ULTRASOUND RESISTIVE INDEX MEASUREMENT 3: 0.57
RIGHT RENAL ULTRASOUND RESISTIVE INDEX MEASUREMENT AVERAGE: 0.65

## 2025-04-08 PROCEDURE — 93975 VASCULAR STUDY: CPT | Mod: PO

## 2025-04-08 PROCEDURE — 93975 VASCULAR STUDY: CPT | Mod: 26,,, | Performed by: INTERNAL MEDICINE

## 2025-04-10 DIAGNOSIS — N93.9 ABNORMAL UTERINE BLEEDING (AUB): Primary | ICD-10-CM

## 2025-04-15 ENCOUNTER — PATIENT MESSAGE (OUTPATIENT)
Dept: OBSTETRICS AND GYNECOLOGY | Facility: CLINIC | Age: 45
End: 2025-04-15
Payer: COMMERCIAL

## 2025-04-15 DIAGNOSIS — F41.0 GENERALIZED ANXIETY DISORDER WITH PANIC ATTACKS: ICD-10-CM

## 2025-04-15 DIAGNOSIS — F33.0 MILD EPISODE OF RECURRENT MAJOR DEPRESSIVE DISORDER: ICD-10-CM

## 2025-04-15 DIAGNOSIS — F41.1 GENERALIZED ANXIETY DISORDER WITH PANIC ATTACKS: ICD-10-CM

## 2025-04-15 RX ORDER — FLUOXETINE HYDROCHLORIDE 40 MG/1
CAPSULE ORAL
Qty: 90 CAPSULE | Refills: 1 | OUTPATIENT
Start: 2025-04-15

## 2025-04-22 ENCOUNTER — HOSPITAL ENCOUNTER (OUTPATIENT)
Dept: PREADMISSION TESTING | Facility: HOSPITAL | Age: 45
Discharge: HOME | End: 2025-04-22
Attending: OBSTETRICS & GYNECOLOGY
Payer: COMMERCIAL

## 2025-04-22 ENCOUNTER — ANESTHESIA EVENT (OUTPATIENT)
Dept: SURGERY | Facility: HOSPITAL | Age: 45
End: 2025-04-22
Payer: COMMERCIAL

## 2025-04-22 ENCOUNTER — OFFICE VISIT (OUTPATIENT)
Dept: OBSTETRICS AND GYNECOLOGY | Facility: CLINIC | Age: 45
End: 2025-04-22
Payer: COMMERCIAL

## 2025-04-22 VITALS
HEART RATE: 69 BPM | TEMPERATURE: 97 F | RESPIRATION RATE: 18 BRPM | HEIGHT: 64 IN | SYSTOLIC BLOOD PRESSURE: 164 MMHG | BODY MASS INDEX: 26.05 KG/M2 | OXYGEN SATURATION: 100 % | WEIGHT: 152.56 LBS | DIASTOLIC BLOOD PRESSURE: 106 MMHG

## 2025-04-22 DIAGNOSIS — N93.9 ABNORMAL UTERINE BLEEDING (AUB): Primary | ICD-10-CM

## 2025-04-22 DIAGNOSIS — Z01.818 PREOPERATIVE TESTING: Primary | ICD-10-CM

## 2025-04-22 LAB
ABSOLUTE EOSINOPHIL (OHS): 0.07 K/UL
ABSOLUTE MONOCYTE (OHS): 0.45 K/UL (ref 0.3–1)
ABSOLUTE NEUTROPHIL COUNT (OHS): 4.08 K/UL (ref 1.8–7.7)
ALBUMIN SERPL BCP-MCNC: 3.5 G/DL (ref 3.5–5.2)
ALP SERPL-CCNC: 98 UNIT/L (ref 40–150)
ALT SERPL W/O P-5'-P-CCNC: 25 UNIT/L (ref 10–44)
ANION GAP (OHS): 8 MMOL/L (ref 8–16)
AST SERPL-CCNC: 20 UNIT/L (ref 11–45)
B-HCG UR QL: NEGATIVE
BASOPHILS # BLD AUTO: 0.02 K/UL
BASOPHILS NFR BLD AUTO: 0.3 %
BILIRUB SERPL-MCNC: 0.4 MG/DL (ref 0.1–1)
BUN SERPL-MCNC: 14 MG/DL (ref 6–20)
CALCIUM SERPL-MCNC: 8.8 MG/DL (ref 8.7–10.5)
CHLORIDE SERPL-SCNC: 110 MMOL/L (ref 95–110)
CO2 SERPL-SCNC: 24 MMOL/L (ref 23–29)
CREAT SERPL-MCNC: 0.8 MG/DL (ref 0.5–1.4)
ERYTHROCYTE [DISTWIDTH] IN BLOOD BY AUTOMATED COUNT: 14.6 % (ref 11.5–14.5)
GFR SERPLBLD CREATININE-BSD FMLA CKD-EPI: >60 ML/MIN/1.73/M2
GLUCOSE SERPL-MCNC: 101 MG/DL (ref 70–110)
HCT VFR BLD AUTO: 34.1 % (ref 37–48.5)
HGB BLD-MCNC: 10.3 GM/DL (ref 12–16)
IMM GRANULOCYTES # BLD AUTO: 0.02 K/UL (ref 0–0.04)
IMM GRANULOCYTES NFR BLD AUTO: 0.3 % (ref 0–0.5)
INDIRECT COOMBS: NORMAL
LYMPHOCYTES # BLD AUTO: 1.82 K/UL (ref 1–4.8)
MCH RBC QN AUTO: 25.5 PG (ref 27–31)
MCHC RBC AUTO-ENTMCNC: 30.2 G/DL (ref 32–36)
MCV RBC AUTO: 84 FL (ref 82–98)
NUCLEATED RBC (/100WBC) (OHS): 0 /100 WBC
PLATELET # BLD AUTO: 314 K/UL (ref 150–450)
PMV BLD AUTO: 9.1 FL (ref 9.2–12.9)
POTASSIUM SERPL-SCNC: 3.9 MMOL/L (ref 3.5–5.1)
PROT SERPL-MCNC: 6.9 GM/DL (ref 6–8.4)
RBC # BLD AUTO: 4.04 M/UL (ref 4–5.4)
RELATIVE EOSINOPHIL (OHS): 1.1 %
RELATIVE LYMPHOCYTE (OHS): 28.2 % (ref 18–48)
RELATIVE MONOCYTE (OHS): 7 % (ref 4–15)
RELATIVE NEUTROPHIL (OHS): 63.1 % (ref 38–73)
RH BLD: NORMAL
SODIUM SERPL-SCNC: 142 MMOL/L (ref 136–145)
WBC # BLD AUTO: 6.46 K/UL (ref 3.9–12.7)

## 2025-04-22 PROCEDURE — 99999 PR PBB SHADOW E&M-EST. PATIENT-LVL III: CPT | Mod: PBBFAC,,, | Performed by: OBSTETRICS & GYNECOLOGY

## 2025-04-22 PROCEDURE — 3044F HG A1C LEVEL LT 7.0%: CPT | Mod: CPTII,S$GLB,, | Performed by: OBSTETRICS & GYNECOLOGY

## 2025-04-22 PROCEDURE — 85025 COMPLETE CBC W/AUTO DIFF WBC: CPT | Performed by: OBSTETRICS & GYNECOLOGY

## 2025-04-22 PROCEDURE — 80053 COMPREHEN METABOLIC PANEL: CPT | Performed by: OBSTETRICS & GYNECOLOGY

## 2025-04-22 PROCEDURE — 81025 URINE PREGNANCY TEST: CPT | Performed by: OBSTETRICS & GYNECOLOGY

## 2025-04-22 PROCEDURE — 86901 BLOOD TYPING SEROLOGIC RH(D): CPT | Performed by: OBSTETRICS & GYNECOLOGY

## 2025-04-22 PROCEDURE — 99214 OFFICE O/P EST MOD 30 MIN: CPT | Mod: S$GLB,,, | Performed by: OBSTETRICS & GYNECOLOGY

## 2025-04-22 PROCEDURE — 4010F ACE/ARB THERAPY RXD/TAKEN: CPT | Mod: CPTII,S$GLB,, | Performed by: OBSTETRICS & GYNECOLOGY

## 2025-04-22 PROCEDURE — 1160F RVW MEDS BY RX/DR IN RCRD: CPT | Mod: CPTII,S$GLB,, | Performed by: OBSTETRICS & GYNECOLOGY

## 2025-04-22 PROCEDURE — 1159F MED LIST DOCD IN RCRD: CPT | Mod: CPTII,S$GLB,, | Performed by: OBSTETRICS & GYNECOLOGY

## 2025-04-22 RX ORDER — FAMOTIDINE 20 MG/1
20 TABLET, FILM COATED ORAL
Status: SHIPPED | OUTPATIENT
Start: 2025-04-22

## 2025-04-22 RX ORDER — MUPIROCIN 20 MG/G
OINTMENT TOPICAL
Status: CANCELLED | OUTPATIENT
Start: 2025-04-22

## 2025-04-22 RX ORDER — SODIUM CHLORIDE 9 MG/ML
INJECTION, SOLUTION INTRAVENOUS CONTINUOUS
Status: CANCELLED | OUTPATIENT
Start: 2025-04-22

## 2025-04-22 NOTE — ANESTHESIA PREPROCEDURE EVALUATION
2025  Shelli Iverson is a 45 y.o., female scheduled for  HYSTERECTOMY,TOTAL,LAPAROSCOPIC,WITH SALPINGECTOMY (Uterus)  on 2025.          Past Medical History:   Diagnosis Date    LAKE (generalized anxiety disorder)     GERD (gastroesophageal reflux disease)     HLD (hyperlipidemia)     Hypertension     Recurrent major depressive disorder, in remission 2022    Sciatica     T2DM (type 2 diabetes mellitus)        Past Surgical History:   Procedure Laterality Date    ABDOMINOPLASTY       SECTION      x 2    COLONOSCOPY N/A 2024    Procedure: COLONOSCOPY;  Surgeon: Kleber Tesfaye DO;  Location: [facility];  Service: Endoscopy;  Laterality: N/A;  ppm    REVISION OF ABDOMINAL SCAR      planned 22             Pre-op Assessment    I have reviewed the Patient Summary Reports.     I have reviewed the Nursing Notes. I have reviewed the NPO Status.   I have reviewed the Medications.     Review of Systems  Anesthesia Hx:  No problems with previous Anesthesia             Denies Family Hx of Anesthesia complications.    Denies Personal Hx of Anesthesia complications.                    Social:  Non-Smoker, Social Alcohol Use, Recreational Drugs Marijuana      Hematology/Oncology:  Hematology Normal   Oncology Normal                                   EENT/Dental:  EENT/Dental Normal           Cardiovascular:  Exercise tolerance: good   Hypertension           hyperlipidemia       Functional Capacity good / => 4 METS                         Pulmonary:  Pulmonary Normal                       Hepatic/GI:     GERD    Taking GLP-1 Agonists Instructed to Hold for 7 Days No Reported GI Symptoms          Musculoskeletal:  Musculoskeletal Normal                Neurological:    Denies CVA. Neuromuscular Disease,   Denies Seizures.                                Endocrine:  Diabetes          "  Dermatological:  Skin Normal    Psych:  Psychiatric History anxiety depression                Physical Exam  General: Well nourished, Cooperative, Alert and Oriented    Airway:  Mallampati: II   Mouth Opening: Normal  TM Distance: Normal  Tongue: Normal  Neck ROM: Normal ROM    Dental:  Intact    Chest/Lungs:  Normal Respiratory Rate    Heart:  Rate: Normal        Anesthesia Plan  Type of Anesthesia, risks & benefits discussed:    Anesthesia Type: Gen ETT  Intra-op Monitoring Plan: Standard ASA Monitors  Airway Plan: Video, Post-Induction  Informed Consent: Informed consent signed with the Patient and all parties understand the risks and agree with anesthesia plan.  All questions answered. Patient consented to blood products? Yes  ASA Score: 2  Day of Surgery Review of History & Physical: H&P Update referred to the surgeon/provider.  Anesthesia Plan Notes: PMHx significant for HTN, anemia  Cleared by Cardiology -"Low risk cleared for upcoming surgery."    Ready For Surgery From Anesthesia Perspective.     .      "

## 2025-04-22 NOTE — DISCHARGE INSTRUCTIONS
YOUR PROCEDURE WILL BE AT [facility] at [address], [place]. [id number]                 Enter through the Main Entrance facing [address].      Your procedure  is scheduled for __4/24/2025________.    Call [phone] between 2pm and 5pm on _4/23/2025______to find out your arrival time for the day of surgery.    You may have up to three visitors.  No children under 18 years old.     You will be going to the Same Day Surgery Unit on the 2nd floor of the hospital.    Report to the Same Day Surgery Registration Desk in the hallway.(Just beside the Same Day Surgery Unit)                    DO NOT PARK IN THE GARAGE.  THERE IS NO OPEN ENTRANCE TO THE HOSPITAL BUILDING AND NO ELEVATOR.      Important instructions:  Do not eat anything after midnight.  You may have plain water, non carbonated.  You may also have Gatorade or Powerade after midnight.    Stop all fluids 2 hours before your surgery.    It is okay to brush your teeth.  Do not have gum, candy or mints.    SEE MEDICATION SHEET.   TAKE MEDICATIONS AS DIRECTED.    Do not take any diabetic medication on the morning of surgery unless instructed to do so by your doctor or pre op nurse.    All GLP-1 weekly diabetic/weight loss medications must not be taken for one week before your surgery, or your surgery could be canceled.      STOP taking for 7 days before surgery:    Aspirin           Voltaren (Diclofenac)  Ibuprofen  (Advil, Motrin)                Indomethocin  Mobic (meloxicam, celebrex)      Etodolac   Aleve (naproxen)          Toradol (ketoralac)  Fish oil, Krill oil and Vitamin E  Headache Powders (BC Powder, Goody's Powder, Stanback)                           You may take Tylenol if needed which is not a blood thinner.    Please shower the night before and the morning of your surgery.      Follow any Prep Instructions given by your surgeon.               Use Chlorhexidine soap as instructed by your pre op nurse.    Please place clean linens on your bed the night before surgery. Please wear fresh clean clothing after each shower.    No shaving of procedural area at least 4-5 days before surgery due to increased risk of skin irritation and/or possible infection.    Contact lenses and removable denture work may not be worn during your procedure.    You may wear deodorant only. If you are having breast surgery, do not wear deodorant on the operative side.    Do not wear powder, body lotion, perfume/cologne or make-up.    Do not wear any jewelry or have any metal on your body.    You will be asked to remove any dentures or partials for the procedure.    If you are going home on the same day of surgery, you must arrange for a family member or a friend to drive you home.  Public transportation is prohibited.  You will not be able to drive home if you were given anesthesia or sedation.    Patients who want to have their Post-op prescriptions filled from our in-house [facility], bring a Credit/Debit Card or cash with you. A co-pay may be required.  The pharmacy closes at 5:30 pm.    Wear loose fitting clothes allowing for bandages.    Please leave money and valuables home.      You may bring your cell phone.    Call the doctor if fever or illness should occur before your surgery.    Call [phone] to contact us here if needed.                            CLOTHES ON DAY OF SURGERY    SHOULDER surgery:  you must have a very oversized shirt.  Very, Very large.  You will probably have a large sling on with your arm strapped to your chest.  You will not be able to put the arm of the operated shoulder into a sleeve.  You can put the arm of the un-operated shoulder into the sleeve, but the shirt will need to be draped over the operated shoulder.       ARM or HAND surgery:  make sure that your sleeves are large and loose enough to pass over large dressings or cast.      BREAST or UNDERARM surgery:  wear a loose, button down shirt so that  you can dress without raising your arms over your head.    ABDOMINAL surgery:  wear loose, comfortable clothing.  Nothing tight around the abdomen.  NO JEANS    PENIS or SCROTAL surgery:  loose comfortable clothing.  Large sweat pants, pajama pants or a robe.  ABSOLUTELY NO JEANS      LEG or FOOT surgery:  wear large loose pants that are able to pass over any large dressings or casts.  You could also wear loose shorts or a skirt.

## 2025-04-22 NOTE — PROGRESS NOTES
History & Physical  Gynecology Problem Visit      SUBJECTIVE:     Chief Complaint: Pre-op Exam       History of Present Illness:  Ms. Perry a 45 y/o female who presents to clinic for AUB. Patient reports that she was scheduled for laparoscopic hysterectomy but cancelled day of due to her uncontrolled blood pressure. She reports that her bleeding is worsening, and she would like to proceed with hysterectomy. She reports that she has been bleeding constantly for months ([place]). She reports that she has been feeling      She reports experiencing dizziness and syncope, with two episodes of complete loss of consciousness. She experiences orthostatic dizziness particularly at work, accompanied by tachycardia.        TVUS 2025  FINDINGS:  Uterus measures 8.4 x 4.3 x 5.4 cm.  Multiple uterine fibroids measuring up to 2.2 cm.  Endometrial thickness is 3 mm.  Small volume complex fluid in the lower uterine segment.     Unremarkable ovaries.  No torsion.     Impression:     Small volume complex fluid in the lower uterine segment.      Review of patient's allergies indicates:   Allergen Reactions    Penicillins Rash    Black dye        Past Medical History:   Diagnosis Date    LAKE (generalized anxiety disorder)     GERD (gastroesophageal reflux disease)     HLD (hyperlipidemia)     Hypertension     Recurrent major depressive disorder, in remission 2022    Sciatica     T2DM (type 2 diabetes mellitus)      Past Surgical History:   Procedure Laterality Date    ABDOMINOPLASTY       SECTION      x 2    COLONOSCOPY N/A 2024    Procedure: COLONOSCOPY;  Surgeon: Kleber Tesfaye DO;  Location: [facility];  Service: Endoscopy;  Laterality: N/A;  ppm    REVISION OF ABDOMINAL SCAR      planned 21     OB History          3    Para   3    Term   0       3    AB   0    Living   3         SAB   0    IAB   0    Ectopic   0    Multiple   0    Live Births   3           Obstetric Comments    H/o PTL x 2, pt was on [place] with her second pregnancy             Family History   Problem Relation Name Age of Onset    Heart disease Mother          CAD age 38    Hypertension Mother  38        MI    Diabetes Mother      Hypertension Father      Colon cancer Paternal Grandfather  80     Social History[1]    Current Outpatient Medications   Medication Sig    amLODIPine (NORVASC) 10 MG tablet Take 1 tablet (10 mg total) by mouth once daily.    atorvastatin (LIPITOR) 20 MG tablet Take 1 tablet (20 mg total) by mouth once daily.    blood sugar diagnostic Strp To check BG 2 times daily, to use with insurance preferred meter    FLUoxetine 40 MG capsule Take one tablet by mouth daily    hydrALAZINE (APRESOLINE) 10 MG tablet Take 1 tablet (10 mg total) by mouth 3 (three) times daily. Take one tablet by mouth if blood pressure is higher than 160 for the systolic blood pressure and/or higher than 100 for the diastolic blood pressure.    hydrocortisone 2.5 % cream Apply topically 2 (two) times daily.    hydrOXYzine (ATARAX) 50 MG tablet TAKE 1 TABLET BY MOUTH AT BEDTIME AS NEEDED INSOMNIA    losartan (COZAAR) 100 MG tablet Take 1 tablet (100 mg total) by mouth once daily.    metFORMIN (GLUCOPHAGE) 1000 MG tablet Take 1 tablet (1,000 mg total) by mouth 2 (two) times daily with meals.    ondansetron (ZOFRAN-ODT) 4 MG TbDL Take 2 tablets (8 mg total) by mouth every 8 (eight) hours as needed (nausea).    spironolactone (ALDACTONE) 25 MG tablet Take 1 tablet (25 mg total) by mouth once daily.    tirzepatide 7.5 mg/0.5 mL PnIj Inject 7.5 mg into the skin every 7 days.    tranexamic acid (LYSTEDA) 650 mg tablet TAKE 2 TABLETS(1300 MG) BY MOUTH THREE TIMES DAILY FOR 5 DAYS    TRUE METRIX GLUCOSE METER Misc TAKE AS DIRECTED TWICE DAILY    TRUEPLUS LANCETS 33 gauge Misc 2 (two) times daily.     Current Facility-Administered Medications   Medication    famotidine tablet 20 mg         Review of Systems:  Review of Systems    Constitutional:  Negative for activity change and fatigue.   HENT:  Negative for congestion.    Respiratory:  Negative for shortness of breath.    Cardiovascular:  Negative for chest pain.   Gastrointestinal:  Negative for abdominal pain, nausea and vomiting.   Genitourinary:  Negative for menstrual problem, pelvic pain and vaginal discharge.   Musculoskeletal:  Negative for back pain.        OBJECTIVE:   Vitals:     There were no vitals filed for this visit.     Physical Exam:  Physical Exam  Vitals and nursing note reviewed. Exam conducted with a chaperone present.   Constitutional:       Appearance: She is well-developed.   Cardiovascular:      Rate and Rhythm: Normal rate.   Pulmonary:      Effort: Pulmonary effort is normal. No respiratory distress.   Chest:   Breasts:     Breasts are symmetrical.   Skin:     General: Skin is warm and dry.   Neurological:      Mental Status: She is alert and oriented to person, place, and time.       ASSESSMENT:       ICD-10-CM ICD-9-CM    1. Abnormal uterine bleeding (AUB)  N93.9 626.9 Full code      Vital signs      Insert peripheral IV      [place] to [place]      POCT glucose      Notify physician if BS > 180 for hysterectomy patients      Chlorhexidine (CHG) 2% Wipes      Notify Physician/Vital Signs Parameters Urine output less than 0.5mL/kg/hr (with indwelling catheter) or 30 mL/hr (without indwelling catheter) or blood glucose greater than 200 mg/dL      Notify physician      Notify Physician - Potential Need of Opioid Reversal      Diet NPO      Chlorohexidine Gluconate Bath      Place in Outpatient      Place COURTNEY hose      Place sequential compression device      Pregnancy, urine rapid      Full code      Insert peripheral IV      Notify Physician - Potential Need of Opioid Reversal      Diet NPO      Place COURTNEY hose      Place sequential compression device           Plan:      Shelli was seen today for pre-op exam.    Diagnoses and all orders for this  visit:    Abnormal uterine bleeding (AUB)  -     Full code; Standing  -     Vital signs; Standing  -     Insert peripheral IV; Standing  -     [place] to [place]; Standing  -     POCT glucose; Standing  -     Notify physician if BS > 180 for hysterectomy patients; Standing  -     Chlorhexidine (CHG) 2% Wipes; Standing  -     Notify Physician/Vital Signs Parameters Urine output less than 0.5mL/kg/hr (with indwelling catheter) or 30 mL/hr (without indwelling catheter) or blood glucose greater than 200 mg/dL; Standing  -     Notify physician; Standing  -     Notify Physician - Potential Need of Opioid Reversal; Standing  -     Diet NPO; Standing  -     Chlorohexidine Gluconate Bath; Standing  -     Place in Outpatient; Standing  -     Place COURTNEY hose; Standing  -     Place sequential compression device; Standing  -     Pregnancy, urine rapid; Standing  -     Full code  -     Insert peripheral IV  -     Notify Physician - Potential Need of Opioid Reversal  -     Diet NPO  -     Place COURTNEY hose  -     Place sequential compression device    Other orders  -     0.9% NaCl infusion  -     IP VTE LOW RISK PATIENT; Standing  -     mupirocin 2 % ointment  -     famotidine tablet 20 mg  -     Progressive Mobility Protocol (mobilize patient to their highest level of functioning at least twice daily); Standing  -     IP VTE LOW RISK PATIENT  -     Progressive Mobility Protocol (mobilize patient to their highest level of functioning at least twice daily)  -     Progressive Mobility Protocol (mobilize patient to their highest level of functioning at least twice daily)  -     Progressive Mobility Protocol (mobilize patient to their highest level of functioning at least twice daily)        Orders Placed This Encounter   Procedures    Diet NPO    Notify Physician - Potential Need of Opioid Reversal    Place COURTNEY hose    Place sequential compression device    Full code    Insert peripheral IV       Follow up in about 6 weeks (around  6/3/2025) for Postop F/U.           [1]   Social History  Tobacco Use    Smoking status: Never     Passive exposure: Never    Smokeless tobacco: Never   Substance Use Topics    Alcohol use: Yes     Comment: occasionally    Drug use: Yes     Types: Marijuana     Comment: occasional gummies

## 2025-04-22 NOTE — H&P (VIEW-ONLY)
History & Physical  Gynecology Problem Visit      SUBJECTIVE:     Chief Complaint: Pre-op Exam       History of Present Illness:  Ms. Perry a 45 y/o female who presents to clinic for AUB. Patient reports that she was scheduled for laparoscopic hysterectomy but cancelled day of due to her uncontrolled blood pressure. She reports that her bleeding is worsening, and she would like to proceed with hysterectomy. She reports that she has been bleeding constantly for months ([place]). She reports that she has been feeling      She reports experiencing dizziness and syncope, with two episodes of complete loss of consciousness. She experiences orthostatic dizziness particularly at work, accompanied by tachycardia.        TVUS 2025  FINDINGS:  Uterus measures 8.4 x 4.3 x 5.4 cm.  Multiple uterine fibroids measuring up to 2.2 cm.  Endometrial thickness is 3 mm.  Small volume complex fluid in the lower uterine segment.     Unremarkable ovaries.  No torsion.     Impression:     Small volume complex fluid in the lower uterine segment.      Review of patient's allergies indicates:   Allergen Reactions    Penicillins Rash    Black dye        Past Medical History:   Diagnosis Date    LAKE (generalized anxiety disorder)     GERD (gastroesophageal reflux disease)     HLD (hyperlipidemia)     Hypertension     Recurrent major depressive disorder, in remission 2022    Sciatica     T2DM (type 2 diabetes mellitus)      Past Surgical History:   Procedure Laterality Date    ABDOMINOPLASTY       SECTION      x 2    COLONOSCOPY N/A 2024    Procedure: COLONOSCOPY;  Surgeon: Kleber Tesfaye DO;  Location: [facility];  Service: Endoscopy;  Laterality: N/A;  ppm    REVISION OF ABDOMINAL SCAR      planned 22     OB History          3    Para   3    Term   0       3    AB   0    Living   3         SAB   0    IAB   0    Ectopic   0    Multiple   0    Live Births   3           Obstetric Comments    H/o PTL x 2, pt was on [place] with her second pregnancy             Family History   Problem Relation Name Age of Onset    Heart disease Mother          CAD age 38    Hypertension Mother  38        MI    Diabetes Mother      Hypertension Father      Colon cancer Paternal Grandfather  80     Social History[1]    Current Outpatient Medications   Medication Sig    amLODIPine (NORVASC) 10 MG tablet Take 1 tablet (10 mg total) by mouth once daily.    atorvastatin (LIPITOR) 20 MG tablet Take 1 tablet (20 mg total) by mouth once daily.    blood sugar diagnostic Strp To check BG 2 times daily, to use with insurance preferred meter    FLUoxetine 40 MG capsule Take one tablet by mouth daily    hydrALAZINE (APRESOLINE) 10 MG tablet Take 1 tablet (10 mg total) by mouth 3 (three) times daily. Take one tablet by mouth if blood pressure is higher than 160 for the systolic blood pressure and/or higher than 100 for the diastolic blood pressure.    hydrocortisone 2.5 % cream Apply topically 2 (two) times daily.    hydrOXYzine (ATARAX) 50 MG tablet TAKE 1 TABLET BY MOUTH AT BEDTIME AS NEEDED INSOMNIA    losartan (COZAAR) 100 MG tablet Take 1 tablet (100 mg total) by mouth once daily.    metFORMIN (GLUCOPHAGE) 1000 MG tablet Take 1 tablet (1,000 mg total) by mouth 2 (two) times daily with meals.    ondansetron (ZOFRAN-ODT) 4 MG TbDL Take 2 tablets (8 mg total) by mouth every 8 (eight) hours as needed (nausea).    spironolactone (ALDACTONE) 25 MG tablet Take 1 tablet (25 mg total) by mouth once daily.    tirzepatide 7.5 mg/0.5 mL PnIj Inject 7.5 mg into the skin every 7 days.    tranexamic acid (LYSTEDA) 650 mg tablet TAKE 2 TABLETS(1300 MG) BY MOUTH THREE TIMES DAILY FOR 5 DAYS    TRUE METRIX GLUCOSE METER Misc TAKE AS DIRECTED TWICE DAILY    TRUEPLUS LANCETS 33 gauge Misc 2 (two) times daily.     Current Facility-Administered Medications   Medication    famotidine tablet 20 mg         Review of Systems:  Review of Systems    Constitutional:  Negative for activity change and fatigue.   HENT:  Negative for congestion.    Respiratory:  Negative for shortness of breath.    Cardiovascular:  Negative for chest pain.   Gastrointestinal:  Negative for abdominal pain, nausea and vomiting.   Genitourinary:  Negative for menstrual problem, pelvic pain and vaginal discharge.   Musculoskeletal:  Negative for back pain.        OBJECTIVE:   Vitals:     There were no vitals filed for this visit.     Physical Exam:  Physical Exam  Vitals and nursing note reviewed. Exam conducted with a chaperone present.   Constitutional:       Appearance: She is well-developed.   Cardiovascular:      Rate and Rhythm: Normal rate.   Pulmonary:      Effort: Pulmonary effort is normal. No respiratory distress.   Chest:   Breasts:     Breasts are symmetrical.   Skin:     General: Skin is warm and dry.   Neurological:      Mental Status: She is alert and oriented to person, place, and time.       ASSESSMENT:       ICD-10-CM ICD-9-CM    1. Abnormal uterine bleeding (AUB)  N93.9 626.9 Full code      Vital signs      Insert peripheral IV      [place] to [place]      POCT glucose      Notify physician if BS > 180 for hysterectomy patients      Chlorhexidine (CHG) 2% Wipes      Notify Physician/Vital Signs Parameters Urine output less than 0.5mL/kg/hr (with indwelling catheter) or 30 mL/hr (without indwelling catheter) or blood glucose greater than 200 mg/dL      Notify physician      Notify Physician - Potential Need of Opioid Reversal      Diet NPO      Chlorohexidine Gluconate Bath      Place in Outpatient      Place COURTNEY hose      Place sequential compression device      Pregnancy, urine rapid      Full code      Insert peripheral IV      Notify Physician - Potential Need of Opioid Reversal      Diet NPO      Place COURTNEY hose      Place sequential compression device           Plan:      Shelli was seen today for pre-op exam.    Diagnoses and all orders for this  visit:    Abnormal uterine bleeding (AUB)  -     Full code; Standing  -     Vital signs; Standing  -     Insert peripheral IV; Standing  -     [place] to [place]; Standing  -     POCT glucose; Standing  -     Notify physician if BS > 180 for hysterectomy patients; Standing  -     Chlorhexidine (CHG) 2% Wipes; Standing  -     Notify Physician/Vital Signs Parameters Urine output less than 0.5mL/kg/hr (with indwelling catheter) or 30 mL/hr (without indwelling catheter) or blood glucose greater than 200 mg/dL; Standing  -     Notify physician; Standing  -     Notify Physician - Potential Need of Opioid Reversal; Standing  -     Diet NPO; Standing  -     Chlorohexidine Gluconate Bath; Standing  -     Place in Outpatient; Standing  -     Place COURTNEY hose; Standing  -     Place sequential compression device; Standing  -     Pregnancy, urine rapid; Standing  -     Full code  -     Insert peripheral IV  -     Notify Physician - Potential Need of Opioid Reversal  -     Diet NPO  -     Place COURTNEY hose  -     Place sequential compression device    Other orders  -     0.9% NaCl infusion  -     IP VTE LOW RISK PATIENT; Standing  -     mupirocin 2 % ointment  -     famotidine tablet 20 mg  -     Progressive Mobility Protocol (mobilize patient to their highest level of functioning at least twice daily); Standing  -     IP VTE LOW RISK PATIENT  -     Progressive Mobility Protocol (mobilize patient to their highest level of functioning at least twice daily)  -     Progressive Mobility Protocol (mobilize patient to their highest level of functioning at least twice daily)  -     Progressive Mobility Protocol (mobilize patient to their highest level of functioning at least twice daily)        Orders Placed This Encounter   Procedures    Diet NPO    Notify Physician - Potential Need of Opioid Reversal    Place COURTNEY hose    Place sequential compression device    Full code    Insert peripheral IV       Follow up in about 6 weeks (around  6/3/2025) for Postop F/U.           [1]   Social History  Tobacco Use    Smoking status: Never     Passive exposure: Never    Smokeless tobacco: Never   Substance Use Topics    Alcohol use: Yes     Comment: occasionally    Drug use: Yes     Types: Marijuana     Comment: occasional gummies

## 2025-04-23 ENCOUNTER — OFFICE VISIT (OUTPATIENT)
Dept: FAMILY MEDICINE | Facility: CLINIC | Age: 45
End: 2025-04-23
Payer: COMMERCIAL

## 2025-04-23 ENCOUNTER — TELEPHONE (OUTPATIENT)
Dept: SURGERY | Facility: HOSPITAL | Age: 45
End: 2025-04-23
Payer: COMMERCIAL

## 2025-04-23 VITALS
BODY MASS INDEX: 28 KG/M2 | HEART RATE: 81 BPM | DIASTOLIC BLOOD PRESSURE: 92 MMHG | OXYGEN SATURATION: 98 % | HEIGHT: 64 IN | SYSTOLIC BLOOD PRESSURE: 148 MMHG | RESPIRATION RATE: 18 BRPM | WEIGHT: 164 LBS

## 2025-04-23 DIAGNOSIS — E11.59 HYPERTENSION ASSOCIATED WITH DIABETES: ICD-10-CM

## 2025-04-23 DIAGNOSIS — F33.1 MODERATE EPISODE OF RECURRENT MAJOR DEPRESSIVE DISORDER: ICD-10-CM

## 2025-04-23 DIAGNOSIS — F41.0 GENERALIZED ANXIETY DISORDER WITH PANIC ATTACKS: ICD-10-CM

## 2025-04-23 DIAGNOSIS — F33.0 MILD EPISODE OF RECURRENT MAJOR DEPRESSIVE DISORDER: ICD-10-CM

## 2025-04-23 DIAGNOSIS — E11.9 CONTROLLED TYPE 2 DIABETES MELLITUS WITHOUT COMPLICATION, WITHOUT LONG-TERM CURRENT USE OF INSULIN: ICD-10-CM

## 2025-04-23 DIAGNOSIS — Z00.00 ROUTINE GENERAL MEDICAL EXAMINATION AT HEALTH CARE FACILITY: Primary | ICD-10-CM

## 2025-04-23 DIAGNOSIS — I15.2 HYPERTENSION ASSOCIATED WITH DIABETES: ICD-10-CM

## 2025-04-23 DIAGNOSIS — F41.1 GENERALIZED ANXIETY DISORDER WITH PANIC ATTACKS: ICD-10-CM

## 2025-04-23 PROCEDURE — 99999 PR PBB SHADOW E&M-EST. PATIENT-LVL V: CPT | Mod: PBBFAC,,,

## 2025-04-23 RX ORDER — FLUOXETINE HYDROCHLORIDE 40 MG/1
CAPSULE ORAL
Qty: 90 CAPSULE | Refills: 3 | Status: SHIPPED | OUTPATIENT
Start: 2025-04-23

## 2025-04-23 RX ORDER — HYDRALAZINE HYDROCHLORIDE 50 MG/1
50 TABLET, FILM COATED ORAL 3 TIMES DAILY
Qty: 270 TABLET | Refills: 3 | Status: SHIPPED | OUTPATIENT
Start: 2025-04-23 | End: 2026-04-23

## 2025-04-23 NOTE — PROGRESS NOTES
"Subjective:       Patient ID: Shelli Iverson is a 45 y.o. female.    Chief Complaint: Follow-up (6 month f/u)    Follow-up        History of Present Illness    CHIEF COMPLAINT:  Patient presents today for pre-operative evaluation and medication refills.    CURRENT MEDICATIONS:  She is currently taking fluoxetine for mood management, tirzepatide for diabetes, spironolactone 25 mg daily and hydralazine 10 mg for blood pressure management, and Lipitor for cholesterol management.    PSYCHIATRIC:  She reports fluoxetine is effective in managing her symptoms, though she feels there could be room for improvement in symptom control.    DIABETES:  She reports good glycemic control on tirzepatide and denies any history of diabetic retinopathy.      ROS:  General: positive fatigue          Past Medical History:   Diagnosis Date    LAKE (generalized anxiety disorder)     GERD (gastroesophageal reflux disease)     HLD (hyperlipidemia)     Hypertension     Recurrent major depressive disorder, in remission 11/22/2022    Sciatica     T2DM (type 2 diabetes mellitus)        Review of patient's allergies indicates:   Allergen Reactions    Penicillins Rash    Black dye        Current Medications[1]    Review of Systems    Objective:      BP (!) 148/92 (BP Location: Right arm, Patient Position: Sitting)   Pulse 81   Resp 18   Ht 5' 4" (1.626 m)   Wt 74.4 kg (164 lb 0.4 oz)   SpO2 98%   BMI 28.15 kg/m²   Physical Exam  Vitals reviewed.   Constitutional:       General: She is not in acute distress.     Appearance: Normal appearance. She is normal weight. She is not ill-appearing, toxic-appearing or diaphoretic.   HENT:      Head: Normocephalic.      Right Ear: External ear normal.      Left Ear: External ear normal.      Nose: Nose normal. No congestion or rhinorrhea.      Mouth/Throat:      Mouth: Mucous membranes are moist.      Pharynx: Oropharynx is clear.   Eyes:      General: No scleral icterus.        Right eye: No discharge. "         Left eye: No discharge.      Extraocular Movements: Extraocular movements intact.      Conjunctiva/sclera: Conjunctivae normal.   Cardiovascular:      Rate and Rhythm: Normal rate and regular rhythm.      Pulses: Normal pulses.      Heart sounds: Normal heart sounds. No murmur heard.     No friction rub. No gallop.   Pulmonary:      Effort: Pulmonary effort is normal. No respiratory distress.      Breath sounds: Normal breath sounds. No wheezing, rhonchi or rales.   Chest:      Chest wall: No tenderness.   Musculoskeletal:         General: No swelling, tenderness or deformity. Normal range of motion.      Cervical back: Normal range of motion.      Right lower leg: No edema.      Left lower leg: No edema.   Skin:     General: Skin is warm and dry.      Capillary Refill: Capillary refill takes less than 2 seconds.      Coloration: Skin is not jaundiced.      Findings: No bruising, erythema, lesion or rash.   Neurological:      Mental Status: She is alert and oriented to person, place, and time.      Gait: Gait normal.   Psychiatric:         Mood and Affect: Mood normal.         Behavior: Behavior normal.         Thought Content: Thought content normal.         Judgment: Judgment normal.             Assessment:       1. Routine general medical examination at health care facility    2. Hypertension associated with diabetes    3. Moderate episode of recurrent major depressive disorder    4. Mild episode of recurrent major depressive disorder    5. Generalized anxiety disorder with panic attacks    6. Controlled type 2 diabetes mellitus without complication, without long-term current use of insulin          Assessment & Plan    IMPRESSION:  - Reviewed labs for surgery clearance; hemoglobin and RBC count lower than previous but reasonable for surgery.  - Noted significant improvement in A1c, indicating effective glucose control with tirzepatide.  - Cholesterol management adequately addressed with Lipitor.  -  Increased hydralazine from 10 mg to 50 mg as current dose ineffective due to persistently elevated BP readings.  - Evaluated post-op recovery expectations, anticipating good mobility within a week.    PLAN SUMMARY:  - Increase hydralazine dosage from 10mg to 50mg  - Continue Lipitor for cholesterol management  - Continue tirzepatide for blood sugar control  - Continue fluoxetine for depression management  - Order eye camera photo  - Recommend ophthalmological exam for diabetic retinopathy screening  - Explain role of spironolactone in decreasing testosterone levels  - Refill fluoxetine prescription  - Follow-up in 2 weeks for blood pressure check with nurse  - Instruct patient to record home blood pressure readings for nurse visit    MODERATE EPISODE OF RECURRENT MAJOR DEPRESSIVE DISORDER:  - Continue fluoxetine for depression management.  - Patient reports the medication is effective but could work better.  - Acknowledged that medication is not the only factor in managing depression, emphasizing the importance of lifestyle factors.  - Will refill fluoxetine prescription.    TYPE 2 DIABETES MELLITUS:  - Continue tirzepatide for blood sugar control.  - Patient's A1c has improved significantly, indicating the medication is working well.  - Order eye camera photo and recommend ophthalmological exam to screen for diabetic retinopathy.    HYPERTENSION:  - Blood pressure is elevated, indicating current medication dosage is ineffective.  - Increase hydralazine dosage from 10mg to 50mg.  - Instruct patient to monitor and record blood pressure at home for a few days and bring these readings to the upcoming nurse visit.    HYPERLIPIDEMIA:  - Continue Lipitor for cholesterol management.  - Last cholesterol check was in June or July, showing reasonable control with current medication.  - Continue monitoring cholesterol levels.    GENERAL INSTRUCTIONS AND FOLLOW-UP:  - Explain the role of spironolactone in decreasing  testosterone levels.  - Discuss the importance of listening to one's body and avoiding overexertion during post-op recovery.  - Advise patient to contact the office for any side effects, questions, or concerns.  - Schedule follow up in 2 weeks for blood pressure check with nurse.          Plan:       Routine general medical examination at health care facility    Hypertension associated with diabetes  -     hydrALAZINE (APRESOLINE) 50 MG tablet; Take 1 tablet (50 mg total) by mouth 3 (three) times daily.  Dispense: 270 tablet; Refill: 3    Moderate episode of recurrent major depressive disorder    Mild episode of recurrent major depressive disorder  -     FLUoxetine 40 MG capsule; Take one tablet by mouth daily  Dispense: 90 capsule; Refill: 3    Generalized anxiety disorder with panic attacks  -     FLUoxetine 40 MG capsule; Take one tablet by mouth daily  Dispense: 90 capsule; Refill: 3    Controlled type 2 diabetes mellitus without complication, without long-term current use of insulin  -     Diabetic Eye Screening Photo; Future  -     Hemoglobin A1C; Future; Expected date: 04/23/2025  -     Microalbumin/Creatinine Ratio, Urine; Future; Expected date: 04/23/2025  -     Lipid Panel; Future; Expected date: 04/23/2025                   Ha Baum PA-C  Family Medicine Physician Assistant       Future Appointments       Date Provider Specialty Appt Notes    5/7/2025  Family Medicine BP check- [facility]    5/7/2025  Family Medicine eye cam    5/7/2025  Lab Controlled type 2 diabetes mellitus without complication, without long-term current use of insulin    5/7/2025  Lab Controlled type 2 diabetes mellitus without complication, without long-term current use of insulin    5/12/2025 Satya Watson MD Cardiology 6 week f/u    10/16/2025 Shayna Rock MD Family Medicine annual               I spent a total of 30 minutes on the day of the visit.This includes face to face time and non-face to face time preparing to  see the patient (eg, review of tests), obtaining and/or reviewing separately obtained history, documenting clinical information in the electronic or other health record, independently interpreting results and communicating results to the patient/family/caregiver, or care coordinator.      We have addressed [4] Moderate: 1 or more chronic illnesses with exacerbation, progression, or side effects of treatment / 2 or more stable chronic illnesses / 1 undiagnosed new problem with uncertain prognosis / 1 acute illness with systemic symptoms / 1 acute complicated injury  The complexity of the data reviewed and analyzed for this visit was [3] Limited (Reviewed prior external note, ordered unique testing or reviewed the results of each unique test)   The risk of complications and/or morbidity or mortality are [4] Moderate risk (I.e. prescription drug management / decision regarding minor surgery with identified pt or procedure risk factors / decision regarding elective major surgery without identified pt or procedure risk factors / diagnosis or treatment significantly limited by social determinants of health)   The level of Medical Decision Making for this visit is [4] Moderate     This note may have been generated with the assistance of ambient listening technology. If used, verbal consent was obtained by the patient and accompanying visitor(s) for the recording of patient appointment to facilitate this note. I attest to having reviewed and edited the generated note for accuracy, though some syntax or spelling errors may persist. Please contact the author of this note for any clarification.         [1]   Current Outpatient Medications:     amLODIPine (NORVASC) 10 MG tablet, Take 1 tablet (10 mg total) by mouth once daily., Disp: 90 tablet, Rfl: 3    atorvastatin (LIPITOR) 20 MG tablet, Take 1 tablet (20 mg total) by mouth once daily., Disp: 90 tablet, Rfl: 3    blood sugar diagnostic Strp, To check BG 2 times daily, to use  with insurance preferred meter, Disp: 100 each, Rfl: 2    hydrocortisone 2.5 % cream, Apply topically 2 (two) times daily., Disp: 28 g, Rfl: 0    hydrOXYzine (ATARAX) 50 MG tablet, TAKE 1 TABLET BY MOUTH AT BEDTIME AS NEEDED INSOMNIA, Disp: , Rfl:     losartan (COZAAR) 100 MG tablet, Take 1 tablet (100 mg total) by mouth once daily., Disp: 90 tablet, Rfl: 3    metFORMIN (GLUCOPHAGE) 1000 MG tablet, Take 1 tablet (1,000 mg total) by mouth 2 (two) times daily with meals., Disp: 180 tablet, Rfl: 3    ondansetron (ZOFRAN-ODT) 4 MG TbDL, Take 2 tablets (8 mg total) by mouth every 8 (eight) hours as needed (nausea)., Disp: 60 tablet, Rfl: 2    spironolactone (ALDACTONE) 25 MG tablet, Take 1 tablet (25 mg total) by mouth once daily., Disp: 30 tablet, Rfl: 5    tirzepatide 7.5 mg/0.5 mL PnIj, Inject 7.5 mg into the skin every 7 days., Disp: 4 Pen, Rfl: 6    tranexamic acid (LYSTEDA) 650 mg tablet, TAKE 2 TABLETS(1300 MG) BY MOUTH THREE TIMES DAILY FOR 5 DAYS, Disp: 30 tablet, Rfl: 0    TRUE METRIX GLUCOSE METER Misc, TAKE AS DIRECTED TWICE DAILY, Disp: , Rfl:     TRUEPLUS LANCETS 33 gauge Misc, 2 (two) times daily., Disp: , Rfl:     FLUoxetine 40 MG capsule, Take one tablet by mouth daily, Disp: 90 capsule, Rfl: 3    hydrALAZINE (APRESOLINE) 50 MG tablet, Take 1 tablet (50 mg total) by mouth 3 (three) times daily., Disp: 270 tablet, Rfl: 3    Current Facility-Administered Medications:     famotidine tablet 20 mg, 20 mg, Oral, On Call Procedure, Isaiah Shafer MD

## 2025-04-24 ENCOUNTER — ANESTHESIA (OUTPATIENT)
Dept: SURGERY | Facility: HOSPITAL | Age: 45
End: 2025-04-24
Payer: COMMERCIAL

## 2025-04-24 ENCOUNTER — HOSPITAL ENCOUNTER (OUTPATIENT)
Facility: HOSPITAL | Age: 45
Discharge: HOME | End: 2025-04-24
Attending: OBSTETRICS & GYNECOLOGY | Admitting: OBSTETRICS & GYNECOLOGY
Payer: COMMERCIAL

## 2025-04-24 VITALS
SYSTOLIC BLOOD PRESSURE: 145 MMHG | HEART RATE: 68 BPM | OXYGEN SATURATION: 95 % | BODY MASS INDEX: 26.23 KG/M2 | TEMPERATURE: 98 F | WEIGHT: 152.81 LBS | RESPIRATION RATE: 16 BRPM | DIASTOLIC BLOOD PRESSURE: 96 MMHG

## 2025-04-24 DIAGNOSIS — Z90.710 STATUS POST LAPAROSCOPIC HYSTERECTOMY: Primary | ICD-10-CM

## 2025-04-24 DIAGNOSIS — N93.9 ABNORMAL UTERINE BLEEDING (AUB): ICD-10-CM

## 2025-04-24 LAB — POCT GLUCOSE: 132 MG/DL (ref 70–110)

## 2025-04-24 PROCEDURE — 37000008 HC ANESTHESIA 1ST 15 MINUTES: Performed by: OBSTETRICS & GYNECOLOGY

## 2025-04-24 PROCEDURE — 71000016 HC POSTOP RECOV ADDL HR: Performed by: OBSTETRICS & GYNECOLOGY

## 2025-04-24 PROCEDURE — 25000003 PHARM REV CODE 250: Performed by: OBSTETRICS & GYNECOLOGY

## 2025-04-24 PROCEDURE — 36000711: Performed by: OBSTETRICS & GYNECOLOGY

## 2025-04-24 PROCEDURE — 63600175 PHARM REV CODE 636 W HCPCS: Performed by: OBSTETRICS & GYNECOLOGY

## 2025-04-24 PROCEDURE — 58571 TLH W/T/O 250 G OR LESS: CPT | Mod: AS,,, | Performed by: PHYSICIAN ASSISTANT

## 2025-04-24 PROCEDURE — 88307 TISSUE EXAM BY PATHOLOGIST: CPT | Mod: TC | Performed by: OBSTETRICS & GYNECOLOGY

## 2025-04-24 PROCEDURE — 25000003 PHARM REV CODE 250: Performed by: NURSE ANESTHETIST, CERTIFIED REGISTERED

## 2025-04-24 PROCEDURE — 63600175 PHARM REV CODE 636 W HCPCS: Performed by: ANESTHESIOLOGY

## 2025-04-24 PROCEDURE — 71000033 HC RECOVERY, INTIAL HOUR: Performed by: OBSTETRICS & GYNECOLOGY

## 2025-04-24 PROCEDURE — 37000009 HC ANESTHESIA EA ADD 15 MINS: Performed by: OBSTETRICS & GYNECOLOGY

## 2025-04-24 PROCEDURE — 27201423 OPTIME MED/SURG SUP & DEVICES STERILE SUPPLY: Performed by: OBSTETRICS & GYNECOLOGY

## 2025-04-24 PROCEDURE — 71000015 HC POSTOP RECOV 1ST HR: Performed by: OBSTETRICS & GYNECOLOGY

## 2025-04-24 PROCEDURE — 25000003 PHARM REV CODE 250: Performed by: ANESTHESIOLOGY

## 2025-04-24 PROCEDURE — 58571 TLH W/T/O 250 G OR LESS: CPT | Mod: ,,, | Performed by: OBSTETRICS & GYNECOLOGY

## 2025-04-24 PROCEDURE — 82962 GLUCOSE BLOOD TEST: CPT | Performed by: OBSTETRICS & GYNECOLOGY

## 2025-04-24 PROCEDURE — C2628 CATHETER, OCCLUSION: HCPCS | Performed by: OBSTETRICS & GYNECOLOGY

## 2025-04-24 PROCEDURE — 63600175 PHARM REV CODE 636 W HCPCS: Performed by: NURSE ANESTHETIST, CERTIFIED REGISTERED

## 2025-04-24 PROCEDURE — 36000710: Performed by: OBSTETRICS & GYNECOLOGY

## 2025-04-24 RX ORDER — ONDANSETRON HYDROCHLORIDE 2 MG/ML
4 INJECTION, SOLUTION INTRAVENOUS DAILY PRN
Status: DISCONTINUED | OUTPATIENT
Start: 2025-04-24 | End: 2025-04-24

## 2025-04-24 RX ORDER — GLUCAGON 1 MG
1 KIT INJECTION
Status: DISCONTINUED | OUTPATIENT
Start: 2025-04-24 | End: 2025-04-24 | Stop reason: HOSPADM

## 2025-04-24 RX ORDER — SODIUM CHLORIDE, SODIUM LACTATE, POTASSIUM CHLORIDE, CALCIUM CHLORIDE 600; 310; 30; 20 MG/100ML; MG/100ML; MG/100ML; MG/100ML
INJECTION, SOLUTION INTRAVENOUS CONTINUOUS
Status: DISCONTINUED | OUTPATIENT
Start: 2025-04-24 | End: 2025-04-24 | Stop reason: HOSPADM

## 2025-04-24 RX ORDER — CLINDAMYCIN PHOSPHATE 900 MG/50ML
900 INJECTION, SOLUTION INTRAVENOUS
Status: COMPLETED | OUTPATIENT
Start: 2025-04-24 | End: 2025-04-24

## 2025-04-24 RX ORDER — HYDROCODONE BITARTRATE AND ACETAMINOPHEN 5; 325 MG/1; MG/1
1 TABLET ORAL EVERY 4 HOURS PRN
Status: DISCONTINUED | OUTPATIENT
Start: 2025-04-24 | End: 2025-04-24 | Stop reason: HOSPADM

## 2025-04-24 RX ORDER — ROCURONIUM BROMIDE 10 MG/ML
INJECTION, SOLUTION INTRAVENOUS
Status: DISCONTINUED | OUTPATIENT
Start: 2025-04-24 | End: 2025-04-24

## 2025-04-24 RX ORDER — HYDROCODONE BITARTRATE AND ACETAMINOPHEN 10; 325 MG/1; MG/1
1 TABLET ORAL EVERY 4 HOURS PRN
Status: DISCONTINUED | OUTPATIENT
Start: 2025-04-24 | End: 2025-04-24 | Stop reason: HOSPADM

## 2025-04-24 RX ORDER — LIDOCAINE HYDROCHLORIDE 10 MG/ML
1 INJECTION, SOLUTION EPIDURAL; INFILTRATION; INTRACAUDAL; PERINEURAL ONCE
Status: DISCONTINUED | OUTPATIENT
Start: 2025-04-24 | End: 2025-04-24 | Stop reason: HOSPADM

## 2025-04-24 RX ORDER — HYDROMORPHONE HYDROCHLORIDE 2 MG/ML
0.2 INJECTION, SOLUTION INTRAMUSCULAR; INTRAVENOUS; SUBCUTANEOUS EVERY 5 MIN PRN
Status: DISCONTINUED | OUTPATIENT
Start: 2025-04-24 | End: 2025-04-24 | Stop reason: HOSPADM

## 2025-04-24 RX ORDER — DIPHENHYDRAMINE HCL 25 MG
25 CAPSULE ORAL EVERY 4 HOURS PRN
Status: DISCONTINUED | OUTPATIENT
Start: 2025-04-24 | End: 2025-04-24 | Stop reason: HOSPADM

## 2025-04-24 RX ORDER — DIPHENHYDRAMINE HYDROCHLORIDE 50 MG/ML
25 INJECTION, SOLUTION INTRAMUSCULAR; INTRAVENOUS EVERY 4 HOURS PRN
Status: DISCONTINUED | OUTPATIENT
Start: 2025-04-24 | End: 2025-04-24 | Stop reason: HOSPADM

## 2025-04-24 RX ORDER — FENTANYL CITRATE 50 UG/ML
INJECTION, SOLUTION INTRAMUSCULAR; INTRAVENOUS
Status: DISCONTINUED | OUTPATIENT
Start: 2025-04-24 | End: 2025-04-24

## 2025-04-24 RX ORDER — SCOPOLAMINE 1 MG/3D
1 PATCH, EXTENDED RELEASE TRANSDERMAL
Status: DISCONTINUED | OUTPATIENT
Start: 2025-04-24 | End: 2025-04-24 | Stop reason: HOSPADM

## 2025-04-24 RX ORDER — PROPOFOL 10 MG/ML
VIAL (ML) INTRAVENOUS
Status: DISCONTINUED | OUTPATIENT
Start: 2025-04-24 | End: 2025-04-24

## 2025-04-24 RX ORDER — DEXAMETHASONE SODIUM PHOSPHATE 4 MG/ML
INJECTION, SOLUTION INTRA-ARTICULAR; INTRALESIONAL; INTRAMUSCULAR; INTRAVENOUS; SOFT TISSUE
Status: DISCONTINUED | OUTPATIENT
Start: 2025-04-24 | End: 2025-04-24

## 2025-04-24 RX ORDER — ACETAMINOPHEN 500 MG
1000 TABLET ORAL
Status: COMPLETED | OUTPATIENT
Start: 2025-04-24 | End: 2025-04-24

## 2025-04-24 RX ORDER — MUPIROCIN 20 MG/G
OINTMENT TOPICAL
Status: DISCONTINUED | OUTPATIENT
Start: 2025-04-24 | End: 2025-04-24 | Stop reason: HOSPADM

## 2025-04-24 RX ORDER — HYDROCODONE BITARTRATE AND ACETAMINOPHEN 5; 325 MG/1; MG/1
1 TABLET ORAL EVERY 6 HOURS PRN
Qty: 30 TABLET | Refills: 0 | Status: SHIPPED | OUTPATIENT
Start: 2025-04-24

## 2025-04-24 RX ORDER — MIDAZOLAM HYDROCHLORIDE 1 MG/ML
INJECTION INTRAMUSCULAR; INTRAVENOUS
Status: DISCONTINUED | OUTPATIENT
Start: 2025-04-24 | End: 2025-04-24

## 2025-04-24 RX ORDER — ONDANSETRON HYDROCHLORIDE 2 MG/ML
INJECTION, SOLUTION INTRAVENOUS
Status: DISCONTINUED | OUTPATIENT
Start: 2025-04-24 | End: 2025-04-24

## 2025-04-24 RX ORDER — SODIUM CHLORIDE 9 MG/ML
INJECTION, SOLUTION INTRAVENOUS CONTINUOUS
Status: DISCONTINUED | OUTPATIENT
Start: 2025-04-24 | End: 2025-04-24 | Stop reason: HOSPADM

## 2025-04-24 RX ORDER — ONDANSETRON 4 MG/1
8 TABLET, ORALLY DISINTEGRATING ORAL EVERY 8 HOURS PRN
Status: DISCONTINUED | OUTPATIENT
Start: 2025-04-24 | End: 2025-04-24 | Stop reason: HOSPADM

## 2025-04-24 RX ORDER — LIDOCAINE HYDROCHLORIDE 20 MG/ML
INJECTION INTRAVENOUS
Status: DISCONTINUED | OUTPATIENT
Start: 2025-04-24 | End: 2025-04-24

## 2025-04-24 RX ORDER — IBUPROFEN 800 MG/1
800 TABLET ORAL EVERY 8 HOURS PRN
Qty: 40 TABLET | Refills: 0 | Status: SHIPPED | OUTPATIENT
Start: 2025-04-24

## 2025-04-24 RX ORDER — IBUPROFEN 600 MG/1
600 TABLET ORAL EVERY 6 HOURS PRN
Status: DISCONTINUED | OUTPATIENT
Start: 2025-04-24 | End: 2025-04-24 | Stop reason: HOSPADM

## 2025-04-24 RX ORDER — KETOROLAC TROMETHAMINE 30 MG/ML
INJECTION, SOLUTION INTRAMUSCULAR; INTRAVENOUS
Status: DISCONTINUED | OUTPATIENT
Start: 2025-04-24 | End: 2025-04-24

## 2025-04-24 RX ADMIN — CLINDAMYCIN PHOSPHATE 900 MG: 18 INJECTION, SOLUTION INTRAVENOUS at 07:04

## 2025-04-24 RX ADMIN — PROPOFOL 200 MG: 10 INJECTION, EMULSION INTRAVENOUS at 07:04

## 2025-04-24 RX ADMIN — ONDANSETRON 4 MG: 2 INJECTION, SOLUTION INTRAMUSCULAR; INTRAVENOUS at 09:04

## 2025-04-24 RX ADMIN — DEXAMETHASONE SODIUM PHOSPHATE 4 MG: 4 INJECTION, SOLUTION INTRAMUSCULAR; INTRAVENOUS at 07:04

## 2025-04-24 RX ADMIN — PROPOFOL 30 MG: 10 INJECTION, EMULSION INTRAVENOUS at 09:04

## 2025-04-24 RX ADMIN — ACETAMINOPHEN 1000 MG: 500 TABLET ORAL at 06:04

## 2025-04-24 RX ADMIN — MIDAZOLAM HYDROCHLORIDE 2 MG: 1 INJECTION INTRAMUSCULAR; INTRAVENOUS at 07:04

## 2025-04-24 RX ADMIN — SODIUM CHLORIDE, SODIUM LACTATE, POTASSIUM CHLORIDE, AND CALCIUM CHLORIDE: .6; .31; .03; .02 INJECTION, SOLUTION INTRAVENOUS at 08:04

## 2025-04-24 RX ADMIN — SODIUM CHLORIDE, SODIUM LACTATE, POTASSIUM CHLORIDE, AND CALCIUM CHLORIDE: .6; .31; .03; .02 INJECTION, SOLUTION INTRAVENOUS at 07:04

## 2025-04-24 RX ADMIN — HYDROCODONE BITARTRATE AND ACETAMINOPHEN 1 TABLET: 10; 325 TABLET ORAL at 11:04

## 2025-04-24 RX ADMIN — SODIUM CHLORIDE 302.5 MG: 9 INJECTION, SOLUTION INTRAVENOUS at 07:04

## 2025-04-24 RX ADMIN — LIDOCAINE HYDROCHLORIDE 100 MG: 20 INJECTION, SOLUTION INTRAVENOUS at 07:04

## 2025-04-24 RX ADMIN — FENTANYL CITRATE 50 MCG: 50 INJECTION, SOLUTION INTRAMUSCULAR; INTRAVENOUS at 09:04

## 2025-04-24 RX ADMIN — FENTANYL CITRATE 50 MCG: 50 INJECTION, SOLUTION INTRAMUSCULAR; INTRAVENOUS at 08:04

## 2025-04-24 RX ADMIN — KETOROLAC TROMETHAMINE 30 MG: 30 INJECTION, SOLUTION INTRAMUSCULAR at 09:04

## 2025-04-24 RX ADMIN — ROCURONIUM BROMIDE 50 MG: 10 INJECTION INTRAVENOUS at 07:04

## 2025-04-24 RX ADMIN — MUPIROCIN: 20 OINTMENT TOPICAL at 06:04

## 2025-04-24 RX ADMIN — FENTANYL CITRATE 100 MCG: 50 INJECTION, SOLUTION INTRAMUSCULAR; INTRAVENOUS at 07:04

## 2025-04-24 RX ADMIN — SUGAMMADEX 200 MG: 100 INJECTION, SOLUTION INTRAVENOUS at 09:04

## 2025-04-24 NOTE — INTERVAL H&P NOTE
The patient has been examined and the H&P has been reviewed:    I concur with the findings and no changes have occurred since H&P was written.    Surgery risks, benefits and alternative options discussed and understood by patient/family. Procedure was confirmed with Ms. Iverson. She confirmed TLH/BS. Patient denies any questions or concerns at this time. Consents in chart and Staff notified. Patient can proceed to OR.            There are no hospital problems to display for this patient.

## 2025-04-24 NOTE — ANESTHESIA POSTPROCEDURE EVALUATION
Anesthesia Post Evaluation    Patient: Shelli Iverson    Procedure(s) Performed: Procedure(s) (LRB):  HYSTERECTOMY,TOTAL,LAPAROSCOPIC,WITH SALPINGECTOMY,CYSTOSCOPY (N/A)    Final Anesthesia Type: general      Patient location during evaluation: [facility]  Patient participation: Yes- Able to Participate  Level of consciousness: awake and alert  Post-procedure vital signs: reviewed and stable  Pain management: adequate  Airway patency: patent    PONV status at discharge: No PONV  Anesthetic complications: no      Cardiovascular status: hemodynamically stable and blood pressure returned to baseline  Respiratory status: room air, spontaneous ventilation and unassisted  Hydration status: euvolemic  Follow-up not needed.              Vitals Value Taken Time   /105 04/24/25 11:00   Temp 36.6 °C (97.9 °F) 04/24/25 11:00   Pulse 66 04/24/25 11:00   Resp 18 04/24/25 11:30   SpO2 100 % 04/24/25 11:00         Event Time   Out of Recovery 10:56:10         Pain/Eros Score: Pain Rating Prior to Med Admin: 8 (4/24/2025 11:30 AM)  Pain Rating Post Med Admin: 0 (4/24/2025  7:10 AM)  Eros Score: 10 (4/24/2025 11:00 AM)

## 2025-04-24 NOTE — ANESTHESIA PROCEDURE NOTES
Intubation    Date/Time: 4/24/2025 7:24 AM    Performed by: Ned Holguin CRNA  Authorized by: Shady Moore MD    Intubation:     Induction:  Intravenous    Intubated:  Postinduction    Mask Ventilation:  Easy mask    Attempts:  1    Attempted By:  MICHELA    Blade:  Que[place] 3    Laryngeal View Grade: Grade IIA - cords partially seen      Difficult Airway Encountered?: No      Complications:  None    Airway Device:  Oral endotracheal tube    Airway Device Size:  7.0    Style/Cuff Inflation:  Cuffed    Inflation Amount (mL):  5    Tube secured:  18    Secured at:  The teeth    Placement Verified By:  Capnometry    Complicating Factors:  None    Findings Post-Intubation:  BS equal bilateral and atraumatic/condition of teeth unchanged

## 2025-04-24 NOTE — PLAN OF CARE
Pt verbalized a readiness to go home. VSS. Written and verbal discharge instructions given. Pt aware to call office for follow-up appt.

## 2025-04-24 NOTE — DISCHARGE INSTRUCTIONS
BATHING:                   You may shower after your dressing is removed, but no tub baths, hot tubs, saunas or swimming until you see the doctor.    DRESSING:  Remove your bandage in 24 hours. If there are skin tapes over the incision, leave them in place. They will start to come off in 5-7 days.    ACTIVITY LEVEL: If you have received sedation or an anesthetic, you may feel sleepy for   several hours. Rest until you are more awake. Gradually resume your normal activities  No heavy lifting or straining, nothing over 10 lbs., like a gallon of milk.  Pelvic rest- no sex, tampons or douching until follow up or instructed by doctor.  DIET:  You may resume your home diet. If nausea is present, increase your diet gradually with fluids and bland foods.    Medications:  Pain medication should be taken only if needed and as directed. If antibiotics are prescribed, the medication should be taken until completed. You will be given an updated list of you medications.  No driving, alcoholic beverages or signing legal documents for next 24 hours or while taking pain medication    CALL THE DOCTOR:   For any obvious bleeding (some dried blood over the incision is normal).     Redness, swelling, foul smell around incision or fever over 101.  Shortness of breath, Coughing up Bloody Sputum or Pains or Swelling in your calves.  Persistent pain or nausea not relieved by medication.  If vaginal bleeding is in excess of a normal period.  Problems urinating    If any unusual problems or difficulties occur contact your doctor. If you cannot contact your doctor but feel your signs and symptoms warrant a physicians attention return to the emergency room.           Fall Prevention  Millions of people fall every year and injure themselves. You may have had anesthesia or sedation which may increase your risk of falling. You may have health issues that put you at an increased risk of falling.     Here are ways to reduce your risk of  falling.    Make your home safe by keeping walkways clear of objects you may trip over.  Use non-slip pads under rugs. Do not use area rugs or small throw rugs.  Use non-slip mats in bathtubs and showers.  Install handrails and lights on staircases.  Do not walk in poorly lit areas.  Do not stand on chairs or wobbly ladders.  Use caution when reaching overhead or looking upward. This position can cause a loss of balance.  Be sure your shoes fit properly, have non-slip bottoms and are in good condition.   Wear shoes both inside and out. Avoid going barefoot or wearing slippers.  Be cautious when going up and down stairs, curbs, and when walking on uneven sidewalks.  If your balance is poor, consider using a cane or walker.  If your fall was related to alcohol use, stop or limit alcohol intake.   If your fall was related to use of sleeping medicines, talk to your doctor about this. You may need to reduce your dosage at bedtime if you awaken during the night to go to the bathroom.    To reduce the need for nighttime bathroom trips:  Avoid drinking fluids for several hours before going to bed  Empty your bladder before going to bed  Men can keep a urinal at the bedside  Stay as active as you can. Balance, flexibility, strength, and endurance all come from exercise. They all play a role in preventing falls. Ask your healthcare provider which types of activity are right for you.  Get your vision checked on a regular basis.  If you have pets, know where they are before you stand up or walk so you don't trip over them.  Use night lights.

## 2025-04-24 NOTE — TRANSFER OF CARE
Anesthesia Transfer of Care Note    Patient: Shelli Iverson    Procedure(s) Performed: Procedure(s) (LRB):  HYSTERECTOMY,TOTAL,LAPAROSCOPIC,WITH SALPINGECTOMY,CYSTOSCOPY (N/A)    Patient location: PACU    Anesthesia Type: general    Transport from OR: Transported from OR on room air with adequate spontaneous ventilation    Post pain: adequate analgesia    Post assessment: no apparent anesthetic complications and tolerated procedure well    Post vital signs: stable    Level of consciousness: awake    Nausea/Vomiting: no nausea/vomiting    Complications: none    Transfer of care protocol was followed      Last vitals: Visit Vitals  BP (!) 144/87   Pulse 79   Temp 36.5 °C (97.7 °F)   Resp (!) 37   Wt 69.3 kg (152 lb 12.8 oz)   SpO2 97%   Breastfeeding No   BMI 26.23 kg/m²

## 2025-04-29 LAB
ESTROGEN SERPL-MCNC: NORMAL PG/ML
INSULIN SERPL-ACNC: NORMAL U[IU]/ML
LAB AP CLINICAL INFORMATION: NORMAL
LAB AP GROSS DESCRIPTION: NORMAL
LAB AP PERFORMING LOCATION(S): NORMAL
LAB AP REPORT FOOTNOTES: NORMAL

## 2025-05-07 ENCOUNTER — TELEPHONE (OUTPATIENT)
Dept: FAMILY MEDICINE | Facility: CLINIC | Age: 45
End: 2025-05-07

## 2025-05-07 ENCOUNTER — CLINICAL SUPPORT (OUTPATIENT)
Dept: FAMILY MEDICINE | Facility: CLINIC | Age: 45
End: 2025-05-07
Payer: COMMERCIAL

## 2025-05-07 ENCOUNTER — LAB VISIT (OUTPATIENT)
Dept: LAB | Facility: HOSPITAL | Age: 45
End: 2025-05-07
Payer: COMMERCIAL

## 2025-05-07 ENCOUNTER — RESULTS FOLLOW-UP (OUTPATIENT)
Dept: FAMILY MEDICINE | Facility: CLINIC | Age: 45
End: 2025-05-07

## 2025-05-07 VITALS — DIASTOLIC BLOOD PRESSURE: 94 MMHG | HEART RATE: 86 BPM | SYSTOLIC BLOOD PRESSURE: 140 MMHG

## 2025-05-07 DIAGNOSIS — E11.9 CONTROLLED TYPE 2 DIABETES MELLITUS WITHOUT COMPLICATION, WITHOUT LONG-TERM CURRENT USE OF INSULIN: ICD-10-CM

## 2025-05-07 DIAGNOSIS — I15.2 HYPERTENSION ASSOCIATED WITH DIABETES: Primary | ICD-10-CM

## 2025-05-07 DIAGNOSIS — E11.59 HYPERTENSION ASSOCIATED WITH DIABETES: Primary | ICD-10-CM

## 2025-05-07 LAB
ALBUMIN/CREAT UR: 48.3 UG/MG
CHOLEST SERPL-MCNC: 150 MG/DL (ref 120–199)
CHOLEST/HDLC SERPL: 2.9 {RATIO} (ref 2–5)
CREAT UR-MCNC: 151 MG/DL (ref 15–325)
EAG (OHS): 140 MG/DL (ref 68–131)
HBA1C MFR BLD: 6.5 % (ref 4–5.6)
HDLC SERPL-MCNC: 51 MG/DL (ref 40–75)
HDLC SERPL: 34 % (ref 20–50)
LDLC SERPL CALC-MCNC: 81.4 MG/DL (ref 63–159)
MICROALBUMIN UR-MCNC: 73 UG/ML (ref ?–5000)
NONHDLC SERPL-MCNC: 99 MG/DL
TRIGL SERPL-MCNC: 88 MG/DL (ref 30–150)

## 2025-05-07 PROCEDURE — 99499 UNLISTED E&M SERVICE: CPT | Mod: S$GLB,,, | Performed by: STUDENT IN AN ORGANIZED HEALTH CARE EDUCATION/TRAINING PROGRAM

## 2025-05-07 PROCEDURE — 99999 PR PBB SHADOW E&M-EST. PATIENT-LVL II: CPT | Mod: PBBFAC,,,

## 2025-05-07 PROCEDURE — 80061 LIPID PANEL: CPT

## 2025-05-07 PROCEDURE — 92228 IMG RTA DETC/MNTR DS PHY/QHP: CPT | Mod: 26,S$GLB,, | Performed by: OPTOMETRIST

## 2025-05-07 PROCEDURE — 82043 UR ALBUMIN QUANTITATIVE: CPT

## 2025-05-07 PROCEDURE — 36415 COLL VENOUS BLD VENIPUNCTURE: CPT | Mod: PO

## 2025-05-07 PROCEDURE — 83036 HEMOGLOBIN GLYCOSYLATED A1C: CPT

## 2025-05-07 PROCEDURE — 2025F 7 FLD RTA PHOTO W/O RTNOPTHY: CPT | Mod: CPTII,S$GLB,, | Performed by: OPTOMETRIST

## 2025-05-07 NOTE — PROGRESS NOTES
"Shelli Iverson is a 45 y.o. female here for a diabetic eye screening with non-dilated fundus photos per Dr. Rock.    Patient cooperative?: Yes  Small pupils?: No  Last eye exam: patient states " it's been a few years"    For exam results, see Encounter Report.     "

## 2025-05-07 NOTE — TELEPHONE ENCOUNTER
Pt seen in clinic today for BP check. Pt states that she is taking all medications with no issues. Did not take medication this morning. Pts blood pressure today in clinic is 154/98 pulse 97.  Blood pressure reading after 15 minutes was 140/94, Pulse 86.  Please advise if you would like to change anything.      Thanks.  Tiffanie

## 2025-05-07 NOTE — TELEPHONE ENCOUNTER
Spoke with pharmacy states received Fluoxetine and Hydralazine on 04/23/25 but patient never picked up. Will get medication ready and contact patient for .

## 2025-05-07 NOTE — TELEPHONE ENCOUNTER
----- Message from Bijal sent at 5/7/2025  3:05 PM CDT -----  Regarding: return call  Contact: patient  Type:  Patient Returning CallWho Called:patientWho Left Message for Patient:Mariela the patient know what this is regarding?:Would the patient rather a call back or a response via [facility]? [facility] Call Back [facility]:[phone] (home) Additional Information: Please call patient to advise.  Thanks!

## 2025-05-07 NOTE — Clinical Note
No gross diabetic retinopathy is noted in either eye. Crowded optic disc. Advise dilated fundus exam in 12 months.

## 2025-05-07 NOTE — PROGRESS NOTES
Shelli Iverson 45 y.o. female is here today for Blood Pressure check.   History of HTN yes.    Review of patient's allergies indicates:   Allergen Reactions    Penicillins Rash    Black dye      Creatinine   Date Value Ref Range Status   04/22/2025 0.8 0.5 - 1.4 mg/dL Final     Sodium   Date Value Ref Range Status   04/22/2025 142 136 - 145 mmol/L Final   02/27/2025 140 136 - 145 mmol/L Final     Potassium   Date Value Ref Range Status   04/22/2025 3.9 3.5 - 5.1 mmol/L Final   02/27/2025 3.6 3.5 - 5.1 mmol/L Final   ]  Patient verifies taking blood pressure medications on a regular basis at the same time of the day.   Current Medications[1]  Does patient have record of home blood pressure readings no.   Last dose of blood pressure medication was taken at yesterday.  Patient is asymptomatic.       BP: (!) 154/98 , Pulse: 97 .    Blood pressure reading after 15 minutes was 140/94, Pulse 86.  Dr. Rock notified.            [1]   Current Outpatient Medications:     amLODIPine (NORVASC) 10 MG tablet, Take 1 tablet (10 mg total) by mouth once daily., Disp: 90 tablet, Rfl: 3    atorvastatin (LIPITOR) 20 MG tablet, Take 1 tablet (20 mg total) by mouth once daily., Disp: 90 tablet, Rfl: 3    blood sugar diagnostic Strp, To check BG 2 times daily, to use with insurance preferred meter, Disp: 100 each, Rfl: 2    FLUoxetine 40 MG capsule, Take one tablet by mouth daily, Disp: 90 capsule, Rfl: 3    hydrALAZINE (APRESOLINE) 50 MG tablet, Take 1 tablet (50 mg total) by mouth 3 (three) times daily., Disp: 270 tablet, Rfl: 3    HYDROcodone-acetaminophen (NORCO) 5-325 mg per tablet, Take 1 tablet by mouth every 6 (six) hours as needed for Pain., Disp: 30 tablet, Rfl: 0    hydrocortisone 2.5 % cream, Apply topically 2 (two) times daily., Disp: 28 g, Rfl: 0    hydrOXYzine (ATARAX) 50 MG tablet, TAKE 1 TABLET BY MOUTH AT BEDTIME AS NEEDED INSOMNIA, Disp: , Rfl:     ibuprofen (ADVIL,MOTRIN) 800 MG tablet, Take 1 tablet (800 mg total) by  mouth every 8 (eight) hours as needed for Pain., Disp: 40 tablet, Rfl: 0    losartan (COZAAR) 100 MG tablet, Take 1 tablet (100 mg total) by mouth once daily., Disp: 90 tablet, Rfl: 3    metFORMIN (GLUCOPHAGE) 1000 MG tablet, Take 1 tablet (1,000 mg total) by mouth 2 (two) times daily with meals., Disp: 180 tablet, Rfl: 3    ondansetron (ZOFRAN-ODT) 4 MG TbDL, Take 2 tablets (8 mg total) by mouth every 8 (eight) hours as needed (nausea)., Disp: 60 tablet, Rfl: 2    spironolactone (ALDACTONE) 25 MG tablet, Take 1 tablet (25 mg total) by mouth once daily., Disp: 30 tablet, Rfl: 5    tirzepatide 7.5 mg/0.5 mL PnIj, Inject 7.5 mg into the skin every 7 days., Disp: 4 Pen, Rfl: 6    tranexamic acid (LYSTEDA) 650 mg tablet, TAKE 2 TABLETS(1300 MG) BY MOUTH THREE TIMES DAILY FOR 5 DAYS, Disp: 30 tablet, Rfl: 0    TRUE METRIX GLUCOSE METER Misc, TAKE AS DIRECTED TWICE DAILY, Disp: , Rfl:     TRUEPLUS LANCETS 33 gauge Misc, 2 (two) times daily., Disp: , Rfl:     Current Facility-Administered Medications:     famotidine tablet 20 mg, 20 mg, Oral, On Call Procedure, Isaiah Shafer MD

## 2025-05-08 ENCOUNTER — PATIENT MESSAGE (OUTPATIENT)
Dept: OBSTETRICS AND GYNECOLOGY | Facility: CLINIC | Age: 45
End: 2025-05-08
Payer: COMMERCIAL

## 2025-05-08 DIAGNOSIS — I10 ESSENTIAL (PRIMARY) HYPERTENSION: ICD-10-CM

## 2025-05-08 RX ORDER — LOSARTAN POTASSIUM 100 MG/1
100 TABLET ORAL DAILY
Qty: 90 TABLET | Refills: 3 | Status: SHIPPED | OUTPATIENT
Start: 2025-05-08

## 2025-05-09 ENCOUNTER — PATIENT MESSAGE (OUTPATIENT)
Dept: OBSTETRICS AND GYNECOLOGY | Facility: CLINIC | Age: 45
End: 2025-05-09
Payer: COMMERCIAL

## 2025-05-15 ENCOUNTER — PATIENT MESSAGE (OUTPATIENT)
Dept: OBSTETRICS AND GYNECOLOGY | Facility: CLINIC | Age: 45
End: 2025-05-15
Payer: COMMERCIAL

## 2025-05-15 ENCOUNTER — TELEPHONE (OUTPATIENT)
Dept: OBSTETRICS AND GYNECOLOGY | Facility: CLINIC | Age: 45
End: 2025-05-15
Payer: COMMERCIAL

## 2025-05-15 NOTE — TELEPHONE ENCOUNTER
LVM informing pt that Dr. Leon will not be in clinic on 05/29 and to c/b to reschedule to see our NP-Damian Morrison or Dr. Leon.  Moved appt to 05/26 at 1530.

## 2025-05-26 ENCOUNTER — OFFICE VISIT (OUTPATIENT)
Dept: OBSTETRICS AND GYNECOLOGY | Facility: CLINIC | Age: 45
End: 2025-05-26
Payer: COMMERCIAL

## 2025-05-26 VITALS — BODY MASS INDEX: 26.87 KG/M2 | DIASTOLIC BLOOD PRESSURE: 80 MMHG | SYSTOLIC BLOOD PRESSURE: 128 MMHG | WEIGHT: 156.5 LBS

## 2025-05-26 DIAGNOSIS — Z90.710 STATUS POST LAPAROSCOPIC HYSTERECTOMY: Primary | ICD-10-CM

## 2025-05-26 PROCEDURE — 3060F POS MICROALBUMINURIA REV: CPT | Mod: CPTII,S$GLB,, | Performed by: OBSTETRICS & GYNECOLOGY

## 2025-05-26 PROCEDURE — 4010F ACE/ARB THERAPY RXD/TAKEN: CPT | Mod: CPTII,S$GLB,, | Performed by: OBSTETRICS & GYNECOLOGY

## 2025-05-26 PROCEDURE — 3066F NEPHROPATHY DOC TX: CPT | Mod: CPTII,S$GLB,, | Performed by: OBSTETRICS & GYNECOLOGY

## 2025-05-26 PROCEDURE — 99024 POSTOP FOLLOW-UP VISIT: CPT | Mod: S$GLB,,, | Performed by: OBSTETRICS & GYNECOLOGY

## 2025-05-26 PROCEDURE — 99999 PR PBB SHADOW E&M-EST. PATIENT-LVL IV: CPT | Mod: PBBFAC,,, | Performed by: OBSTETRICS & GYNECOLOGY

## 2025-05-26 PROCEDURE — 1160F RVW MEDS BY RX/DR IN RCRD: CPT | Mod: CPTII,S$GLB,, | Performed by: OBSTETRICS & GYNECOLOGY

## 2025-05-26 PROCEDURE — 1159F MED LIST DOCD IN RCRD: CPT | Mod: CPTII,S$GLB,, | Performed by: OBSTETRICS & GYNECOLOGY

## 2025-05-26 PROCEDURE — 3044F HG A1C LEVEL LT 7.0%: CPT | Mod: CPTII,S$GLB,, | Performed by: OBSTETRICS & GYNECOLOGY

## 2025-05-26 PROCEDURE — 3079F DIAST BP 80-89 MM HG: CPT | Mod: CPTII,S$GLB,, | Performed by: OBSTETRICS & GYNECOLOGY

## 2025-05-26 PROCEDURE — 3074F SYST BP LT 130 MM HG: CPT | Mod: CPTII,S$GLB,, | Performed by: OBSTETRICS & GYNECOLOGY

## 2025-05-26 NOTE — PROGRESS NOTES
History & Physical  Gynecology Problem Visit      SUBJECTIVE:     Chief Complaint: Post-op Evaluation       History of Present Illness:  Postoperative Follow-up  Ms. Iverson is a 44 y/o female who presents to the clinic 6 weeks status post total laparoscopic hysterectomy for abnormal uterine bleeding. Eating a regular diet without difficulty. Bowel movements are normal. The patient is having pain only when she having bowel movements.    2025  Final Diagnosis   Uterus, cervix, bilateral fallopian tubes, hysterectomy, bilateral salpingectomy:   Uterus (96 g):   Nonproliferative endometrium  Adenomyosis   Leiomyomata  Serosal adhesions  No evidence of hyperplasia, atypia, or malignancy in sections examined  Cervix:   Benign endocervix and ectocervix   Negative for dysplasia or malignancy in sections examined  Bilateral fallopian tubes:   Fallopian tubes with no significant histopathologic abnormality   Negative for malignancy       Review of patient's allergies indicates:   Allergen Reactions    Penicillins Rash    Black dye        Past Medical History:   Diagnosis Date    LAKE (generalized anxiety disorder)     GERD (gastroesophageal reflux disease)     HLD (hyperlipidemia)     Hypertension     Recurrent major depressive disorder, in remission 2022    Sciatica     T2DM (type 2 diabetes mellitus)      Past Surgical History:   Procedure Laterality Date    ABDOMINOPLASTY       SECTION      x 2    COLONOSCOPY N/A 2024    Procedure: COLONOSCOPY;  Surgeon: Kleber Tesfaye DO;  [facility]: [facility];  Service: Endoscopy;  Laterality: N/A;  ppm    HYSTERECTOMY, TOTAL, LAPAROSCOPIC, WITH SALPINGECTOMY N/A 2025    Procedure: HYSTERECTOMY,TOTAL,LAPAROSCOPIC,WITH SALPINGECTOMY,CYSTOSCOPY;  Surgeon: Isaiah Shafer MD;  Location: [facility] OR;  Service: OB/GYN;  Laterality: N/A;  JERAD SURGICAL NOTIFIED-GG  RN PREOP 2025----CLEARED BY CARDS---T/S---DONE    REVISION OF ABDOMINAL SCAR       planned 22     OB History          3    Para   3    Term   0       3    AB   0    Living   3         SAB   0    IAB   0    Ectopic   0    Multiple   0    Live Births   3           Obstetric Comments   H/o PTL x 2, pt was on [place] with her second pregnancy             Family History   Problem Relation Name Age of Onset    Heart disease Mother          CAD age 38    Hypertension Mother  38        MI    Diabetes Mother      Hypertension Father      Colon cancer Paternal Grandfather  80     Social History[1]    Current Outpatient Medications   Medication Sig    amLODIPine (NORVASC) 10 MG tablet Take 1 tablet (10 mg total) by mouth once daily.    atorvastatin (LIPITOR) 20 MG tablet Take 1 tablet (20 mg total) by mouth once daily.    blood sugar diagnostic Strp To check BG 2 times daily, to use with insurance preferred meter    FLUoxetine 40 MG capsule Take one tablet by mouth daily    hydrALAZINE (APRESOLINE) 50 MG tablet Take 1 tablet (50 mg total) by mouth 3 (three) times daily.    HYDROcodone-acetaminophen (NORCO) 5-325 mg per tablet Take 1 tablet by mouth every 6 (six) hours as needed for Pain.    hydrocortisone 2.5 % cream Apply topically 2 (two) times daily.    hydrOXYzine (ATARAX) 50 MG tablet TAKE 1 TABLET BY MOUTH AT BEDTIME AS NEEDED INSOMNIA    ibuprofen (ADVIL,MOTRIN) 800 MG tablet Take 1 tablet (800 mg total) by mouth every 8 (eight) hours as needed for Pain.    losartan (COZAAR) 100 MG tablet Take 1 tablet (100 mg total) by mouth once daily.    metFORMIN (GLUCOPHAGE) 1000 MG tablet Take 1 tablet (1,000 mg total) by mouth 2 (two) times daily with meals.    ondansetron (ZOFRAN-ODT) 4 MG TbDL Take 2 tablets (8 mg total) by mouth every 8 (eight) hours as needed (nausea).    spironolactone (ALDACTONE) 25 MG tablet Take 1 tablet (25 mg total) by mouth once daily.    tirzepatide 7.5 mg/0.5 mL PnIj Inject 7.5 mg into the skin every 7 days.    tranexamic acid (LYSTEDA) 650 mg tablet TAKE 2  TABLETS(1300 MG) BY MOUTH THREE TIMES DAILY FOR 5 DAYS    TRUE METRIX GLUCOSE METER Misc TAKE AS DIRECTED TWICE DAILY    TRUEPLUS LANCETS 33 gauge Misc 2 (two) times daily.     Current Facility-Administered Medications   Medication    famotidine tablet 20 mg         Review of Systems:  Review of Systems   Constitutional:  Negative for activity change and fatigue.   HENT:  Negative for congestion, sneezing and sore throat.    Respiratory:  Negative for shortness of breath.    Cardiovascular:  Negative for chest pain.   Gastrointestinal:  Negative for abdominal pain, nausea and vomiting.        Pain with bowel movements   Genitourinary:  Negative for flank pain, menstrual problem, pelvic pain and vaginal discharge.   Musculoskeletal:  Negative for back pain.   Skin:  Negative for rash.   Neurological:  Negative for headaches.        OBJECTIVE:   Vitals:     Vitals:    05/26/25 1552   BP: 128/80   Weight: 71 kg (156 lb 8.4 oz)        Physical Exam:  Physical Exam  Vitals and nursing note reviewed. Exam conducted with a chaperone present.   Constitutional:       Appearance: She is well-developed.   Cardiovascular:      Rate and Rhythm: Normal rate.   Pulmonary:      Effort: Pulmonary effort is normal. No respiratory distress.   Chest:   Breasts:     Breasts are symmetrical.   Abdominal:      General: There is no distension.      Palpations: Abdomen is soft.      Tenderness: There is no abdominal tenderness.   Genitourinary:     Comments: Vaginal cuff in place. Suture still present  Skin:     General: Skin is warm and dry.   Neurological:      Mental Status: She is alert and oriented to person, place, and time.       ASSESSMENT:       ICD-10-CM ICD-9-CM    1. Status post laparoscopic hysterectomy  Z90.710 V88.01          Plan:      Shelli was seen today for post-op evaluation.    Diagnoses and all orders for this visit:    Status post laparoscopic hysterectomy  - Tolerating a regular diet with no nausea or  vomiting.  - Voiding and ambulating with no issues  - Pain controlled  - Doing well    No orders of the defined types were placed in this encounter.      Follow up in about 1 year (around 5/26/2026) for Well Woman/Annual.            [1]   Social History  Tobacco Use    Smoking status: Never     Passive exposure: Never    Smokeless tobacco: Never   Substance Use Topics    Alcohol use: Yes     Comment: occasionally    Drug use: Yes     Types: Marijuana     Comment: occasional gummies

## 2025-06-22 ENCOUNTER — PATIENT MESSAGE (OUTPATIENT)
Dept: FAMILY MEDICINE | Facility: CLINIC | Age: 45
End: 2025-06-22
Payer: COMMERCIAL

## 2025-07-23 NOTE — PROGRESS NOTES
[facility] [place]   OFFICE VISIT NOTE    Patient Name: Shelli Iverson  YOB: 1980    PRESENTING HISTORY     History of Present Illness:  Miss Shelli Iverson is a 45 y.o. female     The patient location is:  [place]  The chief complaint leading to consultation is: Checkup  Visit type: Virtual visit with synchronous audio and video  Total time spent with patient:  Less than 30 minutes  Each patient to whom he or she provides medical services by telemedicine is:  (1) informed of the relationship between the physician and patient and the respective role of any other health care provider with respect to management of the patient; and (2) notified that he or she may decline to receive medical services by telemedicine and may withdraw from such care at any time. Vital signs recorded were provided by the patient.    History of Present Illness    CHIEF COMPLAINT:  Patient presents today for follow up of Mounjaro medication management.    WEIGHT MANAGEMENT:  She has been on Mounjaro 7.5 mg for several months, reporting significant weight loss and successful weight maintenance without side effects such as nausea, vomiting, or abdominal pain.    DIET AND EXERCISE:  She maintains an active lifestyle with daily walking and light stretching twice weekly. She denies strenuous exercise. Her diet includes increased water and vegetable intake with reduced carbohydrates, noting that carbohydrates make her feel quickly satiated. She describes herself as not being a frequent snacker.    IBS:  She has diarrhea-predominant IBS with sudden onset of symptoms, experiencing rapid and urgent bowel movements that can occur abruptly, even mid-meal. Bowel movements occur every 1-2 days. She has identified multiple food triggers, noting that chicken appears to provoke symptoms while fish is better tolerated. She has modified her diet both at home and when dining out to manage her symptoms.         Review of Systems    Gastrointestinal:  Positive for diarrhea. Negative for abdominal pain, constipation, nausea and vomiting.          OBJECTIVE:   Vital Signs:  There were no vitals filed for this visit.        Physical Exam  Constitutional:       General: She is awake.      Appearance: Normal appearance.   Pulmonary:      Effort: Pulmonary effort is normal.   Musculoskeletal:      Cervical back: Normal range of motion.   Neurological:      General: No focal deficit present.      Mental Status: She is alert.   Psychiatric:         Mood and Affect: Mood normal.         Behavior: Behavior is cooperative.         ASSESSMENT & PLAN:     Controlled type 2 diabetes mellitus without complication, without long-term current use of insulin  -     tirzepatide 10 mg/0.5 mL PnIj; Inject 10 mg into the skin every 7 days.  Dispense: 4 Pen; Refill: 6    Weight loss counseling, encounter for  -     tirzepatide 10 mg/0.5 mL PnIj; Inject 10 mg into the skin every 7 days.  Dispense: 4 Pen; Refill: 6  - Monitored the patient's tolerance of Mounjaro 7.5 mg dosage for several months with no side effects such as nausea, vomiting, or constipation.  - Evaluated weight loss progress, noting significant weight reduction and maintenance while on Mounjaro.  - Increased dosage to 10 mg based on readiness and lack of significant side effects.  - Educated the patient on potential weight regain after discontinuing Mounjaro, citing research that 2/3 of patients may regain weight, emphasizing the temporary nature of this treatment.  - Advised the patient to continue working on dietary changes (incorporating more vegetables and reducing carbohydrate intake) and physical activity (including daily walking) for long-term weight management after stopping Mounjaro.    Irritable bowel syndrome with diarrhea  - Evaluated the patient's IBS symptoms, noting diarrhea predominance often triggered during meals.  - Determined that symptoms are closely related to dietary intake,  with certain foods acting as triggers.  - Recommend low FODMAP diet for managing IBS symptoms.    FOLLOW-UP:  - Schedule follow up in October for further discussion of IBS management and Mounjaro dosage.         Shayna Rock MD  Family Medicine  [facility] [place]     This note was created using [employer] voice recognition software that occasionally misinterprets phrases or words

## 2025-07-24 ENCOUNTER — PATIENT MESSAGE (OUTPATIENT)
Dept: FAMILY MEDICINE | Facility: CLINIC | Age: 45
End: 2025-07-24

## 2025-07-24 ENCOUNTER — OFFICE VISIT (OUTPATIENT)
Dept: FAMILY MEDICINE | Facility: CLINIC | Age: 45
End: 2025-07-24
Payer: COMMERCIAL

## 2025-07-24 DIAGNOSIS — K58.0 IRRITABLE BOWEL SYNDROME WITH DIARRHEA: ICD-10-CM

## 2025-07-24 DIAGNOSIS — E11.9 CONTROLLED TYPE 2 DIABETES MELLITUS WITHOUT COMPLICATION, WITHOUT LONG-TERM CURRENT USE OF INSULIN: Primary | ICD-10-CM

## 2025-07-24 DIAGNOSIS — Z71.3 WEIGHT LOSS COUNSELING, ENCOUNTER FOR: ICD-10-CM

## 2025-07-24 PROCEDURE — 3044F HG A1C LEVEL LT 7.0%: CPT | Mod: CPTII,95,, | Performed by: STUDENT IN AN ORGANIZED HEALTH CARE EDUCATION/TRAINING PROGRAM

## 2025-07-24 PROCEDURE — 4010F ACE/ARB THERAPY RXD/TAKEN: CPT | Mod: CPTII,95,, | Performed by: STUDENT IN AN ORGANIZED HEALTH CARE EDUCATION/TRAINING PROGRAM

## 2025-07-24 PROCEDURE — 3066F NEPHROPATHY DOC TX: CPT | Mod: CPTII,95,, | Performed by: STUDENT IN AN ORGANIZED HEALTH CARE EDUCATION/TRAINING PROGRAM

## 2025-07-24 PROCEDURE — 98004 SYNCH AUDIO-VIDEO EST SF 10: CPT | Mod: 95,,, | Performed by: STUDENT IN AN ORGANIZED HEALTH CARE EDUCATION/TRAINING PROGRAM

## 2025-07-24 PROCEDURE — 1159F MED LIST DOCD IN RCRD: CPT | Mod: CPTII,95,, | Performed by: STUDENT IN AN ORGANIZED HEALTH CARE EDUCATION/TRAINING PROGRAM

## 2025-07-24 PROCEDURE — 3060F POS MICROALBUMINURIA REV: CPT | Mod: CPTII,95,, | Performed by: STUDENT IN AN ORGANIZED HEALTH CARE EDUCATION/TRAINING PROGRAM

## 2025-07-24 PROCEDURE — 1160F RVW MEDS BY RX/DR IN RCRD: CPT | Mod: CPTII,95,, | Performed by: STUDENT IN AN ORGANIZED HEALTH CARE EDUCATION/TRAINING PROGRAM

## 2025-08-03 ENCOUNTER — ON-DEMAND VIRTUAL (OUTPATIENT)
Dept: URGENT CARE | Facility: CLINIC | Age: 45
End: 2025-08-03
Payer: COMMERCIAL

## 2025-08-03 DIAGNOSIS — N30.00 ACUTE CYSTITIS WITHOUT HEMATURIA: Primary | ICD-10-CM

## 2025-08-03 PROCEDURE — 98004 SYNCH AUDIO-VIDEO EST SF 10: CPT | Mod: 95,,,

## 2025-08-03 RX ORDER — NITROFURANTOIN 25; 75 MG/1; MG/1
100 CAPSULE ORAL 2 TIMES DAILY
Qty: 10 CAPSULE | Refills: 0 | Status: SHIPPED | OUTPATIENT
Start: 2025-08-03 | End: 2025-08-08

## 2025-08-03 NOTE — PATIENT INSTRUCTIONS
PLEASE READ YOUR DISCHARGE INSTRUCTIONS ENTIRELY AS IT CONTAINS IMPORTANT INFORMATION.  URINARY TRACT INFECTION PREVENTION:    - increase fluid intake. Women with recurrent urinary tract infections should drink 2-3 liters of fluid per day.    - Avoid bath salts, bath bombs, bubble baths in prevention of recurrence.     - avoid spermicide use in intercourse. Use of a spermicide with intercourse has been shown to increase the risk of UTIs.     - avoid sex. The frequency of sexual intercourse is a strong factor for recurrent infections.     - urinate after sex routinely    - initiate regular use of probiotics such as culturelle or lactobacillus acidophilus which can protect the vagina from bad bacterial colonization.     - avoid use of nylon undergarments. Use of cotton undergarments can assist in prevention of further infections.     -Take the antibiotics to completion.     -Drink plenty of fluids, wipe front to back, take showers not baths, no scented soaps, wear breathable cotton underwear, urinate after sexual intercourse.     -Please go to Urgent Care or the ER for worsening symptoms including fever, worsening flank pain, vomiting, etc.       -Please return or see your primary care doctor if you develop new or worsening symptoms.       -If you continue to have urinary tract infections despite practicing good prevention, please talk to your doctor about other options such as antibiotic prophylaxis or seeing a urologist.     Please arrange follow up with your primary medical clinic as soon as possible. You must understand that you've received an Urgent Care treatment only and that you may be released before all of your medical problems are known or treated. You, the patient, will arrange for follow up as instructed. If your symptoms worsen or fail to improve you should go to the Emergency Room.  WE CANNOT RULE OUT ALL POSSIBLE CAUSES OF YOUR SYMPTOMS IN THE URGENT CARE SETTING PLEASE GO TO THE ER IF YOU FEELS  YOUR CONDITION IS WORSENING OR YOU WOULD LIKE EMERGENT EVALUATION.     Thank you for choosing [facility]!    Our goal in the [facility] to always provide outstanding medical care. You may receive a survey by mail or e-mail in the next week regarding your experience today. We would greatly appreciate you completing and returning the survey. Your feedback provides us with a way to recognize our staff who provide very good care, and it helps us learn how to improve when your experience was below our aspiration of excellence.         We appreciate you trusting us with your medical care. We hope you feel better soon. We will be happy to take care of you for all of your future medical needs.    You must understand that you've received Virtual  treatment only and that you may be released before all your medical problems are known or treated. You, the patient, will arrange for follow up care as instructed.    Follow up with your PCP or specialty clinic as directed in the next 1-2 weeks if not improved or as needed.  You can call [phone] to schedule an appointment with the appropriate provider.    If your condition worsens we recommend that you receive another evaluation in person, with your primary care provider, urgent care or at the emergency room immediately or contact your primary medical clinics after hours call service to discuss your concerns.

## 2025-08-03 NOTE — PROGRESS NOTES
Subjective:      Patient ID: Shelli Iverson is a 45 y.o. female.    Vitals:  vitals were not taken for this visit.     Chief Complaint: Urinary Tract Infection      Visit Type: TELE AUDIOVISUAL    Patient Location: [place] , [place]     Present with the patient at the time of consultation: TELEMED PRESENT WITH PATIENT: None   Two patient identifiers obtained before virtual visit including name and     Past Medical History:   Diagnosis Date    LAKE (generalized anxiety disorder)     GERD (gastroesophageal reflux disease)     HLD (hyperlipidemia)     Hypertension     Recurrent major depressive disorder, in remission 2022    Sciatica     T2DM (type 2 diabetes mellitus)      Past Surgical History:   Procedure Laterality Date    ABDOMINOPLASTY       SECTION      x 2    COLONOSCOPY N/A 2024    Procedure: COLONOSCOPY;  Surgeon: Kleber Tesfaye DO;  [facility]: [facility];  Service: Endoscopy;  Laterality: N/A;  ppm    HYSTERECTOMY, TOTAL, LAPAROSCOPIC, WITH SALPINGECTOMY N/A 2025    Procedure: HYSTERECTOMY,TOTAL,LAPAROSCOPIC,WITH SALPINGECTOMY,CYSTOSCOPY;  Surgeon: Isaiah Shafer MD;  Location: [facility] OR;  Service: OB/GYN;  Laterality: N/A;  JERAD SURGICAL NOTIFIED-GG  RN PREOP 2025----CLEARED BY CARDS---T/S---DONE    REVISION OF ABDOMINAL SCAR      planned 22     Review of patient's allergies indicates:   Allergen Reactions    Penicillins Rash    Black dye      Medications Ordered Prior to Encounter[1]  Family History   Problem Relation Name Age of Onset    Heart disease Mother          CAD age 38    Hypertension Mother  38        MI    Diabetes Mother      Hypertension Father      Colon cancer Paternal Grandfather  80           Ohs Peq Odvv Intake    8/3/2025  3:56 PM CDT - Filed by Patient   What is your current physical address in the event of a medical emergency? [address] [place] [id number]   Are you able to take your vital signs? No   Please attach any  relevant images or files    Ohs Peq Odvv Preferred Language          Urinary Tract Infection   This is a new problem. The current episode started in the past 7 days (started 2-3 days ago). The problem has been gradually worsening. The pain is at a severity of 0/10. The patient is experiencing no pain. There has been no fever. Associated symptoms include frequency and hesitancy. Pertinent negatives include no discharge, flank pain or hematuria. She has tried nothing for the symptoms. The treatment provided no relief. Her past medical history is significant for diabetes mellitus and hypertension. There is no history of kidney stones or recurrent UTIs.     Review of Systems   Genitourinary:  Positive for frequency and hesitancy. Negative for flank pain and hematuria.        Objective:   The physical exam was conducted virtually.  Physical Exam  Constitutional:       General: She is not in acute distress.     Appearance: Normal appearance. She is not ill-appearing.   HENT:      Head: Normocephalic and atraumatic.   Eyes:      General:         Right eye: No discharge.         Left eye: No discharge.      Conjunctiva/sclera: Conjunctivae normal.   Pulmonary:      Effort: Pulmonary effort is normal.   Musculoskeletal:         General: No deformity.   Skin:     Coloration: Skin is not jaundiced or pale.   Neurological:      General: No focal deficit present.      Mental Status: She is alert and oriented to person, place, and time.   Psychiatric:         Mood and Affect: Mood normal.         Behavior: Behavior normal.          Assessment:     1. Acute cystitis without hematuria    Uti prevention sent via portal   Macrobid sent to pharmacy for empirical treatment  Urinalysis to be given by patient at lab  Strict ER precautions for worsening s/s     Plan:       Acute cystitis without hematuria                          [1]   Current Outpatient Medications on File Prior to Visit   Medication Sig Dispense Refill    amLODIPine  (NORVASC) 10 MG tablet Take 1 tablet (10 mg total) by mouth once daily. 90 tablet 3    atorvastatin (LIPITOR) 20 MG tablet Take 1 tablet (20 mg total) by mouth once daily. 90 tablet 3    blood sugar diagnostic Strp To check BG 2 times daily, to use with insurance preferred meter 100 each 2    FLUoxetine 40 MG capsule Take one tablet by mouth daily 90 capsule 3    hydrALAZINE (APRESOLINE) 50 MG tablet Take 1 tablet (50 mg total) by mouth 3 (three) times daily. 270 tablet 3    HYDROcodone-acetaminophen (NORCO) 5-325 mg per tablet Take 1 tablet by mouth every 6 (six) hours as needed for Pain. 30 tablet 0    hydrocortisone 2.5 % cream Apply topically 2 (two) times daily. 28 g 0    hydrOXYzine (ATARAX) 50 MG tablet TAKE 1 TABLET BY MOUTH AT BEDTIME AS NEEDED INSOMNIA      ibuprofen (ADVIL,MOTRIN) 800 MG tablet Take 1 tablet (800 mg total) by mouth every 8 (eight) hours as needed for Pain. 40 tablet 0    losartan (COZAAR) 100 MG tablet Take 1 tablet (100 mg total) by mouth once daily. 90 tablet 3    metFORMIN (GLUCOPHAGE) 1000 MG tablet Take 1 tablet (1,000 mg total) by mouth 2 (two) times daily with meals. 180 tablet 3    ondansetron (ZOFRAN-ODT) 4 MG TbDL Take 2 tablets (8 mg total) by mouth every 8 (eight) hours as needed (nausea). 60 tablet 2    spironolactone (ALDACTONE) 25 MG tablet Take 1 tablet (25 mg total) by mouth once daily. 30 tablet 5    tirzepatide 10 mg/0.5 mL PnIj Inject 10 mg into the skin every 7 days. 4 Pen 6    tranexamic acid (LYSTEDA) 650 mg tablet TAKE 2 TABLETS(1300 MG) BY MOUTH THREE TIMES DAILY FOR 5 DAYS 30 tablet 0    TRUE METRIX GLUCOSE METER Misc TAKE AS DIRECTED TWICE DAILY      TRUEPLUS LANCETS 33 gauge Misc 2 (two) times daily.       Current Facility-Administered Medications on File Prior to Visit   Medication Dose Route Frequency Provider Last Rate Last Admin    famotidine tablet 20 mg  20 mg Oral On Call Procedure Isaiah Shafer MD

## (undated) DEVICE — ADHESIVE DERMABOND MINI HV

## (undated) DEVICE — APPLICATOR HEMOBLAST LAPSCP

## (undated) DEVICE — TROCAR KII FIOS 5MM X 100MM

## (undated) DEVICE — DEVICE N-SEAL LAPROSCOPIC

## (undated) DEVICE — TRAY CATH 1-LYR URIMTR 16FR

## (undated) DEVICE — SET CYSTO IRRIGATION UNIV SPIK

## (undated) DEVICE — CANISTER SUCTION RIGID 2000CC

## (undated) DEVICE — TROCAR KII FIOS 11MM X 100MM

## (undated) DEVICE — DRESSING ADH ISLAND 2.5 X 3

## (undated) DEVICE — SUT VICRYL+ 27 UR-6 VIOL

## (undated) DEVICE — SUT MCRYL PLUS 4-0 PS2 27IN

## (undated) DEVICE — SYR 50CC LL

## (undated) DEVICE — COVER OVERHEAD SURG LT BLUE

## (undated) DEVICE — DRAPE UINDERBUT GRAD PCH

## (undated) DEVICE — PACK LAPAROSCOPY/PELVISCOPY II

## (undated) DEVICE — GLOVE SIGNATURE ESSNTL LTX 6.5

## (undated) DEVICE — BLADE SURG CARBON STEEL SZ11

## (undated) DEVICE — DISSECTOR CURVED 5DCD

## (undated) DEVICE — R SEALER LIGASURE LAP 37CM 5MM

## (undated) DEVICE — PAD PINK TRENDELENBURG POS XL

## (undated) DEVICE — TOWEL OR DISP STRL BLUE 4/PK

## (undated) DEVICE — KIT ANTIFOG

## (undated) DEVICE — PAD PREP CUFFED NS 24X48IN

## (undated) DEVICE — SOL CLEARIFY VISUALIZATION LAP

## (undated) DEVICE — IRRIGATOR ENDOSCOPY DISP.

## (undated) DEVICE — PAD SANITARY MAXI 11IN

## (undated) DEVICE — BOWL STERILE LARGE 32OZ

## (undated) DEVICE — SUT VLOC 180 ABSORB ESTITCH

## (undated) DEVICE — ELECTRODE REM PLYHSV RETURN 9

## (undated) DEVICE — NDL INSUF ULTRA VERESS 120MM

## (undated) DEVICE — MAT CAMEL ULT ABSRB LG 32X56IN

## (undated) DEVICE — BLANKET WARMING UPPER BODY

## (undated) DEVICE — ENDOSTITCH INSTRUMENT

## (undated) DEVICE — APPLICATOR ARISTA FLEX XL

## (undated) DEVICE — POWDER ARISTA AH 3G

## (undated) DEVICE — SOL NACL IRR 1000ML BTL

## (undated) DEVICE — GOWN NONREINF SET-IN SLV XL

## (undated) DEVICE — COVER PROXIMA MAYO STAND

## (undated) DEVICE — SYR 10CC LUER LOCK

## (undated) DEVICE — Device

## (undated) DEVICE — DRAPE STERI LONG

## (undated) DEVICE — ADAPTER DISP HAND SWITCH

## (undated) DEVICE — SOL NACL 0.9% IV INJ 1000ML

## (undated) DEVICE — DRAPE ADPT RUMI II ADVINCULA

## (undated) DEVICE — HOOK DISSECTING 5MM

## (undated) DEVICE — APPLICATOR CHLORAPREP ORN 26ML

## (undated) DEVICE — SUT VICRYL PLUS 0 CT1 36IN

## (undated) DEVICE — SCISSOR CURVED ENDOPATH 5MM

## (undated) DEVICE — OCCLUDER COLPO-PNEUMO STERILE

## (undated) DEVICE — TUBING INSUFFLATION W/LUER LOK